# Patient Record
Sex: FEMALE | Race: WHITE | NOT HISPANIC OR LATINO | Employment: OTHER | URBAN - METROPOLITAN AREA
[De-identification: names, ages, dates, MRNs, and addresses within clinical notes are randomized per-mention and may not be internally consistent; named-entity substitution may affect disease eponyms.]

---

## 2017-07-12 ENCOUNTER — CONVERSION ENCOUNTER (OUTPATIENT)
Dept: GENERAL RADIOLOGY | Facility: HOSPITAL | Age: 72
End: 2017-07-12

## 2018-01-29 ENCOUNTER — OFFICE VISIT CONVERTED (OUTPATIENT)
Dept: FAMILY MEDICINE CLINIC | Facility: CLINIC | Age: 73
End: 2018-01-29
Attending: NURSE PRACTITIONER

## 2018-01-29 ENCOUNTER — CONVERSION ENCOUNTER (OUTPATIENT)
Dept: FAMILY MEDICINE CLINIC | Facility: CLINIC | Age: 73
End: 2018-01-29

## 2018-02-15 ENCOUNTER — OFFICE VISIT CONVERTED (OUTPATIENT)
Dept: NEUROSURGERY | Facility: CLINIC | Age: 73
End: 2018-02-15
Attending: PHYSICIAN ASSISTANT

## 2018-04-24 ENCOUNTER — OFFICE VISIT CONVERTED (OUTPATIENT)
Dept: FAMILY MEDICINE CLINIC | Facility: CLINIC | Age: 73
End: 2018-04-24
Attending: FAMILY MEDICINE

## 2018-04-24 ENCOUNTER — CONVERSION ENCOUNTER (OUTPATIENT)
Dept: FAMILY MEDICINE CLINIC | Facility: CLINIC | Age: 73
End: 2018-04-24

## 2018-06-13 ENCOUNTER — OFFICE VISIT CONVERTED (OUTPATIENT)
Dept: SURGERY | Facility: CLINIC | Age: 73
End: 2018-06-13
Attending: SURGERY

## 2018-07-13 ENCOUNTER — CONVERSION ENCOUNTER (OUTPATIENT)
Dept: GENERAL RADIOLOGY | Facility: HOSPITAL | Age: 73
End: 2018-07-13

## 2018-08-27 ENCOUNTER — OFFICE VISIT CONVERTED (OUTPATIENT)
Dept: SURGERY | Facility: CLINIC | Age: 73
End: 2018-08-27
Attending: SURGERY

## 2018-10-25 ENCOUNTER — CONVERSION ENCOUNTER (OUTPATIENT)
Dept: FAMILY MEDICINE CLINIC | Facility: CLINIC | Age: 73
End: 2018-10-25

## 2018-10-25 ENCOUNTER — OFFICE VISIT CONVERTED (OUTPATIENT)
Dept: FAMILY MEDICINE CLINIC | Facility: CLINIC | Age: 73
End: 2018-10-25
Attending: FAMILY MEDICINE

## 2018-11-01 ENCOUNTER — CONVERSION ENCOUNTER (OUTPATIENT)
Dept: SURGERY | Facility: CLINIC | Age: 73
End: 2018-11-01

## 2018-11-01 ENCOUNTER — OFFICE VISIT CONVERTED (OUTPATIENT)
Dept: SURGERY | Facility: CLINIC | Age: 73
End: 2018-11-01
Attending: PHYSICIAN ASSISTANT

## 2018-11-13 ENCOUNTER — OFFICE VISIT CONVERTED (OUTPATIENT)
Dept: UROLOGY | Facility: CLINIC | Age: 73
End: 2018-11-13
Attending: UROLOGY

## 2018-11-13 ENCOUNTER — CONVERSION ENCOUNTER (OUTPATIENT)
Dept: SURGERY | Facility: CLINIC | Age: 73
End: 2018-11-13

## 2018-12-11 ENCOUNTER — OFFICE VISIT CONVERTED (OUTPATIENT)
Dept: FAMILY MEDICINE CLINIC | Facility: CLINIC | Age: 73
End: 2018-12-11
Attending: NURSE PRACTITIONER

## 2019-01-10 ENCOUNTER — CONVERSION ENCOUNTER (OUTPATIENT)
Dept: SURGERY | Facility: CLINIC | Age: 74
End: 2019-01-10

## 2019-01-10 ENCOUNTER — PROCEDURE VISIT CONVERTED (OUTPATIENT)
Dept: UROLOGY | Facility: CLINIC | Age: 74
End: 2019-01-10
Attending: UROLOGY

## 2019-01-25 ENCOUNTER — CONVERSION ENCOUNTER (OUTPATIENT)
Dept: FAMILY MEDICINE CLINIC | Facility: CLINIC | Age: 74
End: 2019-01-25

## 2019-01-25 ENCOUNTER — OFFICE VISIT CONVERTED (OUTPATIENT)
Dept: FAMILY MEDICINE CLINIC | Facility: CLINIC | Age: 74
End: 2019-01-25
Attending: NURSE PRACTITIONER

## 2019-03-25 ENCOUNTER — OFFICE VISIT CONVERTED (OUTPATIENT)
Dept: FAMILY MEDICINE CLINIC | Facility: CLINIC | Age: 74
End: 2019-03-25
Attending: NURSE PRACTITIONER

## 2019-03-25 ENCOUNTER — CONVERSION ENCOUNTER (OUTPATIENT)
Dept: FAMILY MEDICINE CLINIC | Facility: CLINIC | Age: 74
End: 2019-03-25

## 2019-03-27 ENCOUNTER — HOSPITAL ENCOUNTER (OUTPATIENT)
Dept: GENERAL RADIOLOGY | Facility: HOSPITAL | Age: 74
Discharge: HOME | End: 2019-03-27
Attending: NURSE PRACTITIONER

## 2019-04-25 ENCOUNTER — HOSPITAL ENCOUNTER (OUTPATIENT)
Dept: LAB | Facility: HOSPITAL | Age: 74
Discharge: HOME | End: 2019-04-25
Attending: FAMILY MEDICINE

## 2019-04-25 LAB
25(OH)D3 SERPL-MCNC: 47.9 NG/ML (ref 30–100)
ALBUMIN SERPL-MCNC: 4.2 G/DL (ref 3.5–5)
ALBUMIN/GLOB SERPL: 1.2 {RATIO} (ref 1.4–2.6)
ALP SERPL-CCNC: 128 U/L (ref 43–160)
ALT SERPL-CCNC: 14 U/L (ref 10–40)
ANION GAP SERPL CALC-SCNC: 14 MMOL/L (ref 8–19)
APPEARANCE UR: CLEAR
AST SERPL-CCNC: 19 U/L (ref 15–50)
BASOPHILS # BLD AUTO: 0.07 10*3/UL (ref 0–0.2)
BASOPHILS NFR BLD AUTO: 0.8 % (ref 0–3)
BILIRUB SERPL-MCNC: 0.5 MG/DL (ref 0.2–1.3)
BILIRUB UR QL: ABNORMAL
BUN SERPL-MCNC: 16 MG/DL (ref 5–25)
BUN/CREAT SERPL: 17 {RATIO} (ref 6–20)
CALCIUM SERPL-MCNC: 9.2 MG/DL (ref 8.7–10.4)
CHLORIDE SERPL-SCNC: 102 MMOL/L (ref 99–111)
CHOLEST SERPL-MCNC: 156 MG/DL (ref 107–200)
CHOLEST/HDLC SERPL: 4 {RATIO} (ref 3–6)
COLOR UR: ABNORMAL
CONV ABS IMM GRAN: 0.02 10*3/UL (ref 0–0.2)
CONV BACTERIA: NEGATIVE
CONV CO2: 26 MMOL/L (ref 22–32)
CONV COLLECTION SOURCE (UA): ABNORMAL
CONV HYALINE CASTS IN URINE MICRO: ABNORMAL /[LPF]
CONV IMMATURE GRAN: 0.2 % (ref 0–1.8)
CONV TOTAL PROTEIN: 7.7 G/DL (ref 6.3–8.2)
CONV UROBILINOGEN IN URINE BY AUTOMATED TEST STRIP: 1 {EHRLICHU}/DL (ref 0.1–1)
CREAT UR-MCNC: 0.92 MG/DL (ref 0.5–0.9)
DEPRECATED RDW RBC AUTO: 49.8 FL (ref 36.4–46.3)
EOSINOPHIL # BLD AUTO: 0.01 10*3/UL (ref 0–0.7)
EOSINOPHIL # BLD AUTO: 0.1 % (ref 0–7)
ERYTHROCYTE [DISTWIDTH] IN BLOOD BY AUTOMATED COUNT: 14.5 % (ref 11.7–14.4)
FOLATE SERPL-MCNC: 10.3 NG/ML (ref 4.8–20)
GFR SERPLBLD BASED ON 1.73 SQ M-ARVRAT: >60 ML/MIN/{1.73_M2}
GLOBULIN UR ELPH-MCNC: 3.5 G/DL (ref 2–3.5)
GLUCOSE SERPL-MCNC: 96 MG/DL (ref 65–99)
GLUCOSE UR QL: NEGATIVE MG/DL
HBA1C MFR BLD: 15.6 G/DL (ref 12–16)
HCT VFR BLD AUTO: 48.8 % (ref 37–47)
HDLC SERPL-MCNC: 39 MG/DL (ref 40–60)
HGB UR QL STRIP: ABNORMAL
KETONES UR QL STRIP: NEGATIVE MG/DL
LDLC SERPL CALC-MCNC: 83 MG/DL (ref 70–100)
LEUKOCYTE ESTERASE UR QL STRIP: NEGATIVE
LYMPHOCYTES # BLD AUTO: 1.35 10*3/UL (ref 1–5)
MCH RBC QN AUTO: 29.5 PG (ref 27–31)
MCHC RBC AUTO-ENTMCNC: 32 G/DL (ref 33–37)
MCV RBC AUTO: 92.4 FL (ref 81–99)
MONOCYTES # BLD AUTO: 0.38 10*3/UL (ref 0.2–1.2)
MONOCYTES NFR BLD AUTO: 4.3 % (ref 3–10)
NEUTROPHILS # BLD AUTO: 7.01 10*3/UL (ref 2–8)
NEUTROPHILS NFR BLD AUTO: 79.3 % (ref 30–85)
NITRITE UR QL STRIP: NEGATIVE
NRBC CBCN: 0 % (ref 0–0.7)
OSMOLALITY SERPL CALC.SUM OF ELEC: 287 MOSM/KG (ref 273–304)
PH UR STRIP.AUTO: 5 [PH] (ref 5–8)
PLATELET # BLD AUTO: 164 10*3/UL (ref 130–400)
PMV BLD AUTO: 12.1 FL (ref 9.4–12.3)
POTASSIUM SERPL-SCNC: 4.4 MMOL/L (ref 3.5–5.3)
PROT UR QL: NEGATIVE MG/DL
RBC # BLD AUTO: 5.28 10*6/UL (ref 4.2–5.4)
RBC #/AREA URNS HPF: ABNORMAL /[HPF]
SODIUM SERPL-SCNC: 138 MMOL/L (ref 135–147)
SP GR UR: 1.02 (ref 1–1.03)
SQUAMOUS SPT QL MICRO: ABNORMAL /[HPF]
TRIGL SERPL-MCNC: 169 MG/DL (ref 40–150)
TSH SERPL-ACNC: 1.79 M[IU]/L (ref 0.27–4.2)
VARIANT LYMPHS NFR BLD MANUAL: 15.3 % (ref 20–45)
VIT B12 SERPL-MCNC: 631 PG/ML (ref 211–911)
VLDLC SERPL-MCNC: 34 MG/DL (ref 5–37)
WBC # BLD AUTO: 8.84 10*3/UL (ref 4.8–10.8)
WBC #/AREA URNS HPF: ABNORMAL /[HPF]

## 2019-04-30 ENCOUNTER — OFFICE VISIT CONVERTED (OUTPATIENT)
Dept: FAMILY MEDICINE CLINIC | Facility: CLINIC | Age: 74
End: 2019-04-30
Attending: FAMILY MEDICINE

## 2019-04-30 ENCOUNTER — HOSPITAL ENCOUNTER (OUTPATIENT)
Dept: LAB | Facility: HOSPITAL | Age: 74
Discharge: HOME | End: 2019-04-30
Attending: FAMILY MEDICINE

## 2019-05-01 LAB — HCV AB SER DONR QL: <0.1 S/CO RATIO (ref 0–0.9)

## 2019-05-03 ENCOUNTER — HOSPITAL ENCOUNTER (OUTPATIENT)
Dept: GENERAL RADIOLOGY | Facility: HOSPITAL | Age: 74
Discharge: HOME | End: 2019-05-03
Attending: FAMILY MEDICINE

## 2019-07-29 ENCOUNTER — OFFICE VISIT CONVERTED (OUTPATIENT)
Dept: FAMILY MEDICINE CLINIC | Facility: CLINIC | Age: 74
End: 2019-07-29
Attending: FAMILY MEDICINE

## 2019-09-16 ENCOUNTER — HOSPITAL ENCOUNTER (OUTPATIENT)
Dept: FAMILY MEDICINE CLINIC | Facility: CLINIC | Age: 74
Discharge: HOME | End: 2019-09-16
Attending: FAMILY MEDICINE

## 2019-09-16 ENCOUNTER — OFFICE VISIT CONVERTED (OUTPATIENT)
Dept: FAMILY MEDICINE CLINIC | Facility: CLINIC | Age: 74
End: 2019-09-16
Attending: FAMILY MEDICINE

## 2019-09-19 LAB
CONV LAST MENSTURAL PERIOD: NORMAL
SPECIMEN SOURCE: NORMAL
SPECIMEN SOURCE: NORMAL
THIN PREP CVX: NORMAL

## 2019-10-01 ENCOUNTER — OFFICE VISIT CONVERTED (OUTPATIENT)
Dept: FAMILY MEDICINE CLINIC | Facility: CLINIC | Age: 74
End: 2019-10-01
Attending: FAMILY MEDICINE

## 2019-10-02 ENCOUNTER — HOSPITAL ENCOUNTER (OUTPATIENT)
Dept: LAB | Facility: HOSPITAL | Age: 74
Discharge: HOME | End: 2019-10-02
Attending: FAMILY MEDICINE

## 2019-10-02 LAB
C DIFF TOX B STL QL CT TISS CULT: NEGATIVE
CONV 027 TOXIN: NEGATIVE

## 2019-10-04 LAB — BACTERIA SPEC AEROBE CULT: NORMAL

## 2019-10-08 ENCOUNTER — HOSPITAL ENCOUNTER (OUTPATIENT)
Dept: GENERAL RADIOLOGY | Facility: HOSPITAL | Age: 74
Discharge: HOME | End: 2019-10-08
Attending: FAMILY MEDICINE

## 2019-10-08 LAB
CREAT BLD-MCNC: 0.6 MG/DL (ref 0.6–1.4)
GFR SERPLBLD BASED ON 1.73 SQ M-ARVRAT: >60 ML/MIN/{1.73_M2}

## 2019-11-27 ENCOUNTER — OFFICE VISIT CONVERTED (OUTPATIENT)
Dept: GASTROENTEROLOGY | Facility: CLINIC | Age: 74
End: 2019-11-27
Attending: PHYSICIAN ASSISTANT

## 2019-12-18 ENCOUNTER — HOSPITAL ENCOUNTER (OUTPATIENT)
Dept: GASTROENTEROLOGY | Facility: HOSPITAL | Age: 74
Setting detail: HOSPITAL OUTPATIENT SURGERY
Discharge: HOME | End: 2019-12-18
Attending: INTERNAL MEDICINE

## 2020-03-02 ENCOUNTER — HOSPITAL ENCOUNTER (OUTPATIENT)
Dept: GENERAL RADIOLOGY | Facility: HOSPITAL | Age: 75
Discharge: HOME | End: 2020-03-02
Attending: FAMILY MEDICINE

## 2020-03-16 ENCOUNTER — OFFICE VISIT CONVERTED (OUTPATIENT)
Dept: FAMILY MEDICINE CLINIC | Facility: CLINIC | Age: 75
End: 2020-03-16
Attending: FAMILY MEDICINE

## 2020-03-16 ENCOUNTER — CONVERSION ENCOUNTER (OUTPATIENT)
Dept: FAMILY MEDICINE CLINIC | Facility: CLINIC | Age: 75
End: 2020-03-16

## 2020-06-11 ENCOUNTER — HOSPITAL ENCOUNTER (OUTPATIENT)
Dept: LAB | Facility: HOSPITAL | Age: 75
Discharge: HOME | End: 2020-06-11
Attending: FAMILY MEDICINE

## 2020-06-11 LAB
25(OH)D3 SERPL-MCNC: 34.9 NG/ML (ref 30–100)
ALBUMIN SERPL-MCNC: 3.9 G/DL (ref 3.5–5)
ALBUMIN/GLOB SERPL: 1.3 {RATIO} (ref 1.4–2.6)
ALP SERPL-CCNC: 93 U/L (ref 43–160)
ALT SERPL-CCNC: 8 U/L (ref 10–40)
ANION GAP SERPL CALC-SCNC: 21 MMOL/L (ref 8–19)
APPEARANCE UR: CLEAR
AST SERPL-CCNC: 14 U/L (ref 15–50)
BASOPHILS # BLD AUTO: 0.04 10*3/UL (ref 0–0.2)
BASOPHILS NFR BLD AUTO: 0.6 % (ref 0–3)
BILIRUB SERPL-MCNC: 0.34 MG/DL (ref 0.2–1.3)
BILIRUB UR QL: ABNORMAL
BUN SERPL-MCNC: 9 MG/DL (ref 5–25)
BUN/CREAT SERPL: 10 {RATIO} (ref 6–20)
CALCIUM SERPL-MCNC: 9 MG/DL (ref 8.7–10.4)
CHLORIDE SERPL-SCNC: 105 MMOL/L (ref 99–111)
CHOLEST SERPL-MCNC: 110 MG/DL (ref 107–200)
CHOLEST/HDLC SERPL: 2.9 {RATIO} (ref 3–6)
COLOR UR: ABNORMAL
CONV ABS IMM GRAN: 0.02 10*3/UL (ref 0–0.2)
CONV BACTERIA: NEGATIVE
CONV CO2: 19 MMOL/L (ref 22–32)
CONV COLLECTION SOURCE (UA): ABNORMAL
CONV CRYSTALS: ABNORMAL /[HPF]
CONV HYALINE CASTS IN URINE MICRO: ABNORMAL /[LPF]
CONV IMMATURE GRAN: 0.3 % (ref 0–1.8)
CONV TOTAL PROTEIN: 7 G/DL (ref 6.3–8.2)
CONV UROBILINOGEN IN URINE BY AUTOMATED TEST STRIP: 1 {EHRLICHU}/DL (ref 0.1–1)
CREAT UR-MCNC: 0.88 MG/DL (ref 0.5–0.9)
DEPRECATED RDW RBC AUTO: 50.1 FL (ref 36.4–46.3)
EOSINOPHIL # BLD AUTO: 0.01 10*3/UL (ref 0–0.7)
EOSINOPHIL # BLD AUTO: 0.2 % (ref 0–7)
ERYTHROCYTE [DISTWIDTH] IN BLOOD BY AUTOMATED COUNT: 14.6 % (ref 11.7–14.4)
FOLATE SERPL-MCNC: 8.7 NG/ML (ref 4.8–20)
GFR SERPLBLD BASED ON 1.73 SQ M-ARVRAT: >60 ML/MIN/{1.73_M2}
GLOBULIN UR ELPH-MCNC: 3.1 G/DL (ref 2–3.5)
GLUCOSE SERPL-MCNC: 102 MG/DL (ref 65–99)
GLUCOSE UR QL: NEGATIVE MG/DL
HCT VFR BLD AUTO: 45.3 % (ref 37–47)
HDLC SERPL-MCNC: 38 MG/DL (ref 40–60)
HGB BLD-MCNC: 14.6 G/DL (ref 12–16)
HGB UR QL STRIP: ABNORMAL
KETONES UR QL STRIP: NEGATIVE MG/DL
LDLC SERPL CALC-MCNC: 54 MG/DL (ref 70–100)
LEUKOCYTE ESTERASE UR QL STRIP: ABNORMAL
LYMPHOCYTES # BLD AUTO: 1.47 10*3/UL (ref 1–5)
LYMPHOCYTES NFR BLD AUTO: 23 % (ref 20–45)
MCH RBC QN AUTO: 30 PG (ref 27–31)
MCHC RBC AUTO-ENTMCNC: 32.2 G/DL (ref 33–37)
MCV RBC AUTO: 93.2 FL (ref 81–99)
MONOCYTES # BLD AUTO: 0.41 10*3/UL (ref 0.2–1.2)
MONOCYTES NFR BLD AUTO: 6.4 % (ref 3–10)
NEUTROPHILS # BLD AUTO: 4.45 10*3/UL (ref 2–8)
NEUTROPHILS NFR BLD AUTO: 69.5 % (ref 30–85)
NITRITE UR QL STRIP: POSITIVE
NRBC CBCN: 0 % (ref 0–0.7)
OSMOLALITY SERPL CALC.SUM OF ELEC: 291 MOSM/KG (ref 273–304)
PH UR STRIP.AUTO: 5.5 [PH] (ref 5–8)
PLATELET # BLD AUTO: 164 10*3/UL (ref 130–400)
PMV BLD AUTO: 11.9 FL (ref 9.4–12.3)
POTASSIUM SERPL-SCNC: 3.8 MMOL/L (ref 3.5–5.3)
PROT UR QL: ABNORMAL MG/DL
RBC # BLD AUTO: 4.86 10*6/UL (ref 4.2–5.4)
RBC #/AREA URNS HPF: ABNORMAL /[HPF]
SODIUM SERPL-SCNC: 141 MMOL/L (ref 135–147)
SP GR UR: 1.02 (ref 1–1.03)
SQUAMOUS SPT QL MICRO: ABNORMAL /[HPF]
TRIGL SERPL-MCNC: 91 MG/DL (ref 40–150)
TSH SERPL-ACNC: 2.31 M[IU]/L (ref 0.27–4.2)
VIT B12 SERPL-MCNC: 556 PG/ML (ref 211–911)
VLDLC SERPL-MCNC: 18 MG/DL (ref 5–37)
WBC # BLD AUTO: 6.4 10*3/UL (ref 4.8–10.8)
WBC #/AREA URNS HPF: ABNORMAL /[HPF]

## 2020-06-18 ENCOUNTER — OFFICE VISIT CONVERTED (OUTPATIENT)
Dept: FAMILY MEDICINE CLINIC | Facility: CLINIC | Age: 75
End: 2020-06-18
Attending: FAMILY MEDICINE

## 2020-06-18 ENCOUNTER — HOSPITAL ENCOUNTER (OUTPATIENT)
Dept: FAMILY MEDICINE CLINIC | Facility: CLINIC | Age: 75
Discharge: HOME | End: 2020-06-18
Attending: FAMILY MEDICINE

## 2020-06-18 LAB
APPEARANCE UR: ABNORMAL
BILIRUB UR QL: ABNORMAL
COLOR UR: ABNORMAL
CONV BACTERIA: ABNORMAL
CONV COLLECTION SOURCE (UA): ABNORMAL
CONV CRYSTALS: ABNORMAL /[HPF]
CONV UROBILINOGEN IN URINE BY AUTOMATED TEST STRIP: 1 {EHRLICHU}/DL (ref 0.1–1)
GLUCOSE UR QL: NEGATIVE MG/DL
HGB UR QL STRIP: NEGATIVE
KETONES UR QL STRIP: ABNORMAL MG/DL
LEUKOCYTE ESTERASE UR QL STRIP: ABNORMAL
MUCOUS THREADS URNS QL MICRO: ABNORMAL
NITRITE UR QL STRIP: NEGATIVE
PH UR STRIP.AUTO: 5 [PH] (ref 5–8)
PROT UR QL: ABNORMAL MG/DL
RBC #/AREA URNS HPF: ABNORMAL /[HPF]
SP GR UR: 1.03 (ref 1–1.03)
SQUAMOUS SPT QL MICRO: ABNORMAL /[HPF]
WBC #/AREA URNS HPF: ABNORMAL /[HPF]

## 2020-06-20 LAB — BACTERIA UR CULT: NORMAL

## 2020-07-15 ENCOUNTER — HOSPITAL ENCOUNTER (OUTPATIENT)
Dept: GENERAL RADIOLOGY | Facility: HOSPITAL | Age: 75
Discharge: HOME | End: 2020-07-15
Attending: FAMILY MEDICINE

## 2020-08-24 ENCOUNTER — OFFICE VISIT CONVERTED (OUTPATIENT)
Dept: FAMILY MEDICINE CLINIC | Facility: CLINIC | Age: 75
End: 2020-08-24
Attending: NURSE PRACTITIONER

## 2020-08-24 ENCOUNTER — CONVERSION ENCOUNTER (OUTPATIENT)
Dept: FAMILY MEDICINE CLINIC | Facility: CLINIC | Age: 75
End: 2020-08-24

## 2020-09-18 ENCOUNTER — HOSPITAL ENCOUNTER (OUTPATIENT)
Dept: LAB | Facility: HOSPITAL | Age: 75
Discharge: HOME | End: 2020-09-18
Attending: FAMILY MEDICINE

## 2020-09-19 LAB
25(OH)D3 SERPL-MCNC: 28.4 NG/ML (ref 30–100)
ALBUMIN SERPL-MCNC: 4.1 G/DL (ref 3.5–5)
ALBUMIN/GLOB SERPL: 1.2 {RATIO} (ref 1.4–2.6)
ALP SERPL-CCNC: 106 U/L (ref 43–160)
ALT SERPL-CCNC: 15 U/L (ref 10–40)
ANION GAP SERPL CALC-SCNC: 15 MMOL/L (ref 8–19)
AST SERPL-CCNC: 19 U/L (ref 15–50)
BASOPHILS # BLD AUTO: 0.04 10*3/UL (ref 0–0.2)
BASOPHILS NFR BLD AUTO: 0.6 % (ref 0–3)
BILIRUB SERPL-MCNC: 0.3 MG/DL (ref 0.2–1.3)
BUN SERPL-MCNC: 18 MG/DL (ref 5–25)
BUN/CREAT SERPL: 19 {RATIO} (ref 6–20)
CALCIUM SERPL-MCNC: 9.6 MG/DL (ref 8.7–10.4)
CHLORIDE SERPL-SCNC: 103 MMOL/L (ref 99–111)
CHOLEST SERPL-MCNC: 169 MG/DL (ref 107–200)
CHOLEST/HDLC SERPL: 5 {RATIO} (ref 3–6)
CONV ABS IMM GRAN: 0.02 10*3/UL (ref 0–0.2)
CONV CO2: 26 MMOL/L (ref 22–32)
CONV IMMATURE GRAN: 0.3 % (ref 0–1.8)
CONV TOTAL PROTEIN: 7.5 G/DL (ref 6.3–8.2)
CREAT UR-MCNC: 0.93 MG/DL (ref 0.5–0.9)
DEPRECATED RDW RBC AUTO: 49.4 FL (ref 36.4–46.3)
EOSINOPHIL # BLD AUTO: 0.01 10*3/UL (ref 0–0.7)
EOSINOPHIL # BLD AUTO: 0.2 % (ref 0–7)
ERYTHROCYTE [DISTWIDTH] IN BLOOD BY AUTOMATED COUNT: 13.9 % (ref 11.7–14.4)
FOLATE SERPL-MCNC: 6.3 NG/ML (ref 4.8–20)
GFR SERPLBLD BASED ON 1.73 SQ M-ARVRAT: >60 ML/MIN/{1.73_M2}
GLOBULIN UR ELPH-MCNC: 3.4 G/DL (ref 2–3.5)
GLUCOSE SERPL-MCNC: 108 MG/DL (ref 65–99)
HCT VFR BLD AUTO: 46.6 % (ref 37–47)
HDLC SERPL-MCNC: 34 MG/DL (ref 40–60)
HGB BLD-MCNC: 14.7 G/DL (ref 12–16)
LDLC SERPL CALC-MCNC: 107 MG/DL (ref 70–100)
LYMPHOCYTES # BLD AUTO: 1.18 10*3/UL (ref 1–5)
LYMPHOCYTES NFR BLD AUTO: 18.6 % (ref 20–45)
MCH RBC QN AUTO: 30.4 PG (ref 27–31)
MCHC RBC AUTO-ENTMCNC: 31.5 G/DL (ref 33–37)
MCV RBC AUTO: 96.5 FL (ref 81–99)
MONOCYTES # BLD AUTO: 0.26 10*3/UL (ref 0.2–1.2)
MONOCYTES NFR BLD AUTO: 4.1 % (ref 3–10)
NEUTROPHILS # BLD AUTO: 4.83 10*3/UL (ref 2–8)
NEUTROPHILS NFR BLD AUTO: 76.2 % (ref 30–85)
NRBC CBCN: 0 % (ref 0–0.7)
OSMOLALITY SERPL CALC.SUM OF ELEC: 292 MOSM/KG (ref 273–304)
PLATELET # BLD AUTO: 218 10*3/UL (ref 130–400)
PMV BLD AUTO: 11.9 FL (ref 9.4–12.3)
POTASSIUM SERPL-SCNC: 4.1 MMOL/L (ref 3.5–5.3)
RBC # BLD AUTO: 4.83 10*6/UL (ref 4.2–5.4)
SODIUM SERPL-SCNC: 140 MMOL/L (ref 135–147)
TRIGL SERPL-MCNC: 138 MG/DL (ref 40–150)
TSH SERPL-ACNC: 1.2 M[IU]/L (ref 0.27–4.2)
VIT B12 SERPL-MCNC: 494 PG/ML (ref 211–911)
VLDLC SERPL-MCNC: 28 MG/DL (ref 5–37)
WBC # BLD AUTO: 6.34 10*3/UL (ref 4.8–10.8)

## 2020-09-29 ENCOUNTER — OFFICE VISIT CONVERTED (OUTPATIENT)
Dept: FAMILY MEDICINE CLINIC | Facility: CLINIC | Age: 75
End: 2020-09-29
Attending: FAMILY MEDICINE

## 2020-11-02 ENCOUNTER — HOSPITAL ENCOUNTER (OUTPATIENT)
Dept: GENERAL RADIOLOGY | Facility: HOSPITAL | Age: 75
Discharge: HOME | End: 2020-11-02
Attending: FAMILY MEDICINE

## 2020-11-02 LAB
CREAT BLD-MCNC: 0.7 MG/DL (ref 0.6–1.4)
GFR SERPLBLD BASED ON 1.73 SQ M-ARVRAT: >60 ML/MIN/{1.73_M2}

## 2020-11-09 ENCOUNTER — HOSPITAL ENCOUNTER (OUTPATIENT)
Dept: CARDIOLOGY | Facility: HOSPITAL | Age: 75
Discharge: HOME | End: 2020-11-09
Attending: SURGERY

## 2020-11-13 ENCOUNTER — HOSPITAL ENCOUNTER (OUTPATIENT)
Dept: GENERAL RADIOLOGY | Facility: HOSPITAL | Age: 75
Discharge: HOME | End: 2020-11-13
Attending: SURGERY

## 2020-12-15 ENCOUNTER — HOSPITAL ENCOUNTER (OUTPATIENT)
Dept: GENERAL RADIOLOGY | Facility: HOSPITAL | Age: 75
Discharge: HOME | End: 2020-12-15
Attending: FAMILY MEDICINE

## 2020-12-15 ENCOUNTER — OFFICE VISIT CONVERTED (OUTPATIENT)
Dept: FAMILY MEDICINE CLINIC | Facility: CLINIC | Age: 75
End: 2020-12-15
Attending: FAMILY MEDICINE

## 2020-12-17 ENCOUNTER — HOSPITAL ENCOUNTER (OUTPATIENT)
Dept: FAMILY MEDICINE CLINIC | Facility: CLINIC | Age: 75
Discharge: HOME | End: 2020-12-17
Attending: FAMILY MEDICINE

## 2020-12-19 LAB
AMPICILLIN SUSC ISLT: >=32
AMPICILLIN+SULBAC SUSC ISLT: >=32
BACTERIA UR CULT: ABNORMAL
CEFAZOLIN SUSC ISLT: <=4
CEFEPIME SUSC ISLT: <=0.12
CEFTAZIDIME SUSC ISLT: <=1
CEFTRIAXONE SUSC ISLT: <=0.25
CIPROFLOXACIN SUSC ISLT: >=4
ERTAPENEM SUSC ISLT: <=0.12
GENTAMICIN SUSC ISLT: <=1
LEVOFLOXACIN SUSC ISLT: >=8
NITROFURANTOIN SUSC ISLT: <=16
PIP+TAZO SUSC ISLT: <=4
TMP SMX SUSC ISLT: <=20
TOBRAMYCIN SUSC ISLT: <=1

## 2020-12-30 ENCOUNTER — HOSPITAL ENCOUNTER (OUTPATIENT)
Dept: LAB | Facility: HOSPITAL | Age: 75
Discharge: HOME | End: 2020-12-30
Attending: FAMILY MEDICINE

## 2020-12-30 LAB
ALBUMIN SERPL-MCNC: 3.8 G/DL (ref 3.5–5)
ALBUMIN/GLOB SERPL: 1.2 {RATIO} (ref 1.4–2.6)
ALP SERPL-CCNC: 153 U/L (ref 43–160)
ALT SERPL-CCNC: 12 U/L (ref 10–40)
ANION GAP SERPL CALC-SCNC: 18 MMOL/L (ref 8–19)
APPEARANCE UR: ABNORMAL
AST SERPL-CCNC: 15 U/L (ref 15–50)
BASOPHILS # BLD AUTO: 0.03 10*3/UL (ref 0–0.2)
BASOPHILS NFR BLD AUTO: 0.3 % (ref 0–3)
BILIRUB SERPL-MCNC: 0.44 MG/DL (ref 0.2–1.3)
BILIRUB UR QL: ABNORMAL
BUN SERPL-MCNC: 13 MG/DL (ref 5–25)
BUN/CREAT SERPL: 15 {RATIO} (ref 6–20)
CALCIUM SERPL-MCNC: 9.3 MG/DL (ref 8.7–10.4)
CASTS URNS QL MICRO: ABNORMAL /[LPF]
CHLORIDE SERPL-SCNC: 101 MMOL/L (ref 99–111)
CHOLEST SERPL-MCNC: 163 MG/DL (ref 107–200)
CHOLEST/HDLC SERPL: 4.4 {RATIO} (ref 3–6)
COLOR UR: ABNORMAL
CONV ABS IMM GRAN: 0.02 10*3/UL (ref 0–0.2)
CONV BACTERIA: NEGATIVE
CONV CO2: 22 MMOL/L (ref 22–32)
CONV COLLECTION SOURCE (UA): ABNORMAL
CONV CRYSTALS: ABNORMAL /[HPF]
CONV HYALINE CASTS IN URINE MICRO: ABNORMAL /[LPF]
CONV IMMATURE GRAN: 0.2 % (ref 0–1.8)
CONV TOTAL PROTEIN: 7 G/DL (ref 6.3–8.2)
CONV UROBILINOGEN IN URINE BY AUTOMATED TEST STRIP: 1 {EHRLICHU}/DL (ref 0.1–1)
CREAT UR-MCNC: 0.88 MG/DL (ref 0.5–0.9)
DEPRECATED RDW RBC AUTO: 46.6 FL (ref 36.4–46.3)
EOSINOPHIL # BLD AUTO: 0.01 10*3/UL (ref 0–0.7)
EOSINOPHIL # BLD AUTO: 0.1 % (ref 0–7)
ERYTHROCYTE [DISTWIDTH] IN BLOOD BY AUTOMATED COUNT: 14 % (ref 11.7–14.4)
FOLATE SERPL-MCNC: 4.7 NG/ML (ref 4.8–20)
GFR SERPLBLD BASED ON 1.73 SQ M-ARVRAT: >60 ML/MIN/{1.73_M2}
GLOBULIN UR ELPH-MCNC: 3.2 G/DL (ref 2–3.5)
GLUCOSE SERPL-MCNC: 135 MG/DL (ref 65–99)
GLUCOSE UR QL: NEGATIVE MG/DL
HCT VFR BLD AUTO: 47.3 % (ref 37–47)
HDLC SERPL-MCNC: 37 MG/DL (ref 40–60)
HGB BLD-MCNC: 15.3 G/DL (ref 12–16)
HGB UR QL STRIP: ABNORMAL
KETONES UR QL STRIP: NEGATIVE MG/DL
LDLC SERPL CALC-MCNC: 93 MG/DL (ref 70–100)
LEUKOCYTE ESTERASE UR QL STRIP: ABNORMAL
LYMPHOCYTES # BLD AUTO: 1.15 10*3/UL (ref 1–5)
LYMPHOCYTES NFR BLD AUTO: 13.4 % (ref 20–45)
MCH RBC QN AUTO: 29.4 PG (ref 27–31)
MCHC RBC AUTO-ENTMCNC: 32.3 G/DL (ref 33–37)
MCV RBC AUTO: 90.8 FL (ref 81–99)
MONOCYTES # BLD AUTO: 0.34 10*3/UL (ref 0.2–1.2)
MONOCYTES NFR BLD AUTO: 4 % (ref 3–10)
NEUTROPHILS # BLD AUTO: 7.03 10*3/UL (ref 2–8)
NEUTROPHILS NFR BLD AUTO: 82 % (ref 30–85)
NITRITE UR QL STRIP: NEGATIVE
NRBC CBCN: 0 % (ref 0–0.7)
OSMOLALITY SERPL CALC.SUM OF ELEC: 286 MOSM/KG (ref 273–304)
PH UR STRIP.AUTO: 6 [PH] (ref 5–8)
PLATELET # BLD AUTO: 147 10*3/UL (ref 130–400)
PMV BLD AUTO: 11.9 FL (ref 9.4–12.3)
POTASSIUM SERPL-SCNC: 3.6 MMOL/L (ref 3.5–5.3)
PROT UR QL: 30 MG/DL
RBC # BLD AUTO: 5.21 10*6/UL (ref 4.2–5.4)
RBC #/AREA URNS HPF: ABNORMAL /[HPF]
RENAL EPI CELLS #/AREA URNS HPF: ABNORMAL /[HPF]
SODIUM SERPL-SCNC: 137 MMOL/L (ref 135–147)
SP GR UR: 1.02 (ref 1–1.03)
SQUAMOUS SPT QL MICRO: ABNORMAL /[HPF]
TRIGL SERPL-MCNC: 167 MG/DL (ref 40–150)
TSH SERPL-ACNC: 1.15 M[IU]/L (ref 0.27–4.2)
VIT B12 SERPL-MCNC: 566 PG/ML (ref 211–911)
VLDLC SERPL-MCNC: 33 MG/DL (ref 5–37)
WBC # BLD AUTO: 8.58 10*3/UL (ref 4.8–10.8)
WBC #/AREA URNS HPF: ABNORMAL /[HPF]

## 2020-12-31 LAB — 25(OH)D3 SERPL-MCNC: 29.5 NG/ML (ref 30–100)

## 2021-01-02 LAB
AMPICILLIN SUSC ISLT: >=32
AMPICILLIN+SULBAC SUSC ISLT: >=32
BACTERIA UR CULT: ABNORMAL
CEFAZOLIN SUSC ISLT: <=4
CEFEPIME SUSC ISLT: 2
CEFEPIME SUSC ISLT: <=0.12
CEFTAZIDIME SUSC ISLT: <=1
CEFTAZIDIME SUSC ISLT: <=1
CEFTRIAXONE SUSC ISLT: <=0.25
CIPROFLOXACIN SUSC ISLT: <=0.25
CIPROFLOXACIN SUSC ISLT: >=4
ERTAPENEM SUSC ISLT: <=0.12
GENTAMICIN SUSC ISLT: <=1
GENTAMICIN SUSC ISLT: <=1
LEVOFLOXACIN SUSC ISLT: 1
LEVOFLOXACIN SUSC ISLT: >=8
NITROFURANTOIN SUSC ISLT: <=16
PIP+TAZO SUSC ISLT: <=4
PIP+TAZO SUSC ISLT: <=4
TMP SMX SUSC ISLT: <=20
TOBRAMYCIN SUSC ISLT: <=1
TOBRAMYCIN SUSC ISLT: <=1

## 2021-01-04 ENCOUNTER — OFFICE VISIT CONVERTED (OUTPATIENT)
Dept: FAMILY MEDICINE CLINIC | Facility: CLINIC | Age: 76
End: 2021-01-04
Attending: FAMILY MEDICINE

## 2021-01-04 ENCOUNTER — CONVERSION ENCOUNTER (OUTPATIENT)
Dept: FAMILY MEDICINE CLINIC | Facility: CLINIC | Age: 76
End: 2021-01-04

## 2021-01-18 ENCOUNTER — CONVERSION ENCOUNTER (OUTPATIENT)
Dept: FAMILY MEDICINE CLINIC | Facility: CLINIC | Age: 76
End: 2021-01-18

## 2021-01-18 ENCOUNTER — OFFICE VISIT CONVERTED (OUTPATIENT)
Dept: FAMILY MEDICINE CLINIC | Facility: CLINIC | Age: 76
End: 2021-01-18
Attending: FAMILY MEDICINE

## 2021-01-18 ENCOUNTER — HOSPITAL ENCOUNTER (OUTPATIENT)
Dept: FAMILY MEDICINE CLINIC | Facility: CLINIC | Age: 76
Discharge: HOME | End: 2021-01-18
Attending: FAMILY MEDICINE

## 2021-01-20 LAB — SARS-COV-2 RNA SPEC QL NAA+PROBE: NOT DETECTED

## 2021-01-25 ENCOUNTER — HOSPITAL ENCOUNTER (OUTPATIENT)
Dept: GENERAL RADIOLOGY | Facility: HOSPITAL | Age: 76
Discharge: HOME | End: 2021-01-25
Attending: FAMILY MEDICINE

## 2021-02-09 ENCOUNTER — OFFICE VISIT CONVERTED (OUTPATIENT)
Dept: NEUROSURGERY | Facility: CLINIC | Age: 76
End: 2021-02-09
Attending: PHYSICIAN ASSISTANT

## 2021-02-09 ENCOUNTER — CONVERSION ENCOUNTER (OUTPATIENT)
Dept: NEUROLOGY | Facility: CLINIC | Age: 76
End: 2021-02-09

## 2021-03-09 ENCOUNTER — HOSPITAL ENCOUNTER (OUTPATIENT)
Dept: GENERAL RADIOLOGY | Facility: HOSPITAL | Age: 76
Discharge: HOME | End: 2021-03-09
Attending: PHYSICIAN ASSISTANT

## 2021-03-18 ENCOUNTER — OFFICE VISIT CONVERTED (OUTPATIENT)
Dept: NEUROSURGERY | Facility: CLINIC | Age: 76
End: 2021-03-18
Attending: PHYSICIAN ASSISTANT

## 2021-04-29 ENCOUNTER — OFFICE VISIT CONVERTED (OUTPATIENT)
Dept: FAMILY MEDICINE CLINIC | Facility: CLINIC | Age: 76
End: 2021-04-29
Attending: STUDENT IN AN ORGANIZED HEALTH CARE EDUCATION/TRAINING PROGRAM

## 2021-05-10 NOTE — H&P
History and Physical      Patient Name: Nilda Julien   Patient ID: [id number]   Sex: Female   YOB: 1945    Primary Care Provider: Jevon Gonzalez MD   Referring Provider: Jevon Gonzalez MD    Visit Date: February 9, 2021    Provider: Christi Escamilla PA-C   [facility]   Location Address: [address] [place] [address]  [id number]   Location Phone: [phone]          Chief Complaint  · Low back pain      History Of Present Illness  The patient is a 75 year old /White female who is seen in consultation at the request of Jevon Gonzalez MD for evaluation of T12 compression fracture mid-back region pain. She states the pain is moderate in severity, is constant and has a dull and aching quality. It began following an injury 6 weeks ago. The pain radiates into the right rib cage.   She reports no additional symptoms. The patient has not identified any aggravating factors. The patient Improves some with Hydrocodone   The patient's past medical history is notable for osteoporosis.   Recent Interventions  She has been previously treated with pain medication. The pain medications were effective.   Information Reviewed  The following information was reviewed radiology reports and images. A MRI of the lumbar spine and MRI of the thoracic spine revealed T12 acute/subacute compression with mild retropulsion with chronic T6 and L1 compression fractures.      DEXA scan March 2020 showed osteoporosis.  She was scheduled to have what sounds like prolia but cancelled due to Covid.       Past Medical History  Allergic rhinitis; Aortic stenosis; Arthritis; Back pain; Bone Density Screening; Coronary artery disease; Diarrhea; Diverticulitis; Essential hypertension; Fatigue; Heart Attack; Heart Disease; Heart Murmur; Hemorrhoids; Hip Pain; Hyperlipidemia; Hypertension; Irritable bowel syndrome; Irritable bowel syndrome with diarrhea; Memory loss/Forgetfulness;  Microhematuria; Nausea; Nephrolithiasis; Pap smear for cervical cancer screening; Screening Mammogram         Past Surgical History  cardiac stents; Colonoscopy; EGD; Hernia Repair; Open Heart Surgery         Medication List  Aspir-81 81 mg Oral tablet,delayed release (DR/EC); carvedilol 6.25 mg oral tablet; cilostazol 100 mg oral tablet; Flonase Allergy Relief 50 mcg/actuation nasal spray,suspension; hydrocodone-acetaminophen 7.5-325 mg oral tablet; Lipitor 80 mg oral tablet; Lomotil 2.5-0.025 mg oral tablet; omeprazole 20 mg oral capsule,delayed release(DR/EC); Probiotic oral; tramadol 50 mg oral tablet; Vitamin D3 10 mcg (400 unit) oral capsule; vitamin E 400 unit oral capsule; Zofran 4 mg oral tablet; Zoloft 50 mg oral tablet; Zyrtec 10 mg oral tablet         Allergy List  NO KNOWN DRUG ALLERGIES       Allergies Reconciled  Family Medical History  Stroke; Hypertension; Chronic Obstructive Pulmonary Disease; Heart Attack (MI); Family history of stroke; Family history of heart disease         Social History  Alcohol (Never); lives alone; Retired; Tobacco (Current every day);          Immunizations  Name Date Admin   Influenza Refused   Influenza Refused   Influenza Refused   Influenza 10/19/2015   Influenza 10/28/2013   Pneumococcal 04/17/2015   Pneumococcal 04/17/2015   Td 04/17/2015         Review of Systems  · Constitutional  o Denies  o : chills, excessive sweating, fatigue, fever, sycope/passing out, weight gain, weight loss  · Eyes  o Denies  o : changes in vision, blurry vision, double vision  · HENT  o Admits  o : ringing in the ears, nasal congestion  o Denies  o : loss of hearing, ear aches, sore throat, sinus pain, nose bleeds, seasonal allergies  · Cardiovascular  o Denies  o : blood clots, swollen legs, anemia, easy burising or bleeding, transfusions  · Respiratory  o Denies  o : shortness of breath, dry cough, productive cough, pneumonia, COPD  · Gastrointestinal  o Denies  o : difficulty  swallowing, reflux  · Genitourinary  o Denies  o : incontinence  · Neurologic  o Admits  o : difficulty with sleep  o Denies  o : headache, seizure, stroke, tremor, loss of balance, falls, dizziness/vertigo, numbness/tingling/paresthesia , difficulty with coordination, difficulty with dexterity, weakness  · Musculoskeletal  o Admits  o : low back pain, mid back pain  o Denies  o : neck stiffness/pain, swollen lymph nodes, muscle aches, joint pain, weakness, spasms, sciatica, pain radiating in arm, pain radiating in leg  · Endocrine  o Denies  o : diabetes, thyroid disorder  · Psychiatric  o Denies  o : anxiety, depression  · All Others Negative      Vitals  Date Time BP Position Site L\R Cuff Size HR RR TEMP (F) WT  HT  BMI kg/m2 BSA m2 O2 Sat FR L/min FiO2 HC       02/09/2021 03:10 PM        96.9 73lbs 3oz 5'   14.29 1.19             Physical Examination  · Constitutional  o Appearance  o : well nourished, well developed, alert, oriented, in no acute distress  · Respiratory  o Respiratory Effort  o : breathing unlabored, no accessory muscle use  o Inspection of Chest  o : normal appearance, no retractions  · Cardiovascular  o Peripheral Vascular System  o :   § Extremities  § : no cyanosis, clubbing or edema  · Musculoskeletal  o Spine  o :   § Stability  § : no subluxations present  § Range of Motion  § : range of motion normal  · Neurologic  o Mental Status Examination  o :   § Orientation  § : grossly oriented to person, place and time  o Motor Examination  o :   § RLE Strength  § : strength normal  § RLE Motor Function  § : tone normal, no atrophy, no abnormal movements noted  § LLE Strength  § : strength normal  § LLE Motor Function  § : tone normal, no atrophy, no abnormal movements noted  o Reflexes  o :   § RLE  § : 2/4 knee and ankle reflex, negative clonus, negative hyperreflexia  § LLE  § : 2/4 knee and ankle reflex, negative clonus, negative hyperreflexia  o Sensation  o :   § Light Touch  § : sensation  intact to light touch in extremities  o Gait and Station  o :   § Gait Screening  § : normal gait, able to stand without difficulty  · Psychiatric  o Mood and Affect  o : mood normal, affect appropriate     ttp in the midline at the thoracolumbar junction           Assessment  · Thoracic Compression Fracture     733.13  T12 compression fracture      Plan  · Orders  o Thoracic spine xray 3 views [facility] Preferred View (40221) - 733.13 - 03/09/2021   T12 compression fracture  · Medications  o Medications have been Reconciled  o Transition of Care or Provider Policy  · Instructions  o Encouraged to follow-up with Primary Care Provider for preventative care.  o The ROS and the PFSH were reviewed at today's visit.  o Call or return to office if symptoms persist or worsen.   o F/U in 4 weeks with thoracic spine xrays. I did not brace her since fracture is over 6 weeks old at this point and pain is improving. No lifting greater than 10 pounds. Not a good candidate for vertebroplasty due to retropulsion of the compression fracture at T12. Will discuss osteoporosis treatment with her PCP.             Electronically Signed by: Christi Escamilla PA-C -Author on February 9, 2021 03:48:20 PM

## 2021-05-13 NOTE — PROGRESS NOTES
Progress Note      Patient Name: Nilda Julien   Patient ID: [id number]   Sex: Female   YOB: 1945    Primary Care Provider: Jevon Gonzalez MD   Referring Provider: Jevon Gonzalez MD    Visit Date: September 29, 2020    Provider: Jevon Gonzalez MD   Location: [facility]   Location Address: [address] [place] [address], [address] [place] [address]  [id number]   Location Phone: [phone]          Chief Complaint  · Annual Exam  · (Health Maintainence Information Reviewed Under Results)      History Of Present Illness  Nilda Julien is a 75 year old /White female who presents for evaluation and treatment of:   Last PAP Smear: 09/16/2019.   Date of Last Mammogram: 03/11/2020.   Date of Last Colonoscopy: 12/18/2019       Past Medical History  Disease Name Date Onset Notes   Allergic rhinitis --  seasonal allergies   Aortic stenosis --  --    Arthritis --  --    Back pain --  --    Bone Density Screening 10/2/17 --    Coronary artery disease --  --    Diarrhea --  --    Diverticulitis --  --    Essential hypertension --  --    Fatigue --  --    Heart Attack 2001 --    Heart Disease --  --    Heart Murmur 2013 --    Hemorrhoids 2013 --    Hip Pain --  --    Hyperlipidemia --  --    Hypertension --  --    Irritable bowel syndrome --  --    Irritable bowel syndrome with diarrhea --  --    Memory loss/Forgetfulness --  --    Microhematuria 01/13/2019 --    Nausea --  --    Nephrolithiasis 01/13/2019 --    Pap smear for cervical cancer screening 4/17/15 --    Screening Mammogram 7/16/18 --          Past Surgical History  Procedure Name Date Notes   cardiac stents 2001 2 stents placed   Colonoscopy 5/23/16 2019 --    EGD 2019 --    Hernia Repair --  --    Open Heart Surgery 4/19/2013 4 way bypass         Medication List  Name Date Started Instructions   Aspir-81 81 mg Oral tablet,delayed release (DR/EC)  take 1 tablet (81 mg) by oral route once daily   carvedilol 6.25 mg oral tablet  2020 TAKE ONE TABLET BY MOUTH TWICE A DAY WITH FOOD   cilostazol 100 mg oral tablet  take 1 tablet by oral route 2 times a day   Lomotil 2.5-0.025 mg oral tablet 2020 take 2 tablets (5 mg) by oral route 2 times per day as needed for 30 days   omeprazole 20 mg oral capsule,delayed release(DR/EC) 2018 take 1 capsule (20 mg) by oral route once daily before a meal for 90 days   pravastatin 40 mg oral tablet 2020 TAKE ONE TABLET BY MOUTH EVERY NIGHT AT BEDTIME   Probiotic oral  --    tramadol 50 mg oral tablet 2020 take 1 tablet by oral route 2 times a for 30 days   vitamin E 400 unit oral capsule  take 2 capsules by oral route daily   Zofran 4 mg oral tablet 2020 take 1 tablet by oral route 3 times a day as needed   Zoloft 50 mg oral tablet 2020 TAKE ONE TABLET BY MOUTH DAILY         Allergy List  Allergen Name Date Reaction Notes   NO KNOWN DRUG ALLERGIES --  --  --        Allergies Reconciled  Family Medical History  Disease Name Relative/Age Notes   Stroke Father/   Mini   Hypertension Father/  Mother/   --    Chronic Obstructive Pulmonary Disease Father/   black lung  age 88   Heart Attack (MI) Mother/   --    Family history of stroke Father/   Father   Family history of heart disease Mother/   Mother         Social History  Finding Status Start/Stop Quantity Notes   Alcohol Never --/-- --  does not drink   lives alone --  --/-- --  --    Retired --  --/-- --  --    Tobacco Current every day --/-- 0.5 PPD current every day smoker, 0.5 pack per day, smoked 31 or more years    --  --/-- --  --          Immunizations  NameDate Admin Mfg Trade Name Lot Number Route Inj VIS Given VIS Publication   InfluenzaRefused 2020 NE Not Entered  NE NE     Comments:    InfluenzaRefused 2020 NE Not Entered  NE NE     Comments:    InfluenzaRefused 2019 NE Not Entered  NE NE     Comments:    Wvmwtilfb18/ [facility] Fluarix, quadrivalent, preservative free [id number]  IM RD 10/19/2015 07/02/2012   Comments: pt left office in stable condition   Hxqfjytzy64/28/2013 [facility] Fluarix-PF > 3 Years [id number] IM LD 10/28/2013 07/02/2012   Comments:    Mkgnvraosfbf66/17/2015 [facility] PREVNAR 13 [id number] IM RD 04/17/2015 02/27/2013   Comments:    Gtltvbieubxr72/17/2015 [facility] PREVNAR 13 [id number] IM RD 04/17/2015 02/27/2013   Comments:    Td04/17/2015 [facility] DECAVAC [id number] IM LD 04/17/2015 01/24/2012   Comments:          Review of Systems  · Constitutional  o Denies  o : night sweats  · Eyes  o Denies  o : double vision, blurred vision  · HENT  o Denies  o : vertigo, recent head injury  · Breasts  o Denies  o : abnormal changes in breast size, additional breast symptoms except as noted in the HPI  · Cardiovascular  o Denies  o : chest pain, irregular heart beats  · Respiratory  o Denies  o : shortness of breath, productive cough  · Gastrointestinal  o Denies  o : nausea, vomiting  · Genitourinary  o Denies  o : dysuria, urinary retention  · Integument  o Denies  o : hair growth change, new skin lesions  · Neurologic  o Denies  o : altered mental status, seizures  · Musculoskeletal  o Denies  o : joint swelling, limitation of motion  · Endocrine  o Denies  o : cold intolerance, heat intolerance  · Heme-Lymph  o Denies  o : petechiae, lymph node enlargement or tenderness  · Allergic-Immunologic  o Denies  o : frequent illnesses      Vitals  Date Time BP Position Site L\R Cuff Size HR RR TEMP (F) WT  HT  BMI kg/m2 BSA m2 O2 Sat        09/29/2020 03:38 /63 Sitting    88 - R  98.2 80lbs 6oz 5'   15.7 1.24 97 %          Physical Examination  · Constitutional  o Appearance  o : well-nourished, in no acute distress  · Neck  o Inspection/Palpation  o : normal appearance, no masses or tenderness, trachea midline  o Thyroid  o : gland size normal, nontender, no nodules or masses present on palpation  · Respiratory  o Respiratory Effort  o : breathing unlabored  o Inspection of Chest  o : normal  appearance  o Auscultation of Lungs  o : normal breath sounds throughout  · Cardiovascular  o Heart  o :   § Auscultation of Heart  § : regular rate and rhythm  · Breasts  o Inspection of Breasts  o : breasts symmetrical, no skin changes, no deformities present, no discharge present  o Palpation of Breasts, Axillae  o : no masses present on palpation, no breast tenderness  · Gastrointestinal  o Abdominal Examination  o : abdomen nontender to palpation, tone normal without rigidity or guarding, no masses present, normal bowel sounds  · Neurologic  o Mental Status Examination  o :   § Orientation  § : grossly oriented to person, place and time  o Gait and Station  o : normal gait, able to stand without difficulty  · Psychiatric  o Judgement and Insight  o : judgment and insight intact  o Mood and Affect  o : mood normal, affect appropriate          Assessment  · Annual physical exam     V70.0/Z00.00  · Vitamin D deficiency     268.9/E55.9  · Screening for depression     V79.0/Z13.89  · Routine gynecological examination     V72.31/Z01.419  · UTI (urinary tract infection)     599.0/N39.0  · Routine lab draw     V72.60/Z01.89  · Vitamin B12 deficiency     266.2/E53.8  · Abdominal aneurysm     441.4/I71.4  · Carotid stenosis     433.10/I65.29  · PVD (peripheral vascular disease)     443.9/I73.9       f/u as directed  order CT abdomen pelvis...with hx of aabdominal aneurysm.  order carotid ultrasound and bialteral ultrasound arteries..for PVD and carotid steonosis.    keep appt with pulm.  she needs to make another appt with [facility] for yearly f/u..she wants to make her own appt with Dr. Davila.      stay on calcium/vitamin D. she needs reclast shot..she wants to wait until early next year.   will schedule on next viist.        f/u as directed.  of note..cholesterol trended up...she doesnt want to change statin...will f/u in 3 months...low fat diet is key and increase activity.       Plan  · Orders  o Annual depression  screening, 15 minutes (82358, ) - V79.0/Z13.89 - 09/29/2020  o ACO-39: Current medications updated and reviewed (1159F, ) - - 09/29/2020  o ACO-18: Negative screen for clinical depression using a standardized tool () - - 09/29/2020  o ACO-14: Influenza immunization was not administered for reasons documented () - - 09/29/2020  o Urinalysis dipstick auto w micro (90365) - 599.0/N39.0 - 12/29/2020  o Urine Culture (Clean Catch) [facility] (54813) - 599.0/N39.0 - 12/29/2020  o Physical, Primary Care Panel (CBC, CMP, Lipid, TSH) [facility] (51395, 42501, 41424, 86536) - V72.60/Z01.89, 268.9/E55.9 - 12/29/2020  o Vitamin D (25-Hydroxy) Level (61836) - 268.9/E55.9 - 12/29/2020  o B12 Folate levels (B12FO) - 266.2/E53.8 - 12/29/2020  o CT Abdomen with IV Contrast [facility]; suggest Oral Prep (60420) - 441.4/I71.4 - 01/29/2021  o Carotid Doppler Bilateral [facility] (32087) - 433.10/I65.29 - 01/29/2021  o Arterial Doppler ultrasound of both lower extremities with measurement of segmental presssure (92575) - 443.9/I73.9 - 01/29/2021  · Medications  o Medications have been Reconciled  o Transition of Care or Provider Policy  · Instructions  o Reviewed health maintenance flowsheet and updated information. Orders were placed and/or patient's response was documented.  o Depression Screen completed and scanned into the EMR under the designated folder within the patient's documents.  o Today's PHQ-9 result is __7_  o Patient was educated/instructed on their diagnosis, treatment and medications prior to discharge from the clinic today.  o Counseled on monthly breast self exams.   o Counseled on STD prevention.  o Counseled on diet and exercise.   o Counseled on weight-bearing exercise.  o Recommended Calcium with Vitamin D twice daily.  o Electronically Identified Patient Education Materials Provided Electronically  · Disposition  o Call or Return if symptoms worsen or persist.  o Care Transition            Electronically Signed by:  Jevon Gonzalez MD -Author on September 29, 2020 04:59:00 PM

## 2021-05-13 NOTE — PROGRESS NOTES
Progress Note      Patient Name: Nilda Julien   Patient ID: [id number]   Sex: Female   YOB: 1945    Primary Care Provider: Jevon Gonzalez MD   Referring Provider: Jevon Gonzalez MD    Visit Date: August 24, 2020    Provider: JOSE Bueno   Location: [facility]   Location Address: [address] [place] [address], [address] [place] [address]  [id number]   Location Phone: [phone]          Chief Complaint  · ? allergic reaction  · noted her palms itching on Thursday and some itching on her legs and elbows. did complete augmentin on Thursday for her IBS.      History Of Present Illness  Nilda Julien is a 74 year old /White female who presents for evaluation and treatment of: pt had diarrhea and had started taking augmentin and then last 4 days of it developed itching. (10 days of augmentin)no rash but itching on her arms and legs. she didn't take anything for it.       Past Medical History  Disease Name Date Onset Notes   Allergic rhinitis --  seasonal allergies   Aortic stenosis --  --    Arthritis --  --    Back pain --  --    Bone Density Screening 10/2/17 --    Coronary artery disease --  --    Diarrhea --  --    Diverticulitis --  --    Essential hypertension --  --    Fatigue --  --    Heart Attack 2001 --    Heart Disease --  --    Heart Murmur 2013 --    Hemorrhoids 2013 --    Hip Pain --  --    Hyperlipidemia --  --    Hypertension --  --    Irritable bowel syndrome --  --    Irritable bowel syndrome with diarrhea --  --    Memory loss/Forgetfulness --  --    Microhematuria 01/13/2019 --    Nausea --  --    Nephrolithiasis 01/13/2019 --    Pap smear for cervical cancer screening 4/17/15 --    Screening Mammogram 7/16/18 --          Past Surgical History  Procedure Name Date Notes   cardiac stents 2001 2 stents placed   Colonoscopy 5/23/16 2019 --    EGD 2019 --    Hernia Repair --  --    Open Heart Surgery 4/19/2013 4 way bypass         Medication List  Name Date Started  Instructions   Aspir-81 81 mg Oral tablet,delayed release (DR/EC)  take 1 tablet (81 mg) by oral route once daily   carvedilol 6.25 mg oral tablet 2019 TAKE ONE TABLET BY MOUTH TWICE A DAY WITH FOOD   cilostazol 100 mg oral tablet  take 1 tablet by oral route 2 times a day   Lomotil 2.5-0.025 mg oral tablet 06/15/2020 take 2 tablets (5 mg) by oral route 2 times per day as needed for 30 days   omeprazole 20 mg oral capsule,delayed release(DR/EC) 2018 take 1 capsule (20 mg) by oral route once daily before a meal for 90 days   pravastatin 40 mg oral tablet 2020 TAKE ONE TABLET BY MOUTH EVERY NIGHT AT BEDTIME   Probiotic oral  --    tramadol 50 mg oral tablet 2020 take 1 tablet by oral route 2 times a for 30 days   vitamin E 400 unit oral capsule  take 2 capsules by oral route daily   Zofran 4 mg oral tablet 2020 take 1 tablet by oral route 3 times a day as needed   Zoloft 50 mg oral tablet 2020 TAKE ONE TABLET BY MOUTH DAILY         Allergy List  Allergen Name Date Reaction Notes   NO KNOWN DRUG ALLERGIES --  --  --          Family Medical History  Disease Name Relative/Age Notes   Stroke Father/   Mini   Hypertension Father/  Mother/   --    Chronic Obstructive Pulmonary Disease Father/   black lung  age 88   Heart Attack (MI) Mother/   --    Family history of stroke Father/   Father   Family history of heart disease Mother/   Mother         Social History  Finding Status Start/Stop Quantity Notes   Alcohol Never --/-- --  does not drink   lives alone --  --/-- --  --    Retired --  --/-- --  --    Tobacco Current every day --/-- 0.5 PPD current every day smoker, 0.5 pack per day, smoked 31 or more years    --  --/-- --  --          Immunizations  NameDate Admin Mfg Trade Name Lot Number Route Inj VIS Given VIS Publication   InfluenzaRefused 2020 NE Not Entered  NE NE     Comments:    InfluenzaRefused 2019 NE Not Entered  NE NE     Comments:     Jaujyqkko31/19/2015 [facility] Fluarix, quadrivalent, preservative free [id number] IM RD 10/19/2015 07/02/2012   Comments: pt left office in stable condition   Qslfbnvnv50/28/2013 [facility] Fluarix-PF > 3 Years [id number] IM LD 10/28/2013 07/02/2012   Comments:    Cafyakyiozdr38/17/2015 [facility] PREVNAR 13 [id number] IM RD 04/17/2015 02/27/2013   Comments:    Qmwcklbmyigp76/17/2015 [facility] PREVNAR 13 [id number] IM RD 04/17/2015 02/27/2013   Comments:    Td04/17/2015 [facility] DECAVAC [id number] IM LD 04/17/2015 01/24/2012   Comments:          Review of Systems  · Constitutional  o Denies  o : fatigue, night sweats  · Eyes  o Denies  o : double vision, blurred vision  · HENT  o Denies  o : vertigo, recent head injury  · Breasts  o Denies  o : abnormal changes in breast size, additional breast symptoms except as noted in the HPI  · Cardiovascular  o Admits  o : noted b/p was elevated but she had not taken her medication and did smoke before coming   o Denies  o : chest pain, irregular heart beats  · Respiratory  o Denies  o : shortness of breath, productive cough  · Gastrointestinal  o Denies  o : nausea, vomiting  · Genitourinary  o Denies  o : dysuria, urinary retention  · Integument  o Admits  o : no rash itching  o Denies  o : hair growth change, new skin lesions  · Neurologic  o Denies  o : altered mental status, seizures  · Musculoskeletal  o Denies  o : joint swelling, limitation of motion  · Endocrine  o Denies  o : cold intolerance, heat intolerance  · Heme-Lymph  o Denies  o : petechiae, lymph node enlargement or tenderness  · Allergic-Immunologic  o Denies  o : frequent illnesses      Vitals  Date Time BP Position Site L\R Cuff Size HR RR TEMP (F) WT  HT  BMI kg/m2 BSA m2 O2 Sat        08/24/2020 11:04 /85 Sitting    95 - R  97.6 77lbs 4oz 5'   15.09 1.22 96 %          Physical Examination  · Constitutional  o Appearance  o : well-nourished, well developed, alert, in no acute distress  · Head and Face  o Face  o :   § Inspection  § : no rash or  redness  · Eyes  o Conjunctivae  o : conjunctivae normal  o Sclerae  o : sclerae white  o Pupils and Irises  o : pupils equal, round, and reactive to light and accommodation bilaterally  o Corneas  o : tear film normal, no lesions present  o Eyelids/Ocular Adnexae  o : eyelid appearance normal, no exudates present, eye moisture level normal  · Ears, Nose, Mouth and Throat  o Ears  o : external ear auricle normal, otic canal normal, TM with no reddness, effusion, retraction  o Nose  o : external normal, nasal mucosa normal, turbinates normal  o Oral Cavity  o : tongue no lesion, oral mucosa normal  o Throat  o : no erythemia, exudate or lesions  · Neck  o Inspection/Palpation  o : normal appearance, no masses or tenderness, trachea midline, no enlarged cervical or supraclavicular lymphnodes palpated  o Thyroid  o : gland size normal, nontender, no nodules or masses present on palpation, thyroid motion normal during swallowing  · Respiratory  o Respiratory Effort  o : breathing unlabored  o Inspection of Chest  o : normal appearance, no retractions  o Auscultation of Lungs  o : normal breath sounds throughout  · Cardiovascular  o Heart  o :   § Auscultation of Heart  § : regular rate and rhythm without murmur  o Peripheral Vascular System  o :   § Extremities  § : no edema, no cyanosis, no distal hair loss, normal capillary refill  · Skin and Subcutaneous Tissue  o General Inspection  o : has thin, and does have small bruises but does take anticoagulant  · Neurologic  o Mental Status Examination  o : judgement, insight intact, modd and affect appropriate  o Motor Examination  o : strength grossly intact in all four extremities  o Gait and Station  o : normal gait, able to stand without difficulty          Assessment  · Itching     698.9/L29.9  · Medication reaction     E947.9/T50.905A      Plan  · Orders  o IM/SQ - Injection Fee [facility] (74614) - 698.9/L29.9 - 08/24/2020  o ACO-39: Current medications updated and reviewed  () - - 08/24/2020  o 4.00 - Kenalog Injection 40mg (-1) - 698.9/L29.9 - 08/24/2020   Injection - Kenalog 40 mg; Dose: 40 mg; Site: Left Gluteus; Route: intramuscular; Date: 08/24/2020 11:41:11; Exp: 10/01/2021; Lot: [id number]; Mfg: N/A; TradeName: triamcinolone; Location: [facility]; Administered By: Shira Chavez MA; Comment: pt tolerated injection  · Medications  o prednisone 10 mg oral tablet   SIG: take 1 tablet (10 mg) by oral route once daily for 6 days   DISP: (6) tablets with 0 refills  Prescribed on 08/24/2020     o hydroxyzine HCl 10 mg oral tablet   SIG: tab 1 po three times a day as needed itching   DISP: (20) tablets with 0 refills  Prescribed on 08/24/2020     o Benadryl 25 mg oral capsule   SIG: take 1 capsule (25 mg) by oral route 3 times per day as needed itching   DISP: (30) capsules with 0 refills  Discontinued on 08/24/2020     o Prolia 60 mg/mL subcutaneous syringe   SIG: inject 1 milliliter (60 mg) by subcutaneous route every 6 months in the upper arm, upper thigh or abdomen   DISP: (1) 1 ml syringe with 4 refills  Discontinued on 08/24/2020     o Medications have been Reconciled  o Transition of Care or Provider Policy  · Instructions  o Take all medications as prescribed/directed.  o Patient was educated/instructed on their diagnosis, treatment and medications prior to discharge from the clinic today.  o if she has diarrhea don't take an antibiotic. should do liquids and take probiotics  · Disposition  o Call or Return if symptoms worsen or persist.            Electronically Signed by: JOSE Bueno -Author on August 24, 2020 12:07:36 PM

## 2021-05-14 VITALS
HEART RATE: 80 BPM | OXYGEN SATURATION: 98 % | SYSTOLIC BLOOD PRESSURE: 110 MMHG | TEMPERATURE: 99.3 F | BODY MASS INDEX: 14.92 KG/M2 | RESPIRATION RATE: 16 BRPM | WEIGHT: 74 LBS | DIASTOLIC BLOOD PRESSURE: 70 MMHG | HEIGHT: 59 IN

## 2021-05-14 VITALS
TEMPERATURE: 97.9 F | HEIGHT: 60 IN | DIASTOLIC BLOOD PRESSURE: 64 MMHG | RESPIRATION RATE: 18 BRPM | BODY MASS INDEX: 14.53 KG/M2 | HEART RATE: 80 BPM | WEIGHT: 74 LBS | OXYGEN SATURATION: 97 % | SYSTOLIC BLOOD PRESSURE: 116 MMHG

## 2021-05-14 VITALS — TEMPERATURE: 96.9 F | BODY MASS INDEX: 14.37 KG/M2 | WEIGHT: 73.19 LBS | HEIGHT: 60 IN

## 2021-05-14 VITALS
DIASTOLIC BLOOD PRESSURE: 82 MMHG | HEART RATE: 94 BPM | SYSTOLIC BLOOD PRESSURE: 140 MMHG | BODY MASS INDEX: 14.76 KG/M2 | WEIGHT: 75.19 LBS | TEMPERATURE: 96.5 F | HEIGHT: 60 IN | OXYGEN SATURATION: 98 % | RESPIRATION RATE: 18 BRPM

## 2021-05-14 VITALS
TEMPERATURE: 97.6 F | WEIGHT: 77.25 LBS | SYSTOLIC BLOOD PRESSURE: 165 MMHG | OXYGEN SATURATION: 96 % | DIASTOLIC BLOOD PRESSURE: 85 MMHG | HEART RATE: 95 BPM | HEIGHT: 60 IN | BODY MASS INDEX: 15.17 KG/M2

## 2021-05-14 VITALS
HEART RATE: 88 BPM | BODY MASS INDEX: 15.78 KG/M2 | TEMPERATURE: 98.2 F | SYSTOLIC BLOOD PRESSURE: 121 MMHG | HEIGHT: 60 IN | DIASTOLIC BLOOD PRESSURE: 63 MMHG | OXYGEN SATURATION: 97 % | WEIGHT: 80.37 LBS

## 2021-05-14 VITALS — WEIGHT: 75.19 LBS | TEMPERATURE: 97 F | BODY MASS INDEX: 15.16 KG/M2 | HEIGHT: 59 IN

## 2021-05-14 NOTE — PROGRESS NOTES
Progress Note      Patient Name: Nilda Julien   Patient ID: [id number]   Sex: Female   YOB: 1945    Primary Care Provider: Jevon Gonzalez MD   Referring Provider: Jevon Gonzalez MD    Visit Date: April 29, 2021    Provider: Joselyn Neal MD   Location: [facility]   Location Address: [address] [place] [address], [address] [place] [address]  [id number]   Location Phone: [phone]          Chief Complaint     Hematoma on left foot       History Of Present Illness  Nilda Julien is a 75 year old /White female who presents for evaluation and treatment of:      74 y/o F with multiple comorbidities comes to the clinic today to follow-up on recent ER visit.    Patient visited ER for left ankle hematoma, hematoma was drained and patient was discharged home with instructions of continue with Neosporin.    Patient reports wound is healing okay, patient has no significant pain or any bleeding or any significant erythema.  Patient reports she has been using Neosporin.    Patient denies any recent fever, trauma or any recent injuries.       Past Medical History  Disease Name Date Onset Notes   Allergic rhinitis --  seasonal allergies   Aortic stenosis --  --    Arthritis --  --    Back pain --  --    Bone Density Screening 10/2/17 --    Coronary artery disease --  --    Diarrhea --  --    Diverticulitis --  --    Essential hypertension --  --    Fatigue --  --    Heart Attack 2001 --    Heart Disease --  --    Heart Murmur 2013 --    Hemorrhoids 2013 --    Hip Pain --  --    Hyperlipidemia --  --    Hypertension --  --    Irritable bowel syndrome --  --    Irritable bowel syndrome with diarrhea --  --    Memory loss/Forgetfulness --  --    Microhematuria 01/13/2019 --    Nausea --  --    Nephrolithiasis 01/13/2019 --    Pap smear for cervical cancer screening 4/17/15 --    Screening Mammogram 7/16/18 --          Past Surgical History  Procedure Name Date Notes   cardiac stents 2001 2  stents placed   Colonoscopy 16 --    EGD  --    Hernia Repair --  --    Open Heart Surgery 2013 4 way bypass         Medication List  Name Date Started Instructions   Aspir-81 81 mg Oral tablet,delayed release (DR/EC)  take 1 tablet (81 mg) by oral route once daily   carvedilol 6.25 mg oral tablet 2020 TAKE ONE TABLET BY MOUTH TWICE A DAY WITH FOOD   CETIRIZINE HCL 10 MG TABLET 2021 TAKE ONE TABLET BY MOUTH DAILY   cilostazol 100 mg oral tablet 10/15/2020 take 1 tablet by oral route 2 times a day   Flonase Allergy Relief 50 mcg/actuation nasal spray,suspension 2021 spray 2 sprays (100 mcg) in each nostril by intranasal route once daily   hydrocodone-acetaminophen 7.5-325 mg oral tablet 04/10/2021 take 1 tablet by oral route every 6 hours as needed for pain   Lipitor 80 mg oral tablet 2021 take 1 tablet (80 mg) by oral route once daily at bedtime for 90 days   Lomotil 2.5-0.025 mg oral tablet 2021 take 2 tablets (5 mg) by oral route 2 times per day as needed for 30 days   omeprazole 20 mg oral capsule,delayed release(DR/EC) 2018 take 1 capsule (20 mg) by oral route once daily before a meal for 90 days   Probiotic oral  --    tramadol 50 mg oral tablet 2020 take 1 tablet by oral route 2 times a for 30 days   Vitamin D3 10 mcg (400 unit) oral capsule  take 1 capsule by oral route daily   vitamin E 400 unit oral capsule  take 2 capsules by oral route daily   Zofran 4 mg oral tablet 2021 take 1 tablet by oral route 3 times a day as needed   Zoloft 50 mg oral tablet 2021 TAKE ONE TABLET BY MOUTH DAILY         Allergy List  Allergen Name Date Reaction Notes   NO KNOWN DRUG ALLERGIES --  --  --        Allergies Reconciled  Family Medical History  Disease Name Relative/Age Notes   Stroke Father/   Mini   Hypertension Father/  Mother/   --    Chronic Obstructive Pulmonary Disease Father/   black lung  age 88   Heart Attack (MI) Mother/   --   "  Family history of stroke Father/   Father   Family history of heart disease Mother/   Mother         Social History  Finding Status Start/Stop Quantity Notes   Alcohol Never --/-- --  does not drink   lives alone --  --/-- --  --    Retired --  --/-- --  --    Tobacco Current every day --/-- 0.5 PPD current every day smoker, 0.5 pack per day, smoked 31 or more years    --  --/-- --  --          Immunizations  NameDate Admin Mfg Trade Name Lot Number Route Inj VIS Given VIS Publication   InfluenzaRefused 09/29/2020 NE Not Entered  NE NE     Comments:    Ajoopyiqtstj13/17/2015 [facility] PREVNAR 13 [id number] IM RD 04/17/2015 02/27/2013   Comments:    Ygvhzpjfpkhg20/17/2015 [facility] PREVNAR 13 [id number]  RD 04/17/2015 02/27/2013   Comments:    Td04/17/2015 [facility] DECAVAC [id number] IM LD 04/17/2015 01/24/2012   Comments:          Review of Systems  · Constitutional  o Denies  o : fatigue, fever  · Eyes  o Denies  o : discharge from eye, redness  · HENT  o Denies  o : headaches, congestion  · Cardiovascular  o Denies  o : chest pain, palpitations  · Respiratory  o Denies  o : shortness of breath, wheezing, cough  · Gastrointestinal  o Denies  o : vomiting, diarrhea, constipation  · Genitourinary  o Denies  o : dysuria, hematuria  · Integument  o Denies  o : rash, new skin lesions  · Neurologic  o Denies  o : altered mental status, seizures  · Musculoskeletal  o Admits  o : Left ankle wound  o Denies  o : weakness, joint swelling  · Psychiatric  o Denies  o : anxiety, depression  · Heme-Lymph  o Denies  o : lymph node enlargement, tenderness      Vitals  Date Time BP Position Site L\R Cuff Size HR RR TEMP (F) WT  HT  BMI kg/m2 BSA m2 O2 Sat FR L/min FiO2 HC       04/29/2021 10:19 /70 Sitting    80 - R 16 99.3 73lbs 16oz 4'  11\" 14.95 1.18 98 %  21%          Physical Examination  · Constitutional  o Appearance  o : alert, in no acute distress, well developed, well-nourished  · Head and Face  o Head  o : normocephalic, " atraumatic, non tender, no palpable masses or nodules.  o Face  o : no facial lesions  · Eyes  o Vision  o : Acuity: grossly normal at distance, Conjuntivae: Normal, Sclerae white, Pupils: PERRL, Cornea: Clear, no lesions bilateral  · Skin and Subcutaneous Tissue  o General Inspection  o : Left ankle exam; small wound and open skin noted which is about 3 inch at the medial ankle, granulation tissue is noted on top, mild erythema, no tenderness or any pus appreciated  · Neurologic  o Mental Status Examination  o : alert and oriented to time, place, and person., Cranial Nerves: grossly intact,   · Psychiatric  o Mood and Affect  o : normal mood and affect          Assessment  · Skin wound from surgical incision     879.8/T14.8XXA  · Wound cellulitis     682.9/L03.90  --- I have advised patient to continue with Neosporin, continue with the dressing as instructed. Patient does have a history of poor blood circulation. We will give patient Keflex empirically for 5 days. If any worsening or no improvement noted; we will consider wound clinic and further management.    Patient to follow-up with Dr. Gonzalez in 4 weeks for reassessment and further planning    Patient understands and agrees with the plan      Plan  · Orders  o ACO-14: Influenza immunization was not administered for reasons documented [facility] () - - 04/29/2021  o ACO-39: Current medications updated and reviewed (1159F, ) - - 04/29/2021  · Medications  o Keflex 500 mg oral capsule   SIG: take 1 capsule (500 mg) by oral route every 12 hours for 5 days   DISP: (10) Capsule with 0 refills  Prescribed on 04/29/2021     o Medications have been Reconciled  o Transition of Care or Provider Policy  · Instructions  o Patient instructed/educated on their diet and exercise program.  o Patient was educated/instructed on their diagnosis, treatment and medications prior to discharge from the clinic today.  o Patient was instructed to exercise regularly.  o Patient  instructed to seek medical attention urgently for new or worsening symptoms.  o Call the office with any concerns or questions.  o Minutes spent with patient including greater than 50% in Education/Counseling/Care Coordination.  o Time spent with the patient was minutes, more than 50% face to face.  o Discussed Covid-19 precautions including, but not limited to, social distancing, avoid touching your face, and hand washing.   · Disposition  o Call or Return if symptoms worsen or persist.  o Follow Up PRN.  o Follow Up in 1 month.            Electronically Signed by: Joselyn Neal MD -Author on April 29, 2021 11:02:59 AM

## 2021-05-14 NOTE — PROGRESS NOTES
Progress Note      Patient Name: Nilda Julien   Patient ID: [id number]   Sex: Female   YOB: 1945    Primary Care Provider: Jevon Gonzalez MD   Referring Provider: Jevon Gonzalez MD    Visit Date: January 18, 2021    Provider: Jevon Gonzalez MD   Location: [facility]   Location Address: [address] [place] [address], [address] [place] [address]  [id number]   Location Phone: [phone]          Chief Complaint     Cough, sore throat, PND, bilateral ear ringing, and sinus pressure for 2 weeks.       History Of Present Illness  Nilda Julien is a 75 year old /White female who presents for evaluation and treatment of:      Pt presents for eval   She c/o sinus congestion/coough/ringing in the ears/PND for 2 weeks.   No fever or chills.       Past Medical History  Disease Name Date Onset Notes   Allergic rhinitis --  seasonal allergies   Aortic stenosis --  --    Arthritis --  --    Back pain --  --    Bone Density Screening 10/2/17 --    Coronary artery disease --  --    Diarrhea --  --    Diverticulitis --  --    Essential hypertension --  --    Fatigue --  --    Heart Attack 2001 --    Heart Disease --  --    Heart Murmur 2013 --    Hemorrhoids 2013 --    Hip Pain --  --    Hyperlipidemia --  --    Hypertension --  --    Irritable bowel syndrome --  --    Irritable bowel syndrome with diarrhea --  --    Memory loss/Forgetfulness --  --    Microhematuria 01/13/2019 --    Nausea --  --    Nephrolithiasis 01/13/2019 --    Pap smear for cervical cancer screening 4/17/15 --    Screening Mammogram 7/16/18 --          Past Surgical History  Procedure Name Date Notes   cardiac stents 2001 2 stents placed   Colonoscopy 5/23/16 2019 --    EGD 2019 --    Hernia Repair --  --    Open Heart Surgery 4/19/2013 4 way bypass         Medication List  Name Date Started Instructions   Aspir-81 81 mg Oral tablet,delayed release (DR/EC)  take 1 tablet (81 mg) by oral route once daily   carvedilol  6.25 mg oral tablet 2020 TAKE ONE TABLET BY MOUTH TWICE A DAY WITH FOOD   cilostazol 100 mg oral tablet 10/15/2020 take 1 tablet by oral route 2 times a day   Flonase Allergy Relief 50 mcg/actuation nasal spray,suspension 2021 spray 2 sprays (100 mcg) in each nostril by intranasal route once daily   hydrocodone-acetaminophen 7.5-325 mg oral tablet 2021 take 1 tablet by oral route every 6 hours as needed for pain   Lipitor 80 mg oral tablet 2021 take 1 tablet (80 mg) by oral route once daily at bedtime for 90 days   Lomotil 2.5-0.025 mg oral tablet 2020 take 2 tablets (5 mg) by oral route 2 times per day as needed for 30 days   omeprazole 20 mg oral capsule,delayed release(DR/EC) 2018 take 1 capsule (20 mg) by oral route once daily before a meal for 90 days   Probiotic oral  --    tramadol 50 mg oral tablet 2020 take 1 tablet by oral route 2 times a for 30 days   vitamin E 400 unit oral capsule  take 2 capsules by oral route daily   Zofran 4 mg oral tablet 2021 take 1 tablet by oral route 3 times a day as needed   Zoloft 50 mg oral tablet 2021 TAKE ONE TABLET BY MOUTH DAILY   Zyrtec 10 mg oral tablet 2021 take 1 tablet (10 mg) by oral route once daily for 90 days         Allergy List  Allergen Name Date Reaction Notes   NO KNOWN DRUG ALLERGIES --  --  --        Allergies Reconciled  Family Medical History  Disease Name Relative/Age Notes   Stroke Father/   Mini   Hypertension Father/  Mother/   --    Chronic Obstructive Pulmonary Disease Father/   black lung  age 88   Heart Attack (MI) Mother/   --    Family history of stroke Father/   Father   Family history of heart disease Mother/   Mother         Social History  Finding Status Start/Stop Quantity Notes   Alcohol Never --/-- --  does not drink   lives alone --  --/-- --  --    Retired --  --/-- --  --    Tobacco Current every day --/-- 0.5 PPD current every day smoker, 0.5 pack per day, smoked 31  or more years    --  --/-- --  --          Immunizations  NameDate Admin Mfg Trade Name Lot Number Route Inj VIS Given VIS Publication   InfluenzaRefused 09/29/2020 NE Not Entered  NE NE     Comments:    Uedvegbajbpj03/17/2015 [facility] 13 [id number] IM RD 04/17/2015 02/27/2013   Comments:    Daidijrquzkv54/17/2015 [facility] PREVNAR 13 [id number] IM RD 04/17/2015 02/27/2013   Comments:    Td04/17/2015 [facility] DECAVAC [id number] IM LD 04/17/2015 01/24/2012   Comments:          Review of Systems  · Constitutional  o Denies  o : night sweats  · Eyes  o Denies  o : double vision, blurred vision  · HENT  o Denies  o : vertigo, recent head injury  · Breasts  o Denies  o : abnormal changes in breast size, additional breast symptoms except as noted in the HPI  · Cardiovascular  o Denies  o : chest pain, irregular heart beats  · Respiratory  o Denies  o : shortness of breath, productive cough  · Gastrointestinal  o Denies  o : nausea, vomiting  · Genitourinary  o Denies  o : dysuria, urinary retention  · Integument  o Denies  o : hair growth change, new skin lesions  · Neurologic  o Denies  o : altered mental status, seizures  · Musculoskeletal  o Denies  o : joint swelling, limitation of motion  · Endocrine  o Denies  o : cold intolerance, heat intolerance  · Heme-Lymph  o Denies  o : petechiae, lymph node enlargement or tenderness  · Allergic-Immunologic  o Denies  o : frequent illnesses      Vitals  Date Time BP Position Site L\R Cuff Size HR RR TEMP (F) WT  HT  BMI kg/m2 BSA m2 O2 Sat FR L/min FiO2        01/18/2021 02:36 /82 Sitting    94 - R 18 96.5 75lbs 3oz 5'   14.68 1.2 98 %  21%          Physical Examination  · Constitutional  o Appearance  o : alert, in no acute distress, well developed, well-nourished  · Head and Face  o Head  o : normocephalic, atraumatic, non tender, no palpable masses or nodules.  o Face  o : no facial lesions  · Eyes  o Vision  o : Acuity: grossly normal at distance, Conjuntivae: Normal, Sclerae  white  · Ears, Nose, Mouth and Throat  o Ears  o : left ear wax..irrigated today  o Nose  o : Nose: Normal shape, No external deformity, No nasal discharge, Mucosa: normal, Septum: midline, Sinuses: Nontender   o Oral Cavity  o : Normal oral mucosa, Normal lips, Gums: normal pink, non swollen, Tongue: Normal appearance, Palate : Normal   o Throat  o : Oropharynx: no inflmation or lesions, Tonsils: within normal limits  · Respiratory  o Auscultation of Lungs  o : normal breath sounds throughout  · Cardiovascular  o Heart  o : Regular rate and rhythm, Normal S1,S2   · Psychiatric  o Mood and Affect  o : normal mood and affect          Results  · In-Office Procedures  o Medical procedure  § Cerumen Removal by MA or Nurse, Unilateral [facility] ([id number])       Assessment  · Sinus congestion     478.19/R09.81  · Sinus infection     473.9/J32.9       f/u as directed  zyrtec  flonase.    will also go ahead and do check for COVID.    left ear irrigated       Plan  · Orders  o ACO-14: Influenza immunization administered or previously received [facility] () - - 01/31/2021  o ACO-39: Current medications updated and reviewed (, 1159F) - - 01/18/2021  o [facility] NCOV2 (send-out) (93033) - - 01/18/2021  · Medications  o ciprofloxacin HCl 500 mg oral tablet   SIG: take 1 tablet (500 mg) by oral route every 12 hours for 7 days   DISP: (14) Tablet with 0 refills  Discontinued on 01/18/2021     o cyclobenzaprine 5 mg oral tablet   SIG: take 1 tablet (5 mg) by oral route QHS   DISP: (30) Tablet with 0 refills  Discontinued on 01/18/2021     o Macrobid 100 mg oral capsule   SIG: take 1 capsule (100 mg) by oral route 2 times per day with food for 7 days   DISP: (14) Capsule with 0 refills  Discontinued on 01/18/2021     o Medications have been Reconciled  o Transition of Care or Provider Policy  · Instructions  o Patient was educated/instructed on their diagnosis, treatment and medications prior to discharge from the clinic  today.  o Electronically Identified Patient Education Materials Provided Electronically  · Disposition  o Call or Return if symptoms worsen or persist.  o Care Transition            Electronically Signed by: Jevon Gonzalez MD -Author on January 31, 2021 05:24:54 PM

## 2021-05-14 NOTE — PROGRESS NOTES
Progress Note      Patient Name: Nilda Julien   Patient ID: [id number]   Sex: Female   YOB: 1945    Primary Care Provider: Jevon Gonzalez MD   Referring Provider: Jevon Gnozalez MD    Visit Date: December 15, 2020    Provider: Jevon Gonzalez MD   Location: [facility]   Location Address: [address] [place] [address], [address] [place] [address]  [id number]   Location Phone: [phone]          Chief Complaint     f/u  back pain       History Of Present Illness  Nilda Julien is a 75 year old /White female who presents for evaluation and treatment of:      Pt presents for eval.  She c/o back pain...mid back pain started after getting out of car.... and twisted her back while trying to lift a heavy doctor pepper bottle.   Pain is about a 7/10, daily, mid back.       Past Medical History  Allergic rhinitis; Aortic stenosis; Arthritis; Back pain; Bone Density Screening; Coronary artery disease; Diarrhea; Diverticulitis; Essential hypertension; Fatigue; Heart Attack; Heart Disease; Heart Murmur; Hemorrhoids; Hip Pain; Hyperlipidemia; Hypertension; Irritable bowel syndrome; Irritable bowel syndrome with diarrhea; Memory loss/Forgetfulness; Microhematuria; Nausea; Nephrolithiasis; Pap smear for cervical cancer screening; Screening Mammogram         Past Surgical History  cardiac stents; Colonoscopy; EGD; Hernia Repair; Open Heart Surgery         Medication List  Aspir-81 81 mg Oral tablet,delayed release (DR/EC); carvedilol 6.25 mg oral tablet; cilostazol 100 mg oral tablet; cyclobenzaprine 5 mg oral tablet; hydrocodone-acetaminophen 5-325 mg oral tablet; Lomotil 2.5-0.025 mg oral tablet; Macrobid 100 mg oral capsule; Medrol (Charlie) 4 mg oral tablets,dose pack; omeprazole 20 mg oral capsule,delayed release(DR/EC); pravastatin 40 mg oral tablet; Probiotic oral; tramadol 50 mg oral tablet; vitamin E 400 unit oral capsule; Zofran 4 mg oral tablet; Zoloft 50 mg oral tablet          Allergy List  NO KNOWN DRUG ALLERGIES       Allergies Reconciled  Family Medical History  Stroke; Hypertension; Chronic Obstructive Pulmonary Disease; Heart Attack (MI); Family history of stroke; Family history of heart disease         Social History  Alcohol (Never); lives alone; Retired; Tobacco (Current every day);          Immunizations  Name Date Admin   Influenza Refused   Influenza Refused   Influenza Refused   Influenza 10/19/2015   Influenza 10/28/2013   Pneumococcal 04/17/2015   Pneumococcal 04/17/2015   Td 04/17/2015         Review of Systems  · Constitutional  o Denies  o : fever, weight loss, weight gain  · Cardiovascular  o Denies  o : pedal edema, claudication, chest pressure, palpitations  · Respiratory  o Denies  o : shortness of breath, wheezing, cough, hemoptysis, dyspnea on exertion  · Gastrointestinal  o Denies  o : nausea, vomiting, diarrhea, constipation, abdominal pain      Vitals  Date Time BP Position Site L\R Cuff Size HR RR TEMP (F) WT  HT  BMI kg/m2 BSA m2 O2 Sat FR L/min FiO2        09/29/2020 03:38 /63 Sitting    88 - R  98.2 80lbs 6oz 5'   15.7 1.24 97 %            Physical Examination  · Constitutional  o Appearance  o : alert, in no acute distress, well developed, well-nourished  · Head and Face  o Head  o : normocephalic, atraumatic, non tender, no palpable masses or nodules.  o Face  o : no facial lesions  · Eyes  o Vision  o : Acuity: grossly normal at distance, Conjuntivae: Normal, Sclerae white  · Respiratory  o Auscultation of Lungs  o : normal breath sounds throughout  · Cardiovascular  o Heart  o : Regular rate and rhythm, Normal S1,S2   · Musculoskeletal  o Spine  o : TTP mid back  · Psychiatric  o Mood and Affect  o : normal mood and affect          Assessment  · Back pain     724.5/M54.9       fu a as directed  xrays.  steroid shot.  hydrocodone prn.   steroids.    urine.       Plan  · Orders  o ACO-39: Current medications updated and reviewed  (1159F, ) - - 12/15/2020  o ACO-14: Influenza immunization administered or previously received [facility] () - - 12/30/2020  o Lumbar Spine xray complete [facility] ([id number]) - 724.5/M54.9 - 12/15/2020  o Thoracic Spine (3 view) [facility] ([id number]) - 724.5/M54.9 - 12/15/2020  o Depo-Medrol 80 () - 724.5/M54.9 - 12/15/2020   Injection - Depo Medrol 80 mg; Dose: 80 mg; Site: Left Deltoid; Route: intramuscular; Date: 12/15/2020 15:32:49; Exp: 09/01/2021; Lot: [id number]; Mfg: [employer].; TradeName: methylprednisolone; Location: [facility]; Administered By: Clarissa Lancaster MA; Comment: Patient tolerated well  o Urine Culture (Clean Catch) [facility] ([id number]) - 724.5/M54.9 - 12/15/2020  · Medications  o Medications have been Reconciled  · Instructions  o Patient was educated/instructed on their diagnosis, treatment and medications prior to discharge from the clinic today.  · Disposition  o Call or Return if symptoms worsen or persist.  o Care Transition            Electronically Signed by: Jevon Gonzalez MD -Author on December 30, 2020 09:02:09 AM

## 2021-05-14 NOTE — PROGRESS NOTES
Progress Note      Patient Name: Nilda Julien   Patient ID: [id number]   Sex: Female   YOB: 1945    Primary Care Provider: Jevon Gonzalez MD   Referring Provider: Jevon Gnozalez MD    Visit Date: March 18, 2021    Provider: Christi Escamilla PA-C   Location: [facility]   Location Address: [address] [place] [address]  [id number]   Location Phone: [phone]          Chief Complaint     Follow up with t-spine x-ray done at [facility].       History Of Present Illness  The patient is a 75 year old /White female who is in the office for followup appointment.      Here for f/u for her T12 compression fracture.  Xrays from this month showed some progression of the compression fracture compared to imaging in January 2021.  Pain has progressed some over the past 2 weeks.  She has not been braced due to the fracture being subacute and at her last visit her pain had improved.       Past Medical History  Allergic rhinitis; Aortic stenosis; Arthritis; Back pain; Bone Density Screening; Coronary artery disease; Diarrhea; Diverticulitis; Essential hypertension; Fatigue; Heart Attack; Heart Disease; Heart Murmur; Hemorrhoids; Hip Pain; Hyperlipidemia; Hypertension; Irritable bowel syndrome; Irritable bowel syndrome with diarrhea; Memory loss/Forgetfulness; Microhematuria; Nausea; Nephrolithiasis; Pap smear for cervical cancer screening; Screening Mammogram         Past Surgical History  cardiac stents; Colonoscopy; EGD; Hernia Repair; Open Heart Surgery         Medication List  Aspir-81 81 mg Oral tablet,delayed release (DR/EC); carvedilol 6.25 mg oral tablet; cilostazol 100 mg oral tablet; Flonase Allergy Relief 50 mcg/actuation nasal spray,suspension; hydrocodone-acetaminophen 7.5-325 mg oral tablet; Lipitor 80 mg oral tablet; Lomotil 2.5-0.025 mg oral tablet; omeprazole 20 mg oral capsule,delayed release(DR/EC); Probiotic oral; tramadol 50 mg oral tablet; Vitamin D3 10  mcg (400 unit) oral capsule; vitamin E 400 unit oral capsule; Zofran 4 mg oral tablet; Zoloft 50 mg oral tablet; Zyrtec 10 mg oral tablet         Allergy List  NO KNOWN DRUG ALLERGIES       Allergies Reconciled  Family Medical History  Stroke; Hypertension; Chronic Obstructive Pulmonary Disease; Heart Attack (MI); Family history of stroke; Family history of heart disease         Social History  Alcohol (Never); lives alone; Retired; Tobacco (Current every day);          Immunizations  Name Date Admin   Influenza Refused   Influenza Refused   Influenza Refused   Influenza 10/19/2015   Influenza 10/28/2013   Pneumococcal 04/17/2015   Pneumococcal 04/17/2015   Td 04/17/2015         Review of Systems  · Constitutional  o Denies  o : chills, excessive sweating, fatigue, fever, sycope/passing out, weight gain, weight loss  · Eyes  o Denies  o : changes in vision, blurry vision, double vision  · HENT  o Admits  o : nasal congestion  o Denies  o : loss of hearing, ringing in the ears, ear aches, sore throat, sinus pain, nose bleeds, seasonal allergies  · Cardiovascular  o Admits  o : easy burising or bleeding  o Denies  o : blood clots, swollen legs, anemia, transfusions  · Respiratory  o Denies  o : shortness of breath, dry cough, productive cough, pneumonia, COPD  · Gastrointestinal  o Denies  o : difficulty swallowing, reflux  · Genitourinary  o Denies  o : incontinence  · Neurologic  o Denies  o : headache, seizure, stroke, tremor, loss of balance, falls, dizziness/vertigo, difficulty with sleep, numbness/tingling/paresthesia , difficulty with coordination, difficulty with dexterity, weakness  · Musculoskeletal  o Admits  o : low back pain, mid back pain  o Denies  o : neck stiffness/pain, swollen lymph nodes, muscle aches, joint pain, weakness, spasms, sciatica, pain radiating in arm, pain radiating in leg  · Endocrine  o Denies  o : diabetes, thyroid disorder  · Psychiatric  o Denies  o : anxiety,  "depression  · All Others Negative      Vitals  Date Time BP Position Site L\R Cuff Size HR RR TEMP (F) WT  HT  BMI kg/m2 BSA m2 O2 Sat FR L/min FiO2 HC       03/18/2021 01:15 PM        97 75lbs 3oz 4'  11\" 15.19 1.19             Physical Examination  · Constitutional  o Appearance  o : well-nourished, well developed, alert, in no acute distress  · Respiratory  o Respiratory Effort  o : breathing unlabored  · Cardiovascular  o Peripheral Vascular System  o :   § Extremities  § : no cyanosis, clubbing or edema  · Neurologic  o Mental Status Examination  o :   § Orientation  § : grossly oriented to person, place and time  o Sensation  o :   § Light Touch  § : sensation intact to light touch in extremities  o Gait and Station  o :   § Gait Screening  § : gait within normal limits  · Psychiatric  o Mood and Affect  o : mood normal, affect appropriate     TTP over the T12 region in the midline    no clonus           Assessment  · Pre-op examination     V72.84/Z01.818  · Thoracic Compression Fracture     733.13  T12 compression fracture  · Osteoporosis     733.00/M81.0      Plan  · Medications  o Medications have been Reconciled  o Transition of Care or Provider Policy  · Instructions  o ****Surgical Orders****  o ***************  o Outpatient  o ***************  o RISK AND BENEFITS:  o Possible risks/complications, benefits and alternatives to surgical or invasive procedure have been explained to the patient and/or legal guardian.  o Patient has been evaluated and can tolerate anesthesia and/or sedation. Risks, benefits, and alternatives to anesthesia and sedation have been explained to the patient and/or legal guardian.  o ***************  o PREP: Per protocol;  o IV: Per Anesthesia;  o *******************************************  o PRE- OP MEDICATION ORDER:  o *******************************************  o Kefzol 2 grams IV on call to OR.  o ***************  o Date of Surgical Procedure:   o Please sign permit for: " thoracic twelve vertebroplasty  o The above History and Physical must have been completed within 30 days of admission.  o The ROS and the PFSH were reviewed at today's visit.  o Call or return to office if symptoms worsen or persist.   o Dr. Cisneros discussed with her the risks and benefits of a T12 vertebroplasty. She will need cardiac clearance from Dr. Tang and permission to d/c cilostazol and ASA. She has a history of a four vessel bypass in 2013. She would like to proceed with surgery once cleared by cardiology. I will brace her with a TLSO brace in the meantime. She will wear it when upright past 30 degrees and when out of bed. No lifting greater than 10 pounds.   · Associate Tasks  o Task ID [id number] General Task: needs cardiac clearance for T12 vertebroplasty from Dr. Tang and d/c ASA and cilostazol            Electronically Signed by: Christi Escamilla PA-C -Author on March 18, 2021 01:55:29 PM  Electronically Co-signed by: Redd Cisneros MD -Reviewer on March 18, 2021 03:07:26 PM

## 2021-05-14 NOTE — PROGRESS NOTES
"   Progress Note      Patient Name: Nilda Julien   Patient ID: [id number]   Sex: Female   YOB: 1945    Primary Care Provider: Jevon Gonzalez MD   Referring Provider: Jevon Gonzalez MD    Visit Date: January 4, 2021    Provider: Jevon Gonzalez MD   Location: [facility]   Location Address: [address] [place] [address], [address]  [place], [place]  [id number]   Location Phone: [phone]          Chief Complaint     3 month follow up       History Of Present Illness  Nilda Julien is a 75 year old /White female who presents for evaluation and treatment of:      Pt presents for f/u.  She c/o back pain...mid back pain started after getting out of car.... and twisted her back while trying to lift a heavy doctor pepper bottle. She was given pain meds/muscle relaxer/steroids..but no improvement. Pain is manly of the mid to lower back. Per pt, she can feel her verterbra \"sticking out\" within her skin. Pain is at times a 9/10.     Hx of UTI..2 organisms. The urine culture results were given back to her today. She will require abx.    labs were done.. lipid panel uncontrolled.       Past Medical History  Disease Name Date Onset Notes   Allergic rhinitis --  seasonal allergies   Aortic stenosis --  --    Arthritis --  --    Back pain --  --    Bone Density Screening 10/2/17 --    Coronary artery disease --  --    Diarrhea --  --    Diverticulitis --  --    Essential hypertension --  --    Fatigue --  --    Heart Attack 2001 --    Heart Disease --  --    Heart Murmur 2013 --    Hemorrhoids 2013 --    Hip Pain --  --    Hyperlipidemia --  --    Hypertension --  --    Irritable bowel syndrome --  --    Irritable bowel syndrome with diarrhea --  --    Memory loss/Forgetfulness --  --    Microhematuria 01/13/2019 --    Nausea --  --    Nephrolithiasis 01/13/2019 --    Pap smear for cervical cancer screening 4/17/15 --    Screening Mammogram 7/16/18 --          Past Surgical " History  Procedure Name Date Notes   cardiac stents 2001 2 stents placed   Colonoscopy 5/23/16 2019 --    EGD 2019 --    Hernia Repair --  --    Open Heart Surgery 4/19/2013 4 way bypass         Medication List  Name Date Started Instructions   Aspir-81 81 mg Oral tablet,delayed release (DR/EC)  take 1 tablet (81 mg) by oral route once daily   carvedilol 6.25 mg oral tablet 09/29/2020 TAKE ONE TABLET BY MOUTH TWICE A DAY WITH FOOD   cilostazol 100 mg oral tablet 10/15/2020 take 1 tablet by oral route 2 times a day   ciprofloxacin HCl 500 mg oral tablet 01/04/2021 take 1 tablet (500 mg) by oral route every 12 hours for 7 days   cyclobenzaprine 5 mg oral tablet 01/04/2021 take 1 tablet (5 mg) by oral route QHS   hydrocodone-acetaminophen 7.5-325 mg oral tablet 01/04/2021 take 1 tablet by oral route every 6 hours as needed for pain   Lipitor 80 mg oral tablet 01/04/2021 take 1 tablet (80 mg) by oral route once daily at bedtime for 90 days   Lomotil 2.5-0.025 mg oral tablet 09/29/2020 take 2 tablets (5 mg) by oral route 2 times per day as needed for 30 days   Macrobid 100 mg oral capsule 01/04/2021 take 1 capsule (100 mg) by oral route 2 times per day with food for 7 days   omeprazole 20 mg oral capsule,delayed release(DR/EC) 04/24/2018 take 1 capsule (20 mg) by oral route once daily before a meal for 90 days   Probiotic oral  --    tramadol 50 mg oral tablet 09/29/2020 take 1 tablet by oral route 2 times a for 30 days   vitamin E 400 unit oral capsule  take 2 capsules by oral route daily   Zofran 4 mg oral tablet 01/04/2021 take 1 tablet by oral route 3 times a day as needed   Zoloft 50 mg oral tablet 06/19/2020 TAKE ONE TABLET BY MOUTH DAILY         Allergy List  Allergen Name Date Reaction Notes   NO KNOWN DRUG ALLERGIES --  --  --          Family Medical History  Disease Name Relative/Age Notes   Stroke Father/   Mini   Hypertension Father/  Mother/   --    Chronic Obstructive Pulmonary Disease Father/   black lung   age 88   Heart Attack (MI) Mother/   --    Family history of stroke Father/   Father   Family history of heart disease Mother/   Mother         Social History  Finding Status Start/Stop Quantity Notes   Alcohol Never --/-- --  does not drink   lives alone --  --/-- --  --    Retired --  --/-- --  --    Tobacco Current every day --/-- 0.5 PPD current every day smoker, 0.5 pack per day, smoked 31 or more years    --  --/-- --  --          Immunizations  NameDate Admin Mfg Trade Name Lot Number Route Inj VIS Given VIS Publication   InfluenzaRefused 2020 NE Not Entered  NE NE     Comments:    Spmpzdevsrbj95/17/2015 [facility] PREVNAR 13 [id number] IM RD 2015   Comments:    Vbbwhcmzzohq29/17/2015 [facility] PREVNAR 13 [id number] IM RD 2015   Comments:    Td2015 [facility] DECAVAC [id number] IM LD 2015   Comments:          Review of Systems  · Constitutional  o Denies  o : fever, weight loss, weight gain  · Cardiovascular  o Denies  o : pedal edema, claudication, chest pressure, palpitations  · Respiratory  o Denies  o : shortness of breath, wheezing, cough, hemoptysis, dyspnea on exertion  · Gastrointestinal  o Denies  o : nausea, vomiting, diarrhea, constipation, abdominal pain      Vitals  Date Time BP Position Site L\R Cuff Size HR RR TEMP (F) WT  HT  BMI kg/m2 BSA m2 O2 Sat FR L/min FiO2        2021 02:36 /64 Sitting    80 - R 18 97.9 73lbs 16oz 5'   14.45 1.19 97 %  21%          Physical Examination  · Constitutional  o Appearance  o : alert, in no acute distress, well developed, well-nourished  · Head and Face  o Head  o : normocephalic, atraumatic, non tender, no palpable masses or nodules.  o Face  o : no facial lesions  · Eyes  o Vision  o : Acuity: grossly normal at distance, Conjuntivae: Normal, Sclerae white  · Respiratory  o Auscultation of Lungs  o : normal breath sounds throughout  · Cardiovascular  o Heart  o : Regular rate and rhythm, Normal  S1,S2   · Musculoskeletal  o Spine  o : TTP along the mid to lower spine...scoliosis/deep curvature.   · Psychiatric  o Mood and Affect  o : normal mood and affect          Assessment  · UTI (urinary tract infection)     599.0/N39.0  · Back pain     724.5/M54.9  · Abnormal x-ray of thoracic spine     793.7/R93.7  · Abnormal x-ray of lumbar spine     793.7/R93.7       pain meds hydrocodone 7.5 given.  muscle relaxer.  will move her appt with neurosurg to a sooner appt.   MRI of the mid and lower back.    abx cipro and Macrobid and Rocephin.  she has to be off the cilostazol while taking cipro.      f/u as directed.         stop pravastatin and start Lipitor.       Plan  · Orders  o MRI of lumbar spine without contrast (97937) - 724.5/M54.9, 793.7/R93.7 - 01/04/2021  o MRI of thoracic spine without contrast (98221) - 724.5/M54.9, 793.7/R93.7 - 01/04/2021  o IM/SQ - Injection Fee [facility] (57649) - 599.0/N39.0 - 01/04/2021  o 4.00 - Rocephin 1 gram mixed with Lidocaine (-4) - 599.0/N39.0 - 01/04/2021   Injection - Rocephin 1 Gram; Dose: 1 Gram; Site: Left Deltoid; Route: intramuscular; Date: 01/04/2021 15:35:14; Exp: 06/01/2021; Lot: [id number]; g: [employer]; TradeName: ceftriaxone; Location: [facility]; Administered By: Lizzy Lemon MA; Comment: pt tolerated well  o ACO-14: Influenza immunization was not administered for reasons documented () - - 01/04/2021  o ACO-39: Current medications updated and reviewed (, 1159F) - - 01/04/2021  · Medications  o cyclobenzaprine 5 mg oral tablet   SIG: take 1 tablet (5 mg) by oral route QHS prn   DISP: (15) Tablet with 0 refills  Discontinued on 01/04/2021     o hydrocodone-acetaminophen 5-325 mg oral tablet   SIG: take 1 tablet by oral route 3 times a day as needed   DISP: (30) Tablet with 0 refills  Discontinued on 01/04/2021     o Macrobid 100 mg oral capsule   SIG: take 1 capsule (100 mg) by oral route every 12 hours with food for 7 days    DISP: (14) Capsule with 0 refills  Discontinued on 01/04/2021     o Medrol (Charlie) 4 mg oral tablets,dose pack   SIG: take by oral route as directed per package instructions   DISP: (1) Dose Pack with 0 refills  Discontinued on 01/04/2021     o Medications have been Reconciled  o Transition of Care or Provider Policy  · Instructions  o Electronically Identified Patient Education Materials Provided Electronically  · Disposition  o Call or Return if symptoms worsen or persist.  o Care Transition            Electronically Signed by: Jevon Gonzalez MD -Author on January 4, 2021 10:51:34 PM

## 2021-05-15 VITALS
SYSTOLIC BLOOD PRESSURE: 124 MMHG | WEIGHT: 88 LBS | HEART RATE: 82 BPM | HEIGHT: 60 IN | BODY MASS INDEX: 17.28 KG/M2 | DIASTOLIC BLOOD PRESSURE: 66 MMHG | OXYGEN SATURATION: 98 % | TEMPERATURE: 98.6 F

## 2021-05-15 VITALS
TEMPERATURE: 97.9 F | DIASTOLIC BLOOD PRESSURE: 64 MMHG | HEART RATE: 74 BPM | BODY MASS INDEX: 16.6 KG/M2 | HEIGHT: 60 IN | OXYGEN SATURATION: 97 % | SYSTOLIC BLOOD PRESSURE: 130 MMHG | WEIGHT: 84.56 LBS

## 2021-05-15 VITALS
OXYGEN SATURATION: 96 % | SYSTOLIC BLOOD PRESSURE: 115 MMHG | DIASTOLIC BLOOD PRESSURE: 58 MMHG | TEMPERATURE: 98.5 F | HEART RATE: 83 BPM | BODY MASS INDEX: 15.31 KG/M2 | HEIGHT: 60 IN | WEIGHT: 78 LBS

## 2021-05-15 VITALS
DIASTOLIC BLOOD PRESSURE: 73 MMHG | SYSTOLIC BLOOD PRESSURE: 138 MMHG | OXYGEN SATURATION: 98 % | BODY MASS INDEX: 14.79 KG/M2 | HEART RATE: 92 BPM | WEIGHT: 83.44 LBS | HEIGHT: 63 IN | TEMPERATURE: 98 F

## 2021-05-15 VITALS
WEIGHT: 90.5 LBS | SYSTOLIC BLOOD PRESSURE: 143 MMHG | HEIGHT: 60 IN | TEMPERATURE: 97.1 F | OXYGEN SATURATION: 98 % | BODY MASS INDEX: 17.77 KG/M2 | DIASTOLIC BLOOD PRESSURE: 68 MMHG | HEART RATE: 82 BPM

## 2021-05-15 VITALS
DIASTOLIC BLOOD PRESSURE: 61 MMHG | WEIGHT: 83.56 LBS | TEMPERATURE: 97.6 F | HEIGHT: 60 IN | BODY MASS INDEX: 16.4 KG/M2 | HEART RATE: 86 BPM | SYSTOLIC BLOOD PRESSURE: 111 MMHG | OXYGEN SATURATION: 98 %

## 2021-05-15 VITALS
HEART RATE: 86 BPM | BODY MASS INDEX: 16.77 KG/M2 | SYSTOLIC BLOOD PRESSURE: 108 MMHG | OXYGEN SATURATION: 96 % | TEMPERATURE: 97.5 F | DIASTOLIC BLOOD PRESSURE: 60 MMHG | WEIGHT: 85.44 LBS | HEIGHT: 60 IN

## 2021-05-15 VITALS
WEIGHT: 86.56 LBS | DIASTOLIC BLOOD PRESSURE: 51 MMHG | OXYGEN SATURATION: 98 % | HEIGHT: 60 IN | SYSTOLIC BLOOD PRESSURE: 111 MMHG | HEART RATE: 85 BPM | RESPIRATION RATE: 12 BRPM | BODY MASS INDEX: 16.99 KG/M2

## 2021-05-16 VITALS
BODY MASS INDEX: 17.93 KG/M2 | SYSTOLIC BLOOD PRESSURE: 142 MMHG | OXYGEN SATURATION: 98 % | HEART RATE: 78 BPM | HEIGHT: 60 IN | RESPIRATION RATE: 18 BRPM | DIASTOLIC BLOOD PRESSURE: 68 MMHG | WEIGHT: 91.31 LBS | TEMPERATURE: 97.8 F

## 2021-05-16 VITALS
HEART RATE: 85 BPM | RESPIRATION RATE: 9 BRPM | WEIGHT: 90.44 LBS | TEMPERATURE: 96.9 F | OXYGEN SATURATION: 98 % | DIASTOLIC BLOOD PRESSURE: 58 MMHG | SYSTOLIC BLOOD PRESSURE: 123 MMHG | BODY MASS INDEX: 17.75 KG/M2 | HEIGHT: 60 IN

## 2021-05-16 VITALS
HEIGHT: 63 IN | SYSTOLIC BLOOD PRESSURE: 110 MMHG | BODY MASS INDEX: 16.52 KG/M2 | RESPIRATION RATE: 20 BRPM | OXYGEN SATURATION: 96 % | WEIGHT: 93.25 LBS | HEART RATE: 78 BPM | DIASTOLIC BLOOD PRESSURE: 60 MMHG | TEMPERATURE: 97.1 F

## 2021-05-16 VITALS — WEIGHT: 90 LBS | RESPIRATION RATE: 16 BRPM | HEIGHT: 59 IN | BODY MASS INDEX: 18.14 KG/M2

## 2021-05-16 VITALS
HEIGHT: 59 IN | DIASTOLIC BLOOD PRESSURE: 72 MMHG | WEIGHT: 90.5 LBS | BODY MASS INDEX: 18.24 KG/M2 | SYSTOLIC BLOOD PRESSURE: 130 MMHG

## 2021-05-16 VITALS
HEIGHT: 60 IN | WEIGHT: 90.5 LBS | BODY MASS INDEX: 17.77 KG/M2 | SYSTOLIC BLOOD PRESSURE: 102 MMHG | DIASTOLIC BLOOD PRESSURE: 70 MMHG

## 2021-05-16 VITALS — BODY MASS INDEX: 17.94 KG/M2 | WEIGHT: 91.37 LBS | RESPIRATION RATE: 12 BRPM | HEIGHT: 60 IN

## 2021-05-16 VITALS
HEIGHT: 59 IN | WEIGHT: 90.06 LBS | OXYGEN SATURATION: 98 % | DIASTOLIC BLOOD PRESSURE: 60 MMHG | TEMPERATURE: 96 F | BODY MASS INDEX: 18.16 KG/M2 | SYSTOLIC BLOOD PRESSURE: 143 MMHG | HEART RATE: 89 BPM

## 2021-05-16 VITALS
HEIGHT: 59 IN | HEART RATE: 72 BPM | TEMPERATURE: 97.5 F | OXYGEN SATURATION: 98 % | DIASTOLIC BLOOD PRESSURE: 58 MMHG | WEIGHT: 92 LBS | SYSTOLIC BLOOD PRESSURE: 156 MMHG | BODY MASS INDEX: 18.55 KG/M2

## 2021-05-16 VITALS — RESPIRATION RATE: 14 BRPM | WEIGHT: 91 LBS | BODY MASS INDEX: 17.87 KG/M2 | HEIGHT: 60 IN

## 2021-05-16 VITALS — RESPIRATION RATE: 16 BRPM | BODY MASS INDEX: 18.14 KG/M2 | WEIGHT: 90 LBS | HEIGHT: 59 IN

## 2021-05-18 ENCOUNTER — OFFICE VISIT CONVERTED (OUTPATIENT)
Dept: CARDIOLOGY | Facility: CLINIC | Age: 76
End: 2021-05-18
Attending: SPECIALIST

## 2021-05-18 ENCOUNTER — CONVERSION ENCOUNTER (OUTPATIENT)
Dept: CARDIOLOGY | Facility: CLINIC | Age: 76
End: 2021-05-18

## 2021-05-22 ENCOUNTER — TRANSCRIBE ORDERS (OUTPATIENT)
Dept: ADMINISTRATIVE | Facility: HOSPITAL | Age: 76
End: 2021-05-22

## 2021-05-22 ENCOUNTER — TRANSCRIBE ORDERS (OUTPATIENT)
Dept: CARDIAC REHAB | Facility: HOSPITAL | Age: 76
End: 2021-05-22

## 2021-05-22 DIAGNOSIS — I24.1 POSTMYOCARDIAL INFARCTION SYNDROME FOLLOWING CORONARY ARTERY BYPASS GRAFT (CABG) SURGERY (HCC): Primary | ICD-10-CM

## 2021-05-22 DIAGNOSIS — Z95.1 HX OF CABG: ICD-10-CM

## 2021-05-22 DIAGNOSIS — Z95.1 POSTMYOCARDIAL INFARCTION SYNDROME FOLLOWING CORONARY ARTERY BYPASS GRAFT (CABG) SURGERY (HCC): Primary | ICD-10-CM

## 2021-05-22 DIAGNOSIS — Z12.31 VISIT FOR SCREENING MAMMOGRAM: Primary | ICD-10-CM

## 2021-05-28 ENCOUNTER — CONVERSION ENCOUNTER (OUTPATIENT)
Dept: CARDIOLOGY | Facility: CLINIC | Age: 76
End: 2021-05-28
Attending: SPECIALIST

## 2021-06-01 ENCOUNTER — TRANSCRIBE ORDERS (OUTPATIENT)
Dept: ADMINISTRATIVE | Facility: HOSPITAL | Age: 76
End: 2021-06-01

## 2021-06-01 DIAGNOSIS — R22.2 MASS IN CHEST: ICD-10-CM

## 2021-06-01 DIAGNOSIS — I87.1 IVC (INFERIOR VENA CAVA OBSTRUCTION): Primary | ICD-10-CM

## 2021-06-05 NOTE — PROCEDURES
"   Procedure Note      Patient Name: Nilda Julien   Patient ID: [id number]   Sex: Female   YOB: 1945    Primary Care Provider: Jevon Gonzalez MD   Referring Provider: Jevon Gonzalez MD    Visit Date: May 28, 2021    Provider: Dannie Obando MD   Location: [facility]   Location Address: [address] [place] [address], [address]   [place]  [id number]   Location Phone: [phone]          FINAL REPORT   TRANSTHORACIC ECHOCARDIOGRAM REPORT    Diagnosis: Shortness of breath   Height: 4'11\" Weight: 74 B/P: 132/62 BSA: 1.2   Tech: BNS   MEASUREMENTS:  RVID (Diastole) : RVID. (NORMAL: 0.7 to 2.4 cm max)   LVID (Systole): 3.7 cm (Diastole): 4.4 cm . (NORMAL: 3.7 - 5.4 cm)   Posterior Wall Thickness (Diastole): 1.1 cm. (NORMAL: 0.8 - 1.1 cm)   Septal Thickness (Diastole): 1.3 cm. (NORMAL: 0.7 - 1.2 cm)   LAID (Systole): 3.7 cm. (NORMAL: 1.9 - 3.8 cm)   Aortic Root Diameter (Diastole): 3.2 cm. (NORMAL: 2.0 - 3.7 cm)   DOPPLER:  E/A ratio N/A (NORMAL 0.8-2.0)   DT: 130 msec (NORMAL 140-240 msec.)   IVRT 106 m/sec (NORMAL  m/sec.)   E/E': 33 (NORMAL <8 avg.)   COMMENTS:  The patient underwent 2-D, M-Mode, and Doppler examination, including pulse-wave, continuous-wave, and color-flow analysis; the study is technically adequate.   FINDINGS:  AORTIC VALVE: Fibrocalcific.   MITRAL VALVE: Fibrocalcific.   TRICUSPID VALVE: Normal.   PULMONIC VALVE: Not well visualized.   LEFT ATRIUM: Normal. No intracavitary masses or clots seen. LA volume index is 40 mL/m2.   AORTIC ROOT: Normal in size with adequate motion.   LEFT VENTRICLE: Mild diffuse hypokinesis of the left ventricle with mildly reduced left ventricular systolic function with underlying left ventricular hypertrophy. Ejection fraction 41%.   RIGHT ATRIUM: Normal.   RIGHT VENTRICLE: Normal size and function.   PERICARDIUM: Unremarkable. No evidence of effusion.   INFERIOR VENA CAVA: Diameter is 2.3 cm.   DOPPLER: Doppler examination of the aortic, " mitral, tricuspid, and pulmonary valves was performed. Normal pulmonary artery systolic pressure by Doppler. There is slightly increased velocity across the aortic valve with peak gradient of 14 mm suggestive of mild aortic stenosis. There is mild aortic regurgitation. There is an echogenic structure in the IVC.   Faxed: 06/02/2021      CONCLUSION:  1.  Mildly reduced left ventricular systolic function with left ventricular hypertrophy and diffuse hypokinesis of        the left ventricle.  2.  Mild aortic regurgitation.  3.  Mild aortic stenosis.   4.  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    RECOMMENDATION:  CT of the chest with contrast to evaluate IVC.      Dannie Obando MD, FACC  JOSIAH/pap                 Electronically Signed by: Candace Mckeon-, Other -Author on June 2, 2021 10:33:46 AM  Electronically Co-signed by: Dannie Obando MD -Reviewer on June 2, 2021 12:39:44 PM

## 2021-06-05 NOTE — H&P
History and Physical      Patient Name: Nilda Julien   Patient ID: [id number]   Sex: Female   YOB: 1945    Primary Care Provider: Jevon Gonzalez MD   Referring Provider: Jevon Gonzalez MD    Visit Date: May 18, 2021    Provider: Dannie Obando MD   Location: [facility]   Location Address: [address] [place] [address], [address]   [place]  [id number]   Location Phone: [phone]          Chief Complaint     Shortness of breath and coronary artery disease.       History Of Present Illness  Consult requested by: ALL CARE PROVIDER NAME   Nilda Julien is a 75 year old /White female with a history of coronary artery disease status post coronary artery bypass graft surgery who has not seen me for several years. She has some shortness of breath on exertion. She denies any chest pain. She has severe peripheral vascular disease. She is supposed to undergo back surgery.   PAST MEDICAL HISTORY: Positive for coronary artery disease, history of coronary artery bypass graft surgery, hypertension, and hyperlipidemia.   FAMILY HISTORY: Positive for hypertension and heart disease.   PSYCHOSOCIAL HISTORY: Denies alcohol consumption. Currently smokes 1/2 pack per day.   CURRENT MEDICATIONS: Cilostazol 100 mg twice daily; Sertraline 50 mg daily; Cetirizine 10 mg daily; Atorvastatin 80 mg at bedtime; Tramadol 50 mg daily; Carvedilol 6.25 mg twice daily; Hydrocodone-Acetaminophen 7.5/325 mg every 6 hours; Vitamin D daily; Vitamin E daily; Aspirin 81 mg daily; Omeprazole 20 mg daily.   ALLERGIES: No known drug allergies.   CHOLESTEROL STATUS: Unknown.       Review of Systems  · Constitutional  o Admits  o : good general health lately, recent weight changes   o Denies  o : fatigue  · Eyes  o Admits  o : double vision  · HENT  o Admits  o : hearing loss or ringing, chronic sinus problem  o Denies  o : swollen glands in neck  · Cardiovascular  o Admits  o : swelling (feet, ankles, hands), shortness  "of breath while walking or lying flat  o Denies  o : chest pain, palpitations (fast, fluttering, or skipping beats)  · Respiratory  o Admits  o : chronic or frequent cough  o Denies  o : asthma or wheezing, COPD  · Gastrointestinal  o Admits  o : nausea or vomiting  o Denies  o : ulcers  · Neurologic  o Admits  o : lightheaded or dizzy  o Denies  o : stroke, headaches  · Musculoskeletal  o Denies  o : joint pain, back pain  · Endocrine  o Admits  o : heat or cold intolerance  o Denies  o : thyroid disease, diabetes, excessive thirst or urination  · Heme-Lymph  o Admits  o : bleeding or bruising tendency  o Denies  o : anemia      Vitals  Date Time BP Position Site L\R Cuff Size HR RR TEMP (F) WT  HT  BMI kg/m2 BSA m2 O2 Sat FR L/min FiO2 HC       05/18/2021 12:01 /62 Sitting    85 - R   73lbs 16oz 4'  11\" 14.95 1.18             Physical Examination  · Constitutional  o Appearance  o : Awake, alert, cooperative, pleasant.  · Eyes  o Pupils and Irises  o : Pupils equal and reacting to light and accommodation.  · Ears, Nose, Mouth and Throat  o Ears  o : Tympanic membranes are normal.  o Nose  o : Clear with no maxillary tenderness.  o Oral Cavity  o : Clear.  · Neck  o Inspection/Palpation  o : No JVD, bruits, thyromegaly, or adenopathy. Trachea midline. No axillary or cervical lymphadenopathy.  o Thyroid  o : No thyroid enlargement.   · Respiratory  o Inspection of Chest  o : No chest wall deformities, moving equal.  o Auscultation of Lungs  o : Good air entry with vesicular breath sounds.  o Percussion of Chest  o : Resonant bilaterally.   o Palpation of Chest  o : Equal movements bilaterally.  · Cardiovascular  o Heart  o :   § Auscultation of Heart  § : PMI is in the 5th intercostal space. S1 and S2 regular. No S3. No S4. No murmurs.  o Peripheral Vascular System  o :   § Extremities  § : No pedal edema. Peripheral pulses well felt. No cyanosis.  · Gastrointestinal  o Abdominal Examination  o : No " organomegaly, masses, tenderness, bruits, or abnormal pulsations. Bowel sounds are normal.  · Musculoskeletal  o General  o : Muscle strength is normal with normal tone.  · Skin and Subcutaneous Tissue  o General Inspection  o : No rash, petechiae, or suspicious skin lesions. Skin turgor is normal.          Assessment     1.  Coronary artery disease, status post coronary artery bypass graft surgery. Shortness of breath. Bilateral peripheral vascular disease. Here for multiple risk factors. We will do a Lexiscan stress test due to rule out any significant ischemia, will do echocardiogram to evaluate left ventricular systolic function. Continue carvedilol for now.  2.  Hyperlipidemia. Continue Lipitor, get lipid profile, managed by PMD.   3.  Positive for nicotine use. Smoking cessation discussed with the patient.         Dannie Obando MD  JOSIAH/               Electronically Signed by: Donna Banuelos-, OT -Author on May 20, 2021 05:21:16 AM  Electronically Co-signed by: Dannie Obando MD -Reviewer on May 20, 2021 11:38:21 AM

## 2021-06-07 ENCOUNTER — OFFICE VISIT (OUTPATIENT)
Dept: FAMILY MEDICINE CLINIC | Facility: CLINIC | Age: 76
End: 2021-06-07

## 2021-06-07 VITALS
BODY MASS INDEX: 15.86 KG/M2 | HEART RATE: 90 BPM | WEIGHT: 78.5 LBS | DIASTOLIC BLOOD PRESSURE: 73 MMHG | TEMPERATURE: 98 F | SYSTOLIC BLOOD PRESSURE: 118 MMHG | OXYGEN SATURATION: 96 % | RESPIRATION RATE: 18 BRPM

## 2021-06-07 DIAGNOSIS — E11.65 TYPE 2 DIABETES MELLITUS WITH HYPERGLYCEMIA, WITHOUT LONG-TERM CURRENT USE OF INSULIN (HCC): ICD-10-CM

## 2021-06-07 DIAGNOSIS — E03.9 ACQUIRED HYPOTHYROIDISM: Primary | ICD-10-CM

## 2021-06-07 DIAGNOSIS — K21.9 GASTROESOPHAGEAL REFLUX DISEASE WITHOUT ESOPHAGITIS: ICD-10-CM

## 2021-06-07 PROBLEM — E78.2 MIXED HYPERLIPIDEMIA: Chronic | Status: ACTIVE | Noted: 2021-06-07

## 2021-06-07 PROBLEM — I73.9 PVD (PERIPHERAL VASCULAR DISEASE): Chronic | Status: ACTIVE | Noted: 2021-06-07

## 2021-06-07 PROCEDURE — 99214 OFFICE O/P EST MOD 30 MIN: CPT | Performed by: FAMILY MEDICINE

## 2021-06-07 RX ORDER — ASPIRIN 81 MG/1
81 TABLET ORAL DAILY
COMMUNITY

## 2021-06-07 RX ORDER — CILOSTAZOL 100 MG/1
100 TABLET ORAL 2 TIMES DAILY
COMMUNITY
End: 2021-10-18 | Stop reason: SDUPTHER

## 2021-06-07 RX ORDER — FLUTICASONE PROPIONATE 50 MCG
2 SPRAY, SUSPENSION (ML) NASAL 2 TIMES DAILY
COMMUNITY
Start: 2021-01-18 | End: 2021-12-14

## 2021-06-07 RX ORDER — HYDROCODONE BITARTRATE AND ACETAMINOPHEN 7.5; 325 MG/1; MG/1
7.5 TABLET ORAL EVERY 6 HOURS
COMMUNITY
Start: 2021-04-10 | End: 2021-06-24

## 2021-06-07 RX ORDER — CARVEDILOL 6.25 MG/1
6.25 TABLET ORAL 2 TIMES DAILY WITH MEALS
COMMUNITY
End: 2021-10-18 | Stop reason: SDUPTHER

## 2021-06-07 RX ORDER — ATORVASTATIN CALCIUM 80 MG/1
80 TABLET, FILM COATED ORAL
COMMUNITY
Start: 2021-01-04 | End: 2021-06-24

## 2021-06-07 RX ORDER — CETIRIZINE HYDROCHLORIDE 10 MG/1
10 TABLET ORAL DAILY
COMMUNITY
Start: 2021-04-09 | End: 2021-10-18 | Stop reason: SDUPTHER

## 2021-06-07 NOTE — PROGRESS NOTES
Chief Complaint   Patient presents with   • Follow-up     Subjective   Nilda Julien is a 75 y.o. female who presents to the office for follow-up.  Patient presents for follow-up.  History of chronic back pain she has now established with neurosurgeon and currently on hydrocodone as needed.  She has a history of peripheral vascular disease bilateral lower extremities and she is established with vascular she is also on cilostazol.  Along with the statin and also aspirin.  She did not have her labs done today.  History allergic rhinitis controlled.  History of hyperlipidemia currently on 80 mg, history of anxiety disorder controlled history of vitamin D deficiency she is currently on vitamin D oral pill over-the-counter daily 400 units periods other than back pain she has no other medical concerns    The following portions of the patient's history were reviewed and updated as appropriate: allergies, current medications, past family history, past medical history, past social history, past surgical history and problem list.    Review of Systems   Constitutional: Negative for chills, fatigue, fever and unexpected weight loss.   Respiratory: Negative for cough, chest tightness and shortness of breath.    Cardiovascular: Negative for chest pain and palpitations.   Gastrointestinal: Negative for abdominal pain, constipation, diarrhea, nausea and vomiting.        Objective   Vitals:    06/07/21 1527   BP: 118/73   BP Location: Left leg   Patient Position: Sitting   Cuff Size: Adult   Pulse: 90   Resp: 18   Temp: 98 °F (36.7 °C)   SpO2: 96%   Weight: 35.6 kg (78 lb 8 oz)     Body mass index is 15.86 kg/m².  Physical Exam  Vitals reviewed.   Constitutional:       General: She is not in acute distress.     Appearance: Normal appearance. She is not ill-appearing.   HENT:      Head: Normocephalic.   Eyes:      Conjunctiva/sclera: Conjunctivae normal.   Cardiovascular:      Rate and Rhythm: Normal rate and regular rhythm.    Pulmonary:      Effort: Pulmonary effort is normal.      Breath sounds: Normal breath sounds.   Musculoskeletal:      Cervical back: No tenderness.   Lymphadenopathy:      Cervical: No cervical adenopathy.   Skin:     Findings: No lesion or rash.   Neurological:      Mental Status: She is alert and oriented to person, place, and time.   Psychiatric:         Mood and Affect: Mood normal.          Assessment/Plan   Diagnoses and all orders for this visit:    1. Acquired hypothyroidism (Primary)  -     Comprehensive Metabolic Panel; Future  -     Lipid Panel; Future  -     Hemoglobin A1c; Future  -     Microalbumin / Creatinine Urine Ratio - Urine, Clean Catch; Future    2. Type 2 diabetes mellitus with hyperglycemia, without long-term current use of insulin (CMS/[facility])  -     Comprehensive Metabolic Panel; Future  -     Lipid Panel; Future  -     Hemoglobin A1c; Future  -     Microalbumin / Creatinine Urine Ratio - Urine, Clean Catch; Future    3. Gastroesophageal reflux disease without esophagitis           She will return to clinic in 3 months for a annual Medicare wellness along with blood work.  Continue to see specialist including cardiologist vascular specialist and neurosurgeon    Return in about 3 months (around 9/7/2021) for with labs prior to appointment.   No flowsheet data found.

## 2021-06-09 ENCOUNTER — HOSPITAL ENCOUNTER (OUTPATIENT)
Dept: CT IMAGING | Facility: HOSPITAL | Age: 76
Discharge: HOME | End: 2021-06-09
Admitting: SPECIALIST

## 2021-06-09 DIAGNOSIS — I87.1 IVC (INFERIOR VENA CAVA OBSTRUCTION): ICD-10-CM

## 2021-06-09 DIAGNOSIS — R22.2 MASS IN CHEST: ICD-10-CM

## 2021-06-09 LAB — CREAT BLDA-MCNC: 0.7 MG/DL

## 2021-06-09 PROCEDURE — 82565 ASSAY OF CREATININE: CPT

## 2021-06-09 PROCEDURE — 0 IOPAMIDOL PER 1 ML: Performed by: SPECIALIST

## 2021-06-09 PROCEDURE — 71260 CT THORAX DX C+: CPT

## 2021-06-09 RX ADMIN — IOPAMIDOL 100 ML: 755 INJECTION, SOLUTION INTRAVENOUS at 18:08

## 2021-06-10 ENCOUNTER — TELEPHONE (OUTPATIENT)
Dept: CARDIOLOGY | Facility: CLINIC | Age: 76
End: 2021-06-10

## 2021-06-10 DIAGNOSIS — I87.1 VENA CAVA OBSTRUCTION, INFERIOR: Primary | ICD-10-CM

## 2021-06-10 NOTE — TELEPHONE ENCOUNTER
"Spoke with patient regarding her CTA Chest results:   1. Poor opacification of the inferior vena cava due to contrast bolus timing.  If there is high   clinical suspicion for an inferior vena cava mass, CT scan of the abdomen and pelvis with contrast   to include delayed phase imaging would be recommended.  2. Stable appearance of bilateral noncalcified pulmonary nodules.  Continued serial CT follow-up   would be recommended with repeat noncontrast CT scan of the chest in 6 months    Patient reports that she does not feel the contrast was administered in the correct manner because \"I didn't feel flushed or like I had to pee in my pants\" as she has had contrast studies before. With this result, what the patient has reported, and after consulting with Radiologist Dr Rainey, a second study will be ordered for better viewing CT Abdomen and Pelvis with contrast at [facility] one day next week.     Patient aware, verbalizes understanding, all questions answered at this time.   "

## 2021-06-23 ENCOUNTER — TELEPHONE (OUTPATIENT)
Dept: CARDIOLOGY | Facility: CLINIC | Age: 76
End: 2021-06-23

## 2021-06-23 ENCOUNTER — HOSPITAL ENCOUNTER (OUTPATIENT)
Dept: CT IMAGING | Facility: HOSPITAL | Age: 76
Discharge: HOME | End: 2021-06-23
Admitting: NURSE PRACTITIONER

## 2021-06-23 DIAGNOSIS — I87.1 VENA CAVA OBSTRUCTION, INFERIOR: ICD-10-CM

## 2021-06-23 PROCEDURE — 74177 CT ABD & PELVIS W/CONTRAST: CPT

## 2021-06-23 PROCEDURE — 0 IOPAMIDOL PER 1 ML: Performed by: NURSE PRACTITIONER

## 2021-06-23 RX ADMIN — IOPAMIDOL 100 ML: 755 INJECTION, SOLUTION INTRAVENOUS at 14:45

## 2021-06-23 NOTE — TELEPHONE ENCOUNTER
----- Message from JOSE Box sent at 6/23/2021  3:52 PM EDT -----  Notify patient no evidence of inferior vena cava mass.

## 2021-06-24 ENCOUNTER — OFFICE VISIT (OUTPATIENT)
Dept: WOUND CARE | Facility: HOSPITAL | Age: 76
End: 2021-06-24

## 2021-06-24 VITALS
DIASTOLIC BLOOD PRESSURE: 64 MMHG | TEMPERATURE: 98.3 F | HEART RATE: 86 BPM | HEIGHT: 59 IN | SYSTOLIC BLOOD PRESSURE: 102 MMHG | OXYGEN SATURATION: 96 % | BODY MASS INDEX: 15.72 KG/M2 | WEIGHT: 78 LBS | RESPIRATION RATE: 14 BRPM

## 2021-06-24 DIAGNOSIS — I83.028 VENOUS STASIS ULCER OF OTHER PART OF LEFT LOWER LEG LIMITED TO BREAKDOWN OF SKIN WITH VARICOSE VEINS (HCC): Primary | ICD-10-CM

## 2021-06-24 DIAGNOSIS — L97.821 VENOUS STASIS ULCER OF OTHER PART OF LEFT LOWER LEG LIMITED TO BREAKDOWN OF SKIN WITH VARICOSE VEINS (HCC): Primary | ICD-10-CM

## 2021-06-24 PROCEDURE — G0463 HOSPITAL OUTPT CLINIC VISIT: HCPCS | Performed by: SURGERY

## 2021-06-24 PROCEDURE — 99213 OFFICE O/P EST LOW 20 MIN: CPT | Performed by: SURGERY

## 2021-06-24 NOTE — PROGRESS NOTES
"Chief Complaint  Wound Check (NON HEALING LEFT ANKLE WOUND)    Subjective            Objective     Vitals:    06/24/21 1454   BP: 102/64   BP Location: Left arm   Patient Position: Sitting   Pulse: 86   Resp: 14   Temp: 98.3 °F (36.8 °C)   TempSrc: Temporal   SpO2: 96%   Weight: 35.4 kg (78 lb)   Height: 149.9 cm (59\")   PainSc:   5   PainLoc: Leg         I have reviewed the HPI and ROS as documented by MA/RN. Mir Mascorro MD    Physical Exam     Wound Description:  This ulcer of the medial aspect of the distal left leg appears to be healing well the wound is very superficial and has decreased in size.  The patient continues to apply the Silvadene to the wound on a daily basis.  I did silver nitrate the wound today.  I have asked the patient to return to see me again in 3 weeks.  Result Review :   The following data was reviewed by: Mir Mascorro MD on 06/24/2021:               Assessment and Plan    Diagnoses and all orders for this visit:    1. Venous stasis ulcer of other part of left lower leg limited to breakdown of skin with varicose veins (CMS/HCC) (Primary)            Follow Up   Return in about 3 weeks (around 7/15/2021).  Patient was given instructions and counseling regarding her condition or for health maintenance advice. Please see specific information pulled into the AVS if appropriate.       "

## 2021-06-24 NOTE — SIGNIFICANT NOTE
Epithelial %: 0%    Exposed Bone: no    Exposed Tendon: no    Impression: improved    Short Term Goals: INCREASE GRANULATION AND DECREASE SIZE

## 2021-06-28 RX ORDER — ATORVASTATIN CALCIUM 80 MG/1
80 TABLET, FILM COATED ORAL NIGHTLY
COMMUNITY
End: 2021-07-12

## 2021-06-28 RX ORDER — TRAMADOL HYDROCHLORIDE 50 MG/1
50 TABLET ORAL EVERY 6 HOURS PRN
COMMUNITY
End: 2022-01-24

## 2021-06-28 RX ORDER — OMEPRAZOLE 20 MG/1
20 CAPSULE, DELAYED RELEASE ORAL DAILY
COMMUNITY

## 2021-06-28 RX ORDER — HYDROCODONE BITARTRATE AND ACETAMINOPHEN 7.5; 325 MG/1; MG/1
1 TABLET ORAL EVERY 6 HOURS PRN
COMMUNITY
End: 2021-08-23 | Stop reason: SDUPTHER

## 2021-06-29 ENCOUNTER — HOSPITAL ENCOUNTER (OUTPATIENT)
Dept: NUCLEAR MEDICINE | Facility: HOSPITAL | Age: 76
Discharge: HOME | End: 2021-06-29

## 2021-06-29 DIAGNOSIS — Z95.1 HX OF CABG: ICD-10-CM

## 2021-06-29 PROCEDURE — 0 TECHNETIUM TETROFOSMIN KIT: Performed by: SPECIALIST

## 2021-06-29 PROCEDURE — 78452 HT MUSCLE IMAGE SPECT MULT: CPT | Performed by: SPECIALIST

## 2021-06-29 PROCEDURE — 93016 CV STRESS TEST SUPVJ ONLY: CPT | Performed by: NURSE PRACTITIONER

## 2021-06-29 PROCEDURE — 93018 CV STRESS TEST I&R ONLY: CPT | Performed by: SPECIALIST

## 2021-06-29 PROCEDURE — 93017 CV STRESS TEST TRACING ONLY: CPT

## 2021-06-29 PROCEDURE — A9502 TC99M TETROFOSMIN: HCPCS | Performed by: SPECIALIST

## 2021-06-29 PROCEDURE — 78452 HT MUSCLE IMAGE SPECT MULT: CPT

## 2021-06-29 PROCEDURE — 25010000002 REGADENOSON 0.4 MG/5ML SOLUTION

## 2021-06-29 RX ADMIN — TETROFOSMIN 1 DOSE: 1.38 INJECTION, POWDER, LYOPHILIZED, FOR SOLUTION INTRAVENOUS at 10:20

## 2021-06-29 RX ADMIN — TETROFOSMIN 1 DOSE: 1.38 INJECTION, POWDER, LYOPHILIZED, FOR SOLUTION INTRAVENOUS at 12:55

## 2021-06-29 RX ADMIN — REGADENOSON 0.4 MG: 0.08 INJECTION, SOLUTION INTRAVENOUS at 12:54

## 2021-06-30 ENCOUNTER — TELEPHONE (OUTPATIENT)
Dept: CARDIOLOGY | Facility: CLINIC | Age: 76
End: 2021-06-30

## 2021-06-30 LAB
BH CV IMMEDIATE POST TECH DATA BLOOD PRESSURE: NORMAL MMHG
BH CV IMMEDIATE POST TECH DATA HEART RATE: 115 BPM
BH CV IMMEDIATE POST TECH DATA OXYGEN SATS: 97 %
BH CV REST NUCLEAR ISOTOPE DOSE: 10.5 MCI
BH CV SIX MINUTE RECOVERY TECH DATA BLOOD PRESSURE: NORMAL
BH CV SIX MINUTE RECOVERY TECH DATA HEART RATE: 117 BPM
BH CV SIX MINUTE RECOVERY TECH DATA OXYGEN SATURATION: 98 %
BH CV STRESS BP STAGE 1: NORMAL
BH CV STRESS COMMENTS STAGE 1: NORMAL
BH CV STRESS DOSE REGADENOSON STAGE 1: 0.4
BH CV STRESS DURATION MIN STAGE 1: 0
BH CV STRESS DURATION SEC STAGE 1: 10
BH CV STRESS HR STAGE 1: 96
BH CV STRESS NUCLEAR ISOTOPE DOSE: 32.3 MCI
BH CV STRESS O2 STAGE 1: 96
BH CV STRESS PROTOCOL 1: NORMAL
BH CV STRESS RECOVERY BP: NORMAL MMHG
BH CV STRESS RECOVERY HR: 117 BPM
BH CV STRESS RECOVERY O2: 98 %
BH CV STRESS STAGE 1: 1
BH CV THREE MINUTE POST TECH DATA BLOOD PRESSURE: NORMAL MMHG
BH CV THREE MINUTE POST TECH DATA HEART RATE: 115 BPM
BH CV THREE MINUTE POST TECH DATA OXYGEN SATURATION: 98 %
LV EF NUC BP: 15 %
MAXIMAL PREDICTED HEART RATE: 145 BPM
PERCENT MAX PREDICTED HR: 79.31 %
STRESS BASELINE BP: NORMAL MMHG
STRESS BASELINE HR: 83 BPM
STRESS O2 SAT REST: 98 %
STRESS PERCENT HR: 93 %
STRESS POST O2 SAT PEAK: 97 %
STRESS POST PEAK BP: NORMAL MMHG
STRESS POST PEAK HR: 115 BPM
STRESS TARGET HR: 123 BPM

## 2021-06-30 NOTE — TELEPHONE ENCOUNTER
Called and informed patient of SPECT results.   Patient stated Dr. Redd Cisneros is her surgeon.  Procedure: T12- verteberoplasty.    Per neurosurgery note, they want to hold ASA and Cilostazol.  Our office does not manager Cilostazol.

## 2021-06-30 NOTE — TELEPHONE ENCOUNTER
Cindy Beltran APRN Justice, Allison, RN  Hold 5 days pre op       Surgery clearance faxed to Dr. Cisneros, [phone].

## 2021-06-30 NOTE — TELEPHONE ENCOUNTER
----- Message from JOSE Box sent at 6/30/2021 11:24 AM EDT -----  Myocardial perfusion imaging indicates a located in the inferior wall with no significant ischemia noted. Myocardial perfusion imaging indicates a medium-sized infarct . Impressions are consistent with an intermediate risk study.  No cardiac cath. Medical management only. Consider high risk for any surgery, including back surgery.

## 2021-07-12 RX ORDER — ATORVASTATIN CALCIUM 80 MG/1
TABLET, FILM COATED ORAL
Qty: 90 TABLET | Refills: 1 | Status: SHIPPED | OUTPATIENT
Start: 2021-07-12 | End: 2022-01-18

## 2021-07-15 VITALS
BODY MASS INDEX: 14.92 KG/M2 | WEIGHT: 74 LBS | HEART RATE: 85 BPM | DIASTOLIC BLOOD PRESSURE: 62 MMHG | HEIGHT: 59 IN | SYSTOLIC BLOOD PRESSURE: 132 MMHG

## 2021-07-16 ENCOUNTER — HOSPITAL ENCOUNTER (OUTPATIENT)
Dept: MAMMOGRAPHY | Facility: HOSPITAL | Age: 76
Discharge: HOME | End: 2021-07-16
Admitting: FAMILY MEDICINE

## 2021-07-16 DIAGNOSIS — Z12.31 VISIT FOR SCREENING MAMMOGRAM: ICD-10-CM

## 2021-07-16 PROCEDURE — 77067 SCR MAMMO BI INCL CAD: CPT

## 2021-07-16 PROCEDURE — 77063 BREAST TOMOSYNTHESIS BI: CPT

## 2021-07-19 ENCOUNTER — TELEPHONE (OUTPATIENT)
Dept: FAMILY MEDICINE CLINIC | Facility: CLINIC | Age: 76
End: 2021-07-19

## 2021-07-19 DIAGNOSIS — Z12.31 ENCOUNTER FOR SCREENING MAMMOGRAM FOR BREAST CANCER: Primary | ICD-10-CM

## 2021-07-19 NOTE — TELEPHONE ENCOUNTER
----- Message from Jevon Gonzalez MD sent at 7/18/2021 10:46 PM EDT -----  Send letter mammogram is benign.  Repeat in 1 year please place order and schedule.  Thanks

## 2021-08-09 DIAGNOSIS — R11.0 NAUSEA: Primary | ICD-10-CM

## 2021-08-09 DIAGNOSIS — R19.7 DIARRHEA, UNSPECIFIED TYPE: ICD-10-CM

## 2021-08-09 RX ORDER — DIPHENOXYLATE HYDROCHLORIDE AND ATROPINE SULFATE 2.5; .025 MG/1; MG/1
TABLET ORAL
Qty: 120 TABLET | Refills: 1 | Status: SHIPPED | OUTPATIENT
Start: 2021-08-09 | End: 2021-10-23

## 2021-08-09 RX ORDER — ONDANSETRON 4 MG/1
TABLET, FILM COATED ORAL
Qty: 30 TABLET | Refills: 0 | Status: SHIPPED | OUTPATIENT
Start: 2021-08-09 | End: 2021-09-09

## 2021-08-23 DIAGNOSIS — G89.4 CHRONIC PAIN SYNDROME: Primary | ICD-10-CM

## 2021-08-23 NOTE — TELEPHONE ENCOUNTER
Caller: Nilda Julien    Relationship: Self    Best call back number: [phone]    Medication needed:   Requested Prescriptions     Pending Prescriptions Disp Refills   • HYDROcodone-acetaminophen (NORCO) 7.5-325 MG per tablet       Sig: Take 1 tablet by mouth Every 6 (Six) Hours As Needed for Moderate Pain .       When do you need the refill by: ASAP 8/25/21    What additional details did the patient provide when requesting the medication:PATIENT STATED SHE HAS 2 DOSES REMAINING    Does the patient have less than a 3 day supply:  [x] Yes  [] No    What is the patient's preferred pharmacy: [facility], [place] - [address] [place] [address] AT [facility] [address] - [phone] [facility] [phone] FX

## 2021-08-24 RX ORDER — HYDROCODONE BITARTRATE AND ACETAMINOPHEN 7.5; 325 MG/1; MG/1
1 TABLET ORAL EVERY 6 HOURS PRN
Qty: 90 TABLET | Refills: 0 | Status: SHIPPED | OUTPATIENT
Start: 2021-08-24 | End: 2022-01-18 | Stop reason: SDUPTHER

## 2021-09-07 DIAGNOSIS — R11.0 NAUSEA: ICD-10-CM

## 2021-09-09 RX ORDER — ONDANSETRON 4 MG/1
TABLET, FILM COATED ORAL
Qty: 30 TABLET | Refills: 0 | Status: SHIPPED | OUTPATIENT
Start: 2021-09-09 | End: 2021-10-18 | Stop reason: SDUPTHER

## 2021-09-30 ENCOUNTER — TELEPHONE (OUTPATIENT)
Dept: FAMILY MEDICINE CLINIC | Facility: CLINIC | Age: 76
End: 2021-09-30

## 2021-09-30 NOTE — TELEPHONE ENCOUNTER
Caller: Nilda Julien    Relationship: Self    Best call back number: [phone]    What orders are you requesting (i.e. lab or imaging): LABS PRIOR TO 10/18/21 APPOINTMENT AND TO INCLUDE THYROID AT PATIENTS REQUEST    In what timeframe would the patient need to come in:   10/11/21    Where will you receive your lab/imaging services:   [address]

## 2021-10-12 ENCOUNTER — LAB (OUTPATIENT)
Dept: LAB | Facility: HOSPITAL | Age: 76
End: 2021-10-12

## 2021-10-12 DIAGNOSIS — E11.65 TYPE 2 DIABETES MELLITUS WITH HYPERGLYCEMIA, WITHOUT LONG-TERM CURRENT USE OF INSULIN (HCC): ICD-10-CM

## 2021-10-12 DIAGNOSIS — E03.9 ACQUIRED HYPOTHYROIDISM: ICD-10-CM

## 2021-10-12 LAB
ALBUMIN SERPL-MCNC: 4.2 G/DL (ref 3.5–5.2)
ALBUMIN UR-MCNC: 1.9 MG/DL
ALBUMIN/GLOB SERPL: 1.4 G/DL
ALP SERPL-CCNC: 141 U/L (ref 39–117)
ALT SERPL W P-5'-P-CCNC: 14 U/L (ref 1–33)
ANION GAP SERPL CALCULATED.3IONS-SCNC: 10.1 MMOL/L (ref 5–15)
AST SERPL-CCNC: 15 U/L (ref 1–32)
BILIRUB SERPL-MCNC: 0.4 MG/DL (ref 0–1.2)
BUN SERPL-MCNC: 11 MG/DL (ref 8–23)
BUN/CREAT SERPL: 16.2 (ref 7–25)
CALCIUM SPEC-SCNC: 9.4 MG/DL (ref 8.6–10.5)
CHLORIDE SERPL-SCNC: 102 MMOL/L (ref 98–107)
CHOLEST SERPL-MCNC: 108 MG/DL (ref 0–200)
CO2 SERPL-SCNC: 25.9 MMOL/L (ref 22–29)
CREAT SERPL-MCNC: 0.68 MG/DL (ref 0.57–1)
CREAT UR-MCNC: 99.5 MG/DL
GFR SERPL CREATININE-BSD FRML MDRD: 84 ML/MIN/1.73
GLOBULIN UR ELPH-MCNC: 3.1 GM/DL
GLUCOSE SERPL-MCNC: 113 MG/DL (ref 65–99)
HBA1C MFR BLD: 5.4 % (ref 4.8–5.6)
HDLC SERPL-MCNC: 38 MG/DL (ref 40–60)
LDLC SERPL CALC-MCNC: 53 MG/DL (ref 0–100)
LDLC/HDLC SERPL: 1.38 {RATIO}
MICROALBUMIN/CREAT UR: 19.1 MG/G
POTASSIUM SERPL-SCNC: 4.5 MMOL/L (ref 3.5–5.2)
PROT SERPL-MCNC: 7.3 G/DL (ref 6–8.5)
SODIUM SERPL-SCNC: 138 MMOL/L (ref 136–145)
TRIGL SERPL-MCNC: 88 MG/DL (ref 0–150)
VLDLC SERPL-MCNC: 17 MG/DL (ref 5–40)

## 2021-10-12 PROCEDURE — 80061 LIPID PANEL: CPT

## 2021-10-12 PROCEDURE — 36415 COLL VENOUS BLD VENIPUNCTURE: CPT

## 2021-10-12 PROCEDURE — 82043 UR ALBUMIN QUANTITATIVE: CPT

## 2021-10-12 PROCEDURE — 82570 ASSAY OF URINE CREATININE: CPT

## 2021-10-12 PROCEDURE — 80053 COMPREHEN METABOLIC PANEL: CPT

## 2021-10-12 PROCEDURE — 83036 HEMOGLOBIN GLYCOSYLATED A1C: CPT

## 2021-10-18 ENCOUNTER — OFFICE VISIT (OUTPATIENT)
Dept: FAMILY MEDICINE CLINIC | Facility: CLINIC | Age: 76
End: 2021-10-18

## 2021-10-18 VITALS
TEMPERATURE: 97.6 F | HEART RATE: 86 BPM | DIASTOLIC BLOOD PRESSURE: 72 MMHG | OXYGEN SATURATION: 97 % | BODY MASS INDEX: 15.94 KG/M2 | RESPIRATION RATE: 18 BRPM | SYSTOLIC BLOOD PRESSURE: 121 MMHG | WEIGHT: 78.9 LBS

## 2021-10-18 DIAGNOSIS — R73.09 ELEVATED GLUCOSE: ICD-10-CM

## 2021-10-18 DIAGNOSIS — E55.9 VITAMIN D DEFICIENCY: ICD-10-CM

## 2021-10-18 DIAGNOSIS — R53.82 CHRONIC FATIGUE: ICD-10-CM

## 2021-10-18 DIAGNOSIS — K58.0 IRRITABLE BOWEL SYNDROME WITH DIARRHEA: ICD-10-CM

## 2021-10-18 DIAGNOSIS — E78.2 MIXED HYPERLIPIDEMIA: ICD-10-CM

## 2021-10-18 DIAGNOSIS — R53.83 FATIGUE, UNSPECIFIED TYPE: ICD-10-CM

## 2021-10-18 DIAGNOSIS — J30.9 ALLERGIC RHINITIS, UNSPECIFIED SEASONALITY, UNSPECIFIED TRIGGER: ICD-10-CM

## 2021-10-18 DIAGNOSIS — R91.1 LUNG NODULE: ICD-10-CM

## 2021-10-18 DIAGNOSIS — R11.0 NAUSEA: ICD-10-CM

## 2021-10-18 DIAGNOSIS — Z23 NEED FOR PNEUMOCOCCAL VACCINATION: ICD-10-CM

## 2021-10-18 DIAGNOSIS — Z23 NEED FOR INFLUENZA VACCINATION: ICD-10-CM

## 2021-10-18 DIAGNOSIS — I10 ESSENTIAL (PRIMARY) HYPERTENSION: ICD-10-CM

## 2021-10-18 DIAGNOSIS — Z00.00 MEDICARE ANNUAL WELLNESS VISIT, SUBSEQUENT: ICD-10-CM

## 2021-10-18 DIAGNOSIS — K21.9 GASTROESOPHAGEAL REFLUX DISEASE WITHOUT ESOPHAGITIS: ICD-10-CM

## 2021-10-18 PROBLEM — E78.5 HYPERLIPIDEMIA: Status: ACTIVE | Noted: 2021-06-07

## 2021-10-18 PROBLEM — M19.90 ARTHRITIS: Status: ACTIVE | Noted: 2021-10-18

## 2021-10-18 PROBLEM — I25.10 CORONARY ARTERY DISEASE: Status: ACTIVE | Noted: 2021-10-18

## 2021-10-18 PROBLEM — R01.1 HEART MURMUR: Status: ACTIVE | Noted: 2021-10-18

## 2021-10-18 PROBLEM — R19.7 DIARRHEA: Status: ACTIVE | Noted: 2021-10-18

## 2021-10-18 PROBLEM — I35.0 AORTIC STENOSIS: Status: ACTIVE | Noted: 2021-10-18

## 2021-10-18 PROBLEM — M25.559 HIP PAIN: Status: ACTIVE | Noted: 2021-10-18

## 2021-10-18 PROBLEM — K64.9 HEMORRHOIDS: Status: ACTIVE | Noted: 2021-10-18

## 2021-10-18 PROBLEM — K57.92 DIVERTICULITIS: Status: ACTIVE | Noted: 2021-10-18

## 2021-10-18 PROBLEM — R31.29 MICROHEMATURIA: Status: ACTIVE | Noted: 2019-01-13

## 2021-10-18 PROBLEM — I21.9 HEART ATTACK: Status: ACTIVE | Noted: 2021-10-18

## 2021-10-18 PROBLEM — N20.0 NEPHROLITHIASIS: Status: ACTIVE | Noted: 2019-01-13

## 2021-10-18 PROCEDURE — G0439 PPPS, SUBSEQ VISIT: HCPCS | Performed by: FAMILY MEDICINE

## 2021-10-18 PROCEDURE — 1160F RVW MEDS BY RX/DR IN RCRD: CPT | Performed by: FAMILY MEDICINE

## 2021-10-18 PROCEDURE — 1170F FXNL STATUS ASSESSED: CPT | Performed by: FAMILY MEDICINE

## 2021-10-18 PROCEDURE — 1125F AMNT PAIN NOTED PAIN PRSNT: CPT | Performed by: FAMILY MEDICINE

## 2021-10-18 PROCEDURE — G0008 ADMIN INFLUENZA VIRUS VAC: HCPCS | Performed by: FAMILY MEDICINE

## 2021-10-18 PROCEDURE — 90662 IIV NO PRSV INCREASED AG IM: CPT | Performed by: FAMILY MEDICINE

## 2021-10-18 RX ORDER — CILOSTAZOL 100 MG/1
100 TABLET ORAL 2 TIMES DAILY
Qty: 180 TABLET | Refills: 1 | Status: SHIPPED | OUTPATIENT
Start: 2021-10-18 | End: 2022-01-16

## 2021-10-18 RX ORDER — ONDANSETRON 4 MG/1
4 TABLET, FILM COATED ORAL EVERY 8 HOURS PRN
Qty: 90 TABLET | Refills: 1 | Status: SHIPPED | OUTPATIENT
Start: 2021-10-18 | End: 2022-03-14

## 2021-10-18 RX ORDER — CETIRIZINE HYDROCHLORIDE 10 MG/1
10 TABLET ORAL DAILY
Qty: 90 TABLET | Refills: 1 | Status: SHIPPED | OUTPATIENT
Start: 2021-10-18 | End: 2022-04-28 | Stop reason: SDUPTHER

## 2021-10-18 RX ORDER — CARVEDILOL 6.25 MG/1
6.25 TABLET ORAL 2 TIMES DAILY WITH MEALS
Qty: 180 TABLET | Refills: 1 | Status: SHIPPED | OUTPATIENT
Start: 2021-10-18 | End: 2023-02-09

## 2021-10-18 NOTE — PROGRESS NOTES
The ABCs of the Annual Wellness Visit  Subsequent Medicare Wellness Visit    Chief Complaint   Patient presents with   • Medicare Wellness-subsequent      Subjective    History of Present Illness:  Nilda Julien is a 76 y.o. female who presents for a Subsequent Medicare Wellness Visit.    The following portions of the patient's history were reviewed and   updated as appropriate: allergies, current medications, past surgical history and problem list.    Compared to one year ago, the patient feels her physical   health is the same.    Compared to one year ago, the patient feels her mental   health is the same.    Recent Hospitalizations:  This patient has had a [facility] admission record on file within the last 365 days.    Current Medical Providers:  Patient Care Team:  Jevon Gonzalez MD as PCP - General (Family Medicine)    Outpatient Medications Prior to Visit   Medication Sig Dispense Refill   • aspirin (aspirin) 81 MG EC tablet Take 81 mg by mouth Daily.     • atorvastatin (LIPITOR) 80 MG tablet TAKE ONE TABLET BY MOUTH EVERY NIGHT AT BEDTIME 90 tablet 1   • Cholecalciferol 10 MCG (400 UNIT) capsule Take 400 Units by mouth Daily.     • diphenoxylate-atropine (LOMOTIL) 2.5-0.025 MG per tablet TAKE TWO TABLETS BY MOUTH TWICE A DAY AS NEEDED 120 tablet 1   • fluticasone (FLONASE) 50 MCG/ACT nasal spray 2 sprays into the nostril(s) as directed by provider 2 (two) times a day.     • HYDROcodone-acetaminophen (NORCO) 7.5-325 MG per tablet Take 1 tablet by mouth Every 6 (Six) Hours As Needed for Moderate Pain . 90 tablet 0   • omeprazole (priLOSEC) 20 MG capsule Take 20 mg by mouth Daily.     • traMADol (ULTRAM) 50 MG tablet Take 50 mg by mouth Every 6 (Six) Hours As Needed for Moderate Pain .     • Vitamin Mixture (VITAMIN E COMPLETE PO) Take 1 tablet by mouth Daily.     • carvedilol (COREG) 6.25 MG tablet Take 6.25 mg by mouth 2 (Two) Times a Day With Meals.     • cetirizine (zyrTEC) 10 MG tablet Take 10 mg  by mouth Daily.     • cilostazol (PLETAL) 100 MG tablet Take 100 mg by mouth 2 (Two) Times a Day.     • ondansetron (ZOFRAN) 4 MG tablet TAKE ONE TABLET BY MOUTH THREE TIMES A DAY AS NEEDED 30 tablet 0   • sertraline (Zoloft) 50 MG tablet Take 50 mg by mouth Daily.       No facility-administered medications prior to visit.       Opioid medication/s are on active medication list.  and I have evaluated her active treatment plan and pain score trends (see table).  There were no vitals filed for this visit.  I have reviewed the chart for potential of high risk medication and harmful drug interactions in the elderly.            Aspirin is on active medication list. Aspirin use is indicated based on review of current medical condition/s. Pros and cons of this therapy have been discussed today. Benefits of this medication outweigh potential harm.  Patient has been encouraged to continue taking this medication.  .      Patient Active Problem List   Diagnosis   • Hyperlipidemia   • Hypertension   • Type 2 diabetes mellitus with hyperglycemia, without long-term current use of insulin (HCC)   • Gastroesophageal reflux disease without esophagitis   • Acquired hypothyroidism   • Allergic rhinitis   • Aortic stenosis   • Arthritis   • Hip pain   • Coronary artery disease   • Diarrhea   • Diverticulitis   • Fatigue   • Heart attack (HCC)   • Heart murmur   • Hemorrhoids   • Irritable bowel syndrome with diarrhea   • Microhematuria   • Nausea   • Nephrolithiasis     Advance Care Planning  Advance Directive is on file.  ACP discussion was held with the patient during this visit. Patient has an advance directive in EMR which is still valid.           Objective    Vitals:    10/18/21 1403   BP: 121/72   BP Location: Left arm   Patient Position: Sitting   Cuff Size: Adult   Pulse: 86   Resp: 18   Temp: 97.6 °F (36.4 °C)   SpO2: 97%   Weight: 35.8 kg (78 lb 14.4 oz)     BMI Readings from Last 1 Encounters:   10/18/21 15.94 kg/m²   BMI  is below normal parameters. Recommendations include: none (medical contraindication)    Does the patient have evidence of cognitive impairment? No    Physical Exam  Lab Results   Component Value Date    TRIG 88 10/12/2021    HDL 38 (L) 10/12/2021    LDL 53 10/12/2021    VLDL 17 10/12/2021    HGBA1C 5.40 10/12/2021            HEALTH RISK ASSESSMENT    Smoking Status:  Social History     Tobacco Use   Smoking Status Current Every Day Smoker   • Packs/day: 0.50   • Types: Cigarettes   Smokeless Tobacco Never Used   Tobacco Comment    Current every day smoker, 0.5 PPD, smoked for 31 or more years     Alcohol Consumption:  Social History     Substance and Sexual Activity   Alcohol Use Never     Fall Risk Screen:    STEADI Fall Risk Assessment was completed, and patient is at LOW risk for falls.Assessment completed on:10/18/2021    Depression Screening:  PHQ-2/PHQ-9 Depression Screening 10/18/2021   Little interest or pleasure in doing things 0   Feeling down, depressed, or hopeless 0   Total Score 0       Health Habits and Functional and Cognitive Screening:  Functional & Cognitive Status 10/18/2021   Do you have difficulty preparing food and eating? No   Do you have difficulty bathing yourself, getting dressed or grooming yourself? No   Do you have difficulty using the toilet? No   Do you have difficulty moving around from place to place? No   Do you have trouble with steps or getting out of a bed or a chair? Yes   Current Diet Well Balanced Diet   Dental Exam Other        Dental Exam Comment Dentures   Eye Exam Up to date   Exercise (times per week) 2 times per week   Current Exercises Include No Regular Exercise        Exercise Comment Cleaning and walking   Do you need help using the phone?  No   Are you deaf or do you have serious difficulty hearing?  Yes   Do you need help with transportation? No   Do you need help shopping? No   Do you need help preparing meals?  No   Do you need help with housework?  No   Do you  need help with laundry? No   Do you need help taking your medications? No   Do you need help managing money? No   Do you ever drive or ride in a car without wearing a seat belt? No   Have you felt unusual stress, anger or loneliness in the last month? No   Who do you live with? Alone   If you need help, do you have trouble finding someone available to you? No   Have you been bothered in the last four weeks by sexual problems? No   Do you have difficulty concentrating, remembering or making decisions? No       Age-appropriate Screening Schedule:  Refer to the list below for future screening recommendations based on patient's age, sex and/or medical conditions. Orders for these recommended tests are listed in the plan section. The patient has been provided with a written plan.    Health Maintenance   Topic Date Due   • ZOSTER VACCINE (1 of 2) Never done   • DIABETIC EYE EXAM  05/22/2021   • DXA SCAN  03/02/2022   • HEMOGLOBIN A1C  04/12/2022   • LIPID PANEL  10/12/2022   • URINE MICROALBUMIN  10/12/2022   • TDAP/TD VACCINES (2 - Tdap) 04/17/2025   • INFLUENZA VACCINE  Completed              Assessment/Plan   CMS Preventative Services Quick Reference  Risk Factors Identified During Encounter  Cardiovascular Disease  Chronic Pain   Dementia/Memory   Depression/Dysphoria  The above risks/problems have been discussed with the patient.  Follow up actions/plans if indicated are seen below in the Assessment/Plan Section.  Pertinent information has been shared with the patient in the After Visit Summary.    Diagnoses and all orders for this visit:    1. Medicare annual wellness visit, subsequent    2. Nausea  -     ondansetron (ZOFRAN) 4 MG tablet; Take 1 tablet by mouth Every 8 (Eight) Hours As Needed for Nausea or Vomiting.  Dispense: 90 tablet; Refill: 1    3. Chronic fatigue  -     TSH+Free T4; Future  -     T3, free; Future  -     Thyroid Peroxidase Antibody; Future    4. Lung nodule  -     CT Chest With Contrast;  Future    5. Mixed hyperlipidemia    6. Fatigue, unspecified type  -     Vitamin B12 & Folate; Future    7. Vitamin D deficiency  -     Vitamin D 25 Hydroxy; Future    8. Gastroesophageal reflux disease without esophagitis    9. Allergic rhinitis, unspecified seasonality, unspecified trigger    10. Essential (primary) hypertension  -     CBC Auto Differential; Future  -     Comprehensive Metabolic Panel; Future  -     Lipid Panel; Future  -     TSH; Future  -     T4, Free; Future  -     Microalbumin / Creatinine Urine Ratio - Urine, Clean Catch; Future    11. Irritable bowel syndrome with diarrhea    12. Elevated glucose  -     Hemoglobin A1c; Future    13. Need for influenza vaccination  -     Fluzone High-Dose 65+yrs ([id number])    14. Need for pneumococcal vaccination  -     pneumococcal polysaccharide 23-valent (PNEUMOVAX-23) vaccine 0.5 mL    Other orders  -     cetirizine (zyrTEC) 10 MG tablet; Take 1 tablet by mouth Daily.  Dispense: 90 tablet; Refill: 1  -     sertraline (Zoloft) 50 MG tablet; Take 1 tablet by mouth Daily.  Dispense: 90 tablet; Refill: 1  -     carvedilol (COREG) 6.25 MG tablet; Take 1 tablet by mouth 2 (Two) Times a Day With Meals for 90 days.  Dispense: 180 tablet; Refill: 1  -     cilostazol (PLETAL) 100 MG tablet; Take 1 tablet by mouth 2 (Two) Times a Day for 90 days.  Dispense: 180 tablet; Refill: 1  -     riFAXIMin (XIFAXAN) 550 MG tablet; Take 1 tablet by mouth Every 12 (Twelve) Hours.  Dispense: 28 tablet; Refill: 0        Follow Up:   Return in about 6 months (around 4/18/2022).     An After Visit Summary and PPPS were made available to the patient.      Trial of xifaxin.

## 2021-10-20 DIAGNOSIS — R19.7 DIARRHEA, UNSPECIFIED TYPE: ICD-10-CM

## 2021-10-23 RX ORDER — DIPHENOXYLATE HYDROCHLORIDE AND ATROPINE SULFATE 2.5; .025 MG/1; MG/1
TABLET ORAL
Qty: 120 TABLET | Refills: 1 | Status: SHIPPED | OUTPATIENT
Start: 2021-10-23 | End: 2021-12-30

## 2021-11-04 ENCOUNTER — TRANSCRIBE ORDERS (OUTPATIENT)
Dept: VASCULAR SURGERY | Facility: HOSPITAL | Age: 76
End: 2021-11-04

## 2021-11-04 DIAGNOSIS — I73.9 CLAUDICATION (HCC): Primary | ICD-10-CM

## 2021-12-01 ENCOUNTER — HOSPITAL ENCOUNTER (OUTPATIENT)
Dept: CARDIOLOGY | Facility: HOSPITAL | Age: 76
Discharge: HOME | End: 2021-12-01

## 2021-12-01 ENCOUNTER — OFFICE VISIT (OUTPATIENT)
Dept: VASCULAR SURGERY | Facility: HOSPITAL | Age: 76
End: 2021-12-01

## 2021-12-01 VITALS
TEMPERATURE: 98 F | HEART RATE: 71 BPM | DIASTOLIC BLOOD PRESSURE: 70 MMHG | SYSTOLIC BLOOD PRESSURE: 121 MMHG | RESPIRATION RATE: 16 BRPM | OXYGEN SATURATION: 95 %

## 2021-12-01 DIAGNOSIS — I70.219 ATHEROSCLEROSIS OF LOWER EXTREMITY WITH CLAUDICATION (HCC): Primary | ICD-10-CM

## 2021-12-01 DIAGNOSIS — I73.9 CLAUDICATION (HCC): ICD-10-CM

## 2021-12-01 LAB
BH CV LOWER ARTERIAL LEFT ABI RATIO: 0.44
BH CV LOWER ARTERIAL LEFT DORSALIS PEDIS SYS MAX: 52 MMHG
BH CV LOWER ARTERIAL LEFT GREAT TOE SYS MAX: 37 MMHG
BH CV LOWER ARTERIAL LEFT LOW THIGH SYS MAX: 56 MMHG
BH CV LOWER ARTERIAL LEFT POPLITEAL SYS MAX: 55 MMHG
BH CV LOWER ARTERIAL LEFT POST TIBIAL SYS MAX: 57 MMHG
BH CV LOWER ARTERIAL LEFT TBI RATIO: 0.28
BH CV LOWER ARTERIAL RIGHT ABI RATIO: 0.69
BH CV LOWER ARTERIAL RIGHT DORSALIS PEDIS SYS MAX: 88 MMHG
BH CV LOWER ARTERIAL RIGHT GREAT TOE SYS MAX: 76 MMHG
BH CV LOWER ARTERIAL RIGHT LOW THIGH SYS MAX: 58 MMHG
BH CV LOWER ARTERIAL RIGHT POPLITEAL SYS MAX: 88 MMHG
BH CV LOWER ARTERIAL RIGHT POST TIBIAL SYS MAX: 90 MMHG
BH CV LOWER ARTERIAL RIGHT TBI RATIO: 0.58
MAXIMAL PREDICTED HEART RATE: 144 BPM
STRESS TARGET HR: 122 BPM
UPPER ARTERIAL LEFT ARM BRACHIAL SYS MAX: 121 MMHG
UPPER ARTERIAL RIGHT ARM BRACHIAL SYS MAX: 130 MMHG

## 2021-12-01 PROCEDURE — 93923 UPR/LXTR ART STDY 3+ LVLS: CPT | Performed by: SURGERY

## 2021-12-01 PROCEDURE — 99212 OFFICE O/P EST SF 10 MIN: CPT | Performed by: SURGERY

## 2021-12-01 PROCEDURE — 93923 UPR/LXTR ART STDY 3+ LVLS: CPT

## 2021-12-01 NOTE — PROGRESS NOTES
[facility]   Follow up Office    Patient Name: Nilda Julien  : 1945  MRN: [id number]  Primary Care Physician:  Jevon Gonzalez MD      Subjective   Subjective     HPI:    Nilda Julien is a 76 y.o. female here for follow-up for her peripheral vascular disease.  She continues to have significant limitations with walking.  Her left leg is the most symptomatic.  The right leg does not bother her.  No new complaints.      Objective     Vitals:   Temp:  [98 °F (36.7 °C)] 98 °F (36.7 °C)  Heart Rate:  [71] 71  Resp:  [16] 16  BP: (121-130)/(70) 121/70    Physical Exam      General: Alert, no acute distress.  Extremities: Symmetric.  Skin intact.    Diagnostic studies: An arterial Doppler in the office today demonstrates ABIs of 0.69 on the right, 0.44 on the left.    Assessment/Plan   Assessment / Plan     Diagnoses and all orders for this visit:    1. Atherosclerosis of lower extremity with claudication (HCC) (Primary)       Assessment/Plan:   Mrs. Julien has significant occlusive disease.  A previous CTA demonstrates occlusion of her left common iliac, occlusion of the bilateral superficial femoral arteries.  I discussed with her again today management options to include no further evaluation or intervention versus surgical intervention.  If she desired to proceed with surgical intervention I would recommend that we do an arteriogram first with attempt any possible endovascular intervention that may be an option.  Definitive surgery will depend on the results of angiography.  I have discussed with the patient in detail the mechanics of the procedure, the indications, benefits, risks, alternatives as well as potential complications to include but not limited to infection, bleeding, inability to cross the occlusion, vascular injury requiring surgical repair.  She appears to understand.  At this time she will think about it and she will let us know whether she wants to proceed.  I have advised her that if  we do not hear from her the plan will be for a repeat arterial Doppler in 1 year.  She agrees.        Electronically signed by Selvin Vásquez MD, 12/01/21, 3:43 PM EST.

## 2021-12-03 ENCOUNTER — LAB (OUTPATIENT)
Dept: LAB | Facility: HOSPITAL | Age: 76
End: 2021-12-03

## 2021-12-03 DIAGNOSIS — R53.82 CHRONIC FATIGUE: ICD-10-CM

## 2021-12-03 LAB
T3FREE SERPL-MCNC: 2.48 PG/ML (ref 2–4.4)
T4 FREE SERPL-MCNC: 1.32 NG/DL (ref 0.93–1.7)
TSH SERPL DL<=0.05 MIU/L-ACNC: 1.31 UIU/ML (ref 0.27–4.2)

## 2021-12-03 PROCEDURE — 84443 ASSAY THYROID STIM HORMONE: CPT

## 2021-12-03 PROCEDURE — 84481 FREE ASSAY (FT-3): CPT

## 2021-12-03 PROCEDURE — 84439 ASSAY OF FREE THYROXINE: CPT

## 2021-12-03 PROCEDURE — 36415 COLL VENOUS BLD VENIPUNCTURE: CPT

## 2021-12-03 PROCEDURE — 86376 MICROSOMAL ANTIBODY EACH: CPT

## 2021-12-06 LAB — THYROPEROXIDASE AB SERPL-ACNC: 14 IU/ML (ref 0–34)

## 2021-12-14 RX ORDER — FLUTICASONE PROPIONATE 50 MCG
SPRAY, SUSPENSION (ML) NASAL
Qty: 16 ML | Refills: 1 | Status: SHIPPED | OUTPATIENT
Start: 2021-12-14 | End: 2023-02-24 | Stop reason: SDUPTHER

## 2021-12-30 ENCOUNTER — OFFICE VISIT (OUTPATIENT)
Dept: CARDIOLOGY | Facility: CLINIC | Age: 76
End: 2021-12-30

## 2021-12-30 VITALS
DIASTOLIC BLOOD PRESSURE: 54 MMHG | HEIGHT: 59 IN | BODY MASS INDEX: 15.92 KG/M2 | WEIGHT: 79 LBS | HEART RATE: 88 BPM | SYSTOLIC BLOOD PRESSURE: 107 MMHG

## 2021-12-30 DIAGNOSIS — E78.5 HYPERLIPIDEMIA LDL GOAL <70: ICD-10-CM

## 2021-12-30 DIAGNOSIS — I25.10 CORONARY ARTERY DISEASE INVOLVING NATIVE CORONARY ARTERY OF NATIVE HEART WITHOUT ANGINA PECTORIS: ICD-10-CM

## 2021-12-30 DIAGNOSIS — F17.200 SMOKER: ICD-10-CM

## 2021-12-30 DIAGNOSIS — I35.0 AORTIC STENOSIS, MILD: ICD-10-CM

## 2021-12-30 DIAGNOSIS — I10 ESSENTIAL HYPERTENSION: ICD-10-CM

## 2021-12-30 DIAGNOSIS — I50.20 HFREF (HEART FAILURE WITH REDUCED EJECTION FRACTION) (HCC): Primary | ICD-10-CM

## 2021-12-30 PROBLEM — R11.0 NAUSEA: Status: RESOLVED | Noted: 2021-10-18 | Resolved: 2021-12-30

## 2021-12-30 PROBLEM — M25.559 HIP PAIN: Status: RESOLVED | Noted: 2021-10-18 | Resolved: 2021-12-30

## 2021-12-30 PROBLEM — R01.1 HEART MURMUR: Status: RESOLVED | Noted: 2021-10-18 | Resolved: 2021-12-30

## 2021-12-30 PROBLEM — I21.9 HEART ATTACK: Status: RESOLVED | Noted: 2021-10-18 | Resolved: 2021-12-30

## 2021-12-30 PROCEDURE — 99214 OFFICE O/P EST MOD 30 MIN: CPT | Performed by: NURSE PRACTITIONER

## 2021-12-30 NOTE — PATIENT INSTRUCTIONS
"Low-Sodium Eating Plan  Sodium, which is an element that makes up salt, helps you maintain a healthy balance of fluids in your body. Too much sodium can increase your blood pressure and cause fluid and waste to be held in your body.  Your health care provider or dietitian may recommend following this plan if you have high blood pressure (hypertension), kidney disease, liver disease, or heart failure. Eating less sodium can help lower your blood pressure, reduce swelling, and protect your heart, liver, and kidneys.  What are tips for following this plan?  Reading food labels  · The Nutrition Facts label lists the amount of sodium in one serving of the food. If you eat more than one serving, you must multiply the listed amount of sodium by the number of servings.  · Choose foods with less than 140 mg of sodium per serving.  · Avoid foods with 300 mg of sodium or more per serving.  Shopping    · Look for lower-sodium products, often labeled as \"low-sodium\" or \"no salt added.\"  · Always check the sodium content, even if foods are labeled as \"unsalted\" or \"no salt added.\"  · Buy fresh foods.  ? Avoid canned foods and pre-made or frozen meals.  ? Avoid canned, cured, or processed meats.  · Buy breads that have less than 80 mg of sodium per slice.    Cooking    · Eat more home-cooked food and less restaurant, buffet, and fast food.  · Avoid adding salt when cooking. Use salt-free seasonings or herbs instead of table salt or sea salt. Check with your health care provider or pharmacist before using salt substitutes.  · Cook with plant-based oils, such as canola, sunflower, or olive oil.    Meal planning  · When eating at a restaurant, ask that your food be prepared with less salt or no salt, if possible. Avoid dishes labeled as brined, pickled, cured, smoked, or made with soy sauce, miso, or teriyaki sauce.  · Avoid foods that contain MSG (monosodium glutamate). MSG is sometimes added to Chinese food, bouillon, and some " "canned foods.  · Make meals that can be grilled, baked, poached, roasted, or steamed. These are generally made with less sodium.  General information  Most people on this plan should limit their sodium intake to 1,500-2,000 mg (milligrams) of sodium each day.  What foods should I eat?  Fruits  Fresh, frozen, or canned fruit. Fruit juice.  Vegetables  Fresh or frozen vegetables. \"No salt added\" canned vegetables. \"No salt added\" tomato sauce and paste. Low-sodium or reduced-sodium tomato and vegetable juice.  Grains  Low-sodium cereals, including oats, puffed wheat and rice, and shredded wheat. Low-sodium crackers. Unsalted rice. Unsalted pasta. Low-sodium bread. Whole-grain breads and whole-grain pasta.  Meats and other proteins  Fresh or frozen (no salt added) meat, poultry, seafood, and fish. Low-sodium canned tuna and salmon. Unsalted nuts. Dried peas, beans, and lentils without added salt. Unsalted canned beans. Eggs. Unsalted nut butters.  Dairy  Milk. Soy milk. Cheese that is naturally low in sodium, such as ricotta cheese, fresh mozzarella, or Swiss cheese. Low-sodium or reduced-sodium cheese. Cream cheese. Yogurt.  Seasonings and condiments  Fresh and dried herbs and spices. Salt-free seasonings. Low-sodium mustard and ketchup. Sodium-free salad dressing. Sodium-free light mayonnaise. Fresh or refrigerated horseradish. Lemon juice. Vinegar.  Other foods  Homemade, reduced-sodium, or low-sodium soups. Unsalted popcorn and pretzels. Low-salt or salt-free chips.  The items listed above may not be a complete list of foods and beverages you can eat. Contact a dietitian for more information.  What foods should I avoid?  Vegetables  Sauerkraut, pickled vegetables, and relishes. Olives. French fries. Onion rings. Regular canned vegetables (not low-sodium or reduced-sodium). Regular canned tomato sauce and paste (not low-sodium or reduced-sodium). Regular tomato and vegetable juice (not low-sodium or reduced-sodium). " Frozen vegetables in sauces.  Grains  Instant hot cereals. Bread stuffing, pancake, and biscuit mixes. Croutons. Seasoned rice or pasta mixes. Noodle soup cups. Boxed or frozen macaroni and cheese. Regular salted crackers. Self-rising flour.  Meats and other proteins  Meat or fish that is salted, canned, smoked, spiced, or pickled. Precooked or cured meat, such as sausages or meat loaves. Mandel. Ham. Pepperoni. Hot dogs. Corned beef. Chipped beef. Salt pork. Jerky. Pickled herring. Anchovies and sardines. Regular canned tuna. Salted nuts.  Dairy  Processed cheese and cheese spreads. Hard cheeses. Cheese curds. Blue cheese. Feta cheese. String cheese. Regular cottage cheese. Buttermilk. Canned milk.  Fats and oils  Salted butter. Regular margarine. Ghee. Mandel fat.  Seasonings and condiments  Onion salt, garlic salt, seasoned salt, table salt, and sea salt. Canned and packaged gravies. Worcestershire sauce. Tartar sauce. Barbecue sauce. Teriyaki sauce. Soy sauce, including reduced-sodium. Steak sauce. Fish sauce. Oyster sauce. Cocktail sauce. Horseradish that you find on the shelf. Regular ketchup and mustard. Meat flavorings and tenderizers. Bouillon cubes. Hot sauce. Pre-made or packaged marinades. Pre-made or packaged taco seasonings. Relishes. Regular salad dressings. Salsa.  Other foods  Salted popcorn and pretzels. Corn chips and puffs. Potato and tortilla chips. Canned or dried soups. Pizza. Frozen entrees and pot pies.  The items listed above may not be a complete list of foods and beverages you should avoid. Contact a dietitian for more information.  Summary  · Eating less sodium can help lower your blood pressure, reduce swelling, and protect your heart, liver, and kidneys.  · Most people on this plan should limit their sodium intake to 1,500-2,000 mg (milligrams) of sodium each day.  · Canned, boxed, and frozen foods are high in sodium. Restaurant foods, fast foods, and pizza are also very high in sodium.  You also get sodium by adding salt to food.  · Try to cook at home, eat more fresh fruits and vegetables, and eat less fast food and canned, processed, or prepared foods.  This information is not intended to replace advice given to you by your health care provider. Make sure you discuss any questions you have with your health care provider.  Document Revised: 01/22/2021 Document Reviewed: 11/18/2020  [employer] Patient Education © 2021 [employer]      Cholesterol Content in Foods  Cholesterol is a waxy, fat-like substance that helps to carry fat in the blood. The body needs cholesterol in small amounts, but too much cholesterol can cause damage to the arteries and heart.  Most people should eat less than 200 milligrams (mg) of cholesterol a day.  Foods with cholesterol    Cholesterol is found in animal-based foods, such as meat, seafood, and dairy. Generally, low-fat dairy and lean meats have less cholesterol than full-fat dairy and fatty meats. The milligrams of cholesterol per serving (mg per serving) of common cholesterol-containing foods are listed below.  Meat and other proteins  · Egg -- one large whole egg has 186 mg.  · Veal shank -- 4 oz has 141 mg.  · Lean ground turkey (93% lean) -- 4 oz has 118 mg.  · Fat-trimmed lamb loin -- 4 oz has 106 mg.  · Lean ground beef (90% lean) -- 4 oz has 100 mg.  · Lobster -- 3.5 oz has 90 mg.  · Pork loin chops -- 4 oz has 86 mg.  · Canned salmon -- 3.5 oz has 83 mg.  · Fat-trimmed beef top loin -- 4 oz has 78 mg.  · Frankfurter -- 1 cat (3.5 oz) has 77 mg.  · Crab -- 3.5 oz has 71 mg.  · Roasted chicken without skin, white meat -- 4 oz has 66 mg.  · Light bologna -- 2 oz has 45 mg.  · Deli-cut turkey -- 2 oz has 31 mg.  · Canned tuna -- 3.5 oz has 31 mg.  · Mandel -- 1 oz has 29 mg.  · Oysters and mussels (raw) -- 3.5 oz has 25 mg.  · Mackerel -- 1 oz has 22 mg.  · Trout -- 1 oz has 20 mg.  · Pork sausage -- 1 link (1 oz) has 17 mg.  · [place] -- 1 oz has 16  mg.  · Tilapia -- 1 oz has 14 mg.  Dairy  · Soft-serve ice cream -- ½ cup (4 oz) has 103 mg.  · Whole-milk yogurt -- 1 cup (8 oz) has 29 mg.  · Cheddar cheese -- 1 oz has 28 mg.  · American cheese -- 1 oz has 28 mg.  · Whole milk -- 1 cup (8 oz) has 23 mg.  · 2% milk -- 1 cup (8 oz) has 18 mg.  · Cream cheese -- 1 tablespoon (Tbsp) has 15 mg.  · Cottage cheese -- ½ cup (4 oz) has 14 mg.  · Low-fat (1%) milk -- 1 cup (8 oz) has 10 mg.  · Sour cream -- 1 Tbsp has 8.5 mg.  · Low-fat yogurt -- 1 cup (8 oz) has 8 mg.  · Nonfat Greek yogurt -- 1 cup (8 oz) has 7 mg.  · Half-and-half cream -- 1 Tbsp has 5 mg.  Fats and oils  · Cod liver oil -- 1 tablespoon (Tbsp) has 82 mg.  · Butter -- 1 Tbsp has 15 mg.  · Lard -- 1 Tbsp has 14 mg.  · Mandle grease -- 1 Tbsp has 14 mg.  · Mayonnaise -- 1 Tbsp has 5-10 mg.  · Margarine -- 1 Tbsp has 3-10 mg.  Exact amounts of cholesterol in these foods may vary depending on specific ingredients and brands.  Foods without cholesterol  Most plant-based foods do not have cholesterol unless you combine them with a food that has cholesterol. Foods without cholesterol include:  · Grains and cereals.  · Vegetables.  · Fruits.  · Vegetable oils, such as olive, canola, and sunflower oil.  · Legumes, such as peas, beans, and lentils.  · Nuts and seeds.  · Egg whites.  Summary  · The body needs cholesterol in small amounts, but too much cholesterol can cause damage to the arteries and heart.  · Most people should eat less than 200 milligrams (mg) of cholesterol a day.  This information is not intended to replace advice given to you by your health care provider. Make sure you discuss any questions you have with your health care provider.  Document Revised: 05/10/2021 Document Reviewed: 05/10/2021  [employer] Patient Education © 2021 [employer]

## 2021-12-30 NOTE — PROGRESS NOTES
Chief Complaint  Coronary Artery Disease, Hypertension, and Hyperlipidemia    Subjective            History of Present Illness  Nilda Julien is a 76-year-old white/ female patient who presents to the office today for follow-up.  She has CAD status post CABG, mild aortic stenosis, hyperlipidemia, hypertension, and is a smoker.  She reports compliance with all of her medications.  She denies any chest pain, shortness of breath, lightheadedness/dizziness, palpitations, or edema.    PMH  Past Medical History:   Diagnosis Date   • Allergic rhinitis     Seasonal allergies   • Aortic stenosis, mild 10/18/2021   • Arthritis    • Back pain    • CAD s/p CABG 10/18/2021   • Diverticulitis    • Essential hypertension 6/7/2021   • Fatigue    • Heart attack (HCC) 10/18/2021   • Hemorrhoids 2013   • Hyperlipidemia LDL goal <70 6/7/2021   • Irritable bowel syndrome with diarrhea    • Memory loss     forgetfulness   • Microhematuria 01/13/2019   • Nephrolithiasis 01/13/2019   • Smoker 12/30/2021         ALLERGY  No Known Allergies       SURGICALHX  Past Surgical History:   Procedure Laterality Date   • CARDIAC SURGERY  04/19/2013    4 way bypass   • COLONOSCOPY  05/23/2016, 2019   • CORONARY ARTERY BYPASS GRAFT  2013   • ENDOSCOPY  2019   • HERNIA REPAIR     • OTHER SURGICAL HISTORY  2001    cardiac stents, 2 stents placed          SOC  Social History     Socioeconomic History   • Marital status:    Tobacco Use   • Smoking status: Current Every Day Smoker     Packs/day: 0.50     Types: Cigarettes   • Smokeless tobacco: Never Used   • Tobacco comment: Current every day smoker, 0.5 PPD, smoked for 31 or more years   Vaping Use   • Vaping Use: Never used   Substance and Sexual Activity   • Alcohol use: Never   • Drug use: Never   • Sexual activity: Defer         FAMHX  Family History   Problem Relation Age of Onset   • Hypertension Mother    • Heart attack Mother         MI   • Other Mother         Family History of  "Heart Disease   • Stroke Father         MINI   • Hypertension Father    • COPD Father         Black lung  age 88   • Other Father         Family history of Stroke          MEDSIGONLY  Current Outpatient Medications on File Prior to Visit   Medication Sig   • aspirin (aspirin) 81 MG EC tablet Take 81 mg by mouth Daily.   • atorvastatin (LIPITOR) 80 MG tablet TAKE ONE TABLET BY MOUTH EVERY NIGHT AT BEDTIME   • carvedilol (COREG) 6.25 MG tablet Take 1 tablet by mouth 2 (Two) Times a Day With Meals for 90 days.   • cetirizine (zyrTEC) 10 MG tablet Take 1 tablet by mouth Daily.   • Cholecalciferol 10 MCG (400 UNIT) capsule Take 400 Units by mouth Daily.   • cilostazol (PLETAL) 100 MG tablet Take 1 tablet by mouth 2 (Two) Times a Day for 90 days.   • fluticasone (FLONASE) 50 MCG/ACT nasal spray SPRAY TWO SPRAYS IN EACH NOSTRIL ONCE DAILY   • HYDROcodone-acetaminophen (NORCO) 7.5-325 MG per tablet Take 1 tablet by mouth Every 6 (Six) Hours As Needed for Moderate Pain .   • omeprazole (priLOSEC) 20 MG capsule Take 20 mg by mouth Daily.   • ondansetron (ZOFRAN) 4 MG tablet Take 1 tablet by mouth Every 8 (Eight) Hours As Needed for Nausea or Vomiting.   • sertraline (Zoloft) 50 MG tablet Take 1 tablet by mouth Daily.   • traMADol (ULTRAM) 50 MG tablet Take 50 mg by mouth Every 6 (Six) Hours As Needed for Moderate Pain .   • [DISCONTINUED] diphenoxylate-atropine (LOMOTIL) 2.5-0.025 MG per tablet TAKE TWO TABLETS BY MOUTH TWICE A DAY AS NEEDED   • [DISCONTINUED] riFAXIMin (XIFAXAN) 550 MG tablet Take 1 tablet by mouth Every 12 (Twelve) Hours.   • [DISCONTINUED] Vitamin Mixture (VITAMIN E COMPLETE PO) Take 1 tablet by mouth Daily.     No current facility-administered medications on file prior to visit.         Objective   /54   Pulse 88   Ht 149.9 cm (59\")   Wt 35.8 kg (79 lb)   BMI 15.96 kg/m²       Physical Exam  HENT:      Head: Normocephalic.   Neck:      Vascular: No carotid bruit.   Cardiovascular:      " Rate and Rhythm: Normal rate and regular rhythm.      Pulses: Normal pulses.      Heart sounds: Murmur heard.       Pulmonary:      Effort: Pulmonary effort is normal.      Breath sounds: Normal breath sounds.   Musculoskeletal:      Cervical back: Neck supple.      Right lower leg: No edema.      Left lower leg: No edema.   Skin:     General: Skin is dry.      Capillary Refill: Capillary refill takes less than 2 seconds.   Neurological:      Mental Status: She is alert and oriented to person, place, and time.   Psychiatric:         Behavior: Behavior normal.       Result Review :   The following data was reviewed by: JOSE Mar on 12/30/2021:  No results found for: PROBNP  CMP    CMP 10/12/21   Glucose 113 (A)   BUN 11   Creatinine 0.68   eGFR Non African Am 84   Sodium 138   Potassium 4.5   Chloride 102   Calcium 9.4   Albumin 4.20   Total Bilirubin 0.4   Alkaline Phosphatase 141 (A)   AST (SGOT) 15   ALT (SGPT) 14           Lab Results   Component Value Date    TSH 1.310 12/03/2021      Lab Results   Component Value Date    FREET4 1.32 12/03/2021      No results found for: DDIMERQUANT  No results found for: MG   No results found for: DIGOXIN   No results found for: TROPONINT        Lipid Panel    Lipid Panel 10/12/21   Total Cholesterol 108   Triglycerides 88   HDL Cholesterol 38 (A)   VLDL Cholesterol 17   LDL Cholesterol  53   LDL/HDL Ratio 1.38   (A) Abnormal value       05/28/21 echo:  1.  Mildly reduced left ventricular systolic function with left ventricular hypertrophy and diffuse hypokinesis of the left ventricle.  2.  Mild aortic regurgitation.  3.  Mild aortic stenosis.      Assessment and Plan    Diagnoses and all orders for this visit:    1. HFrEF (heart failure with reduced ejection fraction) (Primary)  Symptomatically stable and euvolemic on exam, continue carvedilol 6.25 mg twice daily.    2. CAD s/p CABG  Currently denies any anginal symptoms, continue aspirin 81 mg daily.    3. Aortic  stenosis, mild  Symptomatically stable at this time, continue to monitor, repeat echocardiogram in 6 months.  -     Adult Transthoracic Echo Complete W/ Cont if Necessary Per Protocol; Future    4. Hyperlipidemia LDL goal <70  Last lipid panel was 10/12/2021 with LDL of 53 which is within her goal range, continue atorvastatin 80 mg daily.    5. Essential hypertension  Currently controlled and without adverse effects from medication, continue carvedilol 6.25 mg twice daily.    6. Smoker  Tobacco abuse cessation counseling provided to patient today but was unable to get patient to commit to cessation plan.    Follow Up   Return in about 6 months (around 6/30/2022) for Follow up with Dr Obando.    Patient was given instructions and counseling regarding her condition or for health maintenance advice. Please see specific information pulled into the AVS if appropriate.     Nilda Julien  reports that she has been smoking cigarettes. She has been smoking about 0.50 packs per day. She has never used smokeless tobacco.. I have educated her on the risk of diseases from using tobacco products such as cancer, COPD and heart disease.     I advised her to quit and she is not willing to quit.    I spent 4 minutes counseling the patient.           Cindy Beltran, JOSE  12/30/21  13:03 EST    Dictated Utilizing Dragon Dictation

## 2022-01-04 ENCOUNTER — HOSPITAL ENCOUNTER (OUTPATIENT)
Dept: CT IMAGING | Facility: HOSPITAL | Age: 77
Discharge: HOME | End: 2022-01-04
Admitting: FAMILY MEDICINE

## 2022-01-04 DIAGNOSIS — R91.1 LUNG NODULE: ICD-10-CM

## 2022-01-04 LAB — CREAT BLDA-MCNC: 0.8 MG/DL

## 2022-01-04 PROCEDURE — 71260 CT THORAX DX C+: CPT

## 2022-01-04 PROCEDURE — 82565 ASSAY OF CREATININE: CPT

## 2022-01-04 PROCEDURE — 0 IOPAMIDOL PER 1 ML: Performed by: FAMILY MEDICINE

## 2022-01-04 RX ADMIN — IOPAMIDOL 100 ML: 755 INJECTION, SOLUTION INTRAVENOUS at 13:30

## 2022-01-11 ENCOUNTER — TELEPHONE (OUTPATIENT)
Dept: FAMILY MEDICINE CLINIC | Facility: CLINIC | Age: 77
End: 2022-01-11

## 2022-01-11 DIAGNOSIS — S32.010K COMPRESSION FRACTURE OF L1 VERTEBRA WITH NONUNION, SUBSEQUENT ENCOUNTER: ICD-10-CM

## 2022-01-11 DIAGNOSIS — S22.080K COMPRESSION FRACTURE OF T12 VERTEBRA WITH NONUNION, SUBSEQUENT ENCOUNTER: Primary | ICD-10-CM

## 2022-01-11 DIAGNOSIS — S22.050K: ICD-10-CM

## 2022-01-11 NOTE — TELEPHONE ENCOUNTER
----- Message from Jevon Gonzalez MD sent at 1/10/2022  5:15 AM EST -----  Please call and let pt know that the nodule in the lung is stable. No new ndoule. Recs are to repeat CT in 2 yrs.  She still has the compression fractures in back, very similar to previous imaging. I know she was sent to neurosurg for that last year. Get her back in to neurosurg. For compression fracture T12.T6, and L1 Thanks. I want her to see them again to make sure the plan doesn't change.

## 2022-01-18 DIAGNOSIS — G89.4 CHRONIC PAIN SYNDROME: ICD-10-CM

## 2022-01-18 RX ORDER — ATORVASTATIN CALCIUM 80 MG/1
TABLET, FILM COATED ORAL
Qty: 90 TABLET | Refills: 1 | Status: SHIPPED | OUTPATIENT
Start: 2022-01-18 | End: 2022-04-28 | Stop reason: SDUPTHER

## 2022-01-18 RX ORDER — HYDROCODONE BITARTRATE AND ACETAMINOPHEN 7.5; 325 MG/1; MG/1
1 TABLET ORAL EVERY 6 HOURS PRN
Qty: 90 TABLET | Refills: 0 | Status: SHIPPED | OUTPATIENT
Start: 2022-01-18 | End: 2022-04-01 | Stop reason: HOSPADM

## 2022-01-24 ENCOUNTER — OFFICE VISIT (OUTPATIENT)
Dept: NEUROSURGERY | Facility: CLINIC | Age: 77
End: 2022-01-24

## 2022-01-24 VITALS — BODY MASS INDEX: 15.32 KG/M2 | WEIGHT: 76 LBS | HEIGHT: 59 IN

## 2022-01-24 DIAGNOSIS — S22.050D CLOSED WEDGE COMPRESSION FRACTURE OF T6 VERTEBRA WITH ROUTINE HEALING, SUBSEQUENT ENCOUNTER: ICD-10-CM

## 2022-01-24 DIAGNOSIS — S22.080G COMPRESSION FRACTURE OF T12 VERTEBRA WITH DELAYED HEALING, SUBSEQUENT ENCOUNTER: Primary | ICD-10-CM

## 2022-01-24 PROCEDURE — 99214 OFFICE O/P EST MOD 30 MIN: CPT | Performed by: NURSE PRACTITIONER

## 2022-01-24 NOTE — PATIENT INSTRUCTIONS
-MRI Thoracic Spine  -No lifting greater than 10 pounds, limit bending and twisting at the waist  -Wear LSO brace for support when out of bed  -Follow up in 4 weeks

## 2022-01-24 NOTE — PROGRESS NOTES
Chief Complaint  Back Pain    Subjective          Nilda Julien who is a 76 y.o. year old female who presents to [facility] for concerns of back pain.     Pt was last seen in our office in March 2021 for an acute T12 compression fracture. At that time her XR showed progression of the fracture and she was having worsening pain symptoms. She discussed vertebroplasty with Dr. Cisneros, with plans to proceed pending cardiac clearance. Unfortunately this was delayed due to other health issues. Her pain improved with time.     She presents today with concerns of new, severe pain in the thoracic spine. This started around two weeks ago. She denies falling. She denies any heavy lifting or movement that triggered pain. Pain is in the lower thoracic spine, around T11/T12, with radiating pain into the ribs worse on the right side. She denies in numbness or weakness into the legs. Her PCP has prescribed Norco 7.5 mg which is providing modest benefit. She has not been wearing her TLSO brace as it is too big and bulky for her. She is rather petite in stature, weighing 76 lb. Pt has concerns of unintentional weight loss as well.    She had a recent CT Chest revealing stable chronic compression fractures at T6, T12, and L1. Most significant at T12.       Interval History per MIGUELANGEL Roque 3/18/21  The patient is a 75 year old /White female who is in the office for followup appointment.       Here for f/u for her T12 compression fracture.  Xrays from this month showed some progression of the compression fracture compared to imaging in January 2021.  Pain has progressed some over the past 2 weeks.  She has not been braced due to the fracture being subacute and at her last visit her pain had improved.       Recent Interventions: Norco      Review of Systems   Musculoskeletal: Positive for back pain.   Neurological: Positive for weakness.   All other systems reviewed and are  "negative.       Objective   Vital Signs:   Ht 149.9 cm (59\")   Wt 34.5 kg (76 lb)   BMI 15.35 kg/m²       Physical Exam  Vitals reviewed.   Constitutional:       Appearance: Normal appearance.   Musculoskeletal:      Thoracic back: Tenderness present.   Neurological:      Mental Status: She is alert and oriented to person, place, and time.      Deep Tendon Reflexes: Strength normal.      Reflex Scores:       Patellar reflexes are 2+ on the right side and 2+ on the left side.       Achilles reflexes are 2+ on the right side and 2+ on the left side.       Neurologic Exam     Mental Status   Oriented to person, place, and time.   Level of consciousness: alert    Motor Exam   Muscle bulk: normal  Overall muscle tone: normal    Strength   Strength 5/5 throughout.     Sensory Exam   Light touch normal.     Gait, Coordination, and Reflexes     Reflexes   Right patellar: 2+  Left patellar: 2+  Right achilles: 2+  Left achilles: 2+  Right ankle clonus: absent  Left ankle clonus: absentAntalgic gait        Result Review :       Data reviewed: Radiologic studies  CT Chest revealing stable chronic compression fractures at T6, T12, and L1. Most significant at T12.           Assessment and Plan    Diagnoses and all orders for this visit:    1. Compression fracture of T12 vertebra with delayed healing, subsequent encounter (Primary)  -     MRI Thoracic Spine Without Contrast; Future    2. Closed wedge compression fracture of T6 vertebra with routine healing, subsequent encounter  -     MRI Thoracic Spine Without Contrast; Future    Pt presenting with prior thoracic spine compression fractures, presenting with worsening mid back pain. Will order MRI Thoracic spine to stage these fractures for consideration of surgical intervention. She has been instructed to wear her brace for support, she is going to order one that is not as bulky as her current brace. Mobility restrictions given. Continue Norco as needed for pain. She will " follow up in 4 weeks.       Follow Up   Return in about 4 weeks (around 2/21/2022).  Patient was given instructions and counseling regarding her condition or for health maintenance advice.     -MRI Thoracic Spine  -No lifting greater than 10 pounds, limit bending and twisting at the waist  -Wear LSO brace for support when out of bed  -Follow up in 4 weeks

## 2022-02-11 ENCOUNTER — HOSPITAL ENCOUNTER (OUTPATIENT)
Dept: MRI IMAGING | Facility: HOSPITAL | Age: 77
Discharge: HOME | End: 2022-02-11
Admitting: NURSE PRACTITIONER

## 2022-02-11 DIAGNOSIS — S22.050D CLOSED WEDGE COMPRESSION FRACTURE OF T6 VERTEBRA WITH ROUTINE HEALING, SUBSEQUENT ENCOUNTER: ICD-10-CM

## 2022-02-11 DIAGNOSIS — S22.080G COMPRESSION FRACTURE OF T12 VERTEBRA WITH DELAYED HEALING, SUBSEQUENT ENCOUNTER: ICD-10-CM

## 2022-02-11 PROCEDURE — 72146 MRI CHEST SPINE W/O DYE: CPT

## 2022-02-21 ENCOUNTER — LAB (OUTPATIENT)
Dept: LAB | Facility: HOSPITAL | Age: 77
End: 2022-02-21

## 2022-02-21 ENCOUNTER — OFFICE VISIT (OUTPATIENT)
Dept: NEUROSURGERY | Facility: CLINIC | Age: 77
End: 2022-02-21

## 2022-02-21 VITALS — BODY MASS INDEX: 15.12 KG/M2 | HEIGHT: 59 IN | WEIGHT: 75 LBS

## 2022-02-21 DIAGNOSIS — M80.08XG FRACTURE OF VERTEBRA DUE TO OSTEOPOROSIS WITH DELAYED HEALING, SUBSEQUENT ENCOUNTER: ICD-10-CM

## 2022-02-21 DIAGNOSIS — R63.4 WEIGHT LOSS: ICD-10-CM

## 2022-02-21 DIAGNOSIS — S22.080A COMPRESSION FRACTURE OF T11 VERTEBRA, INITIAL ENCOUNTER: Primary | ICD-10-CM

## 2022-02-21 DIAGNOSIS — S22.080A COMPRESSION FRACTURE OF T11 VERTEBRA, INITIAL ENCOUNTER: ICD-10-CM

## 2022-02-21 PROCEDURE — 99214 OFFICE O/P EST MOD 30 MIN: CPT | Performed by: NURSE PRACTITIONER

## 2022-02-21 PROCEDURE — 86334 IMMUNOFIX E-PHORESIS SERUM: CPT

## 2022-02-21 PROCEDURE — 36415 COLL VENOUS BLD VENIPUNCTURE: CPT

## 2022-02-21 PROCEDURE — 84165 PROTEIN E-PHORESIS SERUM: CPT

## 2022-02-21 PROCEDURE — 84155 ASSAY OF PROTEIN SERUM: CPT

## 2022-02-21 PROCEDURE — 82784 ASSAY IGA/IGD/IGG/IGM EACH: CPT

## 2022-02-21 NOTE — PROGRESS NOTES
Chief Complaint  Back Pain    Subjective          Nilda Julien who is a 76 y.o. year old female who presents to [facility] NEUROSURGERY for follow up of worsening mid back pain. Recent MRI Thoracic Spine.     MRI Thoracic Spine on 2/11/22 reviewed. Acute T11 compression fracture with 30% loss of height and minimal retropulsion. Severe T12 compression fracture with almost complete loss of anterior height, appearing acute to subacute in nature. Chronic compression fractures at T6  and L1. No cord signal abnormality.     Pt's back pain continues to be quite severe. Norco 7.5 mg providing modest benefit. She cannot tolerate a TLSO brace due to her petite stature and kyphosis. She has been wearing a lumbar brace bought online, though this rides up on her as well. She wishes to discuss surgical options with Dr. Cisneros.     She has lost another pound since her last visit. She has had minimal appetite.       Interval History   Nilda Julien who is a 76 y.o. year old female who presents to [facility] NEUR[facility]UR[facility]ERY for concerns of back pain.      Pt was last seen in our office in March 2021 for an acute T12 compression fracture. At that time her XR showed progression of the fracture and she was having worsening pain symptoms. She discussed vertebroplasty with Dr. Cisneros, with plans to proceed pending cardiac clearance. Unfortunately this was delayed due to other health issues. Her pain improved with time.      She presents today with concerns of new, severe pain in the thoracic spine. This started around two weeks ago. She denies falling. She denies any heavy lifting or movement that triggered pain. Pain is in the lower thoracic spine, around T11/T12, with radiating pain into the ribs worse on the right side. She denies in numbness or weakness into the legs. Her PCP has prescribed Norco 7.5 mg which is providing modest benefit. She has not been wearing her TLSO  "brace as it is too big and bulky for her. She is rather petite in stature, weighing 76 lb. Pt has concerns of unintentional weight loss as well.     She had a recent CT Chest revealing stable chronic compression fractures at T6, T12, and L1. Most significant at T12.      Recent Interventions: Norco      Review of Systems   Constitutional: Positive for unexpected weight loss.   Musculoskeletal: Positive for back pain.   Neurological: Positive for weakness.   All other systems reviewed and are negative.       Objective   Vital Signs:   Ht 149.9 cm (59\")   Wt 34 kg (75 lb)   BMI 15.15 kg/m²       Physical Exam  Vitals reviewed.   Constitutional:       Appearance: Normal appearance.   Musculoskeletal:      Thoracic back: Tenderness present.   Neurological:      Mental Status: She is alert and oriented to person, place, and time.      Deep Tendon Reflexes:      Reflex Scores:       Patellar reflexes are 2+ on the right side and 2+ on the left side.       Achilles reflexes are 2+ on the right side and 2+ on the left side.       Neurologic Exam     Mental Status   Oriented to person, place, and time.     Gait, Coordination, and Reflexes     Reflexes   Right patellar: 2+  Left patellar: 2+  Right achilles: 2+  Left achilles: 2+       Result Review :       Data reviewed: Radiologic studies MRI Thoracic Spine on 2/11/22 reviewed. Acute T11 compression fracture with 30% loss of height and minimal retropulsion. Severe T12 compression fracture with almost complete loss of anterior height, appearing acute to subacute in nature. Chronic compression fractures at T6  and L1. No cord signal abnormality.           Assessment and Plan    Diagnoses and all orders for this visit:    1. Compression fracture of T11 vertebra, initial encounter ([facility]) (Primary)  -     Protein Elec + Interp, Serum; Future  -     Immunofixation electrophoresis; Future    2. Fracture of vertebra due to osteoporosis with delayed healing, subsequent encounter  - "     Protein Elec + Interp, Serum; Future  -     Immunofixation electrophoresis; Future    3. Weight loss  -     Protein Elec + Interp, Serum; Future  -     Immunofixation electrophoresis; Future    Pt presenting with worsening mid back pain, with new compression fracture at T11 and non healing fracture at T12. Will order SPEP/YENNI to evaluate for possible cancerous process, though suspicion is low. Her fractures are likely osteoporotic in nature. She wishes to discuss surgical options. I have reviewed her MRI Thoracic Spine with Dr. Cisneros. He believes surgery would be challenging though could be possibility for vertebroplasty. She will need cardiac clearance. Will schedule pt for follow up to discuss surgical options.       Follow Up   Return in about 1 week (around 2/28/2022).  Patient was given instructions and counseling regarding her condition or for health maintenance advice.     Addendum  SPEP/YENNI normal with no M Liang.

## 2022-02-22 LAB
ALBUMIN SERPL ELPH-MCNC: 2.9 G/DL (ref 2.9–4.4)
ALBUMIN/GLOB SERPL: 0.9 {RATIO} (ref 0.7–1.7)
ALPHA1 GLOB SERPL ELPH-MCNC: 0.3 G/DL (ref 0–0.4)
ALPHA2 GLOB SERPL ELPH-MCNC: 0.8 G/DL (ref 0.4–1)
B-GLOBULIN SERPL ELPH-MCNC: 1.1 G/DL (ref 0.7–1.3)
GAMMA GLOB SERPL ELPH-MCNC: 1.3 G/DL (ref 0.4–1.8)
GLOBULIN SER-MCNC: 3.5 G/DL (ref 2.2–3.9)
IGA SERPL-MCNC: 527 MG/DL (ref 64–422)
IGG SERPL-MCNC: 1079 MG/DL (ref 586–1602)
IGM SERPL-MCNC: 115 MG/DL (ref 26–217)
INTERPRETATION SERPL IEP-IMP: ABNORMAL
LABORATORY COMMENT REPORT: ABNORMAL
M PROTEIN SERPL ELPH-MCNC: ABNORMAL G/DL
PROT SERPL-MCNC: 6.4 G/DL (ref 6–8.5)

## 2022-02-24 ENCOUNTER — PREP FOR SURGERY (OUTPATIENT)
Dept: OTHER | Facility: HOSPITAL | Age: 77
End: 2022-02-24

## 2022-02-24 ENCOUNTER — OFFICE VISIT (OUTPATIENT)
Dept: NEUROSURGERY | Facility: CLINIC | Age: 77
End: 2022-02-24

## 2022-02-24 VITALS — HEIGHT: 59 IN | WEIGHT: 75 LBS | BODY MASS INDEX: 15.12 KG/M2

## 2022-02-24 DIAGNOSIS — S22.080G COMPRESSION FRACTURE OF T12 VERTEBRA WITH DELAYED HEALING, SUBSEQUENT ENCOUNTER: ICD-10-CM

## 2022-02-24 DIAGNOSIS — S22.080A COMPRESSION FRACTURE OF T11 VERTEBRA, INITIAL ENCOUNTER: Primary | ICD-10-CM

## 2022-02-24 PROCEDURE — 99213 OFFICE O/P EST LOW 20 MIN: CPT | Performed by: NURSE PRACTITIONER

## 2022-02-24 RX ORDER — CEFAZOLIN SODIUM 2 G/100ML
2 INJECTION, SOLUTION INTRAVENOUS ONCE
Status: CANCELLED | OUTPATIENT
Start: 2022-02-24 | End: 2022-02-24

## 2022-02-24 NOTE — PROGRESS NOTES
Chief Complaint  Back Pain    Subjective          Nilda Julien who is a 76 y.o. year old female who presents to [facility]UR[facility] NEUROSURGERY for follow up of acute T11 and T12 compression fractures.     Pt here to discuss surgical options with Dr. Cisneros for severe thoracic spine. At her last visit we reviewed her MRI Thoracic Spine, revealing a new compression fracture at T11 with 30% loss of height, as well as a non healing fracture at T12. There had been discussion previously regarding vertebroplasty at T12 in March of last year. This was delayed as she required cardiac clearance and her symptoms had improved. Unfortunately her pain has worsened over the past several months and medication management is not helping. She is unable to wear TLSO brace.     We checked SPEP/YENNI due to her multiple fracture history. This was normal.     Pt has a history of CAD and prior CABG, as well as CHF. She is followed by Dr. Obando and will need cardiac clearance prior to surgery.      Interval History 2/21/22     Nilda Julien who is a 76 y.o. year old female who presents to [facility]UR[facility] NEUROSURGERY for follow up of worsening mid back pain. Recent MRI Thoracic Spine.      MRI Thoracic Spine on 2/11/22 reviewed. Acute T11 compression fracture with 30% loss of height and minimal retropulsion. Severe T12 compression fracture with almost complete loss of anterior height, appearing acute to subacute in nature. Chronic compression fractures at T6  and L1. No cord signal abnormality.      Pt's back pain continues to be quite severe. Norco 7.5 mg providing modest benefit. She cannot tolerate a TLSO brace due to her petite stature and kyphosis. She has been wearing a lumbar brace bought online, though this rides up on her as well. She wishes to discuss surgical options with Dr. Cisneros.      She has lost another pound since her last visit. She has had minimal appetite.  "    Recent Interventions: Norco      Review of Systems   Constitutional: Positive for unexpected weight loss.   Musculoskeletal: Positive for back pain.   Neurological: Positive for weakness.   All other systems reviewed and are negative.       Objective   Vital Signs:   Ht 149.9 cm (59\")   Wt 34 kg (75 lb)   BMI 15.15 kg/m²       Physical Exam  Vitals reviewed.   Constitutional:       Appearance: Normal appearance.   Musculoskeletal:      Thoracic back: Tenderness present.   Neurological:      Mental Status: She is alert and oriented to person, place, and time.      Deep Tendon Reflexes:      Reflex Scores:       Patellar reflexes are 2+ on the right side and 2+ on the left side.       Achilles reflexes are 2+ on the right side and 2+ on the left side.       Neurologic Exam     Mental Status   Oriented to person, place, and time.     Gait, Coordination, and Reflexes     Reflexes   Right patellar: 2+  Left patellar: 2+  Right achilles: 2+  Left achilles: 2+       Result Review :       Data reviewed: Radiologic studies MRI Thoracic Spine on 2/11/22 reviewed. Acute T11 compression fracture with 30% loss of height and minimal retropulsion. Severe T12 compression fracture with almost complete loss of anterior height, appearing acute to subacute in nature. Chronic compression fractures at T6  and L1. No cord signal abnormality.            Assessment and Plan    Diagnoses and all orders for this visit:    1. Compression fracture of T11 vertebra, initial encounter ([facility]) (Primary)    2. Compression fracture of T12 vertebra with delayed healing, subsequent encounter    Pt discussed T11 vertebroplasty with Dr. Cisneros. Risks and benefits discussed. She would be considered high risk due to her heart history and history of multiple vertebral fractures. She expressed understanding of this. Will obtain cardiac clearance with Dr. Obando prior to surgery. She will follow up at post op.       Follow Up   Return for Post-op " Follow up.  Patient was given instructions and counseling regarding her condition or for health maintenance advice.

## 2022-02-25 ENCOUNTER — TELEPHONE (OUTPATIENT)
Dept: CARDIOLOGY | Facility: CLINIC | Age: 77
End: 2022-02-25

## 2022-02-25 NOTE — TELEPHONE ENCOUNTER
Procedure:  vertebroplasty     Med Directive: Aspirin    PMH: heart failure, CAD sp CABG, mild aortic stenosis, hyperlipidemia, HTN    Last Seen: 12/30/21    Stress Test 5/22/21       Myocardial perfusion imaging with SPECT     analysis shows a fixed defect of the inferior     wall and lateral wall.  There is mild gil-infarct ischemia in the lateral wall.  Gated SPECT shows a reduced ejection fraction of around 15%.  The ejection fraction has to be correlated with that of the echocardiogram.    Echo 5/28/21  1.  Mildly reduced left ventricular systolic function 41% with left ventricular hypertrophy and diffuse hypokinesis of        the left ventricle.  2.  Mild aortic regurgitation.  3.  Mild aortic stenosis.   4.  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

## 2022-03-02 PROBLEM — S22.080A COMPRESSION FRACTURE OF T11 VERTEBRA: Status: ACTIVE | Noted: 2022-03-02

## 2022-03-14 DIAGNOSIS — R11.0 NAUSEA: ICD-10-CM

## 2022-03-14 RX ORDER — ONDANSETRON 4 MG/1
TABLET, FILM COATED ORAL
Qty: 90 TABLET | Refills: 1 | Status: SHIPPED | OUTPATIENT
Start: 2022-03-14 | End: 2022-07-15 | Stop reason: SDUPTHER

## 2022-03-18 ENCOUNTER — ANESTHESIA EVENT (OUTPATIENT)
Dept: PERIOP | Facility: HOSPITAL | Age: 77
End: 2022-03-18

## 2022-03-22 ENCOUNTER — TELEPHONE (OUTPATIENT)
Dept: NEUROSURGERY | Facility: CLINIC | Age: 77
End: 2022-03-22

## 2022-03-22 NOTE — TELEPHONE ENCOUNTER
Patient was scheduled for surgery with Dr. Cisneros 3-23-22, but called today to advise that she would like to reschedule due to IBS. Surgery moved to 4-1-22.

## 2022-03-23 ENCOUNTER — ANESTHESIA (OUTPATIENT)
Dept: PERIOP | Facility: HOSPITAL | Age: 77
End: 2022-03-23

## 2022-04-01 ENCOUNTER — HOSPITAL ENCOUNTER (OUTPATIENT)
Facility: HOSPITAL | Age: 77
Setting detail: HOSPITAL OUTPATIENT SURGERY
Discharge: HOME | End: 2022-04-01
Attending: NEUROLOGICAL SURGERY | Admitting: NEUROLOGICAL SURGERY

## 2022-04-01 ENCOUNTER — TELEPHONE (OUTPATIENT)
Dept: NEUROSURGERY | Facility: CLINIC | Age: 77
End: 2022-04-01

## 2022-04-01 ENCOUNTER — APPOINTMENT (OUTPATIENT)
Dept: GENERAL RADIOLOGY | Facility: HOSPITAL | Age: 77
End: 2022-04-01

## 2022-04-01 VITALS
DIASTOLIC BLOOD PRESSURE: 79 MMHG | OXYGEN SATURATION: 96 % | BODY MASS INDEX: 17.31 KG/M2 | HEIGHT: 56 IN | SYSTOLIC BLOOD PRESSURE: 178 MMHG | WEIGHT: 76.94 LBS | RESPIRATION RATE: 14 BRPM | TEMPERATURE: 97.3 F | HEART RATE: 79 BPM

## 2022-04-01 DIAGNOSIS — S22.080G COMPRESSION FRACTURE OF T11 VERTEBRA WITH DELAYED HEALING, SUBSEQUENT ENCOUNTER: Primary | ICD-10-CM

## 2022-04-01 DIAGNOSIS — S22.080A COMPRESSION FRACTURE OF T11 VERTEBRA, INITIAL ENCOUNTER: ICD-10-CM

## 2022-04-01 PROCEDURE — 25010000002 PROPOFOL 10 MG/ML EMULSION: Performed by: NURSE ANESTHETIST, CERTIFIED REGISTERED

## 2022-04-01 PROCEDURE — 22510 PERQ CERVICOTHORACIC INJECT: CPT | Performed by: NEUROLOGICAL SURGERY

## 2022-04-01 PROCEDURE — 76000 FLUOROSCOPY <1 HR PHYS/QHP: CPT

## 2022-04-01 PROCEDURE — C1713 ANCHOR/SCREW BN/BN,TIS/BN: HCPCS | Performed by: NEUROLOGICAL SURGERY

## 2022-04-01 PROCEDURE — 25010000002 CEFAZOLIN IN DEXTROSE 2-4 GM/100ML-% SOLUTION: Performed by: NEUROLOGICAL SURGERY

## 2022-04-01 PROCEDURE — 25010000002 DEXAMETHASONE PER 1 MG: Performed by: NURSE ANESTHETIST, CERTIFIED REGISTERED

## 2022-04-01 PROCEDURE — 25010000002 ONDANSETRON PER 1 MG: Performed by: NURSE ANESTHETIST, CERTIFIED REGISTERED

## 2022-04-01 PROCEDURE — 25010000002 MIDAZOLAM PER 1 MG: Performed by: ANESTHESIOLOGY

## 2022-04-01 PROCEDURE — S0260 H&P FOR SURGERY: HCPCS | Performed by: NEUROLOGICAL SURGERY

## 2022-04-01 DEVICE — CMT BONE CONFIDENCE/NDLSS DS KT 11CC: Type: IMPLANTABLE DEVICE | Site: SPINE THORACIC | Status: FUNCTIONAL

## 2022-04-01 RX ORDER — PROPOFOL 10 MG/ML
VIAL (ML) INTRAVENOUS AS NEEDED
Status: DISCONTINUED | OUTPATIENT
Start: 2022-04-01 | End: 2022-04-01 | Stop reason: SURG

## 2022-04-01 RX ORDER — ONDANSETRON 2 MG/ML
4 INJECTION INTRAMUSCULAR; INTRAVENOUS ONCE AS NEEDED
Status: DISCONTINUED | OUTPATIENT
Start: 2022-04-01 | End: 2022-04-01 | Stop reason: HOSPADM

## 2022-04-01 RX ORDER — DEXAMETHASONE SODIUM PHOSPHATE 4 MG/ML
INJECTION, SOLUTION INTRA-ARTICULAR; INTRALESIONAL; INTRAMUSCULAR; INTRAVENOUS; SOFT TISSUE AS NEEDED
Status: DISCONTINUED | OUTPATIENT
Start: 2022-04-01 | End: 2022-04-01 | Stop reason: SURG

## 2022-04-01 RX ORDER — ACETAMINOPHEN 500 MG
500 TABLET ORAL ONCE
Status: COMPLETED | OUTPATIENT
Start: 2022-04-01 | End: 2022-04-01

## 2022-04-01 RX ORDER — BUPIVACAINE HYDROCHLORIDE AND EPINEPHRINE 5; 5 MG/ML; UG/ML
INJECTION, SOLUTION EPIDURAL; INTRACAUDAL; PERINEURAL AS NEEDED
Status: DISCONTINUED | OUTPATIENT
Start: 2022-04-01 | End: 2022-04-01 | Stop reason: HOSPADM

## 2022-04-01 RX ORDER — MEPERIDINE HYDROCHLORIDE 25 MG/ML
12.5 INJECTION INTRAMUSCULAR; INTRAVENOUS; SUBCUTANEOUS
Status: DISCONTINUED | OUTPATIENT
Start: 2022-04-01 | End: 2022-04-01 | Stop reason: HOSPADM

## 2022-04-01 RX ORDER — MIDAZOLAM HYDROCHLORIDE 1 MG/ML
1 INJECTION INTRAMUSCULAR; INTRAVENOUS ONCE
Status: COMPLETED | OUTPATIENT
Start: 2022-04-01 | End: 2022-04-01

## 2022-04-01 RX ORDER — ROCURONIUM BROMIDE 10 MG/ML
INJECTION, SOLUTION INTRAVENOUS AS NEEDED
Status: DISCONTINUED | OUTPATIENT
Start: 2022-04-01 | End: 2022-04-01 | Stop reason: SURG

## 2022-04-01 RX ORDER — ONDANSETRON 2 MG/ML
INJECTION INTRAMUSCULAR; INTRAVENOUS AS NEEDED
Status: DISCONTINUED | OUTPATIENT
Start: 2022-04-01 | End: 2022-04-01 | Stop reason: SURG

## 2022-04-01 RX ORDER — OXYCODONE HYDROCHLORIDE AND ACETAMINOPHEN 5; 325 MG/1; MG/1
1 TABLET ORAL EVERY 6 HOURS PRN
Qty: 20 TABLET | Refills: 0 | Status: SHIPPED | OUTPATIENT
Start: 2022-04-01 | End: 2022-04-21 | Stop reason: SDUPTHER

## 2022-04-01 RX ORDER — SODIUM CHLORIDE, SODIUM LACTATE, POTASSIUM CHLORIDE, CALCIUM CHLORIDE 600; 310; 30; 20 MG/100ML; MG/100ML; MG/100ML; MG/100ML
9 INJECTION, SOLUTION INTRAVENOUS CONTINUOUS PRN
Status: DISCONTINUED | OUTPATIENT
Start: 2022-04-01 | End: 2022-04-01 | Stop reason: HOSPADM

## 2022-04-01 RX ORDER — LIDOCAINE HYDROCHLORIDE 20 MG/ML
INJECTION, SOLUTION INFILTRATION; PERINEURAL AS NEEDED
Status: DISCONTINUED | OUTPATIENT
Start: 2022-04-01 | End: 2022-04-01 | Stop reason: SURG

## 2022-04-01 RX ORDER — OXYCODONE HYDROCHLORIDE 5 MG/1
5 TABLET ORAL
Status: DISCONTINUED | OUTPATIENT
Start: 2022-04-01 | End: 2022-04-01 | Stop reason: HOSPADM

## 2022-04-01 RX ORDER — PROMETHAZINE HYDROCHLORIDE 12.5 MG/1
25 TABLET ORAL ONCE AS NEEDED
Status: DISCONTINUED | OUTPATIENT
Start: 2022-04-01 | End: 2022-04-01 | Stop reason: HOSPADM

## 2022-04-01 RX ORDER — PROMETHAZINE HYDROCHLORIDE 25 MG/1
25 SUPPOSITORY RECTAL ONCE AS NEEDED
Status: DISCONTINUED | OUTPATIENT
Start: 2022-04-01 | End: 2022-04-01 | Stop reason: HOSPADM

## 2022-04-01 RX ORDER — PHENYLEPHRINE HCL IN 0.9% NACL 1 MG/10 ML
SYRINGE (ML) INTRAVENOUS AS NEEDED
Status: DISCONTINUED | OUTPATIENT
Start: 2022-04-01 | End: 2022-04-01 | Stop reason: SURG

## 2022-04-01 RX ORDER — CEFAZOLIN SODIUM 2 G/100ML
2 INJECTION, SOLUTION INTRAVENOUS ONCE
Status: COMPLETED | OUTPATIENT
Start: 2022-04-01 | End: 2022-04-01

## 2022-04-01 RX ADMIN — LIDOCAINE HYDROCHLORIDE 100 MG: 20 INJECTION, SOLUTION INFILTRATION; PERINEURAL at 09:18

## 2022-04-01 RX ADMIN — ACETAMINOPHEN 500 MG: 500 TABLET ORAL at 08:35

## 2022-04-01 RX ADMIN — SODIUM CHLORIDE, POTASSIUM CHLORIDE, SODIUM LACTATE AND CALCIUM CHLORIDE 9 ML/HR: 600; 310; 30; 20 INJECTION, SOLUTION INTRAVENOUS at 08:04

## 2022-04-01 RX ADMIN — CEFAZOLIN SODIUM 2 G: 2 INJECTION, SOLUTION INTRAVENOUS at 09:09

## 2022-04-01 RX ADMIN — Medication 200 MCG: at 09:23

## 2022-04-01 RX ADMIN — SUGAMMADEX 50 MG: 100 INJECTION, SOLUTION INTRAVENOUS at 09:44

## 2022-04-01 RX ADMIN — PROPOFOL 150 MG: 10 INJECTION, EMULSION INTRAVENOUS at 09:18

## 2022-04-01 RX ADMIN — Medication 100 MCG: at 09:30

## 2022-04-01 RX ADMIN — SODIUM CHLORIDE, POTASSIUM CHLORIDE, SODIUM LACTATE AND CALCIUM CHLORIDE: 600; 310; 30; 20 INJECTION, SOLUTION INTRAVENOUS at 09:53

## 2022-04-01 RX ADMIN — ONDANSETRON 4 MG: 2 INJECTION INTRAMUSCULAR; INTRAVENOUS at 09:24

## 2022-04-01 RX ADMIN — ROCURONIUM BROMIDE 20 MG: 10 INJECTION INTRAVENOUS at 09:18

## 2022-04-01 RX ADMIN — MIDAZOLAM HYDROCHLORIDE 1 MG: 1 INJECTION, SOLUTION INTRAMUSCULAR; INTRAVENOUS at 08:41

## 2022-04-01 RX ADMIN — DEXAMETHASONE SODIUM PHOSPHATE 8 MG: 4 INJECTION INTRA-ARTICULAR; INTRALESIONAL; INTRAMUSCULAR; INTRAVENOUS; SOFT TISSUE at 09:24

## 2022-04-01 RX ADMIN — Medication 100 MCG: at 09:28

## 2022-04-01 RX ADMIN — SUGAMMADEX 200 MG: 100 INJECTION, SOLUTION INTRAVENOUS at 09:42

## 2022-04-01 NOTE — TELEPHONE ENCOUNTER
Caller: JENNIFER SMITH    Relationship to patient: Emergency Contact    Best call back number: [phone]    Chief complaint: PATIENT CANNOT MAKE IT TO THE SCHEDULED 1ST POST-OP APPOINTMENT.     Type of visit: POST-OP    Requested date: 4/21/2022; 1:00PM OR LATER.    If rescheduling, when is the original appointment: Appointment with Ann Scott APRN (04/21/2022)    Additional notes: PATIENT'S DAUGHTER (ON  VERBAL AND EMERGENCY CONTACT) CALLED TO RESCHEDULE HER MOTHER'S FIRST POST-OP APPOINTMENT. THEY CANNOT MAKE THE CURRENTLY SCHEDULED 9:30AM APPOINTMENT AND ARE REQUESTING SOMETHING 1:00 OR LATER THAT SAME DAY.     IF NOTHING IS AVAILABLE FOR THE SAME DAY IN THAT TIME FRAME, PLEASE CONTACT DAUGHTER TO RESCHEDULE POST-OP APPT.     ATTEMPTED WARM TRANSFER TO PRACTICE BUT BOTH EXTENSIONS WERE EITHER ON THE OTHER LINE OR UNAVAILABLE.     THANK YOU VERY MUCH.

## 2022-04-01 NOTE — ANESTHESIA PREPROCEDURE EVALUATION
" Anesthesia Evaluation     Patient summary reviewed and Nursing notes reviewed   no history of anesthetic complications:  NPO Solid Status: > 8 hours  NPO Liquid Status: > 2 hours           Airway   Mallampati: II  TM distance: >3 FB  Neck ROM: full  No difficulty expected  Dental    (+) edentulous, upper dentures and lower dentures    Pulmonary - negative pulmonary ROS and normal exam    breath sounds clear to auscultation  Cardiovascular - normal exam  Exercise tolerance: good (4-7 METS)    Rhythm: regular  Rate: normal    (+) hypertension, valvular problems/murmurs AS, past MI , CAD, CABG, CHF Systolic <55%, hyperlipidemia,     ROS comment:  <4METS; DECREASED MOBILITY. HX MI, CABG. SEE CARDS NOTE/CLEARANCE BOOKMARKED. echo EF 41%, mild AS/AR    Neuro/Psych- negative ROS  GI/Hepatic/Renal/Endo    (+)   diabetes mellitus, thyroid problem hypothyroidism    Musculoskeletal     (+) back pain,   Abdominal    Substance History      OB/GYN          Other   arthritis,             Last Seen: 12/30/21 Cleared, \"mod risk\" Dr Tang     Stress Test 5/22/21       Myocardial perfusion imaging with SPECT     analysis shows a fixed defect of the inferior     wall and lateral wall.  There is mild gil-infarct ischemia in the lateral wall.  Gated SPECT shows a reduced ejection fraction of around 15%.  The ejection fraction has to be correlated with that of the echocardiogram.     Echo 5/28/21  1.  Mildly reduced left ventricular systolic function 41% with left ventricular hypertrophy and diffuse hypokinesis of        the left ventricle.  2.  Mild aortic regurgitation.  3.  Mild aortic stenosis.   4.  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       Anesthesia Plan    ASA 3     general       Anesthetic plan, all risks, benefits, and alternatives have been provided, discussed and informed consent has been obtained with: patient.        CODE STATUS:       "

## 2022-04-01 NOTE — ANESTHESIA POSTPROCEDURE EVALUATION
Patient: Nilda Julien    Procedure Summary     Date: 04/01/22 Room / Location: [facility] OR 05 / [facility] MAIN OR    Anesthesia Start: 0906 Anesthesia Stop: 1012    Procedure: VERTEBROPLASTY, THORACIC 11 (N/A ) Diagnosis:       Compression fracture of T11 vertebra, initial encounter ([facility])      (Compression fracture of T11 vertebra, initial encounter ([facility]) [S22.080A])    Surgeons: Redd Cisneros MD Provider: Abdullahi Adame MD    Anesthesia Type: general ASA Status: 3          Anesthesia Type: general    Vitals  Vitals Value Taken Time   /97 04/01/22 1031   Temp 36.2 °C (97.2 °F) 04/01/22 1003   Pulse 85 04/01/22 1032   Resp 14 04/01/22 1013   SpO2 93 % 04/01/22 1032   Vitals shown include unvalidated device data.        Post Anesthesia Care and Evaluation    Patient location during evaluation: bedside  Patient participation: complete - patient participated  Level of consciousness: awake  Pain management: adequate  Airway patency: patent  Anesthetic complications: No anesthetic complications  PONV Status: none  Cardiovascular status: acceptable and stable  Respiratory status: acceptable  Hydration status: acceptable    Comments: An Anesthesiologist personally participated in the most demanding procedures (including induction and emergence if applicable) in the anesthesia plan, monitored the course of anesthesia administration at frequent intervals and remained physically present and available for immediate diagnosis and treatment of emergencies.

## 2022-04-01 NOTE — DISCHARGE INSTRUCTIONS
DISCHARGE INSTRUCTIONS  VERTEBROPLASTY  [x] MINIMALLY INVASIVE      For your surgery you had:  General anesthesia (you may have a sore throat for the first 24 hours)  IV sedation.  Local anesthesia  Monitored anesthesia care  You received a medicated patch for nausea prevention today (behind your ear). It is recommended that you remove it 24-48 hours post-operatively. It must be removed within 72 hours.   You have received an anesthesia medication today that can cause hormonal forms of birth control to be ineffective. You should use a different form of birth control (to prevent pregnancy) for 7 days.   You may experience dizziness, drowsiness, or light-headedness for several hours following surgery  Do not stay alone today or tonight.  Limit your activity for 24 hours.  You should not drive, operate machinery, drink alcohol, or sign legally binding documents for 24 hours or while you are taking pain medication.  Activity  For the first two weeks following surgery, remain close to home and do not do any lifting, bending or strenuous activity.  Walking is the best exercise and you can increase the amount you walk as you feel like it.  Riding in a car for short distances (15-20 minutes) is okay but do not drive until you are off all narcotic medications.  If you have a sedentary job, you may resume work as soon as you feel like it (this is rarely less than one week).  Pain Control  Take your pain medicines as needed and as prescribed.  As you are feeling better, you can decrease the prescribed pain medication and take over-the-counter medications such as Tylenol or Ibuprofen.  The prescribed pain medicines tend to be constipating so it is important to eat a well-balanced diet and take a stool softener if recommended by the doctor.  Activity such as walking also helps keep your bowels regular.   Incision Care  Your sutures are under the skin and will dissolve in time.  You may shower as soon as you like.    [x] You  have a clear dressing, which you should remove in 3-4 days.  Beneath this dressing are steri-strips that will peel off over time.  If these steri-strips have not peeled off in 10-14 days, you may remove them.  Gently washing your incision with mild soap and rinsing with water during your shower is all you need to do to care for the incision.  Do not scrub the incision or sit in the bathtub.  Check your incision site each day to see how it looks.  Some redness and bruising is normal for the first few days.  NOTIFY YOUR DOCTOR IF YOU EXPERIENCE ANY OF THE FOLLOWING:   You have a fever over 100.8o Fahrenheit orally  Shaking chills  Your incision is red, hot to touch, or has excessive drainage  Your incision starts to separate  Increase in bleeding or bleeding that is excessive  Nausea, vomiting and/or pain not controlled by prescribed medications  Unable to urinate in 6 hours after surgery  You may contact 's clinic at [phone] with any questions or concerns.  If unable to reach your doctor, please go to the closest emergency room.    SPECIAL INSTRUCTIONS:          Last dose of pain medication was given at:

## 2022-04-01 NOTE — OP NOTE
VERTEBROPLASTY  Procedure Report    Patient Name:  Nilda Julien  YOB: 1945    Date of Surgery:  4/1/2022     Indications: Thoracic 11 compression fracture, osteoporotic    Pre-op Diagnosis:   Compression fracture of T11 vertebra, initial encounter ([facility]) [S22.080A]       Post-Op Diagnosis Codes:     * Compression fracture of T11 vertebra, initial encounter ([facility]) [S22.080A]    Procedure/CPT® Codes:      Procedure(s):  VERTEBROPLASTY, THORACIC 11    Staff:  Surgeon(s):  Redd Cisneros MD    Assistant: Ann Kramer RN CSA    Anesthesia: General    Estimated Blood Loss: none      Specimen:          None        Findings: Multiple compression fractures, T12 to narrow to proceed with surgery, T11 with good filling pattern after injection of polymethylmethacrylate.    Complications: No apparent intraoperative complications    Description of Procedure: After informed consent was obtained, the patient was brought to the operating room.  After the induction of adequate general endotracheal seizure she was gently placed into the prone position on the Blayne table with massaging pads.  Her back was prepped and draped in typical fashion.  The thoracic levels were localized using the C arm.  She had a significant fracture T12 which was known the operatively.  The vertebral body immediately above was also shown to have a compression fracture of approximately 30%.  This was the target of the surgery.  A line about 3-1/2 cm off the midline was drawn.  On this line the T11 vertebral body was localized.  The spinal needle was used.  The lateral border of the pedicle was identified using AP and the Jamshidi needle was then advanced into the vertebral body.  Once it passed the posterior cortex the AP was again used to confirm that the needle had not breached the medial border of the pedicle.  It was then advanced to the anterior one third the vertebral body.  The confidence of bone cement was then mixed.  It was  injected into the Jamshidi needle under fluoroscopic guidance.  Live fluoroscopic images were obtained once the contrast reached the vertebral body.  There was a good filling pattern with the contrast involving both sides of vertebral body.  Approximately 3 mL of cement was injected.  After this the Jamshidi needle was removed.  A Steri-Strip was placed over the stab incision.    Assistant: Ann Kramer RN CSA Thad Jackson, MD     Date: 4/1/2022  Time: 09:58 EDT

## 2022-04-01 NOTE — H&P
[facility]   HISTORY AND PHYSICAL    Patient Name: Nilda Julien  : 1945  MRN: [id number]  Primary Care Physician:  Jevno Gonzalez MD  Date of admission: 2022    Subjective   Subjective     Chief Complaint: Acute thoracic 11 compression fracture with pain    76-year-old female with multiple fractures of the spine.  She has an acute T11 fracture.  She also has a T12 fracture with near vertebra plana.  She presents for thoracic 11 vertebroplasty.      Review of Systems   Musculoskeletal: Positive for back pain ( Multiple compression fractures).        Personal History     Past Medical History:   Diagnosis Date   • Allergic rhinitis     Seasonal allergies   • Aortic stenosis, mild 10/18/2021   • Arthritis    • Back pain    • CAD s/p CABG 10/18/2021   • Diverticulitis    • Essential hypertension 2021   • Fatigue    • Heart attack (HCC) 10/18/2021   • Hemorrhoids    • HFrEF (heart failure with reduced ejection fraction) 2021   • Hyperlipidemia LDL goal <70 2021   • Irritable bowel syndrome with diarrhea    • Memory loss     forgetfulness   • Microhematuria 2019   • Nephrolithiasis 2019   • Smoker 2021       Past Surgical History:   Procedure Laterality Date   • CARDIAC SURGERY  2013    4 way bypass   • COLONOSCOPY  2016, 2019   • CORONARY ARTERY BYPASS GRAFT     • ENDOSCOPY  2019   • HERNIA REPAIR     • OTHER SURGICAL HISTORY  2001    cardiac stents, 2 stents placed       Family History: family history includes COPD in her father; Heart attack in her mother; Hypertension in her father and mother; Other in her father and mother; Stroke in her father. Otherwise pertinent FHx was reviewed and not pertinent to current issue.    Social History:  reports that she has been smoking cigarettes. She has been smoking about 0.50 packs per day. She has never used smokeless tobacco. She reports that she does not drink alcohol and does not use drugs.    Home  Medications:  Cholecalciferol, HYDROcodone-acetaminophen, aspirin, atorvastatin, carvedilol, cetirizine, fluticasone, omeprazole, ondansetron, and sertraline    Allergies:  No Known Allergies    Objective    Objective     Vitals:   Temp:  [97.5 °F (36.4 °C)] 97.5 °F (36.4 °C)  Heart Rate:  [76] 76  Resp:  [20] 20  BP: (148)/(77) 148/77    Physical Exam  Constitutional:       Comments: Very thin in appearance   Cardiovascular:      Comments: No visible edema  Pulmonary:      Effort: Pulmonary effort is normal.   Musculoskeletal:      Comments: Obvious kyphosis of the thoracic spine   Neurological:      Mental Status: She is alert.   Psychiatric:         Mood and Affect: Mood normal.         Result Review    Result Review:  I have personally reviewed the results from the time of this admission to 4/1/2022 08:26 EDT and agree with these findings:  []  Laboratory  []  Microbiology  [x]  Radiology  []  EKG/Telemetry   []  Cardiology/Vascular   []  Pathology  []  Old records  []  Other:  Most notable findings include: MRI shows near vertebral plana at T12 with acute T11 compression fracture with no retropulsion.    Assessment/Plan   Assessment / Plan     Brief Patient Summary:  Nilda Julien is a 76 y.o. female who has an acute T11 compression fracture.  Presents today for vertebroplasty.    Active Hospital Problems:  Active Hospital Problems    Diagnosis    • **Compression fracture of T11 vertebra (HCC)      Added automatically from request for surgery [id number]       Plan:   OR today for thoracic 11 vertebroplasty.  Risk and benefits discussed with patient.  She desires to proceed.    DVT prophylaxis:  Mechanical DVT prophylaxis orders are present.    CODE STATUS:       Admission Status:  I believe this patient meets outpatient status.    Rded Cisneros MD

## 2022-04-01 NOTE — TELEPHONE ENCOUNTER
Left detailed voicemail to notify that I have changed appointment time to 1:30 on the same day (4-21-22).

## 2022-04-21 ENCOUNTER — OFFICE VISIT (OUTPATIENT)
Dept: NEUROSURGERY | Facility: CLINIC | Age: 77
End: 2022-04-21

## 2022-04-21 VITALS
BODY MASS INDEX: 16.87 KG/M2 | SYSTOLIC BLOOD PRESSURE: 119 MMHG | WEIGHT: 75 LBS | HEIGHT: 56 IN | HEART RATE: 83 BPM | DIASTOLIC BLOOD PRESSURE: 61 MMHG

## 2022-04-21 DIAGNOSIS — Z98.890 S/P VERTEBROPLASTY: Primary | ICD-10-CM

## 2022-04-21 DIAGNOSIS — S22.080G COMPRESSION FRACTURE OF T11 VERTEBRA WITH DELAYED HEALING, SUBSEQUENT ENCOUNTER: ICD-10-CM

## 2022-04-21 PROCEDURE — 99024 POSTOP FOLLOW-UP VISIT: CPT | Performed by: NURSE PRACTITIONER

## 2022-04-21 RX ORDER — OXYCODONE HYDROCHLORIDE AND ACETAMINOPHEN 5; 325 MG/1; MG/1
1 TABLET ORAL EVERY 6 HOURS PRN
Qty: 12 TABLET | Refills: 0 | Status: SHIPPED | OUTPATIENT
Start: 2022-04-21 | End: 2022-04-28 | Stop reason: SDUPTHER

## 2022-04-21 NOTE — PROGRESS NOTES
"Chief Complaint  Post-op    Subjective          Nilda Julien who is a 76 y.o. year old female who presents to [facility] 3 weeks s/p vertebroplasty T11.    Pt continues to have significant pain in her back. Rates her pain 7/10 today. Increases with prolonged standing. She has not been wearing the brace. She has not been taking any medication for pain. She initially was taking Percocet once a day. She did feel like this helped, though has been hesitant to take the medication.    Her mobility is improving. She reports having an easier time getting out of bed and in and out of the car.    Incision has healed. No fever or chills.             Review of Systems   Musculoskeletal: Positive for arthralgias, back pain and gait problem.   Neurological: Positive for weakness.   All other systems reviewed and are negative.       Objective   Vital Signs:   /61   Pulse 83   Ht 142.2 cm (56\")   Wt 34 kg (75 lb)   BMI 16.81 kg/m²       Physical Exam  Vitals reviewed.   Constitutional:       Appearance: Normal appearance.   Skin:         Neurological:      Mental Status: She is alert and oriented to person, place, and time.      Deep Tendon Reflexes: Strength normal.        Neurologic Exam     Mental Status   Oriented to person, place, and time.   Level of consciousness: alert    Motor Exam   Muscle bulk: normal  Overall muscle tone: normal    Strength   Strength 5/5 throughout.     Gait, Coordination, and Reflexes     Gait  Gait: shuffling       Result Review :                 Assessment and Plan    Diagnoses and all orders for this visit:    1. S/P vertebroplasty (Primary)    2. Compression fracture of T11 vertebra with delayed healing, subsequent encounter  -     oxyCODONE-acetaminophen (PERCOCET) 5-325 MG per tablet; Take 1 tablet by mouth Every 6 (Six) Hours As Needed (Pain).  Dispense: 12 tablet; Refill: 0    Pt has continued pain. She is not taking medications for pain. " Will refill her Percocet, instructed to take the medication scheduled twice daily and as needed. She will follow up in 4 weeks.      Follow Up   Return in about 4 weeks (around 5/19/2022).  Patient was given instructions and counseling regarding her condition or for health maintenance advice.

## 2022-04-28 ENCOUNTER — TELEPHONE (OUTPATIENT)
Dept: NEUROSURGERY | Facility: CLINIC | Age: 77
End: 2022-04-28

## 2022-04-28 DIAGNOSIS — S22.080G COMPRESSION FRACTURE OF T11 VERTEBRA WITH DELAYED HEALING, SUBSEQUENT ENCOUNTER: ICD-10-CM

## 2022-04-28 RX ORDER — ATORVASTATIN CALCIUM 80 MG/1
80 TABLET, FILM COATED ORAL
Qty: 90 TABLET | Refills: 1 | Status: SHIPPED | OUTPATIENT
Start: 2022-04-28 | End: 2023-02-24 | Stop reason: SDUPTHER

## 2022-04-28 RX ORDER — OXYCODONE HYDROCHLORIDE AND ACETAMINOPHEN 5; 325 MG/1; MG/1
1 TABLET ORAL EVERY 6 HOURS PRN
Qty: 12 TABLET | Refills: 0 | Status: SHIPPED | OUTPATIENT
Start: 2022-04-28 | End: 2022-05-16 | Stop reason: SDUPTHER

## 2022-04-28 RX ORDER — CETIRIZINE HYDROCHLORIDE 10 MG/1
10 TABLET ORAL DAILY
Qty: 90 TABLET | Refills: 1 | Status: SHIPPED | OUTPATIENT
Start: 2022-04-28 | End: 2022-11-09

## 2022-04-28 NOTE — TELEPHONE ENCOUNTER
Caller: Nilda Julien    Relationship: Self    Best call back number: [phone]    Requested Prescriptions:   Requested Prescriptions     Pending Prescriptions Disp Refills   • sertraline (Zoloft) 50 MG tablet 90 tablet 1     Sig: Take 1 tablet by mouth Daily.   • cetirizine (zyrTEC) 10 MG tablet 90 tablet 1     Sig: Take 1 tablet by mouth Daily.   • atorvastatin (LIPITOR) 80 MG tablet 90 tablet 1     Sig: Take 1 tablet by mouth every night at bedtime.    cilostazol 100 mg TWICE DAILY PER PATIENT(NOT IN LIST)    Diphenoxylate/atropine 25./.025 2 A DAY AS NEEDED PER PATIENT (NOT IN LIST)    HYDROCODONE-ACETAMINOPHEN 7.5-325 EVERY 6 HOURS PER PATIENT (NOT IN LIST)    Pharmacy where request should be sent: [facility] [address] [place] [address], [place] - [address] AT [facility] [address] - [phone] [facility] [phone] FX     Additional details provided by patient: PATIENT HAS LESS THAN A 3 DAY SUPPLY     Does the patient have less than a 3 day supply:  [x] Yes  [] No    Aryan Marr Rep   04/28/22 14:59 EDT

## 2022-04-28 NOTE — TELEPHONE ENCOUNTER
Caller: PATIENT    Relationship: SELF     Best call back number: [phone]    Requested Prescriptions:  OXYCODONE 5-325       Pharmacy where request should be sent:  [facility] (IN CH[facility])      Does the patient have less than a 3 day supply:  [x] Yes  [] No        PATIENT IS COMPLETELY OUT

## 2022-05-16 DIAGNOSIS — I70.219 ATHEROSCLEROSIS OF LOWER EXTREMITY WITH CLAUDICATION: Primary | ICD-10-CM

## 2022-05-16 DIAGNOSIS — R19.7 DIARRHEA, UNSPECIFIED TYPE: Primary | ICD-10-CM

## 2022-05-16 DIAGNOSIS — S22.080G COMPRESSION FRACTURE OF T11 VERTEBRA WITH DELAYED HEALING, SUBSEQUENT ENCOUNTER: ICD-10-CM

## 2022-05-16 RX ORDER — DIPHENOXYLATE HYDROCHLORIDE AND ATROPINE SULFATE 2.5; .025 MG/1; MG/1
2 TABLET ORAL DAILY PRN
Qty: 60 TABLET | Refills: 2 | Status: SHIPPED | OUTPATIENT
Start: 2022-05-16 | End: 2022-09-12

## 2022-05-16 RX ORDER — CILOSTAZOL 100 MG/1
100 TABLET ORAL 2 TIMES DAILY
Qty: 180 TABLET | Refills: 3 | Status: SHIPPED | OUTPATIENT
Start: 2022-05-16

## 2022-05-16 RX ORDER — OXYCODONE HYDROCHLORIDE AND ACETAMINOPHEN 5; 325 MG/1; MG/1
1 TABLET ORAL EVERY 6 HOURS PRN
Qty: 12 TABLET | Refills: 0 | Status: SHIPPED | OUTPATIENT
Start: 2022-05-16 | End: 2022-07-19

## 2022-05-16 RX ORDER — DIPHENOXYLATE HYDROCHLORIDE AND ATROPINE SULFATE 2.5; .025 MG/1; MG/1
2 TABLET ORAL DAILY PRN
COMMUNITY
End: 2022-05-16 | Stop reason: SDUPTHER

## 2022-05-16 NOTE — TELEPHONE ENCOUNTER
Caller: Nilda Julien    Relationship: Self    Best call back number: [phone]    Requested Prescriptions:   DIPHENOXYLATE-ATROP 2.5- 2.5MGS  2 TABLETS  A DAY BY MOUTH AS NEEDED    Pharmacy where request should be sent: [facility] [address] [place] [address], [place] - [address] AT [facility] [address] - [phone] [facility] [phone] FX     Additional details provided by patient:     Does the patient have less than a 3 day supply:  [x] Yes  [] No    Aryan Vann Rep   05/16/22 13:31 EDT

## 2022-05-19 ENCOUNTER — OFFICE VISIT (OUTPATIENT)
Dept: NEUROSURGERY | Facility: CLINIC | Age: 77
End: 2022-05-19

## 2022-05-19 VITALS — WEIGHT: 74 LBS | BODY MASS INDEX: 16.65 KG/M2 | HEIGHT: 56 IN

## 2022-05-19 DIAGNOSIS — Z98.890 S/P VERTEBROPLASTY: Primary | ICD-10-CM

## 2022-05-19 DIAGNOSIS — S22.080G COMPRESSION FRACTURE OF T11 VERTEBRA WITH DELAYED HEALING, SUBSEQUENT ENCOUNTER: ICD-10-CM

## 2022-05-19 PROCEDURE — 99213 OFFICE O/P EST LOW 20 MIN: CPT | Performed by: NURSE PRACTITIONER

## 2022-05-19 RX ORDER — HYDROCODONE BITARTRATE AND ACETAMINOPHEN 5; 325 MG/1; MG/1
1 TABLET ORAL EVERY 8 HOURS PRN
Qty: 30 TABLET | Refills: 0 | Status: SHIPPED | OUTPATIENT
Start: 2022-05-19 | End: 2022-06-06 | Stop reason: SDUPTHER

## 2022-05-19 NOTE — PROGRESS NOTES
"Chief Complaint  Back Pain    Subjective          Nilda Julien who is a 76 y.o. year old female who presents to [facility] 6 weeks s/p T11 vertebroplasty.    Pt's back pain has improved some. She has been taking the Percocet 5/325 mg twice daily scheduled which provides her benefit. Will sometimes wear off during the day. She rates her pain a 5-6/10 today.             Review of Systems   Musculoskeletal: Positive for back pain.   All other systems reviewed and are negative.       Objective   Vital Signs:   Ht 142.2 cm (56\")   Wt 33.6 kg (74 lb)   BMI 16.59 kg/m²       Physical Exam  Vitals reviewed.   Constitutional:       Appearance: Normal appearance.   Neurological:      Mental Status: She is alert and oriented to person, place, and time.      Gait: Gait is intact.        Neurologic Exam     Mental Status   Oriented to person, place, and time.   Level of consciousness: alert    Gait, Coordination, and Reflexes     Gait  Gait: normal       Result Review :                 Assessment and Plan    Diagnoses and all orders for this visit:    1. S/P vertebroplasty (Primary)  -     HYDROcodone-acetaminophen (NORCO) 5-325 MG per tablet; Take 1 tablet by mouth Every 8 (Eight) Hours As Needed for Severe Pain .  Dispense: 30 tablet; Refill: 0    2. Compression fracture of T11 vertebra with delayed healing, subsequent encounter  -     HYDROcodone-acetaminophen (NORCO) 5-325 MG per tablet; Take 1 tablet by mouth Every 8 (Eight) Hours As Needed for Severe Pain .  Dispense: 30 tablet; Refill: 0    Will change Percocet to Norco 5/325 mg TID as needed for pain. Neftaly reviewed. She will follow up in 4 weeks.       Follow Up   Return in about 4 weeks (around 6/16/2022).  Patient was given instructions and counseling regarding her condition or for health maintenance advice.     -Norco 5/325 mg three times daily as needed for pain  -Follow up in 4 weeks  "

## 2022-06-06 DIAGNOSIS — Z98.890 S/P VERTEBROPLASTY: ICD-10-CM

## 2022-06-06 DIAGNOSIS — S22.080G COMPRESSION FRACTURE OF T11 VERTEBRA WITH DELAYED HEALING, SUBSEQUENT ENCOUNTER: ICD-10-CM

## 2022-06-07 RX ORDER — HYDROCODONE BITARTRATE AND ACETAMINOPHEN 5; 325 MG/1; MG/1
1 TABLET ORAL EVERY 8 HOURS PRN
Qty: 30 TABLET | Refills: 0 | Status: SHIPPED | OUTPATIENT
Start: 2022-06-07 | End: 2022-07-19

## 2022-06-24 ENCOUNTER — TELEPHONE (OUTPATIENT)
Dept: CARDIOLOGY | Facility: CLINIC | Age: 77
End: 2022-06-24

## 2022-06-24 NOTE — TELEPHONE ENCOUNTER
----- Message from JOSE Box sent at 6/21/2022  3:20 PM EDT -----  Notify pt echo result: EF 51% (improved from previous echo 05/2021) and mild aortic regurgitation present. Continue to monitor.

## 2022-07-15 DIAGNOSIS — R11.0 NAUSEA: ICD-10-CM

## 2022-07-15 RX ORDER — ONDANSETRON 4 MG/1
4 TABLET, FILM COATED ORAL EVERY 8 HOURS PRN
Qty: 90 TABLET | Refills: 1 | Status: SHIPPED | OUTPATIENT
Start: 2022-07-15 | End: 2022-12-02

## 2022-07-15 NOTE — TELEPHONE ENCOUNTER
Caller: Nilda Julien    Relationship: Self    Best call back number: [phone]    Requested Prescriptions:   Requested Prescriptions     Pending Prescriptions Disp Refills   • ondansetron (ZOFRAN) 4 MG tablet 90 tablet 1     Sig: Take 1 tablet by mouth Every 8 (Eight) Hours As Needed for Nausea or Vomiting.        Pharmacy where request should be sent: [facility] [address] [place] [address], [place] - [address] AT [facility] [address] - [phone] [facility] [phone] FX     Does the patient have less than a 3 day supply:  [x] Yes  [] No    Aryan Mayorga Rep   07/15/22 15:00 EDT

## 2022-07-19 ENCOUNTER — OFFICE VISIT (OUTPATIENT)
Dept: NEUROSURGERY | Facility: CLINIC | Age: 77
End: 2022-07-19

## 2022-07-19 VITALS — BODY MASS INDEX: 15.72 KG/M2 | HEIGHT: 59 IN | WEIGHT: 78 LBS

## 2022-07-19 DIAGNOSIS — Z98.890 S/P VERTEBROPLASTY: ICD-10-CM

## 2022-07-19 DIAGNOSIS — S22.080G COMPRESSION FRACTURE OF T11 VERTEBRA WITH DELAYED HEALING, SUBSEQUENT ENCOUNTER: Primary | ICD-10-CM

## 2022-07-19 DIAGNOSIS — S22.050D CLOSED WEDGE COMPRESSION FRACTURE OF T6 VERTEBRA WITH ROUTINE HEALING, SUBSEQUENT ENCOUNTER: ICD-10-CM

## 2022-07-19 DIAGNOSIS — S22.080G COMPRESSION FRACTURE OF T12 VERTEBRA WITH DELAYED HEALING, SUBSEQUENT ENCOUNTER: ICD-10-CM

## 2022-07-19 PROCEDURE — 99213 OFFICE O/P EST LOW 20 MIN: CPT | Performed by: NURSE PRACTITIONER

## 2022-07-19 RX ORDER — TRAMADOL HYDROCHLORIDE 50 MG/1
50 TABLET ORAL 2 TIMES DAILY PRN
Qty: 60 TABLET | Refills: 0 | Status: SHIPPED | OUTPATIENT
Start: 2022-07-19 | End: 2022-10-19

## 2022-07-19 NOTE — PROGRESS NOTES
"Chief Complaint  Back Pain    Subjective          Nilda Julien who is a 76 y.o. year old female who presents to [facility] NEUR[facility]URGERY s/p T11 vertebroplasty. Chronic back pain.     Her pain is improving. She is no longer taking Norco. She is over three months since her surgery. She will have increased pain with prolonged activity, standing and walking. Tylenol is not helping. She would like to try something else for pain.               Review of Systems   Musculoskeletal: Positive for arthralgias and back pain.   All other systems reviewed and are negative.       Objective   Vital Signs:   Ht 149.9 cm (59\")   Wt 35.4 kg (78 lb)   BMI 15.75 kg/m²       Physical Exam  Vitals reviewed.   Constitutional:       Appearance: Normal appearance.   Neurological:      Mental Status: She is alert and oriented to person, place, and time.      Gait: Gait is intact.      Deep Tendon Reflexes: Strength normal.        Neurologic Exam     Mental Status   Oriented to person, place, and time.   Level of consciousness: alert    Motor Exam   Muscle bulk: normal  Overall muscle tone: normal    Strength   Strength 5/5 throughout.     Gait, Coordination, and Reflexes     Gait  Gait: normal       Result Review :                 Assessment and Plan    Diagnoses and all orders for this visit:    1. Compression fracture of T11 vertebra with delayed healing, subsequent encounter (Primary)  -     traMADol (ULTRAM) 50 MG tablet; Take 1 tablet by mouth 2 (Two) Times a Day As Needed for Moderate Pain .  Dispense: 60 tablet; Refill: 0    2. Closed wedge compression fracture of T6 vertebra with routine healing, subsequent encounter  -     traMADol (ULTRAM) 50 MG tablet; Take 1 tablet by mouth 2 (Two) Times a Day As Needed for Moderate Pain .  Dispense: 60 tablet; Refill: 0    3. Compression fracture of T12 vertebra with delayed healing, subsequent encounter    4. S/P vertebroplasty    Pt is doing well. She is having " chronic back pain symptoms. Will start Tramadol twice daily as needed. Neftaly reviewed. She will follow up in three months.       Follow Up   Return in about 3 months (around 10/19/2022).  Patient was given instructions and counseling regarding her condition or for health maintenance advice.     -Tramadol 50 mg twice daily as needed for pain  -Follow up in 3 months

## 2022-08-07 NOTE — PROGRESS NOTES
[facility]  Cardiology progress Note    Patient Name: Nilda Julien  : 1945    CHIEF COMPLAINT  Shortness of breath, coronary artery disease        Subjective   Subjective     HISTORY OF PRESENT ILLNESS    Nilda Julien is a 76 y.o. female with coronary artery s/p CABG.  No chest pain.  Shortness of breath stable.    REVIEW OF SYSTEMS    Constitutional:    No fever, no weight loss  Skin:     No rash  Otolaryngeal:    No difficulty swallowing  Cardiovascular:  No chest pain or shortness of breath  Pulmonary:    No cough, no sputum production    Personal History     Social History:    reports that she has been smoking cigarettes. She has been smoking about 0.50 packs per day. She has never used smokeless tobacco. She reports that she does not drink alcohol and does not use drugs.    Home Medications:  Current Outpatient Medications on File Prior to Visit   Medication Sig   • aspirin 81 MG EC tablet Take 81 mg by mouth Daily. Last dose 22 per Dr. Obando instructed   • atorvastatin (LIPITOR) 80 MG tablet Take 1 tablet by mouth every night at bedtime.   • cetirizine (zyrTEC) 10 MG tablet Take 1 tablet by mouth Daily.   • Cholecalciferol 10 MCG (400 UNIT) capsule Take 400 Units by mouth Daily.   • cilostazol (PLETAL) 100 MG tablet Take 1 tablet by mouth 2 (Two) Times a Day.   • diphenoxylate-atropine (LOMOTIL) 2.5-0.025 MG per tablet Take 2 tablets by mouth Daily As Needed for Diarrhea.   • fluticasone (FLONASE) 50 MCG/ACT nasal spray SPRAY TWO SPRAYS IN EACH NOSTRIL ONCE DAILY (Patient taking differently: 1 spray into the nostril(s) as directed by provider Daily As Needed.)   • omeprazole (priLOSEC) 20 MG capsule Take 20 mg by mouth Daily.   • ondansetron (ZOFRAN) 4 MG tablet Take 1 tablet by mouth Every 8 (Eight) Hours As Needed for Nausea or Vomiting.   • sertraline (Zoloft) 50 MG tablet Take 1 tablet by mouth Daily.   • traMADol (ULTRAM) 50 MG tablet Take 1 tablet by mouth 2 (Two) Times  a Day As Needed for Moderate Pain .   • carvedilol (COREG) 6.25 MG tablet Take 1 tablet by mouth 2 (Two) Times a Day With Meals for 90 days. (Patient taking differently: Take 6.25 mg by mouth 2 (Two) Times a Day With Meals. today)     No current facility-administered medications on file prior to visit.       Past Medical History:   Diagnosis Date   • Allergic rhinitis     Seasonal allergies   • Aortic stenosis, mild 10/18/2021   • Arthritis    • Back pain    • CAD s/p CABG 10/18/2021   • Diverticulitis    • Essential hypertension 06/07/2021   • Fatigue    • Heart attack (HCC) 10/18/2021   • Hemorrhoids 2013   • HFrEF (heart failure with reduced ejection fraction) 12/30/2021   • Hyperlipidemia LDL goal <70 06/07/2021   • Irritable bowel syndrome with diarrhea    • Memory loss     forgetfulness   • Microhematuria 01/13/2019   • Nephrolithiasis 01/13/2019   • Smoker 12/30/2021       Allergies:  No Known Allergies    Objective    Objective       Vitals:   Heart Rate:  [86] 86  BP: (124)/(60) 124/60  Body mass index is 14.74 kg/m².     PHYSICAL EXAM:    General Appearance:   · well developed  · well nourished  HENT:   · oropharynx moist  · lips not cyanotic  Neck:  · thyroid not enlarged  · supple  Respiratory:  · no respiratory distress  · normal breath sounds  · no rales  Cardiovascular:  · no jugular venous distention  · regular rhythm  · apical impulse normal  · S1 normal, S2 normal  · no S3, no S4   · no murmur  · no rub, no thrill  · carotid pulses normal; no bruit  · pedal pulses normal  · lower extremity edema: none    Skin:   · warm, dry  Psychiatric:  · judgement and insight appropriate  · normal mood and affect        Result Review:  I have personally reviewed the available results from  [x]  Laboratory  [x]  EKG  [x]  Cardiology  [x]  Medications  [x]  Old records  []  Other:     Procedures  Lab Results   Component Value Date    CHOL 108 10/12/2021     Lab Results   Component Value Date    TRIG 88 10/12/2021     TRIG 167 (H) 12/30/2020    TRIG 138 09/18/2020     Lab Results   Component Value Date    HDL 38 (L) 10/12/2021    HDL 37 (L) 12/30/2020    HDL 34 (L) 09/18/2020     Lab Results   Component Value Date    LDL 53 10/12/2021    LDL 93 12/30/2020     (H) 09/18/2020     Lab Results   Component Value Date    VLDL 17 10/12/2021    VLDL 33 12/30/2020    VLDL 28 09/18/2020     Results for orders placed in visit on 06/20/22    Adult Transthoracic Echo Complete W/ Cont if Necessary Per Protocol    Interpretation Summary  Mild diffuse hypokinesis with adequate left ventricular systolic function .  Fibrocalcific mitral and aortic valves.  Mild MR and mild TR.  Mild AI.     Impression/Plan:  1.  Coronary artery s/p CABG stable: Continue aspirin 81 mg a day.  No chest pain.  2.  Essential hypertension controlled: Continue carvedilol 6.25 mg twice daily.  Blood pressure controlled at home.  3.  Hyperlipidemia: Continue Lipitor 80 mg a day.  Lipid profile showed LDL of 53.  4.  Mild valvular heart disease/mild aortic regurgitation: Asymptomatic.           Dannie Obando MD   08/09/22   13:53 EDT

## 2022-08-09 ENCOUNTER — OFFICE VISIT (OUTPATIENT)
Dept: CARDIOLOGY | Facility: CLINIC | Age: 77
End: 2022-08-09

## 2022-08-09 VITALS
HEART RATE: 86 BPM | WEIGHT: 73 LBS | HEIGHT: 59 IN | BODY MASS INDEX: 14.72 KG/M2 | SYSTOLIC BLOOD PRESSURE: 124 MMHG | DIASTOLIC BLOOD PRESSURE: 60 MMHG

## 2022-08-09 DIAGNOSIS — Z95.1 HX OF CABG: ICD-10-CM

## 2022-08-09 DIAGNOSIS — I10 HYPERTENSION, ESSENTIAL: ICD-10-CM

## 2022-08-09 DIAGNOSIS — I25.10 CORONARY ARTERY DISEASE INVOLVING NATIVE CORONARY ARTERY OF NATIVE HEART WITHOUT ANGINA PECTORIS: Primary | ICD-10-CM

## 2022-08-09 DIAGNOSIS — E78.2 HYPERLIPEMIA, MIXED: ICD-10-CM

## 2022-08-09 PROCEDURE — 99214 OFFICE O/P EST MOD 30 MIN: CPT | Performed by: SPECIALIST

## 2022-08-16 ENCOUNTER — HOSPITAL ENCOUNTER (EMERGENCY)
Facility: HOSPITAL | Age: 77
Discharge: HOME | End: 2022-08-16
Attending: EMERGENCY MEDICINE | Admitting: EMERGENCY MEDICINE

## 2022-08-16 ENCOUNTER — APPOINTMENT (OUTPATIENT)
Dept: GENERAL RADIOLOGY | Facility: HOSPITAL | Age: 77
End: 2022-08-16

## 2022-08-16 ENCOUNTER — TELEPHONE (OUTPATIENT)
Dept: FAMILY MEDICINE CLINIC | Facility: CLINIC | Age: 77
End: 2022-08-16

## 2022-08-16 VITALS
TEMPERATURE: 97.9 F | RESPIRATION RATE: 18 BRPM | OXYGEN SATURATION: 99 % | SYSTOLIC BLOOD PRESSURE: 141 MMHG | HEIGHT: 59 IN | DIASTOLIC BLOOD PRESSURE: 76 MMHG | HEART RATE: 94 BPM | WEIGHT: 71.65 LBS | BODY MASS INDEX: 14.44 KG/M2

## 2022-08-16 DIAGNOSIS — M25.532 LEFT WRIST PAIN: Primary | ICD-10-CM

## 2022-08-16 DIAGNOSIS — M19.90 ARTHRITIS: ICD-10-CM

## 2022-08-16 LAB
ALBUMIN SERPL-MCNC: 4.1 G/DL (ref 3.5–5.2)
ALBUMIN/GLOB SERPL: 1.1 G/DL
ALP SERPL-CCNC: 164 U/L (ref 39–117)
ALT SERPL W P-5'-P-CCNC: 12 U/L (ref 1–33)
ANION GAP SERPL CALCULATED.3IONS-SCNC: 12.3 MMOL/L (ref 5–15)
AST SERPL-CCNC: 14 U/L (ref 1–32)
BASOPHILS # BLD AUTO: 0.05 10*3/MM3 (ref 0–0.2)
BASOPHILS NFR BLD AUTO: 0.5 % (ref 0–1.5)
BILIRUB SERPL-MCNC: 0.6 MG/DL (ref 0–1.2)
BUN SERPL-MCNC: 11 MG/DL (ref 8–23)
BUN/CREAT SERPL: 15.1 (ref 7–25)
CALCIUM SPEC-SCNC: 9.5 MG/DL (ref 8.6–10.5)
CHLORIDE SERPL-SCNC: 104 MMOL/L (ref 98–107)
CO2 SERPL-SCNC: 19.7 MMOL/L (ref 22–29)
CREAT SERPL-MCNC: 0.73 MG/DL (ref 0.57–1)
CRP SERPL-MCNC: 0.38 MG/DL (ref 0–0.5)
DEPRECATED RDW RBC AUTO: 46.7 FL (ref 37–54)
EGFRCR SERPLBLD CKD-EPI 2021: 85.4 ML/MIN/1.73
EOSINOPHIL # BLD AUTO: 0 10*3/MM3 (ref 0–0.4)
EOSINOPHIL NFR BLD AUTO: 0 % (ref 0.3–6.2)
ERYTHROCYTE [DISTWIDTH] IN BLOOD BY AUTOMATED COUNT: 14.3 % (ref 12.3–15.4)
ERYTHROCYTE [SEDIMENTATION RATE] IN BLOOD: 23 MM/HR (ref 0–30)
GLOBULIN UR ELPH-MCNC: 3.6 GM/DL
GLUCOSE SERPL-MCNC: 132 MG/DL (ref 65–99)
HCT VFR BLD AUTO: 43.5 % (ref 34–46.6)
HGB BLD-MCNC: 14.3 G/DL (ref 12–15.9)
IMM GRANULOCYTES # BLD AUTO: 0.04 10*3/MM3 (ref 0–0.05)
IMM GRANULOCYTES NFR BLD AUTO: 0.4 % (ref 0–0.5)
LYMPHOCYTES # BLD AUTO: 1.25 10*3/MM3 (ref 0.7–3.1)
LYMPHOCYTES NFR BLD AUTO: 11.8 % (ref 19.6–45.3)
MCH RBC QN AUTO: 29.4 PG (ref 26.6–33)
MCHC RBC AUTO-ENTMCNC: 32.9 G/DL (ref 31.5–35.7)
MCV RBC AUTO: 89.5 FL (ref 79–97)
MONOCYTES # BLD AUTO: 0.47 10*3/MM3 (ref 0.1–0.9)
MONOCYTES NFR BLD AUTO: 4.5 % (ref 5–12)
NEUTROPHILS NFR BLD AUTO: 8.75 10*3/MM3 (ref 1.7–7)
NEUTROPHILS NFR BLD AUTO: 82.8 % (ref 42.7–76)
NRBC BLD AUTO-RTO: 0 /100 WBC (ref 0–0.2)
PLATELET # BLD AUTO: 164 10*3/MM3 (ref 140–450)
PMV BLD AUTO: 10.7 FL (ref 6–12)
POTASSIUM SERPL-SCNC: 3.9 MMOL/L (ref 3.5–5.2)
PROT SERPL-MCNC: 7.7 G/DL (ref 6–8.5)
RBC # BLD AUTO: 4.86 10*6/MM3 (ref 3.77–5.28)
SODIUM SERPL-SCNC: 136 MMOL/L (ref 136–145)
WBC NRBC COR # BLD: 10.56 10*3/MM3 (ref 3.4–10.8)

## 2022-08-16 PROCEDURE — 86140 C-REACTIVE PROTEIN: CPT | Performed by: NURSE PRACTITIONER

## 2022-08-16 PROCEDURE — 80053 COMPREHEN METABOLIC PANEL: CPT | Performed by: NURSE PRACTITIONER

## 2022-08-16 PROCEDURE — 25010000002 DEXAMETHASONE PER 1 MG: Performed by: NURSE PRACTITIONER

## 2022-08-16 PROCEDURE — 73110 X-RAY EXAM OF WRIST: CPT

## 2022-08-16 PROCEDURE — 85652 RBC SED RATE AUTOMATED: CPT | Performed by: NURSE PRACTITIONER

## 2022-08-16 PROCEDURE — 99282 EMERGENCY DEPT VISIT SF MDM: CPT

## 2022-08-16 PROCEDURE — 36415 COLL VENOUS BLD VENIPUNCTURE: CPT | Performed by: NURSE PRACTITIONER

## 2022-08-16 PROCEDURE — 96372 THER/PROPH/DIAG INJ SC/IM: CPT

## 2022-08-16 PROCEDURE — 85025 COMPLETE CBC W/AUTO DIFF WBC: CPT | Performed by: NURSE PRACTITIONER

## 2022-08-16 RX ORDER — DEXAMETHASONE SODIUM PHOSPHATE 10 MG/ML
10 INJECTION INTRAMUSCULAR; INTRAVENOUS ONCE
Status: COMPLETED | OUTPATIENT
Start: 2022-08-16 | End: 2022-08-16

## 2022-08-16 RX ADMIN — DEXAMETHASONE SODIUM PHOSPHATE 10 MG: 10 INJECTION INTRAMUSCULAR; INTRAVENOUS at 18:28

## 2022-08-16 NOTE — TELEPHONE ENCOUNTER
Caller: Nilda Julien    Relationship to patient: Self    Best call back number: [phone]    Chief complaint: ARTHRITIS IN HAND WITH SWELLING    Type of visit: OFFICE VISIT    Requested date: 08/16/2022    Additional notes:  PATIENT IS REQUESTING AN APPOINTMENT AS SOON AS POSSIBLE FOR ARTHRITIS IN HAND WITH SWELLING. SHE IS REQUESTING CALL BACK WITH SOONER APPOINTMENT.

## 2022-08-16 NOTE — ED PROVIDER NOTES
--- PROVIDER IN TRIAGE NOTE ---    Time: 5:04 PM EDT    History of Present Illness:  Patient is a 76 y.o. year old female who presents to the emergency department with complaints of left lateral wrist pain, ulnar side.  Patient reports she has a history of arthritis and has pretty frequent flareups with her wrist pain.  She states that she comes here to get steroid injection that usually takes care of the pain.  She denies any known injury.  Denies fever.  Denies history of gout.  Chief Complaint   Patient presents with   • Wrist Pain          History provided by:  Patient   used: No    Wrist Pain  Location:  Left wrist, ulnar side  Severity:  Moderate  Onset quality:  Gradual  Timing:  Intermittent  Progression:  Waxing and waning  Chronicity:  Chronic  Context:  Arthritis flare  Relieved by:  Nothing  Worsened by:  Touch and movement  Associated symptoms: no abdominal pain, no chest pain, no cough, no diarrhea, no fatigue, no fever, no headaches, no myalgias, no nausea, no rash, no rhinorrhea, no shortness of breath, no sore throat and no vomiting          Patient Care Team  Primary Care Provider: Ralph Marcus APRN    Past Medical History:     No Known Allergies  Past Medical History:   Diagnosis Date   • Allergic rhinitis     Seasonal allergies   • Aortic stenosis, mild 10/18/2021   • Arthritis    • Back pain    • CAD s/p CABG 10/18/2021   • Diverticulitis    • Essential hypertension 06/07/2021   • Fatigue    • Heart attack (HCC) 10/18/2021   • Hemorrhoids 2013   • HFrEF (heart failure with reduced ejection fraction) 12/30/2021   • Hyperlipidemia LDL goal <70 06/07/2021   • Irritable bowel syndrome with diarrhea    • Memory loss     forgetfulness   • Microhematuria 01/13/2019   • Nephrolithiasis 01/13/2019   • Smoker 12/30/2021     Past Surgical History:   Procedure Laterality Date   • CARDIAC SURGERY  04/19/2013    4 way bypass   • COLONOSCOPY  05/23/2016, 2019   • CORONARY ARTERY BYPASS  GRAFT     • ENDOSCOPY     • HERNIA REPAIR     • OTHER SURGICAL HISTORY      cardiac stents, 2 stents placed   • VERTEBROPLASTY N/A 2022    Procedure: VERTEBROPLASTY, THORACIC 11;  Surgeon: Redd Cisneros MD;  Location: [facility] MAIN OR;  Service: Neurosurgery;  Laterality: N/A;     Family History   Problem Relation Age of Onset   • Hypertension Mother    • Heart attack Mother         MI   • Other Mother         Family History of Heart Disease   • Stroke Father         MINI   • Hypertension Father    • COPD Father         Black lung  age 88   • Other Father         Family history of Stroke       Home Medications:  Prior to Admission medications    Medication Sig Start Date End Date Taking? Authorizing Provider   aspirin 81 MG EC tablet Take 81 mg by mouth Daily. Last dose 22 per Dr. Obando instructed    ProviderAnny MD   atorvastatin (LIPITOR) 80 MG tablet Take 1 tablet by mouth every night at bedtime. 22   Ralph Marcus APRN   carvedilol (COREG) 6.25 MG tablet Take 1 tablet by mouth 2 (Two) Times a Day With Meals for 90 days.  Patient taking differently: Take 6.25 mg by mouth 2 (Two) Times a Day With Meals. today 10/18/21 1/16/22  Jevon Gonzlaez MD   cetirizine (zyrTEC) 10 MG tablet Take 1 tablet by mouth Daily. 22   Ralph Marcus APRN   Cholecalciferol 10 MCG (400 UNIT) capsule Take 400 Units by mouth Daily.    Provider, MD Anny   cilostazol (PLETAL) 100 MG tablet Take 1 tablet by mouth 2 (Two) Times a Day. 22   Selvin Vásquez MD   diphenoxylate-atropine (LOMOTIL) 2.5-0.025 MG per tablet Take 2 tablets by mouth Daily As Needed for Diarrhea. 22   Ralph Marcus APRN   fluticasone (FLONASE) 50 MCG/ACT nasal spray SPRAY TWO SPRAYS IN EACH NOSTRIL ONCE DAILY  Patient taking differently: 1 spray into the nostril(s) as directed by provider Daily As Needed. 21   Jevon Gonzalez MD   omeprazole (priLOSEC) 20 MG capsule Take 20 mg by mouth  "Daily.    Provider, MD Anny   ondansetron (ZOFRAN) 4 MG tablet Take 1 tablet by mouth Every 8 (Eight) Hours As Needed for Nausea or Vomiting. 7/15/22   Ralph Marcus APRN   sertraline (Zoloft) 50 MG tablet Take 1 tablet by mouth Daily. 4/28/22   Ralph Marcus APRN   traMADol (ULTRAM) 50 MG tablet Take 1 tablet by mouth 2 (Two) Times a Day As Needed for Moderate Pain . 7/19/22   Ann Scott APRN        Social History:   Social History     Tobacco Use   • Smoking status: Current Every Day Smoker     Packs/day: 0.50     Types: Cigarettes   • Smokeless tobacco: Never Used   • Tobacco comment: Current every day smoker, 0.5 PPD, smoked for 31 or more years   Vaping Use   • Vaping Use: Never used   Substance Use Topics   • Alcohol use: Never   • Drug use: Never       Review of Systems:  Review of Systems   Constitutional: Negative for chills, fatigue and fever.   HENT: Negative for rhinorrhea and sore throat.    Eyes: Negative.    Respiratory: Negative for cough, chest tightness and shortness of breath.    Cardiovascular: Negative for chest pain and palpitations.   Gastrointestinal: Negative for abdominal pain, diarrhea, nausea and vomiting.   Endocrine: Negative.    Genitourinary: Negative for decreased urine volume, difficulty urinating, flank pain, frequency, hematuria and urgency.   Musculoskeletal: Positive for arthralgias (Left wrist pain). Negative for myalgias.   Skin: Negative for color change, rash and wound.   Allergic/Immunologic: Negative.    Neurological: Negative for dizziness, syncope, weakness, light-headedness and headaches.   Hematological: Bruises/bleeds easily.   Psychiatric/Behavioral: Negative for agitation and confusion. The patient is not nervous/anxious.         Physical Exam:  /76   Pulse 94   Temp 97.9 °F (36.6 °C)   Resp 18   Ht 149.9 cm (59\")   Wt 32.5 kg (71 lb 10.4 oz)   SpO2 99%   BMI 14.47 kg/m²     Physical Exam  Vitals and nursing note " reviewed.   Constitutional:       General: She is not in acute distress.     Appearance: Normal appearance. She is not ill-appearing.   HENT:      Head: Normocephalic and atraumatic.      Nose: Nose normal.   Eyes:      Extraocular Movements: Extraocular movements intact.      Pupils: Pupils are equal, round, and reactive to light.   Cardiovascular:      Rate and Rhythm: Normal rate and regular rhythm.      Pulses: Normal pulses.      Heart sounds: Normal heart sounds. No murmur heard.    No gallop.   Pulmonary:      Effort: Pulmonary effort is normal. No respiratory distress.      Breath sounds: Normal breath sounds. No wheezing, rhonchi or rales.   Chest:      Chest wall: No tenderness.   Abdominal:      General: Bowel sounds are normal. There is no distension.      Palpations: Abdomen is soft.      Tenderness: There is no abdominal tenderness.   Musculoskeletal:         General: Tenderness (Left wrist pain ulnar side.  Joint tenderness.  There is erythema that is present at site of the joint.) present. No swelling, deformity or signs of injury. Normal range of motion.      Cervical back: Normal range of motion and neck supple.   Skin:     General: Skin is warm and dry.      Capillary Refill: Capillary refill takes less than 2 seconds.      Findings: Bruising (There is some mild erythema and bruising noted to the left lateral wrist) present. No erythema or rash.   Neurological:      Mental Status: She is alert and oriented to person, place, and time.      Motor: No weakness.   Psychiatric:         Mood and Affect: Mood normal.         Behavior: Behavior normal.                Medications in the Emergency Department:  Medications   dexamethasone (DECADRON) injection 10 mg (10 mg Intramuscular Given 8/16/22 1828)        Labs  Lab Results (last 24 hours)     Procedure Component Value Units Date/Time    CBC & Differential [[id number]]  (Abnormal) Collected: 08/16/22 1711    Specimen: Blood Updated: 08/16/22 1217     Narrative:      The following orders were created for panel order CBC & Differential.  Procedure                               Abnormality         Status                     ---------                               -----------         ------                     CBC Auto Differential[[id number]]        Abnormal            Final result                 Please view results for these tests on the individual orders.    Comprehensive Metabolic Panel [[id number]]  (Abnormal) Collected: 08/16/22 1711    Specimen: Blood Updated: 08/16/22 1800     Glucose 132 mg/dL      BUN 11 mg/dL      Creatinine 0.73 mg/dL      Sodium 136 mmol/L      Potassium 3.9 mmol/L      Chloride 104 mmol/L      CO2 19.7 mmol/L      Calcium 9.5 mg/dL      Total Protein 7.7 g/dL      Albumin 4.10 g/dL      ALT (SGPT) 12 U/L      AST (SGOT) 14 U/L      Alkaline Phosphatase 164 U/L      Total Bilirubin 0.6 mg/dL      Globulin 3.6 gm/dL      A/G Ratio 1.1 g/dL      BUN/Creatinine Ratio 15.1     Anion Gap 12.3 mmol/L      eGFR 85.4 mL/min/1.73      Comment: National Kidney Foundation and American Society of Nephrology (ASN) Task Force recommended calculation based on the Chronic Kidney Disease Epidemiology Collaboration (CKD-EPI) equation refit without adjustment for race.       Narrative:      GFR Normal >60  Chronic Kidney Disease <60  Kidney Failure <15      C-reactive Protein [[id number]]  (Normal) Collected: 08/16/22 1711    Specimen: Blood Updated: 08/16/22 1800     C-Reactive Protein 0.38 mg/dL     Sedimentation Rate [[id number]]  (Normal) Collected: 08/16/22 1711    Specimen: Blood Updated: 08/16/22 1815     Sed Rate 23 mm/hr     CBC Auto Differential [[id number]]  (Abnormal) Collected: 08/16/22 1711    Specimen: Blood Updated: 08/16/22 1743     WBC 10.56 10*3/mm3      RBC 4.86 10*6/mm3      Hemoglobin 14.3 g/dL      Hematocrit 43.5 %      MCV 89.5 fL      MCH 29.4 pg      MCHC 32.9 g/dL      RDW 14.3 %      RDW-SD 46.7 fl      MPV 10.7 fL      Platelets  164 10*3/mm3      Neutrophil % 82.8 %      Lymphocyte % 11.8 %      Monocyte % 4.5 %      Eosinophil % 0.0 %      Basophil % 0.5 %      Immature Grans % 0.4 %      Neutrophils, Absolute 8.75 10*3/mm3      Lymphocytes, Absolute 1.25 10*3/mm3      Monocytes, Absolute 0.47 10*3/mm3      Eosinophils, Absolute 0.00 10*3/mm3      Basophils, Absolute 0.05 10*3/mm3      Immature Grans, Absolute 0.04 10*3/mm3      nRBC 0.0 /100 WBC            Imaging:  XR Wrist 3+ View Left    Result Date: 8/16/2022  PROCEDURE: XR WRIST 3+ VW LEFT  COMPARISON: [facility], CR, WRIST >OR= 3V LT, 6/08/2019, 17:41.  INDICATIONS: pain  FINDINGS:  BONES: Normal.  No significant arthropathy or acute abnormality.  SOFT TISSUES: Negative.  No visible soft tissue swelling.  EFFUSION: None visible.  OTHER: Negative.        No acute disease.       FREDRICK MICHELLE MD       Electronically Signed and Approved By: FREDRICK MICHELLE MD on 8/16/2022 at 19:33               Procedures:  Procedures      Medical Decision Making:  MDM  Number of Diagnoses or Management Options  Arthritis  Left wrist pain  Diagnosis management comments: XR shows NAF.  I feel the patient is having an arthritic flareup no concern for septic arthritis.  She was given a steroid shot here.  I feel she is safe to discharge home at this time.       Amount and/or Complexity of Data Reviewed  Clinical lab tests: reviewed and ordered  Tests in the radiology section of CPT®: reviewed and ordered    Risk of Complications, Morbidity, and/or Mortality  Presenting problems: moderate  Diagnostic procedures: moderate  Management options: moderate    Patient Progress  Patient progress: stable       Progress  ED Course as of 08/16/22 1944   Tue Aug 16, 2022   1704 --- PROVIDER IN TRIAGE NOTE ---    The patient was seen and evaluated by essence Lopez in triage. Orders were placed and the patient is currently awaiting disposition. [KS]   1838 CBC normal.  CMP is overall  unremarkable.  Sed rate and C-reactive protein are normal.    X-ray of the left wrist results are pending. [AR]      ED Course User Index  [AR] Ashely Cruz APRN  [KS] Sonia Lopez APRN       This patient was evaluated in triage. Orders were placed. Patient is currently awaiting final disposition.       Final diagnoses:   Left wrist pain   Arthritis       ED Disposition     ED Disposition   Discharge    Condition   Stable    Comment   --               This medical record created using voice recognition software.           Ashely Cruz APRN  08/16/22 1934       Ashely Cruz APRN  08/16/22 1935       Sonia Lopez APRN  08/16/22 1944

## 2022-08-16 NOTE — DISCHARGE INSTRUCTIONS
Take your current home medications.  Follow-up with your primary care physician with recurrent and ongoing issues.  Return to the ER with any new or worsening symptoms such as fever greater than 100.4, severe redness that spreads up your arm, uncontrolled pain or any new injuries.  Your labs today were normal.  Your x-ray of your wrist was normal.  There is no evidence of fracture.

## 2022-08-17 NOTE — TELEPHONE ENCOUNTER
Patient was seen in there ER where she was given a steroid injection. Patient states she is feeling much better.

## 2022-08-24 ENCOUNTER — OFFICE VISIT (OUTPATIENT)
Dept: FAMILY MEDICINE CLINIC | Facility: CLINIC | Age: 77
End: 2022-08-24

## 2022-08-24 VITALS
WEIGHT: 72.6 LBS | SYSTOLIC BLOOD PRESSURE: 110 MMHG | HEART RATE: 81 BPM | DIASTOLIC BLOOD PRESSURE: 74 MMHG | TEMPERATURE: 98.6 F | RESPIRATION RATE: 16 BRPM | BODY MASS INDEX: 14.64 KG/M2 | HEIGHT: 59 IN | OXYGEN SATURATION: 98 %

## 2022-08-24 DIAGNOSIS — M54.41 CHRONIC BILATERAL LOW BACK PAIN WITH BILATERAL SCIATICA: ICD-10-CM

## 2022-08-24 DIAGNOSIS — R91.1 PULMONARY NODULE LESS THAN 1 CM IN DIAMETER WITH MODERATE TO HIGH RISK FOR MALIGNANT NEOPLASM: ICD-10-CM

## 2022-08-24 DIAGNOSIS — Z51.81 MEDICATION MONITORING ENCOUNTER: ICD-10-CM

## 2022-08-24 DIAGNOSIS — Z91.89 PULMONARY NODULE LESS THAN 1 CM IN DIAMETER WITH MODERATE TO HIGH RISK FOR MALIGNANT NEOPLASM: ICD-10-CM

## 2022-08-24 DIAGNOSIS — K52.9 CHRONIC DIARRHEA: ICD-10-CM

## 2022-08-24 DIAGNOSIS — M81.0 OSTEOPOROSIS WITHOUT CURRENT PATHOLOGICAL FRACTURE, UNSPECIFIED OSTEOPOROSIS TYPE: ICD-10-CM

## 2022-08-24 DIAGNOSIS — Z78.0 MENOPAUSE: Primary | ICD-10-CM

## 2022-08-24 DIAGNOSIS — G89.29 CHRONIC BILATERAL LOW BACK PAIN WITH BILATERAL SCIATICA: ICD-10-CM

## 2022-08-24 DIAGNOSIS — M54.42 CHRONIC BILATERAL LOW BACK PAIN WITH BILATERAL SCIATICA: ICD-10-CM

## 2022-08-24 DIAGNOSIS — I10 ESSENTIAL HYPERTENSION: ICD-10-CM

## 2022-08-24 DIAGNOSIS — Z23 NEED FOR SHINGLES VACCINE: ICD-10-CM

## 2022-08-24 PROCEDURE — 80305 DRUG TEST PRSMV DIR OPT OBS: CPT | Performed by: NURSE PRACTITIONER

## 2022-08-24 PROCEDURE — 99214 OFFICE O/P EST MOD 30 MIN: CPT | Performed by: NURSE PRACTITIONER

## 2022-08-24 RX ORDER — LIDOCAINE 50 MG/G
1 PATCH TOPICAL EVERY 24 HOURS
Qty: 30 PATCH | Refills: 1 | Status: SHIPPED | OUTPATIENT
Start: 2022-08-24

## 2022-08-24 NOTE — PROGRESS NOTES
Chief Complaint  Hypertension, Depression, Back Pain (On tramadol, not helping), Diarrhea (On Lomotil doesn't need a refill at this time.), and Hand Pain (Had steroid shot, not helping as well as it did the last time.)    Hermelinda Julien presents to [facility]  Transfer of care from Dr. Gonzalez.    Diarrhea:  Has been on lomotil states hasn't had any workup that she remembers for it but has had even before her back surgery.   Back pain:Thought Hydrocodone was messing with mind but no change when stopped.   Tramadol no longer helping.  Hand and wrist on the left wrist is pain.      Having some discharge and mucus so  Would like tested for UTI.  Back always hurts so not sure of cause.    Back pain:  States tramadol no longer helping.  States that     Hypertension  Pertinent negatives include no chest pain, palpitations or shortness of breath.   DepressionPatient is not experiencing: palpitations and shortness of breath.    Back Pain  Pertinent negatives include no chest pain, fever, numbness or weakness.   Diarrhea   Pertinent negatives include no coughing or fever.   Hand Pain   Pertinent negatives include no chest pain or numbness.         Past History:    Medical History: has a past medical history of Allergic rhinitis, Aortic stenosis, mild (10/18/2021), Arthritis, Back pain, CAD s/p CABG (10/18/2021), Diverticulitis, Essential hypertension (06/07/2021), Fatigue, Heart attack (HCC) (10/18/2021), Hemorrhoids (2013), HFrEF (heart failure with reduced ejection fraction) (12/30/2021), Hyperlipidemia LDL goal <70 (06/07/2021), Irritable bowel syndrome with diarrhea, Memory loss, Microhematuria (01/13/2019), Nephrolithiasis (01/13/2019), and Smoker (12/30/2021).     Surgical History: has a past surgical history that includes Other surgical history (2001); Colonoscopy (05/23/2016, 2019); Esophagogastroduodenoscopy (2019); Hernia repair; Cardiac surgery (04/19/2013);  Coronary artery bypass graft (2013); and Vertebroplasty (N/A, 4/1/2022).     Family History: family history includes COPD in her father; Heart attack in her mother; Hypertension in her father and mother; Other in her father and mother; Stroke in her father.     Social History: reports that she has been smoking cigarettes. She has been smoking about 0.50 packs per day. She has never used smokeless tobacco. She reports that she does not drink alcohol and does not use drugs.    Allergies: Patient has no known allergies.          Current Outpatient Medications:   •  aspirin 81 MG EC tablet, Take 81 mg by mouth Daily. Last dose 03/16/22 per Dr. Obando instructed, Disp: , Rfl:   •  atorvastatin (LIPITOR) 80 MG tablet, Take 1 tablet by mouth every night at bedtime., Disp: 90 tablet, Rfl: 1  •  cetirizine (zyrTEC) 10 MG tablet, Take 1 tablet by mouth Daily., Disp: 90 tablet, Rfl: 1  •  Cholecalciferol 10 MCG (400 UNIT) capsule, Take 400 Units by mouth Daily., Disp: , Rfl:   •  cilostazol (PLETAL) 100 MG tablet, Take 1 tablet by mouth 2 (Two) Times a Day., Disp: 180 tablet, Rfl: 3  •  fluticasone (FLONASE) 50 MCG/ACT nasal spray, SPRAY TWO SPRAYS IN EACH NOSTRIL ONCE DAILY (Patient taking differently: 1 spray into the nostril(s) as directed by provider Daily As Needed.), Disp: 16 mL, Rfl: 1  •  omeprazole (priLOSEC) 20 MG capsule, Take 20 mg by mouth Daily., Disp: , Rfl:   •  ondansetron (ZOFRAN) 4 MG tablet, Take 1 tablet by mouth Every 8 (Eight) Hours As Needed for Nausea or Vomiting., Disp: 90 tablet, Rfl: 1  •  sertraline (Zoloft) 50 MG tablet, Take 1 tablet by mouth Daily., Disp: 90 tablet, Rfl: 1  •  traMADol (ULTRAM) 50 MG tablet, Take 1 tablet by mouth 2 (Two) Times a Day As Needed for Moderate Pain ., Disp: 60 tablet, Rfl: 0  •  alendronate (Fosamax) 70 MG tablet, Take 1 tablet by mouth Every 7 (Seven) Days., Disp: 13 tablet, Rfl: 1  •  carvedilol (COREG) 6.25 MG tablet, Take 1 tablet by mouth 2 (Two) Times a Day  "With Meals for 90 days. (Patient taking differently: Take 6.25 mg by mouth 2 (Two) Times a Day With Meals. today), Disp: 180 tablet, Rfl: 1  •  diphenoxylate-atropine (LOMOTIL) 2.5-0.025 MG per tablet, TAKE TWO TABLETS BY MOUTH DAILY AS NEEDED FOR DARRHEA, Disp: 60 tablet, Rfl: 0  •  lidocaine (Lidoderm) 5 %, Place 1 patch on the skin as directed by provider Daily. Remove & Discard patch within 12 hours or as directed by MD, Disp: 30 patch, Rfl: 1    There are no discontinued medications.      Review of Systems   Constitutional: Negative for fever.   Respiratory: Negative for cough and shortness of breath.    Cardiovascular: Negative for chest pain, palpitations and leg swelling.   Gastrointestinal: Positive for diarrhea.   Musculoskeletal: Positive for back pain.   Neurological: Negative for dizziness, weakness, numbness and headache.        Objective         Vitals:    08/24/22 1257   BP: 110/74   BP Location: Right arm   Patient Position: Sitting   Cuff Size: Adult   Pulse: 81   Resp: 16   Temp: 98.6 °F (37 °C)   TempSrc: Infrared   SpO2: 98%   Weight: 32.9 kg (72 lb 9.6 oz)   Height: 149.9 cm (59.02\")     Body mass index is 14.66 kg/m².         Physical Exam  Vitals reviewed.   Constitutional:       Appearance: Normal appearance. She is well-developed.   HENT:      Head: Normocephalic and atraumatic.      Mouth/Throat:      Pharynx: No oropharyngeal exudate.   Eyes:      Conjunctiva/sclera: Conjunctivae normal.      Pupils: Pupils are equal, round, and reactive to light.   Cardiovascular:      Rate and Rhythm: Normal rate and regular rhythm.      Heart sounds: Normal heart sounds. No murmur heard.    No friction rub. No gallop.   Pulmonary:      Effort: Pulmonary effort is normal.      Breath sounds: Normal breath sounds. No wheezing or rhonchi.   Skin:     General: Skin is warm and dry.   Neurological:      Mental Status: She is alert and oriented to person, place, and time.   Psychiatric:         Mood and " Affect: Mood and affect normal.         Behavior: Behavior normal.         Thought Content: Thought content normal.         Judgment: Judgment normal.             Result Review :               Assessment and Plan     Diagnoses and all orders for this visit:    1. Menopause (Primary)  -     DEXA Bone Density Axial; Future    2. Pulmonary nodule less than 1 cm in diameter with moderate to high risk for malignant neoplasm  -     CT Chest Without Contrast; Future    3. Essential hypertension  -     Lipid Panel With / Chol / HDL Ratio; Future  -     Comprehensive Metabolic Panel; Future  -     Urinalysis With Culture If Indicated -; Future  -     CBC (No Diff); Future    4. Need for shingles vaccine  -     Zoster Vac Recomb Adjuvanted 50 MCG/0.5ML reconstituted suspension; Inject 0.5 mL into the appropriate muscle as directed by prescriber 1 (One) Time for 1 dose.  Dispense: 1 each; Refill: 1    5. Chronic bilateral low back pain with bilateral sciatica  -     Ambulatory Referral to Pain Management  -     lidocaine (Lidoderm) 5 %; Place 1 patch on the skin as directed by provider Daily. Remove & Discard patch within 12 hours or as directed by MD  Dispense: 30 patch; Refill: 1    6. Chronic diarrhea  Comments:  will wait but if change of medication doesn't stop will send to gastro.  Orders:  -     POC Urine Drug Screen [facility]    7. Medication monitoring encounter  -     POC Urine Drug Screen [facility]    8. Osteoporosis without current pathological fracture, unspecified osteoporosis type  -     alendronate (Fosamax) 70 MG tablet; Take 1 tablet by mouth Every 7 (Seven) Days.  Dispense: 13 tablet; Refill: 1              Follow Up     Return in about 6 months (around 2/24/2023).    Patient was given instructions and counseling regarding her condition or for health maintenance advice. Please see specific information pulled into the AVS if appropriate.

## 2022-09-12 DIAGNOSIS — R19.7 DIARRHEA, UNSPECIFIED TYPE: ICD-10-CM

## 2022-09-12 RX ORDER — DIPHENOXYLATE HYDROCHLORIDE AND ATROPINE SULFATE 2.5; .025 MG/1; MG/1
TABLET ORAL
Qty: 60 TABLET | Refills: 0 | Status: SHIPPED | OUTPATIENT
Start: 2022-09-12 | End: 2022-10-13

## 2022-09-13 ENCOUNTER — HOSPITAL ENCOUNTER (OUTPATIENT)
Dept: BONE DENSITY | Facility: HOSPITAL | Age: 77
Discharge: HOME | End: 2022-09-13
Admitting: NURSE PRACTITIONER

## 2022-09-13 DIAGNOSIS — Z78.0 MENOPAUSE: ICD-10-CM

## 2022-09-13 PROCEDURE — 77080 DXA BONE DENSITY AXIAL: CPT

## 2022-09-13 RX ORDER — ALENDRONATE SODIUM 70 MG/1
70 TABLET ORAL
Qty: 13 TABLET | Refills: 1 | Status: SHIPPED | OUTPATIENT
Start: 2022-09-13

## 2022-09-23 ENCOUNTER — APPOINTMENT (OUTPATIENT)
Dept: CT IMAGING | Facility: HOSPITAL | Age: 77
End: 2022-09-23

## 2022-10-13 DIAGNOSIS — R19.7 DIARRHEA, UNSPECIFIED TYPE: ICD-10-CM

## 2022-10-13 RX ORDER — DIPHENOXYLATE HYDROCHLORIDE AND ATROPINE SULFATE 2.5; .025 MG/1; MG/1
TABLET ORAL
Qty: 60 TABLET | Refills: 1 | Status: SHIPPED | OUTPATIENT
Start: 2022-10-13 | End: 2022-12-02

## 2022-10-17 ENCOUNTER — HOSPITAL ENCOUNTER (OUTPATIENT)
Dept: CT IMAGING | Facility: HOSPITAL | Age: 77
Discharge: HOME | End: 2022-10-17
Admitting: NURSE PRACTITIONER

## 2022-10-17 DIAGNOSIS — Z91.89 PULMONARY NODULE LESS THAN 1 CM IN DIAMETER WITH MODERATE TO HIGH RISK FOR MALIGNANT NEOPLASM: ICD-10-CM

## 2022-10-17 DIAGNOSIS — R91.1 PULMONARY NODULE LESS THAN 1 CM IN DIAMETER WITH MODERATE TO HIGH RISK FOR MALIGNANT NEOPLASM: ICD-10-CM

## 2022-10-17 DIAGNOSIS — R91.8 PULMONARY NODULES: Primary | ICD-10-CM

## 2022-10-17 PROCEDURE — 71250 CT THORAX DX C-: CPT

## 2022-10-19 ENCOUNTER — OFFICE VISIT (OUTPATIENT)
Dept: NEUROSURGERY | Facility: CLINIC | Age: 77
End: 2022-10-19

## 2022-10-19 VITALS
WEIGHT: 77 LBS | HEART RATE: 88 BPM | HEIGHT: 59 IN | DIASTOLIC BLOOD PRESSURE: 59 MMHG | SYSTOLIC BLOOD PRESSURE: 124 MMHG | BODY MASS INDEX: 15.52 KG/M2

## 2022-10-19 DIAGNOSIS — S22.080G COMPRESSION FRACTURE OF T12 VERTEBRA WITH DELAYED HEALING, SUBSEQUENT ENCOUNTER: ICD-10-CM

## 2022-10-19 DIAGNOSIS — S22.080G COMPRESSION FRACTURE OF T11 VERTEBRA WITH DELAYED HEALING, SUBSEQUENT ENCOUNTER: Primary | ICD-10-CM

## 2022-10-19 DIAGNOSIS — M80.08XG FRACTURE OF VERTEBRA DUE TO OSTEOPOROSIS WITH DELAYED HEALING, SUBSEQUENT ENCOUNTER: ICD-10-CM

## 2022-10-19 PROCEDURE — 99213 OFFICE O/P EST LOW 20 MIN: CPT | Performed by: NURSE PRACTITIONER

## 2022-10-19 RX ORDER — CARVEDILOL 6.25 MG/1
TABLET ORAL EVERY 12 HOURS SCHEDULED
COMMUNITY
End: 2023-02-09 | Stop reason: SDUPTHER

## 2022-10-19 NOTE — PROGRESS NOTES
"Chief Complaint  Follow-up    Subjective          Nilda Julien who is a 77 y.o. year old female who presents to [facility] for follow up of back pain.     History of Present Illness  Pt with history of multiple vertebral fractures, with prior T11 vertebroplasty 6 months ago. She has had persistent back pain. Since her last visit she has established care with pain management. She has started Norco 5/325 mg twice daily as needed. She is typically taking her pain medicine once daily. The Norco helps her though does not last long. She does feel that this helps more than the Tramadol did and she is no longer taking Tramadol.    She rates her pain 7/10 today. She has been doing more around the house, cleaning the house. This increases her pain. She has an elastic brace that she purchased though does not wear as it is too big for her.     She recently had a bone density study demonstrating osteoporosis in the lumbar spine and hips. She was started on alendronate 70 mg weekly. This has been upsetting her stomach some.           Review of Systems   Musculoskeletal: Positive for arthralgias, back pain and gait problem.   Neurological: Positive for weakness.   All other systems reviewed and are negative.       Objective   Vital Signs:   /59 (BP Location: Left arm, Patient Position: Sitting, Cuff Size: Adult)   Pulse 88   Ht 149.9 cm (59.02\")   Wt 34.9 kg (77 lb)   BMI 15.54 kg/m²       Physical Exam  Vitals reviewed.   Constitutional:       Appearance: Normal appearance.   Musculoskeletal:      Thoracic back: Tenderness present. Scoliosis (kyphosis) present.      Lumbar back: Tenderness present.   Neurological:      Mental Status: She is alert and oriented to person, place, and time.      Motor: Motor strength is normal.      Gait: Gait is intact.      Deep Tendon Reflexes:      Reflex Scores:       Patellar reflexes are 2+ on the right side and 2+ on the left side.       " Achilles reflexes are 2+ on the right side and 2+ on the left side.       Neurologic Exam     Mental Status   Oriented to person, place, and time.   Level of consciousness: alert    Motor Exam   Muscle bulk: normal  Overall muscle tone: normal    Strength   Strength 5/5 throughout.     Sensory Exam   Light touch normal.     Gait, Coordination, and Reflexes     Gait  Gait: normal    Reflexes   Right patellar: 2+  Left patellar: 2+  Right achilles: 2+  Left achilles: 2+  Right ankle clonus: absent  Left ankle clonus: absent       Result Review :            DEXA Bone Density 9/13/22  IMPRESSION:  Osteoporosis in the lumbar spine and hips.  Bone density in the femoral necks has   decreased by 17.7 percent compared to 3/2/2020.  Fracture risk is high.          Assessment and Plan    Diagnoses and all orders for this visit:    1. Compression fracture of T11 vertebra with delayed healing, subsequent encounter (Primary)    2. Compression fracture of T12 vertebra with delayed healing, subsequent encounter    3. Fracture of vertebra due to osteoporosis with delayed healing, subsequent encounter    Pt has established care with pain management for medication management. We discussed taking her pain medication as prescribed for improved pain control. She sees pain management tomorrow and will discuss with them.     She can follow up in our office as needed. She is high risk for fracture due to osteoporosis. She will call if she has any new symptoms are worsening pain.       Follow Up   Return if symptoms worsen or fail to improve.  Patient was given instructions and counseling regarding her condition or for health maintenance advice.

## 2022-11-09 RX ORDER — CETIRIZINE HYDROCHLORIDE 10 MG/1
TABLET ORAL
Qty: 90 TABLET | Refills: 1 | Status: SHIPPED | OUTPATIENT
Start: 2022-11-09

## 2022-11-14 ENCOUNTER — TELEPHONE (OUTPATIENT)
Dept: FAMILY MEDICINE CLINIC | Facility: CLINIC | Age: 77
End: 2022-11-14

## 2022-11-14 NOTE — TELEPHONE ENCOUNTER
Caller: Nilda Julien    Relationship: Self    Best call back number: [phone]    What form or medical record are you requesting: PAPER FOR WALK-UP FOR GARBAGE     Who is requesting this form or medical record from you: [employer]ILLING [employer]ICE    How would you like to receive the form or medical records (pick-up, mail, fax): FAX  If fax, what is the fax number: PATIENT IS UNSURE OF FAX NUMBER, BUT THEIR PHONE NUMBER [phone]    Timeframe paperwork needed: AS SOON AS POSSIBLE    Additional notes:   PATIENT HAS FRACTURES IN BACK AND WOULD LIKE A PAPER FOR [employer] BILLING SERVICE TO BE ABLE TO WALK-UP TO COLLECT PATIENT'S GARBAGE.

## 2022-12-02 DIAGNOSIS — R11.0 NAUSEA: ICD-10-CM

## 2022-12-02 DIAGNOSIS — R19.7 DIARRHEA, UNSPECIFIED TYPE: ICD-10-CM

## 2022-12-02 RX ORDER — DIPHENOXYLATE HYDROCHLORIDE AND ATROPINE SULFATE 2.5; .025 MG/1; MG/1
TABLET ORAL
Qty: 60 TABLET | Refills: 0 | Status: SHIPPED | OUTPATIENT
Start: 2022-12-02 | End: 2023-01-04

## 2022-12-02 RX ORDER — ONDANSETRON 4 MG/1
TABLET, FILM COATED ORAL
Qty: 90 TABLET | Refills: 1 | Status: SHIPPED | OUTPATIENT
Start: 2022-12-02 | End: 2023-04-03

## 2023-01-04 DIAGNOSIS — R19.7 DIARRHEA, UNSPECIFIED TYPE: ICD-10-CM

## 2023-01-04 RX ORDER — DIPHENOXYLATE HYDROCHLORIDE AND ATROPINE SULFATE 2.5; .025 MG/1; MG/1
TABLET ORAL
Qty: 60 TABLET | Refills: 0 | Status: SHIPPED | OUTPATIENT
Start: 2023-01-04 | End: 2023-02-06

## 2023-02-06 DIAGNOSIS — R19.7 DIARRHEA, UNSPECIFIED TYPE: ICD-10-CM

## 2023-02-06 RX ORDER — DIPHENOXYLATE HYDROCHLORIDE AND ATROPINE SULFATE 2.5; .025 MG/1; MG/1
TABLET ORAL
Qty: 60 TABLET | Refills: 0 | Status: SHIPPED | OUTPATIENT
Start: 2023-02-06 | End: 2023-04-03

## 2023-02-09 ENCOUNTER — OFFICE VISIT (OUTPATIENT)
Dept: CARDIOLOGY | Facility: CLINIC | Age: 78
End: 2023-02-09
Payer: MEDICARE

## 2023-02-09 VITALS
WEIGHT: 77 LBS | SYSTOLIC BLOOD PRESSURE: 123 MMHG | HEIGHT: 59 IN | DIASTOLIC BLOOD PRESSURE: 91 MMHG | BODY MASS INDEX: 15.52 KG/M2 | HEART RATE: 53 BPM

## 2023-02-09 DIAGNOSIS — I35.0 AORTIC STENOSIS, MILD: ICD-10-CM

## 2023-02-09 DIAGNOSIS — F17.200 SMOKER: ICD-10-CM

## 2023-02-09 DIAGNOSIS — E78.5 HYPERLIPIDEMIA LDL GOAL <70: ICD-10-CM

## 2023-02-09 DIAGNOSIS — I50.32 CHRONIC HEART FAILURE WITH PRESERVED EJECTION FRACTION (HFPEF): ICD-10-CM

## 2023-02-09 DIAGNOSIS — I25.10 CORONARY ARTERY DISEASE INVOLVING NATIVE CORONARY ARTERY OF NATIVE HEART WITHOUT ANGINA PECTORIS: Primary | ICD-10-CM

## 2023-02-09 DIAGNOSIS — I10 ESSENTIAL HYPERTENSION: ICD-10-CM

## 2023-02-09 PROCEDURE — 99214 OFFICE O/P EST MOD 30 MIN: CPT | Performed by: NURSE PRACTITIONER

## 2023-02-09 RX ORDER — CARVEDILOL 6.25 MG/1
6.25 TABLET ORAL 2 TIMES DAILY WITH MEALS
Qty: 180 TABLET | Refills: 1 | Status: SHIPPED | OUTPATIENT
Start: 2023-02-09

## 2023-02-09 NOTE — PROGRESS NOTES
Chief Complaint  Follow-up, Hyperlipidemia, Hypertension, Aortic Stenosis, Coronary Artery Disease, and Congestive Heart Failure    Subjective            History of Present Illness  Nilda Julien is a 77-year-old female patient who presents to the office today for follow up. She has CAD with prior CABG, chronic HFpEF, mild aortic stenosis, hypertension, hyperlipidemia, and is a smoker. She reports compliance with medications. She denies any chest pain, shortness of breath, lightheadedness/dizziness, palpitations, or edema.    PMH  Past Medical History:   Diagnosis Date   • Allergic rhinitis     Seasonal allergies   • Aortic stenosis, mild 10/18/2021   • Arthritis    • Back pain    • CAD s/p CABG 10/18/2021   • Diverticulitis    • Essential hypertension 06/07/2021   • Fatigue    • Heart attack (HCC) 10/18/2021   • Hemorrhoids 2013   • HFrEF (heart failure with reduced ejection fraction) 12/30/2021   • Hyperlipidemia LDL goal <70 06/07/2021   • Irritable bowel syndrome with diarrhea    • Memory loss     forgetfulness   • Microhematuria 01/13/2019   • Nephrolithiasis 01/13/2019   • Smoker 12/30/2021         ALLERGY  No Known Allergies       SURGICALHX  Past Surgical History:   Procedure Laterality Date   • CARDIAC SURGERY  04/19/2013    4 way bypass   • COLONOSCOPY  05/23/2016, 2019   • CORONARY ARTERY BYPASS GRAFT  2013   • ENDOSCOPY  2019   • HERNIA REPAIR     • OTHER SURGICAL HISTORY  2001    cardiac stents, 2 stents placed   • VERTEBROPLASTY N/A 4/1/2022    Procedure: VERTEBROPLASTY, THORACIC 11;  Surgeon: Redd Cisneros MD;  [facility]: [facility];  Service: Neurosurgery;  Laterality: N/A;          SOC  Social History     Socioeconomic History   • Marital status:    Tobacco Use   • Smoking status: Every Day     Packs/day: 0.50     Types: Cigarettes   • Smokeless tobacco: Never   • Tobacco comments:     Current every day smoker, 0.5 PPD, smoked for 31 or more years   Vaping Use   • Vaping Use: Never used    Substance and Sexual Activity   • Alcohol use: Never   • Drug use: Never   • Sexual activity: Defer         FAMHX  Family History   Problem Relation Age of Onset   • Hypertension Mother    • Heart attack Mother         MI   • Other Mother         Family History of Heart Disease   • Stroke Father         MINI   • Hypertension Father    • COPD Father         Black lung  age 88   • Other Father         Family history of Stroke          MEDSIGONLY  Current Outpatient Medications on File Prior to Visit   Medication Sig   • alendronate (Fosamax) 70 MG tablet Take 1 tablet by mouth Every 7 (Seven) Days.   • aspirin 81 MG EC tablet Take 81 mg by mouth Daily. Last dose 22 per Dr. Obando instructed   • atorvastatin (LIPITOR) 80 MG tablet Take 1 tablet by mouth every night at bedtime.   • carvedilol (COREG) 6.25 MG tablet Every 12 (Twelve) Hours.   • cetirizine (zyrTEC) 10 MG tablet TAKE ONE TABLET BY MOUTH DAILY   • Cholecalciferol 10 MCG (400 UNIT) capsule Take 400 Units by mouth Daily.   • cilostazol (PLETAL) 100 MG tablet Take 1 tablet by mouth 2 (Two) Times a Day.   • diphenoxylate-atropine (LOMOTIL) 2.5-0.025 MG per tablet TAKE 2 TABLETS BY MOUTH AS NEEDED FOR DIARRHEA   • fluticasone (FLONASE) 50 MCG/ACT nasal spray SPRAY TWO SPRAYS IN EACH NOSTRIL ONCE DAILY (Patient taking differently: 1 spray into the nostril(s) as directed by provider Daily As Needed.)   • lidocaine (Lidoderm) 5 % Place 1 patch on the skin as directed by provider Daily. Remove & Discard patch within 12 hours or as directed by MD   • omeprazole (priLOSEC) 20 MG capsule Take 20 mg by mouth Daily.   • ondansetron (ZOFRAN) 4 MG tablet TAKE ONE TABLET BY MOUTH EVERY 8 HOURS AS NEEDED FOR NAUSEA OR VOMITING.   • sertraline (ZOLOFT) 50 MG tablet TAKE ONE TABLET BY MOUTH DAILY   • carvedilol (COREG) 6.25 MG tablet Take 1 tablet by mouth 2 (Two) Times a Day With Meals for 90 days. (Patient taking differently: Take 6.25 mg by mouth 2 (Two)  "Times a Day With Meals. today)     No current facility-administered medications on file prior to visit.         Objective   /91 (BP Location: Left arm, Patient Position: Sitting, Cuff Size: Adult)   Pulse 53   Ht 149.9 cm (59\")   Wt 34.9 kg (77 lb)   BMI 15.55 kg/m²       Physical Exam  HENT:      Head: Normocephalic.   Neck:      Vascular: No carotid bruit.   Cardiovascular:      Rate and Rhythm: Regular rhythm. Bradycardia present.      Pulses: Normal pulses.      Heart sounds: Murmur heard.   Pulmonary:      Effort: Pulmonary effort is normal.      Breath sounds: Normal breath sounds.   Musculoskeletal:      Cervical back: Neck supple.      Right lower leg: No edema.      Left lower leg: No edema.   Skin:     General: Skin is dry.      Capillary Refill: Capillary refill takes less than 2 seconds.   Neurological:      Mental Status: She is alert and oriented to person, place, and time.   Psychiatric:         Behavior: Behavior normal.       Result Review :   The following data was reviewed by: JOSE Mar on 02/09/2023:  No results found for: PROBNP  CMP    CMP 8/16/22   Glucose 132 (A)   BUN 11   Creatinine 0.73   eGFR 85.4   Sodium 136   Potassium 3.9   Chloride 104   Calcium 9.5   Total Protein    Total Protein 7.7   Albumin 4.10   Globulin    Globulin 3.6   Total Bilirubin 0.6   Alkaline Phosphatase 164 (A)   AST (SGOT) 14   ALT (SGPT) 12   Albumin/Globulin Ratio 1.1   BUN/Creatinine Ratio 15.1   Anion Gap 12.3   (A) Abnormal value       Comments are available for some flowsheets but are not being displayed.           CBC w/diff    CBC w/Diff 8/16/22   WBC 10.56   RBC 4.86   Hemoglobin 14.3   Hematocrit 43.5   MCV 89.5   MCH 29.4   MCHC 32.9   RDW 14.3   Platelets 164   Neutrophil Rel % 82.8 (A)   Immature Granulocyte Rel % 0.4   Lymphocyte Rel % 11.8 (A)   Monocyte Rel % 4.5 (A)   Eosinophil Rel % 0.0 (A)   Basophil Rel % 0.5   (A) Abnormal value             Lab Results   Component Value " Date    TSH 1.310 12/03/2021      Lab Results   Component Value Date    FREET4 1.32 12/03/2021      No results found for: DDIMERQUANT  No results found for: MG   No results found for: DIGOXIN   No results found for: TROPONINT        Results for orders placed in visit on 06/20/22    Adult Transthoracic Echo Complete W/ Cont if Necessary Per Protocol    Interpretation Summary  Mild diffuse hypokinesis with adequate left ventricular systolic function .  Fibrocalcific mitral and aortic valves.  Mild MR and mild TR.  Mild AI.         Assessment and Plan    Diagnoses and all orders for this visit:    1. CAD s/p CABG (Primary)  She denies anginal symptoms, continue aspirin 81 mg daily.    2. Chronic heart failure with preserved ejection fraction (HFpEF)   Symptomatically stable at this time, continue carvedilol 6.25 mg twice daily.  Continue to monitor weight daily to check for fluid retention.     3. Aortic stenosis, mild  Currently asymptomatic, continue to monitor with repeat echocardiogram.    4. Essential hypertension  Currently controlled, continue carvedilol 6.25 mg twice daily. Low sodium diet discussed.    5. Hyperlipidemia LDL goal <70  Last lipid panel was 10/12/21 with LDL of 53 which is within her goal range, continue atorvastatin 80 mg daily.    6. Smoker  Smoking cessation counseling provided to patient but was unable to get patient to commit to cessation plan.       Other orders  -     carvedilol (COREG) 6.25 MG tablet; Take 1 tablet by mouth 2 (Two) Times a Day With Meals.  Dispense: 180 tablet; Refill: 1        Follow Up   Return in about 6 months (around 8/9/2023) for Follow up with Dr Obando.    Patient was given instructions and counseling regarding her condition or for health maintenance advice. Please see specific information pulled into the AVS if appropriate.     Nilda Julien  reports that she has been smoking cigarettes. She has been smoking an average of .5 packs per day. She has never used  smokeless tobacco.. I have educated her on the risk of diseases from using tobacco products such as cancer, COPD and heart disease.     I advised her to quit and she is not willing to quit.    I spent 4 minutes counseling the patient.           Cindy Beltran, APRN  02/09/23  15:38 EST    Dictated Utilizing Dragon Dictation

## 2023-02-24 ENCOUNTER — OFFICE VISIT (OUTPATIENT)
Dept: FAMILY MEDICINE CLINIC | Facility: CLINIC | Age: 78
End: 2023-02-24
Payer: MEDICARE

## 2023-02-24 VITALS
HEART RATE: 76 BPM | WEIGHT: 75.4 LBS | SYSTOLIC BLOOD PRESSURE: 96 MMHG | RESPIRATION RATE: 16 BRPM | DIASTOLIC BLOOD PRESSURE: 58 MMHG | TEMPERATURE: 97.9 F | BODY MASS INDEX: 16.96 KG/M2 | HEIGHT: 56 IN

## 2023-02-24 DIAGNOSIS — I10 ESSENTIAL HYPERTENSION: ICD-10-CM

## 2023-02-24 DIAGNOSIS — G89.29 CHRONIC BILATERAL LOW BACK PAIN WITH BILATERAL SCIATICA: ICD-10-CM

## 2023-02-24 DIAGNOSIS — N89.8 VAGINAL DISCHARGE: ICD-10-CM

## 2023-02-24 DIAGNOSIS — M81.0 AGE-RELATED OSTEOPOROSIS WITHOUT CURRENT PATHOLOGICAL FRACTURE: ICD-10-CM

## 2023-02-24 DIAGNOSIS — E78.5 HYPERLIPIDEMIA LDL GOAL <70: ICD-10-CM

## 2023-02-24 DIAGNOSIS — M54.41 CHRONIC BILATERAL LOW BACK PAIN WITH BILATERAL SCIATICA: ICD-10-CM

## 2023-02-24 DIAGNOSIS — M54.42 CHRONIC BILATERAL LOW BACK PAIN WITH BILATERAL SCIATICA: ICD-10-CM

## 2023-02-24 DIAGNOSIS — J30.9 ALLERGIC RHINITIS, UNSPECIFIED SEASONALITY, UNSPECIFIED TRIGGER: ICD-10-CM

## 2023-02-24 DIAGNOSIS — Z12.11 SCREEN FOR COLON CANCER: Primary | ICD-10-CM

## 2023-02-24 LAB
CANDIDA SPECIES: NEGATIVE
GARDNERELLA VAGINALIS: NEGATIVE
T VAGINALIS DNA VAG QL PROBE+SIG AMP: NEGATIVE

## 2023-02-24 PROCEDURE — G0439 PPPS, SUBSEQ VISIT: HCPCS | Performed by: NURSE PRACTITIONER

## 2023-02-24 PROCEDURE — 1170F FXNL STATUS ASSESSED: CPT | Performed by: NURSE PRACTITIONER

## 2023-02-24 PROCEDURE — 87660 TRICHOMONAS VAGIN DIR PROBE: CPT | Performed by: NURSE PRACTITIONER

## 2023-02-24 PROCEDURE — 87480 CANDIDA DNA DIR PROBE: CPT | Performed by: NURSE PRACTITIONER

## 2023-02-24 PROCEDURE — 1160F RVW MEDS BY RX/DR IN RCRD: CPT | Performed by: NURSE PRACTITIONER

## 2023-02-24 PROCEDURE — 87510 GARDNER VAG DNA DIR PROBE: CPT | Performed by: NURSE PRACTITIONER

## 2023-02-24 RX ORDER — ATORVASTATIN CALCIUM 80 MG/1
80 TABLET, FILM COATED ORAL
Qty: 90 TABLET | Refills: 1 | Status: SHIPPED | OUTPATIENT
Start: 2023-02-24

## 2023-02-24 RX ORDER — FLUTICASONE PROPIONATE 50 MCG
2 SPRAY, SUSPENSION (ML) NASAL DAILY
Qty: 16 ML | Refills: 1 | Status: SHIPPED | OUTPATIENT
Start: 2023-02-24

## 2023-02-24 NOTE — PROGRESS NOTES
The ABCs of the Annual Wellness Visit  Subsequent Medicare Wellness Visit    Subjective    Nilda Julien is a 77 y.o. female who presents for a Subsequent Medicare Wellness Visit.    The following portions of the patient's history were reviewed and   updated as appropriate: allergies, current medications, past family history, past medical history, past social history, past surgical history and problem list.    Compared to one year ago, the patient feels her physical   health is worse.    Compared to one year ago, the patient feels her mental   health is worse.    Recent Hospitalizations:  She was not admitted to the hospital during the last year.       Current Medical Providers:  Patient Care Team:  Ralph Marcus APRN as PCP - General (Nurse Practitioner)  Dannie Obando MD as Consulting Physician (Cardiology)  Ann Scott APRN as Nurse Practitioner (Nurse Practitioner)    Outpatient Medications Prior to Visit   Medication Sig Dispense Refill   • aspirin 81 MG EC tablet Take 81 mg by mouth Daily. Last dose 03/16/22 per Dr. Obando instructed     • carvedilol (COREG) 6.25 MG tablet Take 1 tablet by mouth 2 (Two) Times a Day With Meals. 180 tablet 1   • cetirizine (zyrTEC) 10 MG tablet TAKE ONE TABLET BY MOUTH DAILY 90 tablet 1   • Cholecalciferol 10 MCG (400 UNIT) capsule Take 400 Units by mouth Daily.     • cilostazol (PLETAL) 100 MG tablet Take 1 tablet by mouth 2 (Two) Times a Day. 180 tablet 3   • diphenoxylate-atropine (LOMOTIL) 2.5-0.025 MG per tablet TAKE 2 TABLETS BY MOUTH AS NEEDED FOR DIARRHEA 60 tablet 0   • lidocaine (Lidoderm) 5 % Place 1 patch on the skin as directed by provider Daily. Remove & Discard patch within 12 hours or as directed by MD 30 patch 1   • omeprazole (priLOSEC) 20 MG capsule Take 20 mg by mouth Daily.     • ondansetron (ZOFRAN) 4 MG tablet TAKE ONE TABLET BY MOUTH EVERY 8 HOURS AS NEEDED FOR NAUSEA OR VOMITING. 90 tablet 1   • sertraline (ZOLOFT) 50 MG  tablet TAKE ONE TABLET BY MOUTH DAILY 90 tablet 1   • atorvastatin (LIPITOR) 80 MG tablet Take 1 tablet by mouth every night at bedtime. 90 tablet 1   • fluticasone (FLONASE) 50 MCG/ACT nasal spray SPRAY TWO SPRAYS IN EACH NOSTRIL ONCE DAILY (Patient taking differently: 1 spray into the nostril(s) as directed by provider Daily As Needed.) 16 mL 1   • alendronate (Fosamax) 70 MG tablet Take 1 tablet by mouth Every 7 (Seven) Days. 13 tablet 1     No facility-administered medications prior to visit.       No opioid medication identified on active medication list. I have reviewed chart for other potential  high risk medication/s and harmful drug interactions in the elderly.          Aspirin is on active medication list. Aspirin use is indicated based on review of current medical condition/s. Pros and cons of this therapy have been discussed today. Benefits of this medication outweigh potential harm.  Patient has been encouraged to continue taking this medication.  .      Patient Active Problem List   Diagnosis   • Hyperlipidemia LDL goal <70   • Essential hypertension   • Type 2 diabetes mellitus with hyperglycemia, without long-term current use of insulin (HCC)   • Gastroesophageal reflux disease without esophagitis   • Acquired hypothyroidism   • Allergic rhinitis   • Aortic stenosis, mild   • Arthritis   • CAD s/p CABG   • Diarrhea   • Diverticulitis   • Fatigue   • Hemorrhoids   • Irritable bowel syndrome with diarrhea   • Microhematuria   • Nephrolithiasis   • Smoker   • Chronic heart failure with preserved ejection fraction (HFpEF)   • Age-related osteoporosis without current pathological fracture     Advance Care Planning  Advance Directive is on file.  ACP discussion was held with the patient during this visit. Patient has an advance directive in EMR which is still valid.      Objective    Vitals:    02/24/23 1257   BP: 96/58   BP Location: Right arm   Patient Position: Sitting   Cuff Size: Adult   Pulse: 76  "  Resp: 16   Temp: 97.9 °F (36.6 °C)   TempSrc: Temporal   Weight: 34.2 kg (75 lb 6.4 oz)   Height: 142.2 cm (56\")     Estimated body mass index is 16.9 kg/m² as calculated from the following:    Height as of this encounter: 142.2 cm (56\").    Weight as of this encounter: 34.2 kg (75 lb 6.4 oz).    BMI is below normal parameters (malnutrition). Recommendations: treating the underlying disease process      Does the patient have evidence of cognitive impairment? No          HEALTH RISK ASSESSMENT    Smoking Status:  Social History     Tobacco Use   Smoking Status Every Day   • Packs/day: 0.50   • Types: Cigarettes   Smokeless Tobacco Never   Tobacco Comments    Current every day smoker, 0.5 PPD, smoked for 31 or more years     Alcohol Consumption:  Social History     Substance and Sexual Activity   Alcohol Use Never     Fall Risk Screen:    RICKEY Fall Risk Assessment was completed, and patient is at LOW risk for falls.Assessment completed on:2/24/2023    Depression Screening:  PHQ-2/PHQ-9 Depression Screening 2/24/2023   Little Interest or Pleasure in Doing Things 1-->several days   Feeling Down, Depressed or Hopeless 0-->not at all   PHQ-9: Brief Depression Severity Measure Score 1       Health Habits and Functional and Cognitive Screening:  Functional & Cognitive Status 2/24/2023   Do you have difficulty preparing food and eating? No   Do you have difficulty bathing yourself, getting dressed or grooming yourself? No   Do you have difficulty using the toilet? Yes   Do you have difficulty moving around from place to place? Yes   Do you have trouble with steps or getting out of a bed or a chair? Yes   Current Diet Well Balanced Diet   Dental Exam Up to date        Dental Exam Comment -   Eye Exam Not up to date   Exercise (times per week) 0 times per week   Current Exercises Include No Regular Exercise        Exercise Comment -   Do you need help using the phone?  No   Are you deaf or do you have serious difficulty " hearing?  Yes   Do you need help with transportation? No   Do you need help shopping? No   Do you need help preparing meals?  No   Do you need help with housework?  No   Do you need help with laundry? No   Do you need help taking your medications? No   Do you need help managing money? No   Do you ever drive or ride in a car without wearing a seat belt? No   Have you felt unusual stress, anger or loneliness in the last month? No   Who do you live with? Alone   If you need help, do you have trouble finding someone available to you? No   Have you been bothered in the last four weeks by sexual problems? No   Do you have difficulty concentrating, remembering or making decisions? Yes       Age-appropriate Screening Schedule:  Refer to the list below for future screening recommendations based on patient's age, sex and/or medical conditions. Orders for these recommended tests are listed in the plan section. The patient has been provided with a written plan.    Health Maintenance   Topic Date Due   • DIABETIC EYE EXAM  05/22/2021   • HEMOGLOBIN A1C  04/12/2022   • LIPID PANEL  10/12/2022   • URINE MICROALBUMIN  10/12/2022   • COLORECTAL CANCER SCREENING  12/18/2022   • ZOSTER VACCINE (1 of 2) 02/24/2023 (Originally [birth year]/1995)   • INFLUENZA VACCINE  03/31/2023 (Originally 8/1/2022)   • ANNUAL WELLNESS VISIT  02/24/2024   • DXA SCAN  09/13/2024   • TDAP/TD VACCINES (3 - Tdap) 04/17/2025   • HEPATITIS C SCREENING  Completed   • COVID-19 Vaccine  Completed   • Pneumococcal Vaccine 65+  Completed                CMS Preventative Services Quick Reference  Risk Factors Identified During Encounter  None Identified  The above risks/problems have been discussed with the patient.  Pertinent information has been shared with the patient in the After Visit Summary.  An After Visit Summary and PPPS were made available to the patient.    Follow Up:   Next Medicare Wellness visit to be scheduled in 1 year.       Additional E&M Note during same  "encounter follows:  Patient has multiple medical problems which are significant and separately identifiable that require additional work above and beyond the Medicare Wellness Visit.      Chief Complaint  Hypertension, Depression, GI Problem, Diarrhea, Medicare Wellness-subsequent, Osteoporosis (Trouble with the fosamax), Fatigue, Vaginal Discharge (Green/yellow), and muscle cramps (Left calf - has vein problems, Dr. Vásquez sees her)    Subjective        Osteoporosis:  Fosamax causes GI issues.    Hypertension  Pertinent negatives include no chest pain, headaches or shortness of breath.   DepressionPatient is not experiencing: shortness of breath.    GI Problem  The primary symptoms include fatigue and diarrhea. Primary symptoms do not include fever.   Diarrhea   Pertinent negatives include no coughing, fever or headaches.   Osteoporosis  Associated symptoms include fatigue. Pertinent negatives include no chest pain, coughing, fever or headaches.   Fatigue  Associated symptoms include fatigue. Pertinent negatives include no chest pain, coughing, fever or headaches.   Vaginal Discharge  The patient's primary symptoms include vaginal discharge. Associated symptoms include diarrhea. Pertinent negatives include no fever or headaches.     Nilda Julien is also being seen today for vaginal discharge.    Review of Systems   Constitutional: Positive for fatigue. Negative for fever.   Respiratory: Negative for cough, chest tightness and shortness of breath.    Cardiovascular: Negative for chest pain.   Gastrointestinal: Positive for diarrhea.   Genitourinary: Positive for vaginal discharge.       Objective   Vital Signs:  BP 96/58 (BP Location: Right arm, Patient Position: Sitting, Cuff Size: Adult)   Pulse 76   Temp 97.9 °F (36.6 °C) (Temporal)   Resp 16   Ht 142.2 cm (56\")   Wt 34.2 kg (75 lb 6.4 oz)   BMI 16.90 kg/m²     Physical Exam  Vitals reviewed.   Constitutional:       Appearance: Normal appearance. She is " well-developed.   HENT:      Head: Normocephalic and atraumatic.      Mouth/Throat:      Pharynx: No oropharyngeal exudate.   Eyes:      Conjunctiva/sclera: Conjunctivae normal.      Pupils: Pupils are equal, round, and reactive to light.   Cardiovascular:      Rate and Rhythm: Normal rate and regular rhythm.      Heart sounds: Normal heart sounds. No murmur heard.    No friction rub. No gallop.   Pulmonary:      Effort: Pulmonary effort is normal.      Breath sounds: Normal breath sounds. No wheezing or rhonchi.   Genitourinary:     Vagina: Vaginal discharge present.   Skin:     General: Skin is warm and dry.   Neurological:      Mental Status: She is alert and oriented to person, place, and time.   Psychiatric:         Mood and Affect: Mood and affect normal.         Behavior: Behavior normal.         Thought Content: Thought content normal.         Judgment: Judgment normal.                         Assessment and Plan   Diagnoses and all orders for this visit:    1. Screen for colon cancer (Primary)  -     Ambulatory Referral For Screening Colonoscopy    2. Allergic rhinitis, unspecified seasonality, unspecified trigger  -     fluticasone (FLONASE) 50 MCG/ACT nasal spray; 2 sprays by Each Nare route Daily.  Dispense: 16 mL; Refill: 1    3. Age-related osteoporosis without current pathological fracture    4. Essential hypertension  -     Lipid Panel With / Chol / HDL Ratio; Future  -     Comprehensive Metabolic Panel; Future  -     CBC (No Diff); Future  -     Urinalysis With Culture If Indicated -; Future    5. Vaginal discharge  -     Gardnerella vaginalis, Trichomonas vaginalis, Candida albicans, DNA - Swab, Vagina    6. Chronic bilateral low back pain with bilateral sciatica    7. Hyperlipidemia LDL goal <70  -     atorvastatin (LIPITOR) 80 MG tablet; Take 1 tablet by mouth every night at bedtime.  Dispense: 90 tablet; Refill: 1    Other orders  -     denosumab (PROLIA) syringe 60 mg             Follow Up   No  follow-ups on file.  Patient was given instructions and counseling regarding her condition or for health maintenance advice. Please see specific information pulled into the AVS if appropriate.

## 2023-02-27 ENCOUNTER — LAB (OUTPATIENT)
Dept: LAB | Facility: HOSPITAL | Age: 78
End: 2023-02-27
Payer: MEDICARE

## 2023-02-27 DIAGNOSIS — I10 ESSENTIAL HYPERTENSION: ICD-10-CM

## 2023-02-27 DIAGNOSIS — M81.0 AGE-RELATED OSTEOPOROSIS WITHOUT CURRENT PATHOLOGICAL FRACTURE: ICD-10-CM

## 2023-02-27 LAB
25(OH)D3 SERPL-MCNC: 15.1 NG/ML (ref 30–100)
ALBUMIN SERPL-MCNC: 3.6 G/DL (ref 3.5–5.2)
ALBUMIN/GLOB SERPL: 1.1 G/DL
ALP SERPL-CCNC: 151 U/L (ref 39–117)
ALT SERPL W P-5'-P-CCNC: 12 U/L (ref 1–33)
ANION GAP SERPL CALCULATED.3IONS-SCNC: 7.4 MMOL/L (ref 5–15)
AST SERPL-CCNC: 16 U/L (ref 1–32)
BILIRUB SERPL-MCNC: 0.4 MG/DL (ref 0–1.2)
BILIRUB UR QL STRIP: NEGATIVE
BUN SERPL-MCNC: 15 MG/DL (ref 8–23)
BUN/CREAT SERPL: 18.3 (ref 7–25)
CALCIUM SPEC-SCNC: 8.6 MG/DL (ref 8.6–10.5)
CHLORIDE SERPL-SCNC: 103 MMOL/L (ref 98–107)
CHOLEST SERPL-MCNC: 113 MG/DL (ref 0–200)
CLARITY UR: ABNORMAL
CO2 SERPL-SCNC: 28.6 MMOL/L (ref 22–29)
COLOR UR: YELLOW
CREAT SERPL-MCNC: 0.82 MG/DL (ref 0.57–1)
DEPRECATED RDW RBC AUTO: 42.9 FL (ref 37–54)
EGFRCR SERPLBLD CKD-EPI 2021: 73.8 ML/MIN/1.73
ERYTHROCYTE [DISTWIDTH] IN BLOOD BY AUTOMATED COUNT: 13.3 % (ref 12.3–15.4)
GLOBULIN UR ELPH-MCNC: 3.4 GM/DL
GLUCOSE SERPL-MCNC: 109 MG/DL (ref 65–99)
GLUCOSE UR STRIP-MCNC: NEGATIVE MG/DL
HCT VFR BLD AUTO: 42.3 % (ref 34–46.6)
HDLC SERPL QL: 2.9
HDLC SERPL-MCNC: 39 MG/DL (ref 40–60)
HGB BLD-MCNC: 13.9 G/DL (ref 12–15.9)
HGB UR QL STRIP.AUTO: ABNORMAL
KETONES UR QL STRIP: NEGATIVE
LDLC SERPL CALC-MCNC: 57 MG/DL (ref 0–100)
LEUKOCYTE ESTERASE UR QL STRIP.AUTO: ABNORMAL
MAGNESIUM SERPL-MCNC: 1.7 MG/DL (ref 1.6–2.4)
MCH RBC QN AUTO: 29 PG (ref 26.6–33)
MCHC RBC AUTO-ENTMCNC: 32.9 G/DL (ref 31.5–35.7)
MCV RBC AUTO: 88.3 FL (ref 79–97)
NITRITE UR QL STRIP: NEGATIVE
PH UR STRIP.AUTO: 6 [PH] (ref 5–8)
PHOSPHATE SERPL-MCNC: 3.5 MG/DL (ref 2.5–4.5)
PLATELET # BLD AUTO: 143 10*3/MM3 (ref 140–450)
PMV BLD AUTO: 11.8 FL (ref 6–12)
POTASSIUM SERPL-SCNC: 4 MMOL/L (ref 3.5–5.2)
PROT SERPL-MCNC: 7 G/DL (ref 6–8.5)
PROT UR QL STRIP: ABNORMAL
RBC # BLD AUTO: 4.79 10*6/MM3 (ref 3.77–5.28)
SODIUM SERPL-SCNC: 139 MMOL/L (ref 136–145)
SP GR UR STRIP: 1.02 (ref 1–1.03)
TRIGL SERPL-MCNC: 85 MG/DL (ref 0–150)
UROBILINOGEN UR QL STRIP: ABNORMAL
VLDLC SERPL-MCNC: 17 MG/DL (ref 5–40)
WBC NRBC COR # BLD: 5.13 10*3/MM3 (ref 3.4–10.8)

## 2023-02-27 PROCEDURE — 36415 COLL VENOUS BLD VENIPUNCTURE: CPT

## 2023-02-27 PROCEDURE — 80053 COMPREHEN METABOLIC PANEL: CPT

## 2023-02-27 PROCEDURE — 80061 LIPID PANEL: CPT

## 2023-02-27 PROCEDURE — 81001 URINALYSIS AUTO W/SCOPE: CPT

## 2023-02-27 PROCEDURE — 82306 VITAMIN D 25 HYDROXY: CPT

## 2023-02-27 PROCEDURE — 83735 ASSAY OF MAGNESIUM: CPT

## 2023-02-27 PROCEDURE — 87186 SC STD MICRODIL/AGAR DIL: CPT

## 2023-02-27 PROCEDURE — 84100 ASSAY OF PHOSPHORUS: CPT

## 2023-02-27 PROCEDURE — 87077 CULTURE AEROBIC IDENTIFY: CPT

## 2023-02-27 PROCEDURE — 85027 COMPLETE CBC AUTOMATED: CPT

## 2023-02-27 PROCEDURE — 87086 URINE CULTURE/COLONY COUNT: CPT

## 2023-02-28 LAB
BACTERIA UR QL AUTO: ABNORMAL /HPF
COD CRY URNS QL: ABNORMAL /HPF
HYALINE CASTS UR QL AUTO: ABNORMAL /LPF
RBC # UR STRIP: ABNORMAL /HPF
REF LAB TEST METHOD: ABNORMAL
SQUAMOUS #/AREA URNS HPF: ABNORMAL /HPF
WBC # UR STRIP: ABNORMAL /HPF

## 2023-03-01 DIAGNOSIS — N39.0 URINARY TRACT INFECTION WITHOUT HEMATURIA, SITE UNSPECIFIED: ICD-10-CM

## 2023-03-01 DIAGNOSIS — R79.89 LOW SERUM VITAMIN D: Primary | ICD-10-CM

## 2023-03-01 DIAGNOSIS — E55.9 VITAMIN D DEFICIENCY: ICD-10-CM

## 2023-03-01 LAB — BACTERIA SPEC AEROBE CULT: ABNORMAL

## 2023-03-01 RX ORDER — CEFDINIR 300 MG/1
300 CAPSULE ORAL 2 TIMES DAILY
Qty: 14 CAPSULE | Refills: 0 | Status: SHIPPED | OUTPATIENT
Start: 2023-03-01

## 2023-03-01 RX ORDER — ERGOCALCIFEROL 1.25 MG/1
50000 CAPSULE ORAL
Qty: 13 CAPSULE | Refills: 3 | Status: SHIPPED | OUTPATIENT
Start: 2023-03-01

## 2023-03-09 ENCOUNTER — HOSPITAL ENCOUNTER (EMERGENCY)
Facility: HOSPITAL | Age: 78
Discharge: HOME | End: 2023-03-09
Attending: EMERGENCY MEDICINE | Admitting: EMERGENCY MEDICINE
Payer: MEDICARE

## 2023-03-09 ENCOUNTER — APPOINTMENT (OUTPATIENT)
Dept: CT IMAGING | Facility: HOSPITAL | Age: 78
End: 2023-03-09
Payer: MEDICARE

## 2023-03-09 VITALS
TEMPERATURE: 98.3 F | RESPIRATION RATE: 18 BRPM | HEART RATE: 79 BPM | SYSTOLIC BLOOD PRESSURE: 163 MMHG | OXYGEN SATURATION: 97 % | BODY MASS INDEX: 15.24 KG/M2 | HEIGHT: 59 IN | WEIGHT: 75.62 LBS | DIASTOLIC BLOOD PRESSURE: 69 MMHG

## 2023-03-09 DIAGNOSIS — R10.84 GENERALIZED ABDOMINAL PAIN: Primary | ICD-10-CM

## 2023-03-09 LAB
ALBUMIN SERPL-MCNC: 4.4 G/DL (ref 3.5–5.2)
ALBUMIN/GLOB SERPL: 1.3 G/DL
ALP SERPL-CCNC: 149 U/L (ref 39–117)
ALT SERPL W P-5'-P-CCNC: 17 U/L (ref 1–33)
ANION GAP SERPL CALCULATED.3IONS-SCNC: 10.5 MMOL/L (ref 5–15)
AST SERPL-CCNC: 20 U/L (ref 1–32)
BACTERIA UR QL AUTO: ABNORMAL /HPF
BASOPHILS # BLD AUTO: 0.04 10*3/MM3 (ref 0–0.2)
BASOPHILS NFR BLD AUTO: 0.7 % (ref 0–1.5)
BILIRUB SERPL-MCNC: 0.4 MG/DL (ref 0–1.2)
BILIRUB UR QL STRIP: NEGATIVE
BUN SERPL-MCNC: 24 MG/DL (ref 8–23)
BUN/CREAT SERPL: 29.6 (ref 7–25)
CALCIUM SPEC-SCNC: 9 MG/DL (ref 8.6–10.5)
CHLORIDE SERPL-SCNC: 102 MMOL/L (ref 98–107)
CLARITY UR: CLEAR
CO2 SERPL-SCNC: 23.5 MMOL/L (ref 22–29)
COLOR UR: YELLOW
CREAT SERPL-MCNC: 0.81 MG/DL (ref 0.57–1)
D-LACTATE SERPL-SCNC: 1.4 MMOL/L (ref 0.5–2)
DEPRECATED RDW RBC AUTO: 47.4 FL (ref 37–54)
EGFRCR SERPLBLD CKD-EPI 2021: 74.9 ML/MIN/1.73
EOSINOPHIL # BLD AUTO: 0.01 10*3/MM3 (ref 0–0.4)
EOSINOPHIL NFR BLD AUTO: 0.2 % (ref 0.3–6.2)
ERYTHROCYTE [DISTWIDTH] IN BLOOD BY AUTOMATED COUNT: 14.8 % (ref 12.3–15.4)
GLOBULIN UR ELPH-MCNC: 3.5 GM/DL
GLUCOSE SERPL-MCNC: 79 MG/DL (ref 65–99)
GLUCOSE UR STRIP-MCNC: NEGATIVE MG/DL
HCT VFR BLD AUTO: 44.8 % (ref 34–46.6)
HGB BLD-MCNC: 14.9 G/DL (ref 12–15.9)
HGB UR QL STRIP.AUTO: NEGATIVE
HOLD SPECIMEN: NORMAL
HOLD SPECIMEN: NORMAL
HYALINE CASTS UR QL AUTO: ABNORMAL /LPF
IMM GRANULOCYTES # BLD AUTO: 0.02 10*3/MM3 (ref 0–0.05)
IMM GRANULOCYTES NFR BLD AUTO: 0.3 % (ref 0–0.5)
KETONES UR QL STRIP: NEGATIVE
LEUKOCYTE ESTERASE UR QL STRIP.AUTO: ABNORMAL
LIPASE SERPL-CCNC: 70 U/L (ref 13–60)
LYMPHOCYTES # BLD AUTO: 1.28 10*3/MM3 (ref 0.7–3.1)
LYMPHOCYTES NFR BLD AUTO: 21.1 % (ref 19.6–45.3)
MCH RBC QN AUTO: 29.2 PG (ref 26.6–33)
MCHC RBC AUTO-ENTMCNC: 33.3 G/DL (ref 31.5–35.7)
MCV RBC AUTO: 87.7 FL (ref 79–97)
MONOCYTES # BLD AUTO: 0.32 10*3/MM3 (ref 0.1–0.9)
MONOCYTES NFR BLD AUTO: 5.3 % (ref 5–12)
NEUTROPHILS NFR BLD AUTO: 4.41 10*3/MM3 (ref 1.7–7)
NEUTROPHILS NFR BLD AUTO: 72.4 % (ref 42.7–76)
NITRITE UR QL STRIP: NEGATIVE
NRBC BLD AUTO-RTO: 0 /100 WBC (ref 0–0.2)
PH UR STRIP.AUTO: 6 [PH] (ref 5–8)
PLATELET # BLD AUTO: 168 10*3/MM3 (ref 140–450)
PMV BLD AUTO: 11.6 FL (ref 6–12)
POTASSIUM SERPL-SCNC: 4.2 MMOL/L (ref 3.5–5.2)
PROT SERPL-MCNC: 7.9 G/DL (ref 6–8.5)
PROT UR QL STRIP: NEGATIVE
RBC # BLD AUTO: 5.11 10*6/MM3 (ref 3.77–5.28)
RBC # UR STRIP: ABNORMAL /HPF
REF LAB TEST METHOD: ABNORMAL
SODIUM SERPL-SCNC: 136 MMOL/L (ref 136–145)
SP GR UR STRIP: >1.03 (ref 1–1.03)
SQUAMOUS #/AREA URNS HPF: ABNORMAL /HPF
UROBILINOGEN UR QL STRIP: ABNORMAL
WBC # UR STRIP: ABNORMAL /HPF
WBC NRBC COR # BLD: 6.08 10*3/MM3 (ref 3.4–10.8)
WHOLE BLOOD HOLD COAG: NORMAL
WHOLE BLOOD HOLD SPECIMEN: NORMAL

## 2023-03-09 PROCEDURE — 99283 EMERGENCY DEPT VISIT LOW MDM: CPT

## 2023-03-09 PROCEDURE — 81001 URINALYSIS AUTO W/SCOPE: CPT | Performed by: EMERGENCY MEDICINE

## 2023-03-09 PROCEDURE — 85025 COMPLETE CBC W/AUTO DIFF WBC: CPT | Performed by: EMERGENCY MEDICINE

## 2023-03-09 PROCEDURE — 83605 ASSAY OF LACTIC ACID: CPT | Performed by: EMERGENCY MEDICINE

## 2023-03-09 PROCEDURE — 0 IOHEXOL 300 MG/ML SOLUTION: Performed by: EMERGENCY MEDICINE

## 2023-03-09 PROCEDURE — 36415 COLL VENOUS BLD VENIPUNCTURE: CPT

## 2023-03-09 PROCEDURE — 80053 COMPREHEN METABOLIC PANEL: CPT | Performed by: EMERGENCY MEDICINE

## 2023-03-09 PROCEDURE — 83690 ASSAY OF LIPASE: CPT | Performed by: EMERGENCY MEDICINE

## 2023-03-09 PROCEDURE — 74177 CT ABD & PELVIS W/CONTRAST: CPT

## 2023-03-09 RX ORDER — DICYCLOMINE HCL 20 MG
20 TABLET ORAL EVERY 6 HOURS PRN
Qty: 20 TABLET | Refills: 0 | Status: SHIPPED | OUTPATIENT
Start: 2023-03-09 | End: 2023-03-21 | Stop reason: SDUPTHER

## 2023-03-09 RX ORDER — SODIUM CHLORIDE 0.9 % (FLUSH) 0.9 %
10 SYRINGE (ML) INJECTION AS NEEDED
Status: DISCONTINUED | OUTPATIENT
Start: 2023-03-09 | End: 2023-03-09 | Stop reason: HOSPADM

## 2023-03-09 RX ADMIN — IOHEXOL 50 ML: 300 INJECTION, SOLUTION INTRAVENOUS at 18:18

## 2023-03-09 NOTE — ED PROVIDER NOTES
Time: 5:10 PM EST  Date of encounter:  3/9/2023  Independent Historian/Clinical History and Information was obtained by:   Patient  Chief Complaint: Abdominal Pain    History is limited by: N/A    History of Present Illness:  Patient is a 77 y.o. year old female who presents to the emergency department for evaluation of abdominal pain that began about a week ago. The patient reportedly has a UTI and is in the process of completing an antibiotic course for treatment. The patient reports a history of back fracture which occurred over a year ago. The patient reports that this pain it not similar to the pain she experienced due to her fracture and that her current pain is more intense. The patient has not taken any pain medication. The patient denies dysuria or odorous urine.    HPI    Patient Care Team  Primary Care Provider: Ralph Marcus APRN    Past Medical History:     No Known Allergies  Past Medical History:   Diagnosis Date   • Allergic rhinitis     Seasonal allergies   • Aortic stenosis, mild 10/18/2021   • Arthritis    • Back pain    • CAD s/p CABG 10/18/2021   • Chronic heart failure with preserved ejection fraction (HFpEF) 12/30/2021   • Diverticulitis    • Essential hypertension 06/07/2021   • Fatigue    • Heart attack (HCC) 10/18/2021   • Hemorrhoids 2013   • HFrEF (heart failure with reduced ejection fraction) 12/30/2021   • Hyperlipidemia LDL goal <70 06/07/2021   • Irritable bowel syndrome with diarrhea    • Memory loss     forgetfulness   • Microhematuria 01/13/2019   • Nephrolithiasis 01/13/2019   • Smoker 12/30/2021     Past Surgical History:   Procedure Laterality Date   • CARDIAC SURGERY  04/19/2013    4 way bypass   • COLONOSCOPY  05/23/2016, 2019   • CORONARY ARTERY BYPASS GRAFT  2013   • ENDOSCOPY  2019   • HERNIA REPAIR     • OTHER SURGICAL HISTORY  2001    cardiac stents, 2 stents placed   • VERTEBROPLASTY N/A 4/1/2022    Procedure: VERTEBROPLASTY, THORACIC 11;  Surgeon: Redd Cisneros MD;   Location: [facility] MAIN OR;  Service: Neurosurgery;  Laterality: N/A;     Family History   Problem Relation Age of Onset   • Hypertension Mother    • Heart attack Mother         MI   • Other Mother         Family History of Heart Disease   • Stroke Father         MINI   • Hypertension Father    • COPD Father         Black lung  age 88   • Other Father         Family history of Stroke       Home Medications:  Prior to Admission medications    Medication Sig Start Date End Date Taking? Authorizing Provider   alendronate (Fosamax) 70 MG tablet Take 1 tablet by mouth Every 7 (Seven) Days. 22   Ralph Marcus APRN   aspirin 81 MG EC tablet Take 81 mg by mouth Daily. Last dose 22 per Dr. Obando instructed    ProviderAnny MD   atorvastatin (LIPITOR) 80 MG tablet Take 1 tablet by mouth every night at bedtime. 23   Ralph Marcus APRN   carvedilol (COREG) 6.25 MG tablet Take 1 tablet by mouth 2 (Two) Times a Day With Meals. 23   Cindy Beltran APRN   cefdinir (OMNICEF) 300 MG capsule Take 1 capsule by mouth 2 (Two) Times a Day. 3/1/23   Ralph Marcus APRN   cetirizine (zyrTEC) 10 MG tablet TAKE ONE TABLET BY MOUTH DAILY 22   Ralph Marcus APRN   Cholecalciferol 10 MCG (400 UNIT) capsule Take 400 Units by mouth Daily.    Provider, MD Anny   cilostazol (PLETAL) 100 MG tablet Take 1 tablet by mouth 2 (Two) Times a Day. 22   Selvin Vásquez MD   diphenoxylate-atropine (LOMOTIL) 2.5-0.025 MG per tablet TAKE 2 TABLETS BY MOUTH AS NEEDED FOR DIARRHEA 23   Ralph Marcus APRN   fluticasone (FLONASE) 50 MCG/ACT nasal spray 2 sprays by Each Nare route Daily. 23   Ralph Marcus APRN   lidocaine (Lidoderm) 5 % Place 1 patch on the skin as directed by provider Daily. Remove & Discard patch within 12 hours or as directed by MD 22   Ralph Marcus APRN   omeprazole (priLOSEC) 20 MG capsule Take 20 mg by mouth Daily.    ProviderAnny MD  "  ondansetron (ZOFRAN) 4 MG tablet TAKE ONE TABLET BY MOUTH EVERY 8 HOURS AS NEEDED FOR NAUSEA OR VOMITING. 12/2/22   Ralph Marcus APRN   sertraline (ZOLOFT) 50 MG tablet TAKE ONE TABLET BY MOUTH DAILY 11/9/22   Ralph Marcus APRN   vitamin D (ERGOCALCIFEROL) 1.25 MG (08536 UT) capsule capsule Take 1 capsule by mouth Every 7 (Seven) Days. 3/1/23   Ralph Marcus APRN        Social History:   Social History     Tobacco Use   • Smoking status: Every Day     Packs/day: 0.50     Types: Cigarettes   • Smokeless tobacco: Never   • Tobacco comments:     Current every day smoker, 0.5 PPD, smoked for 31 or more years   Vaping Use   • Vaping Use: Never used   Substance Use Topics   • Alcohol use: Never   • Drug use: Never         Review of Systems:  Review of Systems   Constitutional: Negative for chills and fever.   HENT: Negative for congestion, rhinorrhea and sore throat.    Eyes: Negative for pain and visual disturbance.   Respiratory: Negative for apnea, cough, chest tightness and shortness of breath.    Cardiovascular: Negative for chest pain and palpitations.   Gastrointestinal: Positive for abdominal pain. Negative for diarrhea, nausea and vomiting.   Genitourinary: Negative for difficulty urinating and dysuria.   Musculoskeletal: Negative for joint swelling and myalgias.   Skin: Negative for color change.   Neurological: Negative for seizures and headaches.   Psychiatric/Behavioral: Negative.    All other systems reviewed and are negative.       Physical Exam:  /69   Pulse 79   Temp 98.3 °F (36.8 °C)   Resp 18   Ht 149.9 cm (59\")   Wt 34.3 kg (75 lb 9.9 oz)   SpO2 97%   BMI 15.27 kg/m²     Physical Exam  Vitals and nursing note reviewed.   Constitutional:       General: She is not in acute distress.     Appearance: Normal appearance. She is not toxic-appearing.   HENT:      Head: Normocephalic and atraumatic.      Jaw: There is normal jaw occlusion.   Eyes:      General: Lids are normal.      " Extraocular Movements: Extraocular movements intact.      Conjunctiva/sclera: Conjunctivae normal.      Pupils: Pupils are equal, round, and reactive to light.   Cardiovascular:      Rate and Rhythm: Normal rate and regular rhythm.      Pulses: Normal pulses.      Heart sounds: Normal heart sounds.   Pulmonary:      Effort: Pulmonary effort is normal. No respiratory distress.      Breath sounds: Normal breath sounds. No wheezing or rhonchi.   Abdominal:      General: Abdomen is flat.      Palpations: Abdomen is soft.      Tenderness: There is abdominal tenderness (Lower abdominal tenderness). There is no guarding or rebound.   Musculoskeletal:         General: Normal range of motion.      Cervical back: Normal range of motion and neck supple.      Right lower leg: No edema.      Left lower leg: No edema.   Skin:     General: Skin is warm and dry.   Neurological:      Mental Status: She is alert and oriented to person, place, and time. Mental status is at baseline.   Psychiatric:         Mood and Affect: Mood normal.                  Procedures:  Procedures      Medical Decision Making:      Comorbidities that affect care:    Smoking    External Notes reviewed:    Previous Clinic Note: Family medicine office visit for general medical management.      The following orders were placed and all results were independently analyzed by me:  Orders Placed This Encounter   Procedures   • CT Abdomen Pelvis With Contrast   • Kinney Draw   • Comprehensive Metabolic Panel   • Lipase   • Urinalysis With Microscopic If Indicated (No Culture) - Urine, Clean Catch   • Lactic Acid, Plasma   • CBC Auto Differential   • Urinalysis, Microscopic Only - Urine, Clean Catch   • NPO Diet NPO Type: Strict NPO   • Undress & Gown   • Insert Peripheral IV   • CBC & Differential   • Green Top (Gel)   • Lavender Top   • Gold Top - SST   • Light Blue Top       Medications Given in the Emergency Department:  Medications   sodium chloride 0.9 % flush  10 mL (has no administration in time range)   iohexol (OMNIPAQUE) 300 MG/ML injection 100 mL (50 mL Intravenous Given 3/9/23 1818)        ED Course:         Labs:    Lab Results (last 24 hours)     Procedure Component Value Units Date/Time    CBC & Differential [[id number]]  (Abnormal) Collected: 03/09/23 1621    Specimen: Blood Updated: 03/09/23 1712    Narrative:      The following orders were created for panel order CBC & Differential.  Procedure                               Abnormality         Status                     ---------                               -----------         ------                     CBC Auto Differential[[id number]]        Abnormal            Final result                 Please view results for these tests on the individual orders.    Comprehensive Metabolic Panel [[id number]]  (Abnormal) Collected: 03/09/23 1621    Specimen: Blood Updated: 03/09/23 1730     Glucose 79 mg/dL      BUN 24 mg/dL      Creatinine 0.81 mg/dL      Sodium 136 mmol/L      Potassium 4.2 mmol/L      Chloride 102 mmol/L      CO2 23.5 mmol/L      Calcium 9.0 mg/dL      Total Protein 7.9 g/dL      Albumin 4.4 g/dL      ALT (SGPT) 17 U/L      AST (SGOT) 20 U/L      Alkaline Phosphatase 149 U/L      Total Bilirubin 0.4 mg/dL      Globulin 3.5 gm/dL      A/G Ratio 1.3 g/dL      BUN/Creatinine Ratio 29.6     Anion Gap 10.5 mmol/L      eGFR 74.9 mL/min/1.73     Narrative:      GFR Normal >60  Chronic Kidney Disease <60  Kidney Failure <15    The GFR formula is only valid for adults with stable renal function between ages 18 and 70.    Lipase [[id number]]  (Abnormal) Collected: 03/09/23 1621    Specimen: Blood Updated: 03/09/23 1730     Lipase 70 U/L     Lactic Acid, Plasma [[id number]]  (Normal) Collected: 03/09/23 1621    Specimen: Blood Updated: 03/09/23 1726     Lactate 1.4 mmol/L     CBC Auto Differential [[id number]]  (Abnormal) Collected: 03/09/23 1621    Specimen: Blood Updated: 03/09/23 1712     WBC 6.08 10*3/mm3      RBC  5.11 10*6/mm3      Hemoglobin 14.9 g/dL      Hematocrit 44.8 %      MCV 87.7 fL      MCH 29.2 pg      MCHC 33.3 g/dL      RDW 14.8 %      RDW-SD 47.4 fl      MPV 11.6 fL      Platelets 168 10*3/mm3      Neutrophil % 72.4 %      Lymphocyte % 21.1 %      Monocyte % 5.3 %      Eosinophil % 0.2 %      Basophil % 0.7 %      Immature Grans % 0.3 %      Neutrophils, Absolute 4.41 10*3/mm3      Lymphocytes, Absolute 1.28 10*3/mm3      Monocytes, Absolute 0.32 10*3/mm3      Eosinophils, Absolute 0.01 10*3/mm3      Basophils, Absolute 0.04 10*3/mm3      Immature Grans, Absolute 0.02 10*3/mm3      nRBC 0.0 /100 WBC     Urinalysis With Microscopic If Indicated (No Culture) - Urine, Clean Catch [[id number]]  (Abnormal) Collected: 03/09/23 1920    Specimen: Urine, Clean Catch Updated: 03/09/23 1932     Color, UA Yellow     Appearance, UA Clear     pH, UA 6.0     Specific Gravity, UA >1.030     Glucose, UA Negative     Ketones, UA Negative     Bilirubin, UA Negative     Blood, UA Negative     Protein, UA Negative     Leuk Esterase, UA Small (1+)     Nitrite, UA Negative     Urobilinogen, UA 0.2 E.U./dL    Urinalysis, Microscopic Only - Urine, Clean Catch [[id number]]  (Abnormal) Collected: 03/09/23 1920    Specimen: Urine, Clean Catch Updated: 03/09/23 1932     RBC, UA 0-2 /HPF      WBC, UA 0-2 /HPF      Bacteria, UA None Seen /HPF      Squamous Epithelial Cells, UA 0-2 /HPF      Hyaline Casts, UA 0-2 /LPF      Methodology Automated Microscopy           Imaging:    CT Abdomen Pelvis With Contrast    Result Date: 3/9/2023  PROCEDURE: CT ABDOMEN PELVIS W CONTRAST  COMPARISON: [facility], CT, CT ABDOMEN PELVIS W CONTRAST, 6/23/2021, 14:47.  INDICATIONS: abdominal pain  TECHNIQUE: After obtaining the patient's consent, CT images were created with non-ionic intravenous contrast material.   PROTOCOL:   Standard imaging protocol performed    RADIATION:   DLP: 199.4 mGy*cm   Automated exposure control was utilized to  minimize radiation dose. CONTRAST: 50 cc Omnipaque 300 I.V.  FINDINGS:  Upper slices through the lower chest reveal atelectatic changes in the lower lungs.  There are some small nodular areas in the left basilar area which have been noted and could relate to more chronic atelectasis in this area.  There is no definite abnormality of the liver, spleen, pancreas or left kidney.  There is an appearance to the right renal collecting system which could reflect parapelvic cyst based on prior exam.  There is calcification in the right renal hilar area which could be vascular in nature.  The bladder is not distended.  There is extensive colonic diverticulosis.  The lack of oral contrast makes assessment of the bowel limited.  No definite diverticulitis is confirmed on this exam although could be easily missed.  There are atherosclerotic vascular calcifications noted.  There is aneurysmal dilatation of the infrarenal abdominal aorta which measures 3.1 x 2.3 cm previously measuring 2.85 by 2.6 cm.    There is abnormal fluid within the endometrial canal.  Pathology within the uterus could not be excluded  There is a levoscoliosis of the lumbar spine with multilevel degenerative change.  There are compression deformities L1, T12 and T11 with fracture deformity of T11 having developed since prior study.        1. Bibasilar atelectasis. 2. Abnormal fluid in the endometrial canal pathology in the uterus not excluded. 3. Extensive colonic diverticulosis 4. Atherosclerotic vascular disease with infrarenal abdominal Justice aneurysm. 5. Compression fractures of T11, T12 and L1 with fracture of T11 new since prior CT 6. Right parapelvic renal cyst.     ADALID CHRISTIANSEN MD       Electronically Signed and Approved By: ADALID CHRISTIANSEN MD on 3/09/2023 at 19:11                 Differential Diagnosis and Discussion:    Abdominal Pain: Based on the patient's signs and symptoms, I considered abdominal aortic aneurysm, small bowel obstruction,  pancreatitis, acute cholecystitis, acute appendecitis, peptic ulcer disease, gastritis, colitis, endocrine disorders, irritable bowel syndrome and other differential diagnosis an etiology of the patient's abdominal pain.    All labs were reviewed and interpreted by me.  CT scan radiology interpretation was reviewed by me.    MDM  Number of Diagnoses or Management Options  Generalized abdominal pain  Diagnosis management comments: In summary this is a 77-year-old female who presents to the emergency department with complaints of abdominal pain.  She states she is currently being treated for UTI as well.  On examination her abdomen is only minimally tender.  CT scan of the abdomen pelvis is unremarkable for acute pathology.  CBC independently reviewed by me and shows no critical abnormalities.  CMP independently reviewed by me and shows no critical abnormalities.  Patient given prescription for Bentyl and instructed to follow-up outpatient.  Very strict return to ER and follow-up instructions have been provided to the patient.             Patient Care Considerations:    ANTIBIOTICS: I considered prescribing antibiotics as an outpatient however Patient is currently on antibiotics for UTI.      Consultants/Shared Management Plan:    None    Social Determinants of Health:    Patient is independent, reliable, and has access to care.       Disposition and Care Coordination:    Discharged: The patient is suitable and stable for discharge with no need for consideration of observation or admission.    I have explained the patient´s condition, diagnoses and treatment plan based on the information available to me at this time. I have answered questions and addressed any concerns. The patient has a good  understanding of the patient´s diagnosis, condition, and treatment plan as can be expected at this point. The vital signs have been stable. The patient´s condition is stable and appropriate for discharge from the emergency  department.      The patient will pursue further outpatient evaluation with the primary care physician or other designated or consulting physician as outlined in the discharge instructions. They are agreeable to this plan of care and follow-up instructions have been explained in detail. The patient has received these instructions in written format and have expressed an understanding of the discharge instructions. The patient is aware that any significant change in condition or worsening of symptoms should prompt an immediate return to this or the closest emergency department or call to 911.  I have explained discharge medications and the need for follow up with the patient/caretakers. This was also printed in the discharge instructions. Patient was discharged with the following medications and follow up:      Medication List      New Prescriptions    dicyclomine 20 MG tablet  Commonly known as: BENTYL  Take 1 tablet by mouth Every 6 (Six) Hours As Needed (abdominal pain).           Where to Get Your Medications      These medications were sent to [facility] [id number] - [place] - [address] AT [facility] [address] - [phone] [facility] [phone]   [address] [place] [address]    Phone: [phone]   · dicyclomine 20 MG tablet      Ralph Marcus, APRN  [address]  [address]  [place] [address]  [phone]    In 1 week         Final diagnoses:   Generalized abdominal pain        ED Disposition     ED Disposition   Discharge    Condition   Stable    Comment   --             This medical record created using voice recognition software.      Kurt LOWRY MD, am scribing for and in the presence of Kurt Eason MD. 3/9/2023 19:55 Tyson Braden  03/09/23 1726       Kurt Eason MD  03/09/23 [id number]

## 2023-03-16 ENCOUNTER — OFFICE VISIT (OUTPATIENT)
Dept: NEUROSURGERY | Facility: CLINIC | Age: 78
End: 2023-03-16
Payer: MEDICARE

## 2023-03-16 VITALS
HEIGHT: 59 IN | BODY MASS INDEX: 15.24 KG/M2 | WEIGHT: 75.6 LBS | SYSTOLIC BLOOD PRESSURE: 145 MMHG | DIASTOLIC BLOOD PRESSURE: 72 MMHG | HEART RATE: 70 BPM

## 2023-03-16 DIAGNOSIS — S22.080G COMPRESSION FRACTURE OF T12 VERTEBRA WITH DELAYED HEALING, SUBSEQUENT ENCOUNTER: ICD-10-CM

## 2023-03-16 DIAGNOSIS — M54.6 THORACIC SPINE PAIN: ICD-10-CM

## 2023-03-16 DIAGNOSIS — S22.080G COMPRESSION FRACTURE OF T11 VERTEBRA WITH DELAYED HEALING, SUBSEQUENT ENCOUNTER: Primary | ICD-10-CM

## 2023-03-16 DIAGNOSIS — S22.050D CLOSED WEDGE COMPRESSION FRACTURE OF T6 VERTEBRA WITH ROUTINE HEALING, SUBSEQUENT ENCOUNTER: ICD-10-CM

## 2023-03-16 DIAGNOSIS — M80.08XG FRACTURE OF VERTEBRA DUE TO OSTEOPOROSIS WITH DELAYED HEALING, SUBSEQUENT ENCOUNTER: ICD-10-CM

## 2023-03-16 PROCEDURE — 3077F SYST BP >= 140 MM HG: CPT | Performed by: NURSE PRACTITIONER

## 2023-03-16 PROCEDURE — 1159F MED LIST DOCD IN RCRD: CPT | Performed by: NURSE PRACTITIONER

## 2023-03-16 PROCEDURE — 3078F DIAST BP <80 MM HG: CPT | Performed by: NURSE PRACTITIONER

## 2023-03-16 PROCEDURE — 1160F RVW MEDS BY RX/DR IN RCRD: CPT | Performed by: NURSE PRACTITIONER

## 2023-03-16 PROCEDURE — 99214 OFFICE O/P EST MOD 30 MIN: CPT | Performed by: NURSE PRACTITIONER

## 2023-03-16 RX ORDER — HYDROCODONE BITARTRATE AND ACETAMINOPHEN 5; 325 MG/1; MG/1
1 TABLET ORAL 2 TIMES DAILY PRN
Qty: 20 TABLET | Refills: 0 | Status: SHIPPED | OUTPATIENT
Start: 2023-03-16

## 2023-03-16 RX ORDER — SACCHAROMYCES BOULARDII 250 MG
250 CAPSULE ORAL 2 TIMES DAILY
COMMUNITY

## 2023-03-16 RX ORDER — TRAMADOL HYDROCHLORIDE 50 MG/1
50 TABLET ORAL EVERY 6 HOURS PRN
COMMUNITY

## 2023-03-16 RX ORDER — HYDROCODONE BITARTRATE AND ACETAMINOPHEN 7.5; 325 MG/1; MG/1
1 TABLET ORAL EVERY 6 HOURS PRN
COMMUNITY
End: 2023-03-16

## 2023-03-16 RX ORDER — VITAMIN E 268 MG
400 CAPSULE ORAL 2 TIMES DAILY
COMMUNITY

## 2023-03-16 NOTE — PROGRESS NOTES
Chief Complaint  Back Pain (Mid back pain that radiates to right ribs.)    Subjective          Nilda Julien who is a 77 y.o. year old female who presents to [facility] for follow up of worsening mid back pain. Recent CT Abdomen and Pelvis.     History of Present Illness  Pt has been followed by pain management for her chronic back pain since her last visit.  She has recently had some concerns of worsening back pain. She has had GI issues and recently had CT Abdomen and Pelvis, which mentioned T11, T12, and L1 vertebral fractures. The radiologist noted the T11 compression fracture to be new since her last CT Abdomen and Pelvis. This is where she had prior vertebroplasty with Dr Cisneros in April of last year.     Her worsening back pain is in the upper thoracic spine. She continues norco for her pain. She is having some pain into the ribs.       Interval History 10/19/22    Nilda Julien who is a 77 y.o. year old female who presents to [facility] for follow up of back pain.      History of Present Illness  Pt with history of multiple vertebral fractures, with prior T11 vertebroplasty 6 months ago. She has had persistent back pain. Since her last visit she has established care with pain management. She has started Norco 5/325 mg twice daily as needed. She is typically taking her pain medicine once daily. The Norco helps her though does not last long. She does feel that this helps more than the Tramadol did and she is no longer taking Tramadol.     She rates her pain 7/10 today. She has been doing more around the house, cleaning the house. This increases her pain. She has an elastic brace that she purchased though does not wear as it is too big for her.      She recently had a bone density study demonstrating osteoporosis in the lumbar spine and hips. She was started on alendronate 70 mg weekly. This has been upsetting her stomach  "some.         Recent Interventions: T11 vertebroplasty 4/1/22      Review of Systems   Musculoskeletal: Positive for back pain and gait problem.   Neurological: Positive for weakness.   All other systems reviewed and are negative.       Objective   Vital Signs:   /72 (BP Location: Left arm)   Pulse 70   Ht 149.9 cm (59\")   Wt 34.3 kg (75 lb 9.6 oz)   BMI 15.27 kg/m²       Physical Exam  Vitals reviewed.   Constitutional:       Appearance: Normal appearance.   Musculoskeletal:      Thoracic back: Deformity (kyphosis) and tenderness present. Decreased range of motion.      Lumbar back: No tenderness. Negative right straight leg raise test and negative left straight leg raise test.      Right hip: No tenderness. Normal range of motion.      Left hip: No tenderness. Normal range of motion.   Neurological:      Mental Status: She is alert and oriented to person, place, and time.      Motor: Motor strength is normal.      Gait: Gait is intact.      Deep Tendon Reflexes:      Reflex Scores:       Patellar reflexes are 2+ on the right side and 2+ on the left side.       Achilles reflexes are 2+ on the right side and 2+ on the left side.       Neurologic Exam     Mental Status   Oriented to person, place, and time.   Level of consciousness: alert    Motor Exam   Muscle bulk: normal  Overall muscle tone: normal    Strength   Strength 5/5 throughout.     Sensory Exam   Light touch normal.     Gait, Coordination, and Reflexes     Gait  Gait: normal    Reflexes   Right patellar: 2+  Left patellar: 2+  Right achilles: 2+  Left achilles: 2+  Right ankle clonus: absent  Left ankle clonus: absent       Result Review :       Data reviewed: Radiologic studies CT Abdomen and Pelvis on 3/29/23 demonstrates vertebral fractures at T11, T12, and L1.           Assessment and Plan    Diagnoses and all orders for this visit:    1. Compression fracture of T11 vertebra with delayed healing, subsequent encounter (Primary)  -     MRI " Thoracic Spine Without Contrast; Future    2. Compression fracture of T12 vertebra with delayed healing, subsequent encounter  -     MRI Thoracic Spine Without Contrast; Future    3. Fracture of vertebra due to osteoporosis with delayed healing, subsequent encounter  -     MRI Thoracic Spine Without Contrast; Future    4. Closed wedge compression fracture of T6 vertebra with routine healing, subsequent encounter  -     MRI Thoracic Spine Without Contrast; Future    5. Thoracic spine pain  -     MRI Thoracic Spine Without Contrast; Future    Pt with history of multiple vertebral fracture due to osteoporosis, presenting with worsening mid back pain. We reviewed her CT Abdomen and Pelvis, which only visualizes her lower thoracic spine. Her pain is in the upper thoracic spine. Will proceed with MRI Thoracic Spine to evaluate for new fracture. She will follow up in 4 weeks.       Follow Up   Return in about 4 weeks (around 4/13/2023).  Patient was given instructions and counseling regarding her condition or for health maintenance advice.     -MRI Thoracic Spine  -Follow up 4 weeks

## 2023-03-21 NOTE — TELEPHONE ENCOUNTER
Caller: Nilda Julien    Relationship: Self    Best call back number: [phone]    Requested Prescriptions:   Requested Prescriptions     Pending Prescriptions Disp Refills   • dicyclomine (BENTYL) 20 MG tablet 20 tablet 0     Sig: Take 1 tablet by mouth Every 6 (Six) Hours As Needed (abdominal pain).      Pharmacy where request should be sent: [id number] [place] [facility] [address] AT [facility] [address] - [phone] [facility] [phone] FX     Last office visit with prescribing clinician: 2/24/2023   Last telemedicine visit with prescribing clinician: Visit date not found   Next office visit with prescribing clinician: 10/20/2023     Does the patient have less than a 3 day supply:  [x] Yes  [] No    Would you like a call back once the refill request has been completed: [x] Yes [] No    If the office needs to give you a call back, can they leave a voicemail: [x] Yes [] No    Aryan Squires Rep   03/21/23 14:41 EDT

## 2023-03-22 RX ORDER — DICYCLOMINE HCL 20 MG
20 TABLET ORAL EVERY 6 HOURS PRN
Qty: 20 TABLET | Refills: 0 | Status: SHIPPED | OUTPATIENT
Start: 2023-03-22

## 2023-04-03 DIAGNOSIS — R11.0 NAUSEA: ICD-10-CM

## 2023-04-03 DIAGNOSIS — R19.7 DIARRHEA, UNSPECIFIED TYPE: ICD-10-CM

## 2023-04-03 RX ORDER — DIPHENOXYLATE HYDROCHLORIDE AND ATROPINE SULFATE 2.5; .025 MG/1; MG/1
TABLET ORAL
Qty: 60 TABLET | Refills: 1 | Status: SHIPPED | OUTPATIENT
Start: 2023-04-03

## 2023-04-03 RX ORDER — ONDANSETRON 4 MG/1
TABLET, FILM COATED ORAL
Qty: 90 TABLET | Refills: 1 | Status: SHIPPED | OUTPATIENT
Start: 2023-04-03

## 2023-04-25 ENCOUNTER — TELEPHONE (OUTPATIENT)
Dept: NEUROSURGERY | Facility: CLINIC | Age: 78
End: 2023-04-25

## 2023-04-25 NOTE — TELEPHONE ENCOUNTER
CALLED [facility] BACK TO RESCHEDULE APPOINTMENT. LVM TO CALL [phone] OR TO SEND A [employer] MESSAGE SO APPOINTMENT CAN BE RESCHEDULED.    OK FOR HUB TO RESCHEDULE AFTER MRI HAS BEEN COMPLETED. MRI IS SCHEDULED FOR 4/27/2023

## 2023-04-27 ENCOUNTER — HOSPITAL ENCOUNTER (OUTPATIENT)
Dept: MRI IMAGING | Facility: HOSPITAL | Age: 78
Discharge: HOME | End: 2023-04-27
Payer: MEDICARE

## 2023-04-27 DIAGNOSIS — S22.080G COMPRESSION FRACTURE OF T12 VERTEBRA WITH DELAYED HEALING, SUBSEQUENT ENCOUNTER: ICD-10-CM

## 2023-04-27 DIAGNOSIS — M80.08XG FRACTURE OF VERTEBRA DUE TO OSTEOPOROSIS WITH DELAYED HEALING, SUBSEQUENT ENCOUNTER: ICD-10-CM

## 2023-04-27 DIAGNOSIS — M54.6 THORACIC SPINE PAIN: ICD-10-CM

## 2023-04-27 DIAGNOSIS — S22.050D CLOSED WEDGE COMPRESSION FRACTURE OF T6 VERTEBRA WITH ROUTINE HEALING, SUBSEQUENT ENCOUNTER: ICD-10-CM

## 2023-04-27 DIAGNOSIS — S22.080G COMPRESSION FRACTURE OF T11 VERTEBRA WITH DELAYED HEALING, SUBSEQUENT ENCOUNTER: ICD-10-CM

## 2023-04-27 PROCEDURE — 72146 MRI CHEST SPINE W/O DYE: CPT

## 2023-04-28 ENCOUNTER — TELEPHONE (OUTPATIENT)
Dept: NEUROSURGERY | Facility: CLINIC | Age: 78
End: 2023-04-28
Payer: MEDICARE

## 2023-04-28 NOTE — TELEPHONE ENCOUNTER
Stable prior fractures with an acute to subacute fracture now at T10. Will review at follow up.    ANY QUESTIONS FEEL FREE TO CALL [phone] OR SEND A [employer] MESSAGE.

## 2023-05-03 ENCOUNTER — OFFICE VISIT (OUTPATIENT)
Dept: NEUROSURGERY | Facility: CLINIC | Age: 78
End: 2023-05-03
Payer: MEDICARE

## 2023-05-03 VITALS
HEIGHT: 59 IN | DIASTOLIC BLOOD PRESSURE: 62 MMHG | SYSTOLIC BLOOD PRESSURE: 131 MMHG | WEIGHT: 78.2 LBS | HEART RATE: 80 BPM | BODY MASS INDEX: 15.76 KG/M2

## 2023-05-03 DIAGNOSIS — M80.08XG FRACTURE OF VERTEBRA DUE TO OSTEOPOROSIS WITH DELAYED HEALING, SUBSEQUENT ENCOUNTER: ICD-10-CM

## 2023-05-03 DIAGNOSIS — S22.070A CLOSED WEDGE COMPRESSION FRACTURE OF T10 VERTEBRA, INITIAL ENCOUNTER: Primary | ICD-10-CM

## 2023-05-03 DIAGNOSIS — M54.41 ACUTE RIGHT-SIDED LOW BACK PAIN WITH RIGHT-SIDED SCIATICA: ICD-10-CM

## 2023-05-03 NOTE — PROGRESS NOTES
Chief Complaint  Follow-up (Discuss MRI T)    Subjective          Nilda Julien who is a 77 y.o. year old female who presents to [facility] for follow up of mid back pain. MRI Thoracic Spine.     History of Present Illness  MRI Thoracic Spine on 4/27/23 personally reviewed. Stable T6, T11, T12, and L1 compresion fractures. At T10 there appears to be an inferior endplate fracture with less than 20% height loss, appearing acute to subacute in nature.      Pt having pain in the mid back. She is not able to wear a brace due to loss of height in the thoracic spine secondary to multilevel vertebral fractures.     She is followed by pain management, prescribed Norco for pain which provides her benefit.     She has concerns today regarding new pain in the low back and right leg. This started when she bent over getting out of her car.      Interval History 3/16/23     Nilda Julien who is a 77 y.o. year old female who presents to [facility]UR[facility] NE[facility]ERY for follow up of worsening mid back pain. Recent CT Abdomen and Pelvis.      History of Present Illness  Pt has been followed by pain management for her chronic back pain since her last visit.  She has recently had some concerns of worsening back pain. She has had GI issues and recently had CT Abdomen and Pelvis, which mentioned T11, T12, and L1 vertebral fractures. The radiologist noted the T11 compression fracture to be new since her last CT Abdomen and Pelvis. This is where she had prior vertebroplasty with Dr Cisneros in April of last year.      Her worsening back pain is in the upper thoracic spine. She continues norco for her pain. She is having some pain into the ribs.      Recent Interventions: See above      Review of Systems   Musculoskeletal: Positive for arthralgias, back pain and gait problem.   All other systems reviewed and are negative.       Objective   Vital Signs:   /62 (BP  "Location: Left arm)   Pulse 80   Ht 149.9 cm (59\")   Wt 35.5 kg (78 lb 3.2 oz)   BMI 15.79 kg/m²       Physical Exam  Vitals reviewed.   Constitutional:       Appearance: Normal appearance.   Musculoskeletal:      Thoracic back: Tenderness present.      Lumbar back: Tenderness present.   Neurological:      Mental Status: She is alert and oriented to person, place, and time.      Motor: Motor strength is normal.      Gait: Gait is intact.      Deep Tendon Reflexes:      Reflex Scores:       Patellar reflexes are 2+ on the right side and 2+ on the left side.       Achilles reflexes are 2+ on the right side and 2+ on the left side.       Neurologic Exam     Mental Status   Oriented to person, place, and time.   Level of consciousness: alert    Motor Exam   Muscle bulk: normal  Overall muscle tone: normal    Strength   Strength 5/5 throughout.     Sensory Exam   Light touch normal.     Gait, Coordination, and Reflexes     Gait  Gait: normal    Reflexes   Right patellar: 2+  Left patellar: 2+  Right achilles: 2+  Left achilles: 2+  Right ankle clonus: absent  Left ankle clonus: absent       Result Review :       Data reviewed: Radiologic studies MRI Thoracic Spine on 4/27/23 personally reviewed. Stable T6, T11, T12, and L1 compresion fractures. At T10 there appears to be an inferior endplate fracture with less than 20% height loss, appearing acute to subacute in nature.           Assessment and Plan    Diagnoses and all orders for this visit:    1. Closed wedge compression fracture of T10 vertebra, initial encounter (Primary)  -     XR Spine Thoracic 2 View; Future    2. Fracture of vertebra due to osteoporosis with delayed healing, subsequent encounter    3. Acute right-sided low back pain with right-sided sciatica    We reviewed her MRI, demonstrating a mild T10 compression fracture. She is unable to wear a brace. Advised to limit bending and twisting at the waist and avoid strenuous activity. No lifting greater " than 5 pounds. Continue medication management with pain management. Will order a follow up XR for 4 weeks.     She is having acute back pain with right sided sciatica. She will call if this continues and we will proceed with MRI Lumbar Spine.    Follow up in 4 weeks.       Follow Up   Return in about 4 weeks (around 5/31/2023).  Patient was given instructions and counseling regarding her condition or for health maintenance advice.     -XR T Spine prior to next appointment  -Continue with pain management  -Follow up in 4 weeks

## 2023-05-15 DIAGNOSIS — J30.9 ALLERGIC RHINITIS, UNSPECIFIED SEASONALITY, UNSPECIFIED TRIGGER: ICD-10-CM

## 2023-05-15 RX ORDER — CETIRIZINE HYDROCHLORIDE 10 MG/1
TABLET ORAL
Qty: 90 TABLET | Refills: 1 | Status: SHIPPED | OUTPATIENT
Start: 2023-05-15

## 2023-05-15 RX ORDER — FLUTICASONE PROPIONATE 50 MCG
SPRAY, SUSPENSION (ML) NASAL
Qty: 16 ML | Refills: 1 | Status: SHIPPED | OUTPATIENT
Start: 2023-05-15

## 2023-05-18 ENCOUNTER — OFFICE VISIT (OUTPATIENT)
Dept: OBSTETRICS AND GYNECOLOGY | Facility: CLINIC | Age: 78
End: 2023-05-18
Payer: MEDICARE

## 2023-05-18 VITALS
DIASTOLIC BLOOD PRESSURE: 73 MMHG | BODY MASS INDEX: 15.32 KG/M2 | HEART RATE: 82 BPM | SYSTOLIC BLOOD PRESSURE: 113 MMHG | WEIGHT: 76 LBS | HEIGHT: 59 IN

## 2023-05-18 DIAGNOSIS — N39.41 URGE INCONTINENCE OF URINE: ICD-10-CM

## 2023-05-18 DIAGNOSIS — Z12.31 BREAST CANCER SCREENING BY MAMMOGRAM: ICD-10-CM

## 2023-05-18 DIAGNOSIS — N89.8 VAGINAL DISCHARGE: Primary | ICD-10-CM

## 2023-05-18 LAB
BACTERIA UR QL AUTO: ABNORMAL /HPF
BILIRUB UR QL STRIP: NEGATIVE
CANDIDA SPECIES: NEGATIVE
CLARITY UR: CLEAR
COD CRY URNS QL: ABNORMAL /HPF
COLOR UR: ABNORMAL
GARDNERELLA VAGINALIS: NEGATIVE
GLUCOSE UR STRIP-MCNC: NEGATIVE MG/DL
HGB UR QL STRIP.AUTO: NEGATIVE
HYALINE CASTS UR QL AUTO: ABNORMAL /LPF
KETONES UR QL STRIP: ABNORMAL
LEUKOCYTE ESTERASE UR QL STRIP.AUTO: ABNORMAL
NITRITE UR QL STRIP: NEGATIVE
PH UR STRIP.AUTO: 5.5 [PH] (ref 5–8)
PROT UR QL STRIP: ABNORMAL
RBC # UR STRIP: ABNORMAL /HPF
REF LAB TEST METHOD: ABNORMAL
SP GR UR STRIP: >=1.03 (ref 1–1.03)
SQUAMOUS #/AREA URNS HPF: ABNORMAL /HPF
T VAGINALIS DNA VAG QL PROBE+SIG AMP: NEGATIVE
UROBILINOGEN UR QL STRIP: ABNORMAL
WBC # UR STRIP: ABNORMAL /HPF

## 2023-05-18 PROCEDURE — 87660 TRICHOMONAS VAGIN DIR PROBE: CPT | Performed by: STUDENT IN AN ORGANIZED HEALTH CARE EDUCATION/TRAINING PROGRAM

## 2023-05-18 PROCEDURE — 1160F RVW MEDS BY RX/DR IN RCRD: CPT | Performed by: STUDENT IN AN ORGANIZED HEALTH CARE EDUCATION/TRAINING PROGRAM

## 2023-05-18 PROCEDURE — 3074F SYST BP LT 130 MM HG: CPT | Performed by: STUDENT IN AN ORGANIZED HEALTH CARE EDUCATION/TRAINING PROGRAM

## 2023-05-18 PROCEDURE — 3078F DIAST BP <80 MM HG: CPT | Performed by: STUDENT IN AN ORGANIZED HEALTH CARE EDUCATION/TRAINING PROGRAM

## 2023-05-18 PROCEDURE — 1159F MED LIST DOCD IN RCRD: CPT | Performed by: STUDENT IN AN ORGANIZED HEALTH CARE EDUCATION/TRAINING PROGRAM

## 2023-05-18 PROCEDURE — 87480 CANDIDA DNA DIR PROBE: CPT | Performed by: STUDENT IN AN ORGANIZED HEALTH CARE EDUCATION/TRAINING PROGRAM

## 2023-05-18 PROCEDURE — 81001 URINALYSIS AUTO W/SCOPE: CPT | Performed by: STUDENT IN AN ORGANIZED HEALTH CARE EDUCATION/TRAINING PROGRAM

## 2023-05-18 PROCEDURE — 87510 GARDNER VAG DNA DIR PROBE: CPT | Performed by: STUDENT IN AN ORGANIZED HEALTH CARE EDUCATION/TRAINING PROGRAM

## 2023-05-18 PROCEDURE — 99204 OFFICE O/P NEW MOD 45 MIN: CPT | Performed by: STUDENT IN AN ORGANIZED HEALTH CARE EDUCATION/TRAINING PROGRAM

## 2023-05-18 NOTE — PROGRESS NOTES
GYN Problem Visit -     Chief Complaint   Patient presents with   • Vaginal Discharge           HPI  Nilda Julien is a 77 y.o. female, , who presents for concerns for vaginal discharge. Has been present for the past 5 months. Reports it is kind of greenish and yellow. Reports that it is pretty heavy. Denies any odor or irritation. Denies any vaginal bleeding. She still has her uterus. She has her ovaries and fallopian tubes. She is not sexually active. Denies any pain with urination. No blood in urine. She reports she gets yearly mammograms with her primary care physician. Reports last mammogram greater than 2 years ago. She denies any personal or family history of breast, uterine, ovarian or colon cancer. Denies any use of antibiotics over the past 5 months. Denies any vulvar irritation. She reports issues with urinary leakage. Reports urge incontinence.          Additional OB/GYN History   No LMP recorded. Patient is postmenopausal.  Current contraception: contraceptive methods: Abstinence  Desires to: do not start contraception  Allergies : Patient has no known allergies.     The additional following portions of the patient's history were reviewed and updated as appropriate: allergies, current medications, past family history, past medical history, past social history, past surgical history and problem list.    Review of Systems   Constitutional: Positive for unexpected weight loss. Negative for fatigue and fever.   Respiratory: Negative for cough and shortness of breath.    Cardiovascular: Negative for chest pain.   Gastrointestinal: Negative for abdominal distention and abdominal pain.   Genitourinary: Positive for amenorrhea, urgency, urinary incontinence and vaginal discharge. Negative for breast discharge, breast lump, breast pain, difficulty urinating, dysuria, genital sores, menstrual problem, pelvic pain, pelvic pressure, vaginal bleeding and vaginal pain.       I have reviewed and agree with the  "HPI, ROS, and historical information as entered above. Yuriy Sandhu MD    Objective   /73   Pulse 82   Ht 149.9 cm (59\")   Wt 34.5 kg (76 lb)   Breastfeeding No   BMI 15.35 kg/m²     Physical Exam  Vitals and nursing note reviewed. Exam conducted with a chaperone present.   Constitutional:       General: She is not in acute distress.     Appearance: Normal appearance. She is not toxic-appearing.      Comments: Cachectic   HENT:      Head: Normocephalic and atraumatic.   Eyes:      Extraocular Movements: Extraocular movements intact.      Conjunctiva/sclera: Conjunctivae normal.   Cardiovascular:      Pulses: Normal pulses.   Pulmonary:      Effort: Pulmonary effort is normal.   Chest:   Breasts:     Bird Score is 5.      Right: Normal.      Left: Normal.          Comments: Well-healed midline vertical incision from previous cardiac surgery  Abdominal:      General: There is no distension.      Palpations: Abdomen is soft.      Tenderness: There is no abdominal tenderness.   Genitourinary:     General: Normal vulva.      Exam position: Lithotomy position.      Labia:         Right: No tenderness, lesion or injury.         Left: No tenderness, lesion or injury.       Cervix: Discharge present. No cervical motion tenderness, friability, lesion, erythema, cervical bleeding or eversion.      Uterus: Normal.       Adnexa: Right adnexa normal and left adnexa normal.      Comments: Vaginal atrophy    Musculoskeletal:      Cervical back: Normal range of motion.   Skin:     General: Skin is warm and dry.   Neurological:      Mental Status: She is alert and oriented to person, place, and time.   Psychiatric:         Mood and Affect: Mood normal.         Behavior: Behavior normal.         Thought Content: Thought content normal.            Assessment and Plan  Nilda Julien is a 77 y.o.  presenting for evaluation for vaginal discharge.  Patient also with urge incontinence.  Patient declines referral to " urogynecology for evaluation of urge incontinence.  Urinalysis will be performed to rule out urinary tract infection.  Vaginal discharge upon examination.  Vaginitis panel collected.  Review of CT scan with extensive colonic diverticulosis and fluid within the uterus.  Patient denies any vaginal bleeding.  Discussed with patient potential for a possible fistula however this is not likely given no concern for odor with discharge.  Patient in need of mammogram which will be ordered today.  Patient with history of spinal fracture secondary to osteoporosis.  Patient will follow-up with primary care physician.  Recommendation for fall prevention at home.  Patient cachectic with a BMI of 15.35.  Recommendation for nutritional supplementation.  Follow-up with primary care physician.  Follow-up with GYN as needed    Diagnoses and all orders for this visit:    1. Vaginal discharge (Primary)  -     Gardnerella vaginalis, Trichomonas vaginalis, Candida albicans, DNA - Swab, Vagina    2. Urge incontinence of urine  -     Urinalysis With Culture If Indicated - Urine, Clean Catch    3. Breast cancer screening by mammogram  -     Cancel: Mammo Screening Digital Tomosynthesis Bilateral With CAD; Future  -     Mammo Screening Digital Tomosynthesis Bilateral With CAD; Future        She understands the importance of having the above orders performed in a timely fashion.  The risks of not performing them include, but are not limited to, cancer and/or subsequent increase in morbidity and/or mortality.  She is encouraged to review her results online and/or contact or office if she has questions.     Follow Up:  Return if symptoms worsen or fail to improve.        Yuriy Sandhu MD  05/18/2023

## 2023-05-30 ENCOUNTER — TELEPHONE (OUTPATIENT)
Dept: NEUROSURGERY | Facility: CLINIC | Age: 78
End: 2023-05-30

## 2023-05-30 NOTE — TELEPHONE ENCOUNTER
Left message reminding Skylar Munguia ordered an Xray of Thoracic to be done prior to appt tomorrow.

## 2023-05-31 ENCOUNTER — HOSPITAL ENCOUNTER (OUTPATIENT)
Dept: GENERAL RADIOLOGY | Facility: HOSPITAL | Age: 78
Discharge: HOME | End: 2023-05-31
Admitting: NURSE PRACTITIONER

## 2023-05-31 ENCOUNTER — OFFICE VISIT (OUTPATIENT)
Dept: NEUROSURGERY | Facility: CLINIC | Age: 78
End: 2023-05-31

## 2023-05-31 VITALS
HEART RATE: 80 BPM | WEIGHT: 75.4 LBS | DIASTOLIC BLOOD PRESSURE: 58 MMHG | HEIGHT: 59 IN | SYSTOLIC BLOOD PRESSURE: 138 MMHG | BODY MASS INDEX: 15.2 KG/M2

## 2023-05-31 DIAGNOSIS — S22.070A CLOSED WEDGE COMPRESSION FRACTURE OF T10 VERTEBRA, INITIAL ENCOUNTER: ICD-10-CM

## 2023-05-31 DIAGNOSIS — S22.070D CLOSED WEDGE COMPRESSION FRACTURE OF T10 VERTEBRA WITH ROUTINE HEALING, SUBSEQUENT ENCOUNTER: Primary | ICD-10-CM

## 2023-05-31 DIAGNOSIS — M80.08XG FRACTURE OF VERTEBRA DUE TO OSTEOPOROSIS WITH DELAYED HEALING, SUBSEQUENT ENCOUNTER: ICD-10-CM

## 2023-05-31 PROCEDURE — 72070 X-RAY EXAM THORAC SPINE 2VWS: CPT

## 2023-05-31 NOTE — PROGRESS NOTES
Chief Complaint  Follow-up (Discuss XR T, Pt is still having back pain. She is unable to wear brace, it is painful.)    Subjective          Nilda Julien who is a 77 y.o. year old female who presents to [facility]UR[facility]URGERY for follow up of T10 compression fracture.     History of Present Illness  XR Thoracic Spine on 5/31/23 demonstrates stable mild inferior endplate fracture at T10. Chronic T11, T12, and L1 compression deformities. Vertebroplasty at T11.     She continues to be followed by pain management, prescribed Norco for her pain which provides her benefit. She did ask them to increase her pain medication due to the fracture. Norco increased to three times daily for 15 days, with plans to reduce back to twice daily after that. She rates her pain a 7/10 today. Pain is primarily in the back. Will occasionally radiate to the hip. She denies any further pain symptoms into the legs.      Interval History 5/3/23    Nilda Julien who is a 77 y.o. year old female who presents to [facility]UR[facility]UR[facility]URGERY for follow up of mid back pain. MRI Thoracic Spine.      History of Present Illness  MRI Thoracic Spine on 4/27/23 personally reviewed. Stable T6, T11, T12, and L1 compresion fractures. At T10 there appears to be an inferior endplate fracture with less than 20% height loss, appearing acute to subacute in nature.       Pt having pain in the mid back. She is not able to wear a brace due to loss of height in the thoracic spine secondary to multilevel vertebral fractures.      She is followed by pain management, prescribed Norco for pain which provides her benefit.      She has concerns today regarding new pain in the low back and right leg. This started when she bent over getting out of her car.    Recent Interventions: See above      Review of Systems   Musculoskeletal: Positive for arthralgias and back pain.   All other systems reviewed and are  "negative.       Objective   Vital Signs:   /58 (BP Location: Right arm, Patient Position: Sitting, Cuff Size: Small Adult)   Pulse 80   Ht 149.9 cm (59\")   Wt 34.2 kg (75 lb 6.4 oz)   BMI 15.23 kg/m²       Physical Exam  Vitals reviewed.   Constitutional:       Appearance: Normal appearance.   Musculoskeletal:      Thoracic back: Tenderness present.      Lumbar back: Tenderness present.      Comments: Kyphosis   Neurological:      Mental Status: She is alert and oriented to person, place, and time.      Motor: Motor strength is normal.      Gait: Gait is intact.        Neurologic Exam     Mental Status   Oriented to person, place, and time.   Level of consciousness: alert    Motor Exam   Muscle bulk: normal  Overall muscle tone: normal    Strength   Strength 5/5 throughout.     Gait, Coordination, and Reflexes     Gait  Gait: normal       Result Review :       Data reviewed: Radiologic studies XR Thoracic Spine on 5/31/23 demonstrates stable mild inferior endplate fracture at T10. Chronic T11, T12, and L1 compression deformities. Vertebroplasty at T11.           Assessment and Plan    Diagnoses and all orders for this visit:    1. Closed wedge compression fracture of T10 vertebra with routine healing, subsequent encounter (Primary)  -     XR Spine Thoracic 2 View; Future    2. Fracture of vertebra due to osteoporosis with delayed healing, subsequent encounter  -     XR Spine Thoracic 2 View; Future    We reviewed her XR, demonstrating stable T10 fracture. Pain is primarily in the back. Pain management has temporarily increased her pain medication. Will plan on repeating XR in one month and if stable at the point she can likely follow up as needed.     Follow up in 4 weeks.       Follow Up   Return in about 4 weeks (around 6/28/2023).  Patient was given instructions and counseling regarding her condition or for health maintenance advice.       -XR Thoracic Spine prior to next appointment  -Follow up in 4 " weeks

## 2023-08-08 RX ORDER — CARVEDILOL 6.25 MG/1
TABLET ORAL
Qty: 180 TABLET | Refills: 1 | Status: SHIPPED | OUTPATIENT
Start: 2023-08-08

## 2023-08-14 DIAGNOSIS — R19.7 DIARRHEA, UNSPECIFIED TYPE: ICD-10-CM

## 2023-08-14 RX ORDER — DIPHENOXYLATE HYDROCHLORIDE AND ATROPINE SULFATE 2.5; .025 MG/1; MG/1
TABLET ORAL
Qty: 60 TABLET | Refills: 0 | Status: SHIPPED | OUTPATIENT
Start: 2023-08-14

## 2023-08-24 DIAGNOSIS — E78.5 HYPERLIPIDEMIA LDL GOAL <70: ICD-10-CM

## 2023-08-24 RX ORDER — ATORVASTATIN CALCIUM 80 MG/1
TABLET, FILM COATED ORAL
Qty: 90 TABLET | Refills: 1 | Status: SHIPPED | OUTPATIENT
Start: 2023-08-24

## 2023-10-01 DIAGNOSIS — J30.9 ALLERGIC RHINITIS, UNSPECIFIED SEASONALITY, UNSPECIFIED TRIGGER: ICD-10-CM

## 2023-10-02 RX ORDER — FLUTICASONE PROPIONATE 50 MCG
SPRAY, SUSPENSION (ML) NASAL
Qty: 16 ML | Refills: 1 | Status: SHIPPED | OUTPATIENT
Start: 2023-10-02

## 2023-10-10 DIAGNOSIS — R19.7 DIARRHEA, UNSPECIFIED TYPE: ICD-10-CM

## 2023-10-10 DIAGNOSIS — R11.0 NAUSEA: ICD-10-CM

## 2023-10-11 RX ORDER — DIPHENOXYLATE HYDROCHLORIDE AND ATROPINE SULFATE 2.5; .025 MG/1; MG/1
TABLET ORAL
Qty: 60 TABLET | Refills: 0 | Status: SHIPPED | OUTPATIENT
Start: 2023-10-11

## 2023-10-11 RX ORDER — ONDANSETRON 4 MG/1
TABLET, FILM COATED ORAL
Qty: 90 TABLET | Refills: 1 | Status: SHIPPED | OUTPATIENT
Start: 2023-10-11

## 2023-10-16 ENCOUNTER — CLINICAL SUPPORT (OUTPATIENT)
Dept: GASTROENTEROLOGY | Facility: CLINIC | Age: 78
End: 2023-10-16
Payer: MEDICARE

## 2023-10-16 ENCOUNTER — PREP FOR SURGERY (OUTPATIENT)
Dept: OTHER | Facility: HOSPITAL | Age: 78
End: 2023-10-16
Payer: MEDICARE

## 2023-10-16 DIAGNOSIS — Z86.010 HISTORY OF COLON POLYPS: ICD-10-CM

## 2023-10-16 DIAGNOSIS — Z12.11 ENCOUNTER FOR SCREENING FOR MALIGNANT NEOPLASM OF COLON: Primary | ICD-10-CM

## 2023-10-16 RX ORDER — ALENDRONATE SODIUM 70 MG/1
70 TABLET ORAL
COMMUNITY

## 2023-10-16 NOTE — PROGRESS NOTES
Nilda Wily  1945  78 y.o.    Reason for call: Recall- 3 yr recall, hx colon polyps, last colon 2019- [facility]  Prep prescribed: Suprep  Prep instructions reviewed with patient and sent to patient via regular mail to the home address on file  Is the patient currently on any injectable medications for weight loss or diabetes? No  Clearance needed? Yes  If yes, what clearance is needed? Blood thinner- Pletal  Clearance has been requested from [facility]  The patient has been scheduled for: Colonoscopy  Family history of colon cancer? No  If yes, indicate relative: N/A  Family History   Problem Relation Age of Onset    Stroke Father         MINI    Hypertension Father     COPD Father         Black lung  age 88    Other Father         Family history of Stroke    Skin cancer Father     Hypertension Mother     Heart attack Mother         MI    Other Mother         Family History of Heart Disease    Skin cancer Mother     Breast cancer Neg Hx     Ovarian cancer Neg Hx     Uterine cancer Neg Hx     Cervical cancer Neg Hx     Colon cancer Neg Hx     Stomach cancer Neg Hx      Past Medical History:   Diagnosis Date    Allergic rhinitis     Seasonal allergies    Aortic stenosis, mild 10/18/2021    Arthritis     Back pain     CAD s/p CABG 10/18/2021    Chronic heart failure with preserved ejection fraction (HFpEF) 2021    Diverticulitis     Essential hypertension 2021    Fatigue     Heart attack 10/18/2021    Hemorrhoids     HFrEF (heart failure with reduced ejection fraction) 2021    Hyperlipidemia LDL goal <70 2021    Irritable bowel syndrome with diarrhea     Memory loss     forgetfulness    Microhematuria 2019    Nephrolithiasis 2019    Smoker 2021     No Known Allergies  Past Surgical History:   Procedure Laterality Date    CARDIAC SURGERY  2013    4 way bypass    COLONOSCOPY  2016, 2019    CORONARY ARTERY BYPASS GRAFT  2013    ENDOSCOPY  2019    HERNIA  REPAIR      OTHER SURGICAL HISTORY  2001    cardiac stents, 2 stents placed    VERTEBROPLASTY N/A 4/1/2022    Procedure: VERTEBROPLASTY, THORACIC 11;  Surgeon: Redd Cisneros MD;  Location: [facility] MAIN OR;  Service: Neurosurgery;  Laterality: N/A;     Social History     Socioeconomic History    Marital status:     Number of children: 2   Tobacco Use    Smoking status: Every Day     Packs/day: 0.50     Years: 40.00     Additional pack years: 0.00     Total pack years: 20.00     Types: Cigarettes     Passive exposure: Current    Smokeless tobacco: Never    Tobacco comments:     Current every day smoker, 0.5 PPD, smoked for 31 or more years   Vaping Use    Vaping Use: Never used   Substance and Sexual Activity    Alcohol use: Never    Drug use: Never    Sexual activity: Not Currently       Current Outpatient Medications:     alendronate (Fosamax) 70 MG tablet, Take 1 tablet by mouth Every 7 (Seven) Days., Disp: , Rfl:     aspirin 81 MG EC tablet, Take 1 tablet by mouth Daily. Last dose 03/16/22 per Dr. Obando instructed, Disp: , Rfl:     atorvastatin (LIPITOR) 80 MG tablet, TAKE ONE TABLET BY MOUTH EVERY NIGHT AT BEDTIME, Disp: 90 tablet, Rfl: 1    carvedilol (COREG) 6.25 MG tablet, TAKE ONE TABLET BY MOUTH TWICE A DAY WITH MEALS, Disp: 180 tablet, Rfl: 1    cetirizine (zyrTEC) 10 MG tablet, TAKE ONE TABLET BY MOUTH DAILY, Disp: 90 tablet, Rfl: 1    cilostazol (PLETAL) 100 MG tablet, TAKE ONE TABLET BY MOUTH TWICE A DAY, Disp: 180 tablet, Rfl: 3    diphenoxylate-atropine (LOMOTIL) 2.5-0.025 MG per tablet, TAKE 2 TABLETS BY MOUTH DAILY AS NEEDED FOR DIARRHEA, Disp: 60 tablet, Rfl: 0    fluticasone (FLONASE) 50 MCG/ACT nasal spray, SPRAY TWO SPRAYS IN EACH NOSTRIL ONCE DAILY, Disp: 16 mL, Rfl: 1    HYDROcodone-acetaminophen (NORCO) 5-325 MG per tablet, Take 1 tablet by mouth 2 (Two) Times a Day As Needed for Moderate Pain or Severe Pain., Disp: 20 tablet, Rfl: 0    omeprazole (priLOSEC) 20 MG capsule, Take 1  capsule by mouth Daily., Disp: , Rfl:     ondansetron (ZOFRAN) 4 MG tablet, TAKE ONE TABLET BY MOUTH EVERY 8 HOURS AS NEEDED FOR NAUSEA OR VOMITING, Disp: 90 tablet, Rfl: 1    sertraline (ZOLOFT) 50 MG tablet, TAKE ONE TABLET BY MOUTH DAILY, Disp: 90 tablet, Rfl: 1    vitamin E 400 UNIT capsule, Take 1 capsule by mouth 2 (Two) Times a Day., Disp: , Rfl:     Yes

## 2023-10-17 ENCOUNTER — HOSPITAL ENCOUNTER (OUTPATIENT)
Dept: MAMMOGRAPHY | Facility: HOSPITAL | Age: 78
Discharge: HOME | End: 2023-10-17
Payer: MEDICARE

## 2023-10-17 ENCOUNTER — LAB (OUTPATIENT)
Dept: LAB | Facility: HOSPITAL | Age: 78
End: 2023-10-17
Payer: MEDICARE

## 2023-10-17 ENCOUNTER — HOSPITAL ENCOUNTER (OUTPATIENT)
Dept: CT IMAGING | Facility: HOSPITAL | Age: 78
Discharge: HOME | End: 2023-10-17
Payer: MEDICARE

## 2023-10-17 DIAGNOSIS — Z12.31 BREAST CANCER SCREENING BY MAMMOGRAM: ICD-10-CM

## 2023-10-17 DIAGNOSIS — I10 ESSENTIAL HYPERTENSION: ICD-10-CM

## 2023-10-17 DIAGNOSIS — M81.0 AGE-RELATED OSTEOPOROSIS WITHOUT CURRENT PATHOLOGICAL FRACTURE: ICD-10-CM

## 2023-10-17 DIAGNOSIS — R91.8 PULMONARY NODULES: ICD-10-CM

## 2023-10-17 PROBLEM — Z86.0100 HISTORY OF COLON POLYPS: Status: ACTIVE | Noted: 2023-10-16

## 2023-10-17 PROBLEM — Z86.010 HISTORY OF COLON POLYPS: Status: ACTIVE | Noted: 2023-10-16

## 2023-10-17 PROBLEM — Z12.11 ENCOUNTER FOR SCREENING FOR MALIGNANT NEOPLASM OF COLON: Status: ACTIVE | Noted: 2023-10-16

## 2023-10-17 LAB
25(OH)D3 SERPL-MCNC: 40.8 NG/ML (ref 30–100)
ALBUMIN SERPL-MCNC: 4.1 G/DL (ref 3.5–5.2)
ALBUMIN/GLOB SERPL: 1.1 G/DL
ALP SERPL-CCNC: 151 U/L (ref 39–117)
ALT SERPL W P-5'-P-CCNC: 20 U/L (ref 1–33)
ANION GAP SERPL CALCULATED.3IONS-SCNC: 9.2 MMOL/L (ref 5–15)
AST SERPL-CCNC: 19 U/L (ref 1–32)
BILIRUB SERPL-MCNC: 0.4 MG/DL (ref 0–1.2)
BILIRUB UR QL STRIP: NEGATIVE
BUN SERPL-MCNC: 15 MG/DL (ref 8–23)
BUN/CREAT SERPL: 15.8 (ref 7–25)
CALCIUM SPEC-SCNC: 9.1 MG/DL (ref 8.6–10.5)
CHLORIDE SERPL-SCNC: 103 MMOL/L (ref 98–107)
CHOLEST SERPL-MCNC: 114 MG/DL (ref 0–200)
CLARITY UR: CLEAR
CO2 SERPL-SCNC: 24.8 MMOL/L (ref 22–29)
COLOR UR: ABNORMAL
CREAT SERPL-MCNC: 0.95 MG/DL (ref 0.57–1)
DEPRECATED RDW RBC AUTO: 46 FL (ref 37–54)
EGFRCR SERPLBLD CKD-EPI 2021: 61.5 ML/MIN/1.73
ERYTHROCYTE [DISTWIDTH] IN BLOOD BY AUTOMATED COUNT: 14.5 % (ref 12.3–15.4)
GLOBULIN UR ELPH-MCNC: 3.6 GM/DL
GLUCOSE SERPL-MCNC: 106 MG/DL (ref 65–99)
GLUCOSE UR STRIP-MCNC: NEGATIVE MG/DL
HCT VFR BLD AUTO: 40.7 % (ref 34–46.6)
HDLC SERPL QL: 3.56
HDLC SERPL-MCNC: 32 MG/DL (ref 40–60)
HGB BLD-MCNC: 13.7 G/DL (ref 12–15.9)
HGB UR QL STRIP.AUTO: NEGATIVE
HOLD SPECIMEN: NORMAL
KETONES UR QL STRIP: ABNORMAL
LDLC SERPL CALC-MCNC: 56 MG/DL (ref 0–100)
LEUKOCYTE ESTERASE UR QL STRIP.AUTO: ABNORMAL
MAGNESIUM SERPL-MCNC: 2.1 MG/DL (ref 1.6–2.4)
MCH RBC QN AUTO: 29.3 PG (ref 26.6–33)
MCHC RBC AUTO-ENTMCNC: 33.7 G/DL (ref 31.5–35.7)
MCV RBC AUTO: 87.2 FL (ref 79–97)
NITRITE UR QL STRIP: NEGATIVE
PH UR STRIP.AUTO: 5.5 [PH] (ref 5–8)
PHOSPHATE SERPL-MCNC: 4.4 MG/DL (ref 2.5–4.5)
PLATELET # BLD AUTO: 152 10*3/MM3 (ref 140–450)
PMV BLD AUTO: 11.4 FL (ref 6–12)
POTASSIUM SERPL-SCNC: 4.1 MMOL/L (ref 3.5–5.2)
PROT SERPL-MCNC: 7.7 G/DL (ref 6–8.5)
PROT UR QL STRIP: ABNORMAL
RBC # BLD AUTO: 4.67 10*6/MM3 (ref 3.77–5.28)
SODIUM SERPL-SCNC: 137 MMOL/L (ref 136–145)
SP GR UR STRIP: 1.03 (ref 1–1.03)
TRIGL SERPL-MCNC: 148 MG/DL (ref 0–150)
UROBILINOGEN UR QL STRIP: ABNORMAL
VLDLC SERPL-MCNC: 26 MG/DL (ref 5–40)
WBC NRBC COR # BLD: 5.43 10*3/MM3 (ref 3.4–10.8)

## 2023-10-17 PROCEDURE — 87086 URINE CULTURE/COLONY COUNT: CPT

## 2023-10-17 PROCEDURE — 85027 COMPLETE CBC AUTOMATED: CPT

## 2023-10-17 PROCEDURE — 36415 COLL VENOUS BLD VENIPUNCTURE: CPT

## 2023-10-17 PROCEDURE — 77063 BREAST TOMOSYNTHESIS BI: CPT

## 2023-10-17 PROCEDURE — 80053 COMPREHEN METABOLIC PANEL: CPT

## 2023-10-17 PROCEDURE — 77067 SCR MAMMO BI INCL CAD: CPT

## 2023-10-17 PROCEDURE — 80061 LIPID PANEL: CPT

## 2023-10-17 PROCEDURE — 81001 URINALYSIS AUTO W/SCOPE: CPT

## 2023-10-17 PROCEDURE — 83735 ASSAY OF MAGNESIUM: CPT

## 2023-10-17 PROCEDURE — 71250 CT THORAX DX C-: CPT

## 2023-10-17 PROCEDURE — 82306 VITAMIN D 25 HYDROXY: CPT

## 2023-10-17 PROCEDURE — 84100 ASSAY OF PHOSPHORUS: CPT

## 2023-10-17 RX ORDER — SODIUM, POTASSIUM,MAG SULFATES 17.5-3.13G
1 SOLUTION, RECONSTITUTED, ORAL ORAL EVERY 12 HOURS
Qty: 354 ML | Refills: 0 | Status: SHIPPED | OUTPATIENT
Start: 2023-10-17

## 2023-10-18 LAB
BACTERIA UR QL AUTO: ABNORMAL /HPF
HYALINE CASTS UR QL AUTO: ABNORMAL /LPF
RBC # UR STRIP: ABNORMAL /HPF
REF LAB TEST METHOD: ABNORMAL
SQUAMOUS #/AREA URNS HPF: ABNORMAL /HPF
WBC # UR STRIP: ABNORMAL /HPF

## 2023-10-19 LAB — BACTERIA SPEC AEROBE CULT: NORMAL

## 2023-10-20 ENCOUNTER — OFFICE VISIT (OUTPATIENT)
Dept: FAMILY MEDICINE CLINIC | Facility: CLINIC | Age: 78
End: 2023-10-20
Payer: MEDICARE

## 2023-10-20 VITALS
BODY MASS INDEX: 15.68 KG/M2 | HEIGHT: 59 IN | TEMPERATURE: 96.8 F | DIASTOLIC BLOOD PRESSURE: 60 MMHG | OXYGEN SATURATION: 96 % | WEIGHT: 77.8 LBS | HEART RATE: 91 BPM | RESPIRATION RATE: 16 BRPM | SYSTOLIC BLOOD PRESSURE: 100 MMHG

## 2023-10-20 DIAGNOSIS — E78.5 HYPERLIPIDEMIA LDL GOAL <70: Primary | ICD-10-CM

## 2023-10-20 DIAGNOSIS — R73.09 ELEVATED GLUCOSE: ICD-10-CM

## 2023-10-20 DIAGNOSIS — I10 ESSENTIAL HYPERTENSION: ICD-10-CM

## 2023-10-20 DIAGNOSIS — E55.9 VITAMIN D DEFICIENCY: ICD-10-CM

## 2023-10-20 DIAGNOSIS — E03.9 ACQUIRED HYPOTHYROIDISM: ICD-10-CM

## 2023-10-20 DIAGNOSIS — H61.21 EXCESSIVE CERUMEN IN RIGHT EAR CANAL: ICD-10-CM

## 2023-10-20 PROBLEM — E11.65 TYPE 2 DIABETES MELLITUS WITH HYPERGLYCEMIA, WITHOUT LONG-TERM CURRENT USE OF INSULIN: Status: RESOLVED | Noted: 2021-06-07 | Resolved: 2023-10-20

## 2023-10-20 RX ORDER — ERGOCALCIFEROL 1.25 MG/1
50000 CAPSULE ORAL WEEKLY
COMMUNITY

## 2023-10-20 NOTE — PROGRESS NOTES
Chief Complaint  Ear Fullness (bilateral), Hypertension, and Depression    Hermelinda Juilen presents to [facility]  History of Present Illness  Diarrhea:  Has been on lomotil states hasn't had any workup that she remembers for it but has had even before her back surgery.   Back pain: Hydrocodone is heling some but not a lot.    Hand and wrist on the left wrist is pain.      Having some discharge and mucus so  Would like tested for UTI.  Back always hurts so not sure of cause.    Back pain:  States tramadol no longer helping.  States that   Ear Fullness   Associated symptoms include diarrhea. Pertinent negatives include no coughing.   Hypertension  Pertinent negatives include no chest pain, palpitations or shortness of breath.   DepressionPatient is not experiencing: palpitations and shortness of breath.      Diabetes  Pertinent negatives for diabetes include no chest pain and no weakness.   Back Pain  Pertinent negatives include no chest pain or weakness.   Diarrhea   Pertinent negatives include no coughing.   Hand Pain   Pertinent negatives include no chest pain.       The following portions of the patient's history were personally reviewed and updated as appropriate: allergies, current medications, past medical history, past surgical history, past family history, and past social history.     Body mass index is 15.71 kg/m².     Patient has been erroneously marked as diabetic. Based on the available clinical information, she does not have diabetes and should therefore be excluded from diabetic health maintenance and quality measures for the remainder of the reporting period.       Past History:    Medical History: has a past medical history of Allergic rhinitis, Aortic stenosis, mild (10/18/2021), Arthritis, Back pain, CAD s/p CABG (10/18/2021), Chronic heart failure with preserved ejection fraction (HFpEF) (12/30/2021), Diverticulitis, Essential hypertension  (06/07/2021), Fatigue, Heart attack (10/18/2021), Hemorrhoids (2013), HFrEF (heart failure with reduced ejection fraction) (12/30/2021), Hyperlipidemia LDL goal <70 (06/07/2021), Irritable bowel syndrome with diarrhea, Memory loss, Microhematuria (01/13/2019), Nephrolithiasis (01/13/2019), and Smoker (12/30/2021).     Surgical History: has a past surgical history that includes Other surgical history (2001); Colonoscopy (05/23/2016, 2019); Esophagogastroduodenoscopy (2019); Hernia repair; Cardiac surgery (04/19/2013); Coronary artery bypass graft (2013); and Vertebroplasty (N/A, 4/1/2022).     Family History: family history includes COPD in her father; Heart attack in her mother; Hypertension in her father and mother; Other in her father and mother; Skin cancer in her father and mother; Stroke in her father.     Social History: reports that she has been smoking cigarettes. She has a 20.00 pack-year smoking history. She has been exposed to tobacco smoke. She has never used smokeless tobacco. She reports that she does not drink alcohol and does not use drugs.    Allergies: Patient has no known allergies.          Current Outpatient Medications:     aspirin 81 MG EC tablet, Take 1 tablet by mouth Daily. Last dose 03/16/22 per Dr. Obando instructed, Disp: , Rfl:     atorvastatin (LIPITOR) 80 MG tablet, TAKE ONE TABLET BY MOUTH EVERY NIGHT AT BEDTIME, Disp: 90 tablet, Rfl: 1    carvedilol (COREG) 6.25 MG tablet, TAKE ONE TABLET BY MOUTH TWICE A DAY WITH MEALS, Disp: 180 tablet, Rfl: 1    cetirizine (zyrTEC) 10 MG tablet, TAKE ONE TABLET BY MOUTH DAILY, Disp: 90 tablet, Rfl: 1    cilostazol (PLETAL) 100 MG tablet, TAKE ONE TABLET BY MOUTH TWICE A DAY, Disp: 180 tablet, Rfl: 3    diphenoxylate-atropine (LOMOTIL) 2.5-0.025 MG per tablet, TAKE 2 TABLETS BY MOUTH DAILY AS NEEDED FOR DIARRHEA, Disp: 60 tablet, Rfl: 0    fluticasone (FLONASE) 50 MCG/ACT nasal spray, SPRAY TWO SPRAYS IN EACH NOSTRIL ONCE DAILY, Disp: 16 mL,  "Rfl: 1    HYDROcodone-acetaminophen (NORCO) 5-325 MG per tablet, Take 1 tablet by mouth 2 (Two) Times a Day As Needed for Moderate Pain or Severe Pain., Disp: 20 tablet, Rfl: 0    omeprazole (priLOSEC) 20 MG capsule, Take 1 capsule by mouth Daily., Disp: , Rfl:     ondansetron (ZOFRAN) 4 MG tablet, TAKE ONE TABLET BY MOUTH EVERY 8 HOURS AS NEEDED FOR NAUSEA OR VOMITING, Disp: 90 tablet, Rfl: 1    sertraline (ZOLOFT) 50 MG tablet, TAKE ONE TABLET BY MOUTH DAILY, Disp: 90 tablet, Rfl: 1    vitamin D (ERGOCALCIFEROL) 1.25 MG (25694 UT) capsule capsule, Take 1 capsule by mouth 1 (One) Time Per Week., Disp: , Rfl:     vitamin E 400 UNIT capsule, Take 1 capsule by mouth 2 (Two) Times a Day., Disp: , Rfl:     alendronate (Fosamax) 70 MG tablet, Take 1 tablet by mouth Every 7 (Seven) Days. (Patient not taking: Reported on 10/20/2023), Disp: , Rfl:     sodium-potassium-magnesium sulfates (Suprep Bowel Prep Kit) 17.5-3.13-1.6 GM/177ML solution oral solution, Take 1 bottle by mouth Every 12 (Twelve) Hours., Disp: 354 mL, Rfl: 0    Zoster Vac Recomb Adjuvanted 50 MCG/0.5ML reconstituted suspension, Inject 0.5 mL into the appropriate muscle as directed by prescriber 1 (One) Time for 1 dose., Disp: 1 each, Rfl: 1    There are no discontinued medications.      Review of Systems   Respiratory:  Negative for cough and shortness of breath.    Cardiovascular:  Negative for chest pain and palpitations.   Gastrointestinal:  Positive for diarrhea.   Musculoskeletal:  Positive for back pain.   Neurological:  Negative for weakness.        Objective         Vitals:    10/20/23 1408   BP: 100/60   BP Location: Right arm   Patient Position: Sitting   Cuff Size: Adult   Pulse: 91   Resp: 16   Temp: 96.8 °F (36 °C)   TempSrc: Temporal   SpO2: 96%   Weight: 35.3 kg (77 lb 12.8 oz)   Height: 149.9 cm (59.02\")     Body mass index is 15.71 kg/m².         Physical Exam  Vitals reviewed.   Constitutional:       Appearance: Normal appearance. She is " well-developed.   HENT:      Head: Normocephalic and atraumatic.      Right Ear: There is impacted cerumen.      Left Ear: Ear canal normal.      Mouth/Throat:      Pharynx: No oropharyngeal exudate.   Eyes:      Conjunctiva/sclera: Conjunctivae normal.      Pupils: Pupils are equal, round, and reactive to light.   Cardiovascular:      Rate and Rhythm: Normal rate and regular rhythm.      Heart sounds: Normal heart sounds. No murmur heard.     No friction rub. No gallop.   Pulmonary:      Effort: Pulmonary effort is normal.      Breath sounds: Normal breath sounds. No wheezing or rhonchi.   Skin:     General: Skin is warm and dry.   Neurological:      Mental Status: She is alert and oriented to person, place, and time.   Psychiatric:         Mood and Affect: Mood and affect normal.         Behavior: Behavior normal.         Thought Content: Thought content normal.         Judgment: Judgment normal.             Result Review :      Ear Cerumen Removal    Date/Time: 10/20/2023 2:37 PM    Performed by: Ralph Marcus APRN  Authorized by: Ralph Marcus APRN    Anesthesia:  Local Anesthetic: none  Location details: right ear  Patient tolerance: patient tolerated the procedure well with no immediate complications  Procedure type: irrigation   Sedation:  Patient sedated: no                Assessment and Plan     Diagnoses and all orders for this visit:    1. Hyperlipidemia LDL goal <70 (Primary)    2. Essential hypertension  -     Magnesium; Future  -     Lipid Panel With / Chol / HDL Ratio; Future  -     Comprehensive Metabolic Panel; Future  -     CBC (No Diff); Future  -     Urinalysis With Culture If Indicated -; Future  -     Phosphorus; Future    3. Acquired hypothyroidism  -     TSH; Future    4. Vitamin D deficiency  -     Vitamin D,25-Hydroxy; Future    5. Elevated glucose  -     Hemoglobin A1c; Future    6. Excessive cerumen in right ear canal    Other orders  -     Zoster Vac Recomb Adjuvanted 50 MCG/0.5ML  reconstituted suspension; Inject 0.5 mL into the appropriate muscle as directed by prescriber 1 (One) Time for 1 dose.  Dispense: 1 each; Refill: 1  -     Ear Cerumen Removal              Follow Up     Return in about 6 months (around 4/20/2024).    Patient was given instructions and counseling regarding her condition or for health maintenance advice. Please see specific information pulled into the AVS if appropriate.

## 2023-10-23 ENCOUNTER — TELEPHONE (OUTPATIENT)
Dept: FAMILY MEDICINE CLINIC | Facility: CLINIC | Age: 78
End: 2023-10-23
Payer: MEDICARE

## 2023-10-23 NOTE — TELEPHONE ENCOUNTER
Patient is returning phone call  from today 8:30 AM. The patient said she would like to be called back at [phone]

## 2023-11-17 RX ORDER — CETIRIZINE HYDROCHLORIDE 10 MG/1
TABLET ORAL
Qty: 90 TABLET | Refills: 1 | Status: SHIPPED | OUTPATIENT
Start: 2023-11-17

## 2023-11-28 ENCOUNTER — TELEPHONE (OUTPATIENT)
Dept: GASTROENTEROLOGY | Facility: CLINIC | Age: 78
End: 2023-11-28
Payer: MEDICARE

## 2023-11-28 NOTE — TELEPHONE ENCOUNTER
Procedure: Colonoscopy and/or EGD     Med Directive: Pletal     PMH: CAD prior CABG, HFpEF, HTN, HLD     Last Seen: 2/9/23

## 2023-11-28 NOTE — TELEPHONE ENCOUNTER
11/28/2023    Dear Dr. Obando,     Patient: Nilda Julien   YOB: 1945        This patient is waiting to have a Colonoscopy and/or Esophagogastroduodenoscopy which I will perform at [facility] on 1/8/2024.     Our records indicate this patient is currently taking Pletal. This procedure requires the patient to suspend their anticoagulant medication prior to surgery.     Please respond to this request noting your recommendations. You may contact our office at [phone] Option 1 with any questions. I appreciate your prompt response in this matter.     Please return this form to our office no later than two weeks prior to the procedure date listed above. Please return form to [phone].     ____ I approve my patient to stop taking their Anticoagulant Therapy medication 2 days prior to the scheduled procedure.    ____ I do NOT approve my patient to stop taking their Anticoagulant Therapy medication at this time.      Please specify clearance expiration date:_____________________________    Approving physician name (please print):     _____________________________________________    Approving physician signature:     ________________________________    Date:________________        Sincerely,  [facility]   Gastroenterology - Dr.Kevin Pearce

## 2023-12-27 ENCOUNTER — TELEPHONE (OUTPATIENT)
Dept: GASTROENTEROLOGY | Facility: CLINIC | Age: 78
End: 2023-12-27
Payer: MEDICARE

## 2023-12-27 NOTE — TELEPHONE ENCOUNTER
Caller: Nilda Julien    Relationship to patient: Self    Best call back number: [phone]    Patient is needing: PATIENT CALLED NEEDING TO RESCHEDULE PROCEDURE WITH DR FINE ON 1-8-24

## 2023-12-28 NOTE — TELEPHONE ENCOUNTER
Nilda Julien  1945    Patient requested to Reschedule their Colonoscopy. I have offered to reschedule this patient and patient has AGREED.     Patient has been rescheduled to 02.22.24.    Reason for cancelling/rescheduling: PERSONAL    This procedure was ordered by JOSE Campos for an important reason. We want to inform you that there are risks associated with not proceeding with the procedure at this time such as a delay in diagnosis, risk of incurable disease, or cancer.    Patient plans to call us back to reschedule: N/A    Updated clearance needed?: YES/BLOOD THINNER    If yes, clearance request has been submitted to: DR NAVARRETE    Is the patient currently on any injectable medications for weight loss or diabetes? NO    Patient verbalized understanding for all of the above information.

## 2024-01-16 DIAGNOSIS — R19.7 DIARRHEA, UNSPECIFIED TYPE: ICD-10-CM

## 2024-01-16 DIAGNOSIS — R11.0 NAUSEA: ICD-10-CM

## 2024-01-16 DIAGNOSIS — J30.9 ALLERGIC RHINITIS, UNSPECIFIED SEASONALITY, UNSPECIFIED TRIGGER: ICD-10-CM

## 2024-01-16 RX ORDER — ONDANSETRON 4 MG/1
4 TABLET, FILM COATED ORAL EVERY 8 HOURS PRN
Qty: 90 TABLET | Refills: 1 | Status: SHIPPED | OUTPATIENT
Start: 2024-01-16

## 2024-01-16 RX ORDER — DIPHENOXYLATE HYDROCHLORIDE AND ATROPINE SULFATE 2.5; .025 MG/1; MG/1
TABLET ORAL
Qty: 60 TABLET | Refills: 0 | Status: SHIPPED | OUTPATIENT
Start: 2024-01-16

## 2024-01-16 RX ORDER — FLUTICASONE PROPIONATE 50 MCG
SPRAY, SUSPENSION (ML) NASAL
Qty: 16 ML | Refills: 1 | Status: SHIPPED | OUTPATIENT
Start: 2024-01-16

## 2024-01-31 ENCOUNTER — OFFICE VISIT (OUTPATIENT)
Dept: CARDIOLOGY | Facility: CLINIC | Age: 79
End: 2024-01-31
Payer: MEDICARE

## 2024-01-31 VITALS
DIASTOLIC BLOOD PRESSURE: 53 MMHG | HEIGHT: 59 IN | HEART RATE: 90 BPM | BODY MASS INDEX: 16.53 KG/M2 | SYSTOLIC BLOOD PRESSURE: 124 MMHG | WEIGHT: 82 LBS

## 2024-01-31 DIAGNOSIS — I50.32 CHRONIC HEART FAILURE WITH PRESERVED EJECTION FRACTION (HFPEF): ICD-10-CM

## 2024-01-31 DIAGNOSIS — I25.10 CORONARY ARTERY DISEASE INVOLVING NATIVE CORONARY ARTERY OF NATIVE HEART WITHOUT ANGINA PECTORIS: Primary | ICD-10-CM

## 2024-01-31 DIAGNOSIS — I10 ESSENTIAL HYPERTENSION: ICD-10-CM

## 2024-01-31 DIAGNOSIS — I35.0 AORTIC STENOSIS, MILD: ICD-10-CM

## 2024-01-31 DIAGNOSIS — E78.5 HYPERLIPIDEMIA LDL GOAL <70: ICD-10-CM

## 2024-01-31 DIAGNOSIS — F17.200 SMOKER: ICD-10-CM

## 2024-01-31 PROBLEM — R19.7 DIARRHEA: Status: RESOLVED | Noted: 2021-10-18 | Resolved: 2024-01-31

## 2024-01-31 NOTE — PROGRESS NOTES
Chief Complaint  Follow-up    Subjective            History of Present Illness  Nilda Julien is a 78-year-old female patient who presents to the office today for follow-up.  She has CAD with prior CABG, chronic HFpEF, hypertension, hyperlipidemia, mild aortic stenosis, and is a smoker.  She is compliant with medication.  She denies any chest pain, shortness of breath, syncope, palpitations, or edema.    PMH  Past Medical History:   Diagnosis Date    Allergic rhinitis     Seasonal allergies    Aortic stenosis, mild 10/18/2021    Arthritis     CAD s/p CABG 10/18/2021    Chronic heart failure with preserved ejection fraction (HFpEF) 12/30/2021    Diverticulitis     Essential hypertension 06/07/2021    Fatigue     Heart attack 10/18/2021    Hemorrhoids 2013    Hyperlipidemia LDL goal <70 06/07/2021    Irritable bowel syndrome with diarrhea     Memory loss     forgetfulness    Microhematuria 01/13/2019    Nephrolithiasis 01/13/2019    Smoker 12/30/2021         ALLERGY  No Known Allergies       SURGICALHX  Past Surgical History:   Procedure Laterality Date    CARDIAC SURGERY  04/19/2013    4 way bypass    COLONOSCOPY  05/23/2016, 2019    CORONARY ARTERY BYPASS GRAFT  2013    ENDOSCOPY  2019    HERNIA REPAIR      OTHER SURGICAL HISTORY  2001    cardiac stents, 2 stents placed    VERTEBROPLASTY N/A 4/1/2022    Procedure: VERTEBROPLASTY, THORACIC 11;  Surgeon: Redd Cisneros MD;  [facility]: [facility];  Service: Neurosurgery;  Laterality: N/A;          SOC  Social History     Socioeconomic History    Marital status:     Number of children: 2   Tobacco Use    Smoking status: Every Day     Packs/day: 0.50     Years: 40.00     Additional pack years: 0.00     Total pack years: 20.00     Types: Cigarettes     Passive exposure: Current    Smokeless tobacco: Never    Tobacco comments:     Current every day smoker, 0.5 PPD, smoked for 31 or more years   Vaping Use    Vaping Use: Never used   Substance and Sexual Activity     Alcohol use: Never    Drug use: Never    Sexual activity: Not Currently         FAMHX  Family History   Problem Relation Age of Onset    Stroke Father         MINI    Hypertension Father     COPD Father         Black lung  age 88    Other Father         Family history of Stroke    Skin cancer Father     Hypertension Mother     Heart attack Mother         MI    Other Mother         Family History of Heart Disease    Skin cancer Mother     Breast cancer Neg Hx     Ovarian cancer Neg Hx     Uterine cancer Neg Hx     Cervical cancer Neg Hx     Colon cancer Neg Hx     Stomach cancer Neg Hx           MEDSIGONLY  Current Outpatient Medications on File Prior to Visit   Medication Sig    aspirin 81 MG EC tablet Take 1 tablet by mouth Daily. Last dose 22 per Dr. Obando instructed    atorvastatin (LIPITOR) 80 MG tablet TAKE ONE TABLET BY MOUTH EVERY NIGHT AT BEDTIME    carvedilol (COREG) 6.25 MG tablet TAKE ONE TABLET BY MOUTH TWICE A DAY WITH MEALS    cetirizine (zyrTEC) 10 MG tablet TAKE ONE TABLET BY MOUTH DAILY    cilostazol (PLETAL) 100 MG tablet TAKE ONE TABLET BY MOUTH TWICE A DAY    diphenoxylate-atropine (LOMOTIL) 2.5-0.025 MG per tablet TAKE 2 TABLETS BY MOUTH DAILY AS NEEDED FOR DIARRHEA    fluticasone (FLONASE) 50 MCG/ACT nasal spray SPRAY TWO SPRAYS IN EACH NOSTRIL ONCE DAILY    HYDROcodone-acetaminophen (NORCO) 5-325 MG per tablet Take 1 tablet by mouth 2 (Two) Times a Day As Needed for Moderate Pain or Severe Pain.    omeprazole (priLOSEC) 20 MG capsule Take 1 capsule by mouth Daily.    ondansetron (ZOFRAN) 4 MG tablet TAKE 1 TABLET BY MOUTH EVERY 8 HOURS AS NEEDED FOR NAUSEA OR VOMITING    sertraline (ZOLOFT) 50 MG tablet TAKE ONE TABLET BY MOUTH DAILY    vitamin D (ERGOCALCIFEROL) 1.25 MG (81927 UT) capsule capsule Take 1 capsule by mouth 1 (One) Time Per Week.    vitamin E 400 UNIT capsule Take 1 capsule by mouth 2 (Two) Times a Day.    [DISCONTINUED] alendronate (Fosamax) 70 MG tablet  "Take 1 tablet by mouth Every 7 (Seven) Days. (Patient not taking: Reported on 10/20/2023)    [DISCONTINUED] sodium-potassium-magnesium sulfates (Suprep Bowel Prep Kit) 17.5-3.13-1.6 GM/177ML solution oral solution Take 1 bottle by mouth Every 12 (Twelve) Hours. (Patient not taking: Reported on 1/31/2024)     No current facility-administered medications on file prior to visit.         Objective   /53   Pulse 90   Ht 149.9 cm (59.02\")   Wt 37.2 kg (82 lb)   BMI 16.55 kg/m²       Physical Exam  HENT:      Head: Normocephalic.   Neck:      Vascular: No carotid bruit.   Cardiovascular:      Rate and Rhythm: Normal rate and regular rhythm.      Pulses: Normal pulses.      Heart sounds: Normal heart sounds. No murmur heard.  Pulmonary:      Effort: Pulmonary effort is normal.      Breath sounds: Normal breath sounds.   Musculoskeletal:      Cervical back: Neck supple.      Right lower leg: No edema.      Left lower leg: No edema.   Skin:     General: Skin is dry.   Neurological:      Mental Status: She is alert and oriented to person, place, and time.   Psychiatric:         Behavior: Behavior normal.         Result Review :   The following data was reviewed by: JOSE Mar on 01/31/2024:  No results found for: \"PROBNP\"  CMP          10/17/2023    15:37   CMP   Glucose 106    BUN 15    Creatinine 0.95    EGFR 61.5    Sodium 137    Potassium 4.1    Chloride 103    Calcium 9.1    Total Protein 7.7    Albumin 4.1    Globulin 3.6    Total Bilirubin 0.4    Alkaline Phosphatase 151    AST (SGOT) 19    ALT (SGPT) 20    Albumin/Globulin Ratio 1.1    BUN/Creatinine Ratio 15.8    Anion Gap 9.2      CBC w/diff          10/17/2023    15:37   CBC w/Diff   WBC 5.43    RBC 4.67    Hemoglobin 13.7    Hematocrit 40.7    MCV 87.2    MCH 29.3    MCHC 33.7    RDW 14.5    Platelets 152    Neutrophil Rel %    Immature Granulocyte Rel %    Lymphocyte Rel %    Monocyte Rel %    Eosinophil Rel %    Basophil Rel %       Lab " "Results   Component Value Date    TSH 1.310 12/03/2021      Lab Results   Component Value Date    FREET4 1.32 12/03/2021      No results found for: \"DDIMERQUANT\"  Magnesium   Date Value Ref Range Status   10/17/2023 2.1 1.6 - 2.4 mg/dL Final      No results found for: \"DIGOXIN\"   No results found for: \"TROPONINT\"        Lipid Panel          10/17/2023    15:37   Lipid Panel   Total Cholesterol 114    Triglycerides 148    HDL Cholesterol 32    VLDL Cholesterol 26    LDL Cholesterol  56        Results for orders placed in visit on 06/20/22    Adult Transthoracic Echo Complete W/ Cont if Necessary Per Protocol    Interpretation Summary  Mild diffuse hypokinesis with adequate left ventricular systolic function .  Fibrocalcific mitral and aortic valves.  Mild MR and mild TR.  Mild AI.           Assessment and Plan    Diagnoses and all orders for this visit:    1. CAD s/p CABG (Primary)  She denies any anginal symptoms, continue aspirin 81 mg daily.    2. Chronic heart failure with preserved ejection fraction (HFpEF)  Symptomatically stable at this time and euvolemic on exam today, continue to monitor for fluid retention.    3. Essential hypertension  Currently controlled and without adverse effects from medication, continue carvedilol 6.25 mg twice daily.    4. Hyperlipidemia LDL goal <70  Last lipid panel was 10/17/2023 with LDL 56 which is within goal range, continue atorvastatin 80 mg daily.    5. Smoker  Tobacco abuse cessation counseling provided to patient today but was unable to get patient to commit to a cessation plan.    6. Aortic stenosis, mild  Asymptomatic, continue to monitor with repeat echocardiogram          Follow Up   Return in about 6 months (around 7/31/2024) for Follow up with Dr Obando.    Patient was given instructions and counseling regarding her condition or for health maintenance advice. Please see specific information pulled into the AVS if appropriate.     Nilda Julien  reports that she " has been smoking cigarettes. She has a 20.00 pack-year smoking history. She has been exposed to tobacco smoke. She has never used smokeless tobacco.. I have educated her on the risk of diseases from using tobacco products such as cancer, COPD, and heart disease.     I advised her to quit and she is not willing to quit.    I spent 3  minutes counseling the patient.           Cnidy Beltran, JOSE  01/31/24  14:03 EST    Dictated Utilizing Dragon Dictation

## 2024-02-05 RX ORDER — CARVEDILOL 6.25 MG/1
TABLET ORAL
Qty: 180 TABLET | Refills: 3 | Status: SHIPPED | OUTPATIENT
Start: 2024-02-05

## 2024-02-19 NOTE — PRE-PROCEDURE INSTRUCTIONS
"Instructed on date and arrival time of 0800. Come to entrance \"C\". Must have  over age 18 to drive home.  May have two visitors; however, children under 12 must stay in waiting room.  Discussed clear liquid diet (no red or purple), bowel prep, and NPO.  May take medications as usual except for blood thinners, diabetic medications, and weight loss medications.  Bring list of medications.  Verbalized understanding of instructions given.  Instructed to call for questions or concerns.  Hold Pletal for 3 days prior to procedure.  "

## 2024-02-20 DIAGNOSIS — E78.5 HYPERLIPIDEMIA LDL GOAL <70: ICD-10-CM

## 2024-02-20 RX ORDER — ATORVASTATIN CALCIUM 80 MG/1
TABLET, FILM COATED ORAL
Qty: 90 TABLET | Refills: 1 | Status: SHIPPED | OUTPATIENT
Start: 2024-02-20

## 2024-02-21 ENCOUNTER — ANESTHESIA EVENT (OUTPATIENT)
Dept: GASTROENTEROLOGY | Facility: HOSPITAL | Age: 79
End: 2024-02-21
Payer: MEDICARE

## 2024-02-21 NOTE — ANESTHESIA PREPROCEDURE EVALUATION
Anesthesia Evaluation     Patient summary reviewed   no history of anesthetic complications:   NPO Solid Status: > 8 hours  NPO Liquid Status: > 2 hours           Airway   Mallampati: II  TM distance: >3 FB  Neck ROM: full  No difficulty expected  Dental    (+) edentulous, upper dentures and lower dentures    Pulmonary - normal exam    breath sounds clear to auscultation  (+) a smoker Current, Smoked day of surgery, cigarettes,  Cardiovascular   Exercise tolerance: poor (<4 METS)    ECG reviewed  PT is on anticoagulation therapy  Patient on routine beta blocker  Rhythm: regular  Rate: normal    (+) hypertension well controlled, valvular problems/murmurs AI and MR, past MI , CAD, CABG >6 Months, murmur, hyperlipidemia    ROS comment: 10/2021  Mild diffuse hypokinesis with adequate left ventricular systolic function .  Fibrocalcific mitral and aortic valves.  Mild MR and mild TR.  Mild AI.    Takes PLETAL    Neuro/Psych  GI/Hepatic/Renal/Endo    (+) GERD, renal disease-, thyroid problem hypothyroidism    Musculoskeletal     (+) back pain, neck stiffness      ROS comment: 4/27/23 Thoracic spine MRI:  IMPRESSION:                 1. Probably acute to subacute T10 inferior endplate fracture with mild central height loss.  2. Stable appearance of T6, T11, T12, L1 fractures with height loss, as described above.    3. Multilevel degenerative changes with multilevel mild spinal canal narrowing and multilevel   mild-to-moderate neural foraminal narrowing, similar compared to previous MRI.    Abdominal    Substance History      OB/GYN          Other   arthritis,     ROS/Med Hx Other:                    Anesthesia Plan    ASA 3     general     (Total IV Anesthesia    Patient understands anesthesia not responsible for dental damage.  )  intravenous induction     Anesthetic plan, risks, benefits, and alternatives have been provided, discussed and informed consent has been obtained with: patient and child.  Pre-procedure education  provided  Plan discussed with CRNA.      CODE STATUS:

## 2024-02-22 ENCOUNTER — HOSPITAL ENCOUNTER (OUTPATIENT)
Facility: HOSPITAL | Age: 79
Setting detail: HOSPITAL OUTPATIENT SURGERY
Discharge: HOME | End: 2024-02-22
Attending: INTERNAL MEDICINE | Admitting: INTERNAL MEDICINE
Payer: MEDICARE

## 2024-02-22 ENCOUNTER — ANESTHESIA (OUTPATIENT)
Dept: GASTROENTEROLOGY | Facility: HOSPITAL | Age: 79
End: 2024-02-22
Payer: MEDICARE

## 2024-02-22 VITALS
OXYGEN SATURATION: 96 % | RESPIRATION RATE: 18 BRPM | WEIGHT: 78.48 LBS | TEMPERATURE: 97.3 F | BODY MASS INDEX: 15.82 KG/M2 | HEART RATE: 73 BPM | DIASTOLIC BLOOD PRESSURE: 100 MMHG | SYSTOLIC BLOOD PRESSURE: 156 MMHG | HEIGHT: 59 IN

## 2024-02-22 DIAGNOSIS — Z12.11 ENCOUNTER FOR SCREENING FOR MALIGNANT NEOPLASM OF COLON: ICD-10-CM

## 2024-02-22 DIAGNOSIS — Z86.010 HISTORY OF COLON POLYPS: ICD-10-CM

## 2024-02-22 PROCEDURE — 25810000003 LACTATED RINGERS PER 1000 ML: Performed by: NURSE ANESTHETIST, CERTIFIED REGISTERED

## 2024-02-22 PROCEDURE — 88305 TISSUE EXAM BY PATHOLOGIST: CPT | Performed by: INTERNAL MEDICINE

## 2024-02-22 PROCEDURE — 25010000002 PROPOFOL 10 MG/ML EMULSION: Performed by: NURSE ANESTHETIST, CERTIFIED REGISTERED

## 2024-02-22 PROCEDURE — 45385 COLONOSCOPY W/LESION REMOVAL: CPT | Performed by: INTERNAL MEDICINE

## 2024-02-22 RX ORDER — LIDOCAINE HYDROCHLORIDE 20 MG/ML
INJECTION, SOLUTION EPIDURAL; INFILTRATION; INTRACAUDAL; PERINEURAL AS NEEDED
Status: DISCONTINUED | OUTPATIENT
Start: 2024-02-22 | End: 2024-02-22 | Stop reason: SURG

## 2024-02-22 RX ORDER — PROPOFOL 10 MG/ML
VIAL (ML) INTRAVENOUS AS NEEDED
Status: DISCONTINUED | OUTPATIENT
Start: 2024-02-22 | End: 2024-02-22 | Stop reason: SURG

## 2024-02-22 RX ORDER — SODIUM CHLORIDE, SODIUM LACTATE, POTASSIUM CHLORIDE, CALCIUM CHLORIDE 600; 310; 30; 20 MG/100ML; MG/100ML; MG/100ML; MG/100ML
INJECTION, SOLUTION INTRAVENOUS CONTINUOUS PRN
Status: DISCONTINUED | OUTPATIENT
Start: 2024-02-22 | End: 2024-02-22 | Stop reason: SURG

## 2024-02-22 RX ADMIN — LIDOCAINE HYDROCHLORIDE 50 MG: 20 INJECTION, SOLUTION EPIDURAL; INFILTRATION; INTRACAUDAL; PERINEURAL at 10:21

## 2024-02-22 RX ADMIN — PROPOFOL 50 MG: 10 INJECTION, EMULSION INTRAVENOUS at 10:21

## 2024-02-22 RX ADMIN — SODIUM CHLORIDE, POTASSIUM CHLORIDE, SODIUM LACTATE AND CALCIUM CHLORIDE: 600; 310; 30; 20 INJECTION, SOLUTION INTRAVENOUS at 10:16

## 2024-02-22 RX ADMIN — PROPOFOL 175 MCG/KG/MIN: 10 INJECTION, EMULSION INTRAVENOUS at 10:21

## 2024-02-22 NOTE — H&P
ScreeningPre Procedure History & Physical    Chief Complaint:   Screening     Subjective     HPI:   Screening     Past Medical History:   Past Medical History:   Diagnosis Date    Allergic rhinitis     Seasonal allergies    Aortic stenosis, mild 10/18/2021    Arthritis     CAD s/p CABG 10/18/2021    Chronic heart failure with preserved ejection fraction (HFpEF) 2021    Diverticulitis     Essential hypertension 2021    Fatigue     Heart attack 10/18/2021    Hemorrhoids     Hyperlipidemia LDL goal <70 2021    Irritable bowel syndrome with diarrhea     Memory loss     forgetfulness    Microhematuria 2019    Nephrolithiasis 2019    Smoker 2021       Past Surgical History:  Past Surgical History:   Procedure Laterality Date    CARDIAC SURGERY  2013    4 way bypass    COLONOSCOPY  2016,     CORONARY ARTERY BYPASS GRAFT  2013    ENDOSCOPY  2019    HERNIA REPAIR      OTHER SURGICAL HISTORY      cardiac stents, 2 stents placed    VERTEBROPLASTY N/A 2022    Procedure: VERTEBROPLASTY, THORACIC 11;  Surgeon: Redd Cisneros MD;  Location: formerly [facility] MAIN OR;  Service: Neurosurgery;  Laterality: N/A;       Family History:  Family History   Problem Relation Age of Onset    Stroke Father         MINI    Hypertension Father     COPD Father         Black lung  age 88    Other Father         Family history of Stroke    Skin cancer Father     Hypertension Mother     Heart attack Mother         MI    Other Mother         Family History of Heart Disease    Skin cancer Mother     Breast cancer Neg Hx     Ovarian cancer Neg Hx     Uterine cancer Neg Hx     Cervical cancer Neg Hx     Colon cancer Neg Hx     Stomach cancer Neg Hx        Social History:   reports that she has been smoking cigarettes. She has a 20.00 pack-year smoking history. She has been exposed to tobacco smoke. She has never used smokeless tobacco. She reports that she does not drink alcohol and does not  "use drugs.    Medications:   Medications Prior to Admission   Medication Sig Dispense Refill Last Dose    atorvastatin (LIPITOR) 80 MG tablet TAKE ONE TABLET BY MOUTH EVERY NIGHT AT BEDTIME 90 tablet 1 2/21/2024    carvedilol (COREG) 6.25 MG tablet TAKE 1 TABLET BY MOUTH TWICE A DAY WITH A MEAL 180 tablet 3 2/22/2024    cetirizine (zyrTEC) 10 MG tablet TAKE ONE TABLET BY MOUTH DAILY 90 tablet 1 2/22/2024    fluticasone (FLONASE) 50 MCG/ACT nasal spray SPRAY TWO SPRAYS IN EACH NOSTRIL ONCE DAILY 16 mL 1 2/21/2024    omeprazole (priLOSEC) 20 MG capsule Take 1 capsule by mouth Daily.   2/22/2024    ondansetron (ZOFRAN) 4 MG tablet TAKE 1 TABLET BY MOUTH EVERY 8 HOURS AS NEEDED FOR NAUSEA OR VOMITING 90 tablet 1 2/22/2024    sertraline (ZOLOFT) 50 MG tablet TAKE ONE TABLET BY MOUTH DAILY 90 tablet 1 2/22/2024    vitamin D (ERGOCALCIFEROL) 1.25 MG (53987 UT) capsule capsule Take 1 capsule by mouth 1 (One) Time Per Week.   Past Week    vitamin E 400 UNIT capsule Take 1 capsule by mouth 2 (Two) Times a Day.   2/22/2024    aspirin 81 MG EC tablet Take 1 tablet by mouth Daily. Last dose 03/16/22 per Dr. Obando instructed   2/19/2024    cilostazol (PLETAL) 100 MG tablet TAKE ONE TABLET BY MOUTH TWICE A  tablet 3 2/18/2024    diphenoxylate-atropine (LOMOTIL) 2.5-0.025 MG per tablet TAKE 2 TABLETS BY MOUTH DAILY AS NEEDED FOR DIARRHEA 60 tablet 0     HYDROcodone-acetaminophen (NORCO) 5-325 MG per tablet Take 1 tablet by mouth 2 (Two) Times a Day As Needed for Moderate Pain or Severe Pain. 20 tablet 0 More than a month       Allergies:  Patient has no known allergies.        Objective     Blood pressure 113/72, pulse 82, temperature 96.6 °F (35.9 °C), resp. rate 18, height 149.9 cm (59\"), weight 35.6 kg (78 lb 7.7 oz), SpO2 95%, not currently breastfeeding.    Physical Exam   Constitutional: Pt is oriented to person, place, and time and well-developed, well-nourished, and in no distress.   Mouth/Throat: Oropharynx is " clear and moist.   Neck: Normal range of motion.   Cardiovascular: Normal rate, regular rhythm and normal heart sounds.    Pulmonary/Chest: Effort normal and breath sounds normal.   Abdominal: Soft. Nontender  Skin: Skin is warm and dry.   Psychiatric: Mood, memory, affect and judgment normal.     Assessment & Plan     Diagnosis:  Screening colonoscopy  H/o colon polyps     Anticipated Surgical Procedure:  colonoscopy    The risks, benefits, and alternatives of this procedure have been discussed with the patient or the responsible party- the patient understands and agrees to proceed.

## 2024-02-22 NOTE — ANESTHESIA POSTPROCEDURE EVALUATION
Patient: Nilda Julien    Procedure Summary       Date: 02/22/24 Room / Location: [facility] ENDOSCOPY 2 / [facility]    Anesthesia Start: 1016 Anesthesia Stop: 1050    Procedure: COLONOSCOPY WITH HOT SNARE POLYPECTOMIES Diagnosis:       Encounter for screening for malignant neoplasm of colon      History of colon polyps      (Encounter for screening for malignant neoplasm of colon [Z12.11])      (History of colon polyps [Z86.010])    Surgeons: Can Pearce MD Provider: Bar Gale CRNA    Anesthesia Type: general ASA Status: 3            Anesthesia Type: general    Vitals  Vitals Value Taken Time   /100 02/22/24 1104   Temp 36.3 °C (97.3 °F) 02/22/24 1104   Pulse 73 02/22/24 1104   Resp 18 02/22/24 1104   SpO2 96 % 02/22/24 1104           Post Anesthesia Care and Evaluation    Patient location during evaluation: bedside  Patient participation: complete - patient participated  Level of consciousness: awake  Pain management: adequate    Airway patency: patent  Anesthetic complications: No anesthetic complications  PONV Status: controlled  Cardiovascular status: acceptable and stable  Respiratory status: acceptable

## 2024-02-23 LAB
CYTO UR: NORMAL
LAB AP CASE REPORT: NORMAL
LAB AP CLINICAL INFORMATION: NORMAL
PATH REPORT.FINAL DX SPEC: NORMAL
PATH REPORT.GROSS SPEC: NORMAL

## 2024-02-26 RX ORDER — ERGOCALCIFEROL 1.25 MG/1
50000 CAPSULE ORAL WEEKLY
Qty: 13 CAPSULE | Refills: 0 | Status: SHIPPED | OUTPATIENT
Start: 2024-02-26

## 2024-03-02 DIAGNOSIS — R19.7 DIARRHEA, UNSPECIFIED TYPE: ICD-10-CM

## 2024-03-04 RX ORDER — DIPHENOXYLATE HYDROCHLORIDE AND ATROPINE SULFATE 2.5; .025 MG/1; MG/1
TABLET ORAL
Qty: 60 TABLET | Refills: 0 | Status: SHIPPED | OUTPATIENT
Start: 2024-03-04

## 2024-04-11 ENCOUNTER — LAB (OUTPATIENT)
Dept: LAB | Facility: HOSPITAL | Age: 79
End: 2024-04-11
Payer: MEDICARE

## 2024-04-11 DIAGNOSIS — I10 ESSENTIAL HYPERTENSION: ICD-10-CM

## 2024-04-11 DIAGNOSIS — E55.9 VITAMIN D DEFICIENCY: ICD-10-CM

## 2024-04-11 DIAGNOSIS — E03.9 ACQUIRED HYPOTHYROIDISM: ICD-10-CM

## 2024-04-11 DIAGNOSIS — R73.09 ELEVATED GLUCOSE: ICD-10-CM

## 2024-04-11 LAB — HOLD SPECIMEN: NORMAL

## 2024-04-11 PROCEDURE — 84443 ASSAY THYROID STIM HORMONE: CPT

## 2024-04-11 PROCEDURE — 82306 VITAMIN D 25 HYDROXY: CPT

## 2024-04-11 PROCEDURE — 36415 COLL VENOUS BLD VENIPUNCTURE: CPT

## 2024-04-11 PROCEDURE — 83735 ASSAY OF MAGNESIUM: CPT

## 2024-04-11 PROCEDURE — 80053 COMPREHEN METABOLIC PANEL: CPT

## 2024-04-11 PROCEDURE — 83036 HEMOGLOBIN GLYCOSYLATED A1C: CPT

## 2024-04-11 PROCEDURE — 85027 COMPLETE CBC AUTOMATED: CPT

## 2024-04-11 PROCEDURE — 80061 LIPID PANEL: CPT

## 2024-04-11 PROCEDURE — 84100 ASSAY OF PHOSPHORUS: CPT

## 2024-04-11 PROCEDURE — 81001 URINALYSIS AUTO W/SCOPE: CPT

## 2024-04-12 LAB
25(OH)D3 SERPL-MCNC: 63.5 NG/ML (ref 30–100)
ALBUMIN SERPL-MCNC: 4.1 G/DL (ref 3.5–5.2)
ALBUMIN/GLOB SERPL: 1.2 G/DL
ALP SERPL-CCNC: 151 U/L (ref 39–117)
ALT SERPL W P-5'-P-CCNC: 14 U/L (ref 1–33)
AMORPH URATE CRY URNS QL MICRO: NORMAL /HPF
ANION GAP SERPL CALCULATED.3IONS-SCNC: 11 MMOL/L (ref 5–15)
AST SERPL-CCNC: 17 U/L (ref 1–32)
BACTERIA UR QL AUTO: NORMAL /HPF
BILIRUB SERPL-MCNC: 0.4 MG/DL (ref 0–1.2)
BILIRUB UR QL STRIP: NEGATIVE
BUN SERPL-MCNC: 17 MG/DL (ref 8–23)
BUN/CREAT SERPL: 14.3 (ref 7–25)
CALCIUM SPEC-SCNC: 8.6 MG/DL (ref 8.6–10.5)
CHLORIDE SERPL-SCNC: 101 MMOL/L (ref 98–107)
CHOLEST SERPL-MCNC: 108 MG/DL (ref 0–200)
CLARITY UR: ABNORMAL
CO2 SERPL-SCNC: 25 MMOL/L (ref 22–29)
COLOR UR: YELLOW
CREAT SERPL-MCNC: 1.19 MG/DL (ref 0.57–1)
DEPRECATED RDW RBC AUTO: 43.3 FL (ref 37–54)
EGFRCR SERPLBLD CKD-EPI 2021: 46.9 ML/MIN/1.73
ERYTHROCYTE [DISTWIDTH] IN BLOOD BY AUTOMATED COUNT: 15.2 % (ref 12.3–15.4)
GLOBULIN UR ELPH-MCNC: 3.3 GM/DL
GLUCOSE SERPL-MCNC: 108 MG/DL (ref 65–99)
GLUCOSE UR STRIP-MCNC: NEGATIVE MG/DL
HBA1C MFR BLD: 5.9 % (ref 4.8–5.6)
HCT VFR BLD AUTO: 41.3 % (ref 34–46.6)
HDLC SERPL QL: 3.27
HDLC SERPL-MCNC: 33 MG/DL (ref 40–60)
HGB BLD-MCNC: 12.6 G/DL (ref 12–15.9)
HGB UR QL STRIP.AUTO: NEGATIVE
HYALINE CASTS UR QL AUTO: NORMAL /LPF
KETONES UR QL STRIP: NEGATIVE
LDLC SERPL CALC-MCNC: 48 MG/DL (ref 0–100)
LEUKOCYTE ESTERASE UR QL STRIP.AUTO: ABNORMAL
MAGNESIUM SERPL-MCNC: 2.1 MG/DL (ref 1.6–2.4)
MCH RBC QN AUTO: 24.2 PG (ref 26.6–33)
MCHC RBC AUTO-ENTMCNC: 30.5 G/DL (ref 31.5–35.7)
MCV RBC AUTO: 79.3 FL (ref 79–97)
NITRITE UR QL STRIP: NEGATIVE
PH UR STRIP.AUTO: 6 [PH] (ref 5–8)
PHOSPHATE SERPL-MCNC: 4.4 MG/DL (ref 2.5–4.5)
PLATELET # BLD AUTO: 175 10*3/MM3 (ref 140–450)
PMV BLD AUTO: 11.3 FL (ref 6–12)
POTASSIUM SERPL-SCNC: 4.3 MMOL/L (ref 3.5–5.2)
PROT SERPL-MCNC: 7.4 G/DL (ref 6–8.5)
PROT UR QL STRIP: ABNORMAL
RBC # BLD AUTO: 5.21 10*6/MM3 (ref 3.77–5.28)
RBC # UR STRIP: NORMAL /HPF
REF LAB TEST METHOD: NORMAL
SODIUM SERPL-SCNC: 137 MMOL/L (ref 136–145)
SP GR UR STRIP: >=1.03 (ref 1–1.03)
SQUAMOUS #/AREA URNS HPF: NORMAL /HPF
TRIGL SERPL-MCNC: 158 MG/DL (ref 0–150)
TSH SERPL DL<=0.05 MIU/L-ACNC: 1.79 UIU/ML (ref 0.27–4.2)
UROBILINOGEN UR QL STRIP: ABNORMAL
VLDLC SERPL-MCNC: 27 MG/DL (ref 5–40)
WBC # UR STRIP: NORMAL /HPF
WBC NRBC COR # BLD AUTO: 5.45 10*3/MM3 (ref 3.4–10.8)

## 2024-04-15 DIAGNOSIS — R19.7 DIARRHEA, UNSPECIFIED TYPE: ICD-10-CM

## 2024-04-15 DIAGNOSIS — R11.0 NAUSEA: ICD-10-CM

## 2024-04-15 RX ORDER — ONDANSETRON 4 MG/1
TABLET, FILM COATED ORAL
Qty: 90 TABLET | Refills: 1 | Status: SHIPPED | OUTPATIENT
Start: 2024-04-15

## 2024-04-15 RX ORDER — DIPHENOXYLATE HYDROCHLORIDE AND ATROPINE SULFATE 2.5; .025 MG/1; MG/1
TABLET ORAL
Qty: 60 TABLET | Refills: 0 | Status: SHIPPED | OUTPATIENT
Start: 2024-04-15

## 2024-04-22 ENCOUNTER — OFFICE VISIT (OUTPATIENT)
Dept: FAMILY MEDICINE CLINIC | Facility: CLINIC | Age: 79
End: 2024-04-22
Payer: MEDICARE

## 2024-04-22 VITALS
HEART RATE: 78 BPM | TEMPERATURE: 98.2 F | WEIGHT: 83.2 LBS | OXYGEN SATURATION: 99 % | SYSTOLIC BLOOD PRESSURE: 138 MMHG | DIASTOLIC BLOOD PRESSURE: 60 MMHG | RESPIRATION RATE: 16 BRPM | BODY MASS INDEX: 18.72 KG/M2 | HEIGHT: 56 IN

## 2024-04-22 DIAGNOSIS — F41.9 ANXIETY: ICD-10-CM

## 2024-04-22 DIAGNOSIS — E55.9 VITAMIN D DEFICIENCY: ICD-10-CM

## 2024-04-22 DIAGNOSIS — R80.1 PERSISTENT PROTEINURIA: ICD-10-CM

## 2024-04-22 DIAGNOSIS — Z51.81 MEDICATION MONITORING ENCOUNTER: Primary | ICD-10-CM

## 2024-04-22 DIAGNOSIS — K58.0 IRRITABLE BOWEL SYNDROME WITH DIARRHEA: ICD-10-CM

## 2024-04-22 DIAGNOSIS — E78.5 HYPERLIPIDEMIA LDL GOAL <70: ICD-10-CM

## 2024-04-22 DIAGNOSIS — E03.9 ACQUIRED HYPOTHYROIDISM: ICD-10-CM

## 2024-04-22 DIAGNOSIS — I10 ESSENTIAL HYPERTENSION: ICD-10-CM

## 2024-04-22 DIAGNOSIS — L30.9 DERMATITIS: ICD-10-CM

## 2024-04-22 LAB
AMPHET+METHAMPHET UR QL: NEGATIVE
AMPHETAMINE INTERNAL CONTROL: NORMAL
AMPHETAMINES UR QL: NEGATIVE
BARBITURATE INTERNAL CONTROL: NORMAL
BARBITURATES UR QL SCN: NEGATIVE
BENZODIAZ UR QL SCN: NEGATIVE
BENZODIAZEPINE INTERNAL CONTROL: NORMAL
BUPRENORPHINE INTERNAL CONTROL: NORMAL
BUPRENORPHINE SERPL-MCNC: NEGATIVE NG/ML
CANNABINOIDS SERPL QL: NEGATIVE
COCAINE INTERNAL CONTROL: NORMAL
COCAINE UR QL: NEGATIVE
EXPIRATION DATE: NORMAL
Lab: NORMAL
MDMA (ECSTASY) INTERNAL CONTROL: NORMAL
MDMA UR QL SCN: NEGATIVE
METHADONE INTERNAL CONTROL: NORMAL
METHADONE UR QL SCN: NEGATIVE
METHAMPHETAMINE INTERNAL CONTROL: NORMAL
MORPHINE INTERNAL CONTROL: NORMAL
MORPHINE/OPIATES SCREEN, URINE: NEGATIVE
OXYCODONE INTERNAL CONTROL: NORMAL
OXYCODONE UR QL SCN: NEGATIVE
PCP UR QL SCN: NEGATIVE
PHENCYCLIDINE INTERNAL CONTROL: NORMAL
THC INTERNAL CONTROL: NORMAL

## 2024-04-22 PROCEDURE — G0439 PPPS, SUBSEQ VISIT: HCPCS | Performed by: NURSE PRACTITIONER

## 2024-04-22 PROCEDURE — 3078F DIAST BP <80 MM HG: CPT | Performed by: NURSE PRACTITIONER

## 2024-04-22 PROCEDURE — 1159F MED LIST DOCD IN RCRD: CPT | Performed by: NURSE PRACTITIONER

## 2024-04-22 PROCEDURE — 1160F RVW MEDS BY RX/DR IN RCRD: CPT | Performed by: NURSE PRACTITIONER

## 2024-04-22 PROCEDURE — 80305 DRUG TEST PRSMV DIR OPT OBS: CPT | Performed by: NURSE PRACTITIONER

## 2024-04-22 PROCEDURE — 3075F SYST BP GE 130 - 139MM HG: CPT | Performed by: NURSE PRACTITIONER

## 2024-04-22 PROCEDURE — 99214 OFFICE O/P EST MOD 30 MIN: CPT | Performed by: NURSE PRACTITIONER

## 2024-04-22 PROCEDURE — 1170F FXNL STATUS ASSESSED: CPT | Performed by: NURSE PRACTITIONER

## 2024-04-22 RX ORDER — TRIAMCINOLONE ACETONIDE 0.25 MG/G
1 OINTMENT TOPICAL 2 TIMES DAILY
Qty: 15 G | Refills: 1 | Status: SHIPPED | OUTPATIENT
Start: 2024-04-22

## 2024-04-22 NOTE — PROGRESS NOTES
The ABCs of the Annual Wellness Visit  Subsequent Medicare Wellness Visit    Subjective    Nilda Julien is a 78 y.o. female who presents for a Subsequent Medicare Wellness Visit.    The following portions of the patient's history were reviewed and   updated as appropriate: allergies, current medications, past family history, past medical history, past social history, past surgical history, and problem list.    Compared to one year ago, the patient feels her physical   health is the same.    Compared to one year ago, the patient feels her mental   health is the same.    Recent Hospitalizations:  She was not admitted to the hospital during the last year.       Current Medical Providers:  Patient Care Team:  Ralph Marcus APRN as PCP - General (Nurse Practitioner)  Dannie Obando MD as Consulting Physician (Cardiology)  Ann Scott APRN as Nurse Practitioner (Nurse Practitioner)    Outpatient Medications Prior to Visit   Medication Sig Dispense Refill    aspirin 81 MG EC tablet Take 1 tablet by mouth Daily. Last dose 03/16/22 per Dr. Obando instructed      atorvastatin (LIPITOR) 80 MG tablet TAKE ONE TABLET BY MOUTH EVERY NIGHT AT BEDTIME 90 tablet 1    carvedilol (COREG) 6.25 MG tablet TAKE 1 TABLET BY MOUTH TWICE A DAY WITH A MEAL 180 tablet 3    cetirizine (zyrTEC) 10 MG tablet TAKE ONE TABLET BY MOUTH DAILY 90 tablet 1    cilostazol (PLETAL) 100 MG tablet TAKE ONE TABLET BY MOUTH TWICE A  tablet 3    diphenoxylate-atropine (LOMOTIL) 2.5-0.025 MG per tablet TAKE 2 TABLETS BY MOUTH DAILY AS NEEDED FOR DIARRHEA 60 tablet 0    fluticasone (FLONASE) 50 MCG/ACT nasal spray SPRAY TWO SPRAYS IN EACH NOSTRIL ONCE DAILY 16 mL 1    HYDROcodone-acetaminophen (NORCO) 5-325 MG per tablet Take 1 tablet by mouth 2 (Two) Times a Day As Needed for Moderate Pain or Severe Pain. 20 tablet 0    omeprazole (priLOSEC) 20 MG capsule Take 1 capsule by mouth Daily.      ondansetron (ZOFRAN) 4 MG  tablet TAKE 1 TABLET BY MOUTH EVERY 8 HOURS AS NEEDED FOR NAUSEA AND/OR VOMITING 90 tablet 1    vitamin D (ERGOCALCIFEROL) 1.25 MG (00189 UT) capsule capsule TAKE ONE CAPSULE BY MOUTH ONCE WEEKLY 13 capsule 0    vitamin E 400 UNIT capsule Take 1 capsule by mouth 2 (Two) Times a Day.      sertraline (ZOLOFT) 50 MG tablet TAKE ONE TABLET BY MOUTH DAILY 90 tablet 1     No facility-administered medications prior to visit.       Opioid medication/s are on active medication list.  and I have evaluated her active treatment plan and pain score trends (see table).  Vitals:    04/22/24 1450   PainSc:   8   PainLoc: Back     I have reviewed the chart for potential of high risk medication and harmful drug interactions in the elderly.          Aspirin is on active medication list. Aspirin use is indicated based on review of current medical condition/s. Pros and cons of this therapy have been discussed today. Benefits of this medication outweigh potential harm.  Patient has been encouraged to continue taking this medication.  .      Patient Active Problem List   Diagnosis    Hyperlipidemia LDL goal <70    Essential hypertension    Gastroesophageal reflux disease without esophagitis    Acquired hypothyroidism    Allergic rhinitis    Aortic stenosis, mild    Arthritis    CAD s/p CABG    Diverticulitis    Fatigue    Hemorrhoids    Irritable bowel syndrome with diarrhea    Microhematuria    Nephrolithiasis    Smoker    Chronic heart failure with preserved ejection fraction (HFpEF)    Age-related osteoporosis without current pathological fracture    Encounter for screening for malignant neoplasm of colon    History of colon polyps    Anxiety     Advance Care Planning   Advance Care Planning     Advance Directive is on file.  ACP discussion was held with the patient during this visit. Patient has an advance directive in EMR which is still valid.      Objective    Vitals:    04/22/24 1450   BP: 138/60   BP Location: Right arm   Patient  "Position: Sitting   Cuff Size: Adult   Pulse: 78   Resp: 16   Temp: 98.2 °F (36.8 °C)   TempSrc: Temporal   SpO2: 99%   Weight: 37.7 kg (83 lb 3.2 oz)   Height: 141 cm (55.5\")   PainSc:   8   PainLoc: Back     Estimated body mass index is 18.99 kg/m² as calculated from the following:    Height as of this encounter: 141 cm (55.5\").    Weight as of this encounter: 37.7 kg (83 lb 3.2 oz).    BMI is within normal parameters. No other follow-up for BMI required.      Does the patient have evidence of cognitive impairment? No    Lab Results   Component Value Date    TRIG 158 (H) 2024    HDL 33 (L) 2024    LDL 48 2024    VLDL 27 2024    HGBA1C 5.90 (H) 2024        HEALTH RISK ASSESSMENT    Smoking Status:  Social History     Tobacco Use   Smoking Status Every Day    Current packs/day: 0.50    Average packs/day: 0.5 packs/day for 40.0 years (20.0 ttl pk-yrs)    Types: Cigarettes    Passive exposure: Current   Smokeless Tobacco Never   Tobacco Comments    Current every day smoker, 0.5 PPD, smoked for 31 or more years     Alcohol Consumption:  Social History     Substance and Sexual Activity   Alcohol Use Never     Fall Risk Screen:    STEADI Fall Risk Assessment was completed, and patient is at MODERATE risk for falls. Assessment completed on:2024    Depression Screenin/22/2024     2:53 PM   PHQ-2/PHQ-9 Depression Screening   Little Interest or Pleasure in Doing Things 1-->several days   Feeling Down, Depressed or Hopeless 0-->not at all   PHQ-9: Brief Depression Severity Measure Score 1       Health Habits and Functional and Cognitive Screenin/22/2024     2:54 PM   Functional & Cognitive Status   Do you have difficulty preparing food and eating? Yes   Do you have difficulty bathing yourself, getting dressed or grooming yourself? No   Do you have difficulty using the toilet? Yes   Do you have difficulty moving around from place to place? Yes   Do you have trouble with " steps or getting out of a bed or a chair? Yes   Current Diet Unhealthy Diet   Dental Exam Up to date   Eye Exam Up to date   Exercise (times per week) 0 times per week   Current Exercises Include No Regular Exercise   Do you need help using the phone?  No   Are you deaf or do you have serious difficulty hearing?  Yes   Do you need help to go to places out of walking distance? No   Do you need help shopping? No   Do you need help preparing meals?  Yes   Do you need help with housework?  No   Do you need help with laundry? No   Do you need help taking your medications? No   Do you need help managing money? No   Do you ever drive or ride in a car without wearing a seat belt? No   Have you felt unusual stress, anger or loneliness in the last month? No   Who do you live with? Alone   If you need help, do you have trouble finding someone available to you? No   Have you been bothered in the last four weeks by sexual problems? No   Do you have difficulty concentrating, remembering or making decisions? Yes       Age-appropriate Screening Schedule:  Refer to the list below for future screening recommendations based on patient's age, sex and/or medical conditions. Orders for these recommended tests are listed in the plan section. The patient has been provided with a written plan.    Health Maintenance   Topic Date Due    ZOSTER VACCINE (1 of 2) 04/22/2024 (Originally 9/3/1995)    COVID-19 Vaccine (5 - 2023-24 season) 04/22/2025 (Originally 9/1/2023)    RSV Vaccine - Adults (1 - 1-dose 60+ series) 04/22/2025 (Originally 9/3/2005)    INFLUENZA VACCINE  08/01/2024    DXA SCAN  09/13/2024    LIPID PANEL  04/11/2025    TDAP/TD VACCINES (3 - Tdap) 04/17/2025    ANNUAL WELLNESS VISIT  04/22/2025    COLORECTAL CANCER SCREENING  02/22/2027    HEPATITIS C SCREENING  Completed    Pneumococcal Vaccine 65+  Completed    LUNG CANCER SCREENING  Discontinued                  CMS Preventative Services Quick Reference  Risk Factors Identified  "During Encounter  None Identified  states is lazy to fix food.  Able to care for herself.  The above risks/problems have been discussed with the patient.  Pertinent information has been shared with the patient in the After Visit Summary.  An After Visit Summary and PPPS were made available to the patient.    Follow Up:   Next Medicare Wellness visit to be scheduled in 1 year.       Additional E&M Note during same encounter follows:  Patient has multiple medical problems which are significant and separately identifiable that require additional work above and beyond the Medicare Wellness Visit.      Chief Complaint  Hypertension and Depression    Subjective        Diarrhea:  Has been on lomotil states hasn't had any workup that she remembers for it but has had even before her back surgery.   Back pain: Hydrocodone is helping some but not a lot.    Back pain:  States tramadol no longer helping.  States that was taking hydrocodone and stopped taking and now when gets bad will lay down and suffer through she states.    Has an area on her rectum she would like treatment but doesn't want to have it looked at today but if doesn't get better will come back.    Hypertension  Pertinent negatives include no chest pain, headaches or shortness of breath.   DepressionPatient is not experiencing: shortness of breath and chest pain.  Her past medical history is significant for depression.     Nilda Julien is also being seen today for 6 month followup.    Review of Systems   Constitutional:  Negative for fever.   Respiratory:  Negative for cough, chest tightness and shortness of breath.    Cardiovascular:  Negative for chest pain.       Objective   Vital Signs:  /60 (BP Location: Right arm, Patient Position: Sitting, Cuff Size: Adult)   Pulse 78   Temp 98.2 °F (36.8 °C) (Temporal)   Resp 16   Ht 141 cm (55.5\")   Wt 37.7 kg (83 lb 3.2 oz)   SpO2 99%   BMI 18.99 kg/m²     Physical Exam  Vitals reviewed.   Constitutional: "       Appearance: Normal appearance. She is well-developed.   HENT:      Head: Normocephalic and atraumatic.      Mouth/Throat:      Pharynx: No oropharyngeal exudate.   Eyes:      Conjunctiva/sclera: Conjunctivae normal.      Pupils: Pupils are equal, round, and reactive to light.   Cardiovascular:      Rate and Rhythm: Normal rate and regular rhythm.      Heart sounds: Normal heart sounds. No murmur heard.     No friction rub. No gallop.   Pulmonary:      Effort: Pulmonary effort is normal.      Breath sounds: Normal breath sounds. No wheezing or rhonchi.   Skin:     General: Skin is warm and dry.   Neurological:      Mental Status: She is alert and oriented to person, place, and time.   Psychiatric:         Mood and Affect: Mood and affect normal.         Behavior: Behavior normal.         Thought Content: Thought content normal.         Judgment: Judgment normal.                         Assessment and Plan   Diagnoses and all orders for this visit:    1. Medication monitoring encounter (Primary)  -     POC Medline 12 Panel Urine Drug Screen    2. Anxiety  -     sertraline (ZOLOFT) 50 MG tablet; Take 1 tablet by mouth Daily.  Dispense: 90 tablet; Refill: 1    3. Persistent proteinuria  -     Protein, Urine, 24 Hour - Urine, Clean Catch; Future    4. Hyperlipidemia LDL goal <70    5. Essential hypertension  -     Lipid Panel With / Chol / HDL Ratio; Future  -     Comprehensive Metabolic Panel; Future  -     CBC (No Diff); Future  -     Urinalysis With Culture If Indicated -; Future  -     Phosphorus; Future    6. Acquired hypothyroidism    7. Irritable bowel syndrome with diarrhea  -     Phosphorus; Future    8. Vitamin D deficiency  -     Vitamin D,25-Hydroxy; Future    9. Dermatitis  -     triamcinolone (KENALOG) 0.025 % ointment; Apply 1 Application topically to the appropriate area as directed 2 (Two) Times a Day.  Dispense: 15 g; Refill: 1             Follow Up   Return in about 6 months (around  10/22/2024).  Patient was given instructions and counseling regarding her condition or for health maintenance advice. Please see specific information pulled into the AVS if appropriate.

## 2024-05-24 RX ORDER — ERGOCALCIFEROL 1.25 MG/1
50000 CAPSULE ORAL WEEKLY
Qty: 13 CAPSULE | Refills: 0 | Status: SHIPPED | OUTPATIENT
Start: 2024-05-24

## 2024-05-28 DIAGNOSIS — R19.7 DIARRHEA, UNSPECIFIED TYPE: ICD-10-CM

## 2024-05-28 RX ORDER — DIPHENOXYLATE HYDROCHLORIDE AND ATROPINE SULFATE 2.5; .025 MG/1; MG/1
TABLET ORAL
Qty: 60 TABLET | Refills: 9 | Status: SHIPPED | OUTPATIENT
Start: 2024-05-28

## 2024-05-28 NOTE — TELEPHONE ENCOUNTER
UPCOMING APPTS  With Family Medicine ([facility])  10/21/2024 at 3:00 PM  LAST OFFICE VISIT - THIS DEPT  4/22/2024 Ralph Marcus APRN    UDS 4/22/24

## 2024-06-21 DIAGNOSIS — I70.219 ATHEROSCLEROSIS OF LOWER EXTREMITY WITH CLAUDICATION: ICD-10-CM

## 2024-06-21 RX ORDER — CILOSTAZOL 100 MG/1
100 TABLET ORAL 2 TIMES DAILY
Qty: 180 TABLET | Refills: 3 | Status: SHIPPED | OUTPATIENT
Start: 2024-06-21

## 2024-06-27 DIAGNOSIS — L30.9 DERMATITIS: ICD-10-CM

## 2024-06-27 DIAGNOSIS — R11.0 NAUSEA: ICD-10-CM

## 2024-06-27 RX ORDER — TRIAMCINOLONE ACETONIDE 0.25 MG/G
OINTMENT TOPICAL
Qty: 15 G | Refills: 1 | Status: SHIPPED | OUTPATIENT
Start: 2024-06-27

## 2024-06-27 RX ORDER — CETIRIZINE HYDROCHLORIDE 10 MG/1
10 TABLET ORAL DAILY
Qty: 90 TABLET | Refills: 1 | Status: SHIPPED | OUTPATIENT
Start: 2024-06-27

## 2024-06-27 RX ORDER — ONDANSETRON 4 MG/1
TABLET, FILM COATED ORAL
Qty: 90 TABLET | Refills: 1 | Status: SHIPPED | OUTPATIENT
Start: 2024-06-27

## 2024-06-27 NOTE — TELEPHONE ENCOUNTER
UPCOMING APPTS  With Family Medicine ([facility])  10/21/2024 at 3:00 PM  LAST OFFICE VISIT - THIS DEPT  4/22/2024 Ralph Marcus APRN

## 2024-07-03 ENCOUNTER — TELEPHONE (OUTPATIENT)
Dept: GASTROENTEROLOGY | Facility: CLINIC | Age: 79
End: 2024-07-03

## 2024-07-03 NOTE — TELEPHONE ENCOUNTER
Attempted to contact the patient to inform her that she missed her appointment at 2:15pm with Nivia Leigh. Left the patient a VM to contact our office to r/s.     No show letter sent to the patient.

## 2024-07-05 ENCOUNTER — TELEPHONE (OUTPATIENT)
Dept: VASCULAR SURGERY | Facility: HOSPITAL | Age: 79
End: 2024-07-05
Payer: MEDICARE

## 2024-07-05 NOTE — TELEPHONE ENCOUNTER
Caller: Nilda Julien    Relationship: Self    Best call back number: [phone]    What is the best time to reach you: ANY    Who are you requesting to speak with (clinical staff, provider,  specific staff member): CLINICAL    Do you know the name of the person who called: PT    What was the call regarding: PT WOULD LIKE TO BE SEEN LAST APPT 2021 PLEASE GIVE PT A CALL TO DISCUSS. THANK YOU    Is it okay if the provider responds through [email or web address]: NO

## 2024-07-22 ENCOUNTER — OFFICE VISIT (OUTPATIENT)
Dept: VASCULAR SURGERY | Facility: HOSPITAL | Age: 79
End: 2024-07-22
Payer: MEDICARE

## 2024-07-22 VITALS
OXYGEN SATURATION: 92 % | SYSTOLIC BLOOD PRESSURE: 102 MMHG | RESPIRATION RATE: 18 BRPM | DIASTOLIC BLOOD PRESSURE: 60 MMHG | TEMPERATURE: 97.8 F | HEART RATE: 61 BPM

## 2024-07-22 DIAGNOSIS — M79.606 ISCHEMIC REST PAIN OF LOWER EXTREMITY: Primary | ICD-10-CM

## 2024-07-22 DIAGNOSIS — R09.89 OTHER SPECIFIED SYMPTOMS AND SIGNS INVOLVING THE CIRCULATORY AND RESPIRATORY SYSTEMS: ICD-10-CM

## 2024-07-22 DIAGNOSIS — I77.79 DISSECTION OF OTHER SPECIFIED ARTERY: ICD-10-CM

## 2024-07-22 DIAGNOSIS — I99.8 ISCHEMIC REST PAIN OF LOWER EXTREMITY: Primary | ICD-10-CM

## 2024-07-22 PROCEDURE — 1159F MED LIST DOCD IN RCRD: CPT | Performed by: SURGERY

## 2024-07-22 PROCEDURE — 1160F RVW MEDS BY RX/DR IN RCRD: CPT | Performed by: SURGERY

## 2024-07-22 PROCEDURE — 3078F DIAST BP <80 MM HG: CPT | Performed by: SURGERY

## 2024-07-22 PROCEDURE — 99213 OFFICE O/P EST LOW 20 MIN: CPT | Performed by: SURGERY

## 2024-07-22 PROCEDURE — 3074F SYST BP LT 130 MM HG: CPT | Performed by: SURGERY

## 2024-07-22 PROCEDURE — G0463 HOSPITAL OUTPT CLINIC VISIT: HCPCS | Performed by: SURGERY

## 2024-07-22 NOTE — PROGRESS NOTES
[facility]   Follow up Office    Patient Name: Nilda Julien  : 1945  MRN: [id number]  Primary Care Physician:  Ralph Marcus APRN      Subjective   Subjective     HPI:    Nilda Julien is a 78 y.o. female with no significant peripheral vascular disease.  Since last seen her symptoms have significantly worsened.  She now wakes up at least once a night sometimes more often with pain in her feet, right side worse than left.  In her house she can barely walk across the kitchen.      Objective     Vitals:   Temp:  [97.8 °F (36.6 °C)] 97.8 °F (36.6 °C)  Heart Rate:  [61] 61  Resp:  [18] 18  BP: (102)/(60) 102/60    Physical Exam      General: Alert, no acute distress.  HEENT: PERRLA  Abdomen: Benign  Extremities: Symmetric.  Right foot: Ruborous.  Pulses: Nonpalpable right pedal pulses.  Neuro: No gross deficits    Assessment & Plan   Assessment / Plan     Diagnoses and all orders for this visit:    1. Ischemic rest pain of lower extremity (Primary)  -     CT angio abdominal aorta bilat iliofem runoff w wo contrast; Future  -     Doppler Ankle Brachial Index Single Level CAR; Future    2. Dissection of other specified artery  -     CT angio abdominal aorta bilat iliofem runoff w wo contrast; Future    3. Other specified symptoms and signs involving the circulatory and respiratory systems  -     Doppler Ankle Brachial Index Single Level CAR; Future       Assessment/Plan:   Mrs. Julien has significant peripheral vascular disease.  She is experiencing ischemic rest pain.  Given that it has been 3 years since the last noninvasive assessment I am recommending that we obtain a repeat CTA of the aorta and bilateral lower extremities as well as single level ABIs.  She will follow-up with me after the above.        Electronically signed by Selvin Vásquez MD, 24, 2:10 PM EDT.

## 2024-07-29 ENCOUNTER — HOSPITAL ENCOUNTER (OUTPATIENT)
Dept: CT IMAGING | Facility: HOSPITAL | Age: 79
Discharge: HOME | End: 2024-07-29
Admitting: SURGERY
Payer: MEDICARE

## 2024-07-29 DIAGNOSIS — I99.8 ISCHEMIC REST PAIN OF LOWER EXTREMITY: ICD-10-CM

## 2024-07-29 DIAGNOSIS — I77.79 DISSECTION OF OTHER SPECIFIED ARTERY: ICD-10-CM

## 2024-07-29 DIAGNOSIS — M79.606 ISCHEMIC REST PAIN OF LOWER EXTREMITY: ICD-10-CM

## 2024-07-29 LAB
CREAT BLDA-MCNC: 0.9 MG/DL (ref 0.6–1.3)
EGFRCR SERPLBLD CKD-EPI 2021: 65.6 ML/MIN/1.73

## 2024-07-29 PROCEDURE — 25510000001 IOPAMIDOL PER 1 ML: Performed by: SURGERY

## 2024-07-29 PROCEDURE — 82565 ASSAY OF CREATININE: CPT

## 2024-07-29 PROCEDURE — 75635 CT ANGIO ABDOMINAL ARTERIES: CPT

## 2024-07-29 RX ADMIN — IOPAMIDOL 100 ML: 755 INJECTION, SOLUTION INTRAVENOUS at 14:24

## 2024-08-01 ENCOUNTER — HOSPITAL ENCOUNTER (OUTPATIENT)
Dept: CARDIOLOGY | Facility: HOSPITAL | Age: 79
Discharge: HOME | End: 2024-08-01
Admitting: SURGERY
Payer: MEDICARE

## 2024-08-01 DIAGNOSIS — I99.8 ISCHEMIC REST PAIN OF LOWER EXTREMITY: ICD-10-CM

## 2024-08-01 DIAGNOSIS — R09.89 OTHER SPECIFIED SYMPTOMS AND SIGNS INVOLVING THE CIRCULATORY AND RESPIRATORY SYSTEMS: ICD-10-CM

## 2024-08-01 DIAGNOSIS — M79.606 ISCHEMIC REST PAIN OF LOWER EXTREMITY: ICD-10-CM

## 2024-08-01 LAB
BH CV LOWER ARTERIAL LEFT ABI RATIO: 0.38
BH CV LOWER ARTERIAL LEFT DORSALIS PEDIS SYS MAX: 44
BH CV LOWER ARTERIAL LEFT GREAT TOE SYS MAX: 36
BH CV LOWER ARTERIAL LEFT POST TIBIAL SYS MAX: 49
BH CV LOWER ARTERIAL LEFT TBI RATIO: 0.28
BH CV LOWER ARTERIAL RIGHT ABI RATIO: 0.25
BH CV LOWER ARTERIAL RIGHT DORSALIS PEDIS SYS MAX: 32
BH CV LOWER ARTERIAL RIGHT GREAT TOE SYS MAX: 10
BH CV LOWER ARTERIAL RIGHT POST TIBIAL SYS MAX: 32
BH CV LOWER ARTERIAL RIGHT TBI RATIO: 0.08
UPPER ARTERIAL LEFT ARM BRACHIAL SYS MAX: 121
UPPER ARTERIAL RIGHT ARM BRACHIAL SYS MAX: 129

## 2024-08-01 PROCEDURE — 93922 UPR/L XTREMITY ART 2 LEVELS: CPT

## 2024-08-05 ENCOUNTER — OFFICE VISIT (OUTPATIENT)
Dept: VASCULAR SURGERY | Facility: HOSPITAL | Age: 79
End: 2024-08-05
Payer: MEDICARE

## 2024-08-05 VITALS
SYSTOLIC BLOOD PRESSURE: 110 MMHG | RESPIRATION RATE: 18 BRPM | DIASTOLIC BLOOD PRESSURE: 60 MMHG | OXYGEN SATURATION: 95 % | HEART RATE: 92 BPM | TEMPERATURE: 97.6 F

## 2024-08-05 DIAGNOSIS — K86.89 PANCREATIC DUCT DILATED: ICD-10-CM

## 2024-08-05 DIAGNOSIS — I70.0 AORTIC OCCLUSION: Primary | ICD-10-CM

## 2024-08-05 DIAGNOSIS — J43.9 PULMONARY EMPHYSEMA, UNSPECIFIED EMPHYSEMA TYPE: ICD-10-CM

## 2024-08-05 PROCEDURE — G0463 HOSPITAL OUTPT CLINIC VISIT: HCPCS | Performed by: SURGERY

## 2024-08-05 PROCEDURE — 1159F MED LIST DOCD IN RCRD: CPT | Performed by: SURGERY

## 2024-08-05 PROCEDURE — 3074F SYST BP LT 130 MM HG: CPT | Performed by: SURGERY

## 2024-08-05 PROCEDURE — 3078F DIAST BP <80 MM HG: CPT | Performed by: SURGERY

## 2024-08-05 PROCEDURE — 1160F RVW MEDS BY RX/DR IN RCRD: CPT | Performed by: SURGERY

## 2024-08-05 PROCEDURE — 99213 OFFICE O/P EST LOW 20 MIN: CPT | Performed by: SURGERY

## 2024-08-05 NOTE — PROGRESS NOTES
[facility]   Follow up Office    Patient Name: Nilda Julien  : 1945  MRN: [id number]  Primary Care Physician:  Ralph Marcus APRN      Subjective   Subjective     HPI:    Nilda Julien is a 78 y.o. female here for follow-up after a CTA.  She has significant bilateral rest pain, right side worse than left.      Objective     Vitals:   Temp:  [97.6 °F (36.4 °C)] 97.6 °F (36.4 °C)  Heart Rate:  [92] 92  Resp:  [18] 18  BP: (110)/(60) 110/60    Physical Exam      General: Alert, no acute distress.  HEENT: PERRLA  Abdomen: Benign  Extremities: Symmetric.  Neuro: No gross deficits    Diagnostic studies: CTA dated 2024 has been reviewed.  There is aneurysmal dilatation of the infrarenal abdominal aorta up to 3 cm and complete occlusion of the aorta below the level of the FRANCESCO.  There is also complete occlusion of the left iliac system.    Assessment & Plan   Assessment / Plan     Diagnoses and all orders for this visit:    1. Aortic occlusion (Primary)    2. Pancreatic duct dilated  -     MRI abdomen w wo contrast mrcp; Future    3. Pulmonary emphysema, unspecified emphysema type  -     Ambulatory Referral to Pulmonology       Assessment/Plan:   Mrs. Julien has aortic occlusion.  She is experiencing rest pain.  She will need surgical reconstruction.  I have discussed with her that the options exist of proceeding to an aortobifemoral bypass graft versus an ax bifemoral bypass graft.  Given her level of activity and frailty he may be best to proceed to an ax bifemoral bypass graft.  In either case we will need evaluation from her cardiologist.  I am also recommending for her to be evaluated by pulmonary.  She will follow-up with me after the above to discuss definitive treatment.  The CT also demonstrates pancreatic duct dilatation and an MRCP is recommended.  I have discussed this with her and arrangements will be made for the MRCP.        Electronically signed by Selvin Vásquez MD, 24, 4:42 PM  EDT.

## 2024-08-06 ENCOUNTER — TELEPHONE (OUTPATIENT)
Dept: CARDIOLOGY | Facility: CLINIC | Age: 79
End: 2024-08-06
Payer: MEDICARE

## 2024-08-06 NOTE — TELEPHONE ENCOUNTER
Procedure: Femoral bypass    Med Directive: NA    PMH: CAD prior CABG, HFrEF, HTN, HLD, mild aortic stenosis    Last Seen: 1/31/24

## 2024-08-19 ENCOUNTER — TELEPHONE (OUTPATIENT)
Dept: FAMILY MEDICINE CLINIC | Facility: CLINIC | Age: 79
End: 2024-08-19
Payer: MEDICARE

## 2024-08-19 NOTE — TELEPHONE ENCOUNTER
----- Message from Ralph Marcus sent at 10/18/2023 12:14 PM EDT -----  Please let patient know it is time for their follow up chest CT for pulmonary nodule.  Pend and send the order for me to sign.

## 2024-08-21 NOTE — TELEPHONE ENCOUNTER
Pt called back, has pulmonary appt and will discuss with him, it he doesn't order she will let us know and we will place order.

## 2024-08-24 DIAGNOSIS — E78.5 HYPERLIPIDEMIA LDL GOAL <70: ICD-10-CM

## 2024-08-26 RX ORDER — ERGOCALCIFEROL 1.25 MG/1
50000 CAPSULE, LIQUID FILLED ORAL WEEKLY
Qty: 13 CAPSULE | Refills: 0 | Status: SHIPPED | OUTPATIENT
Start: 2024-08-26

## 2024-08-26 RX ORDER — ATORVASTATIN CALCIUM 80 MG/1
80 TABLET, FILM COATED ORAL
Qty: 90 TABLET | Refills: 1 | Status: SHIPPED | OUTPATIENT
Start: 2024-08-26

## 2024-08-26 NOTE — PROGRESS NOTES
[facility]  Cardiology progress Note    Patient Name: Nilda Julien  : 1945    CHIEF COMPLAINT  CAD        Subjective   Subjective     HISTORY OF PRESENT ILLNESS    Nilda Julien is a 78 y.o. female with CAD status post CABG.  No chest pain.    REVIEW OF SYSTEMS    Constitutional:    No fever, no weight loss  Skin:     No rash  Otolaryngeal:    No difficulty swallowing  Cardiovascular: See HPI.  Pulmonary:    No cough, no sputum production    Personal History     Social History:    reports that she has been smoking cigarettes. She has a 20 pack-year smoking history. She has been exposed to tobacco smoke. She has never used smokeless tobacco. She reports that she does not drink alcohol and does not use drugs.    Home Medications:  Current Outpatient Medications on File Prior to Visit   Medication Sig    aspirin 81 MG EC tablet Take 1 tablet by mouth Daily. Last dose 22 per Dr. Obando instructed    atorvastatin (LIPITOR) 80 MG tablet TAKE 1 TABLET BY MOUTH EVERY NIGHT AT BEDTIME    carvedilol (COREG) 6.25 MG tablet TAKE 1 TABLET BY MOUTH TWICE A DAY WITH A MEAL    cetirizine (zyrTEC) 10 MG tablet TAKE 1 TABLET BY MOUTH DAILY    cilostazol (PLETAL) 100 MG tablet TAKE 1 TABLET BY MOUTH TWICE A DAY    diphenoxylate-atropine (LOMOTIL) 2.5-0.025 MG per tablet TAKE 2 TABLETS BY MOUTH DAILY AS NEEDED FOR DIARRHEA    fluticasone (FLONASE) 50 MCG/ACT nasal spray SPRAY TWO SPRAYS IN EACH NOSTRIL ONCE DAILY    HYDROcodone-acetaminophen (NORCO) 5-325 MG per tablet Take 1 tablet by mouth 2 (Two) Times a Day As Needed for Moderate Pain or Severe Pain.    omeprazole (priLOSEC) 20 MG capsule Take 1 capsule by mouth Daily.    ondansetron (ZOFRAN) 4 MG tablet TAKE 1 TABLET BY MOUTH EVERY 8 HOURS AS NEEDED FOR NAUSEA AND/OR VOMITING    sertraline (ZOLOFT) 50 MG tablet Take 1 tablet by mouth Daily.    triamcinolone (KENALOG) 0.025 % ointment APPLY TO THE AFFECTED AREA(S) 2 TIMES A DAY AS DIRECTED    vitamin  D (ERGOCALCIFEROL) 1.25 MG (89236 UT) capsule capsule TAKE 1 CAPSULE BY MOUTH ONCE WEEKLY    vitamin E 400 UNIT capsule Take 1 capsule by mouth 2 (Two) Times a Day.     No current facility-administered medications on file prior to visit.       Past Medical History:   Diagnosis Date    Allergic rhinitis     Seasonal allergies    Anxiety 4/22/2024    Aortic stenosis, mild 10/18/2021    Arthritis     CAD s/p CABG 10/18/2021    Chronic heart failure with preserved ejection fraction (HFpEF) 12/30/2021    Diverticulitis     Essential hypertension 06/07/2021    Fatigue     Heart attack 10/18/2021    Hemorrhoids 2013    Hyperlipidemia LDL goal <70 06/07/2021    Irritable bowel syndrome with diarrhea     Memory loss     forgetfulness    Microhematuria 01/13/2019    Nephrolithiasis 01/13/2019    Smoker 12/30/2021       Allergies:  No Known Allergies    Objective    Objective       Vitals:   Heart Rate:  [98] 98  BP: (106)/(54) 106/54  Body mass index is 17.2 kg/m².     PHYSICAL EXAM:    General Appearance:   well developed  well nourished  HENT:   oropharynx moist  lips not cyanotic  Neck:  thyroid not enlarged  supple  Respiratory:  no respiratory distress  normal breath sounds  no rales  Cardiovascular:  no jugular venous distention  regular rhythm  apical impulse normal  S1 normal, S2 normal  no S3, no S4   no murmur  no rub, no thrill  carotid pulses normal; no bruit  pedal pulses normal  lower extremity edema: none    Skin:   warm, dry  Psychiatric:  judgement and insight appropriate  normal mood and affect        Result Review:  I have personally reviewed the available results from  [x]  Laboratory  [x]  EKG  [x]  Cardiology  [x]  Medications  [x]  Old records  []  Other:     Procedures  Lab Results   Component Value Date    CHOL 108 04/11/2024    CHOL 114 10/17/2023    CHOL 113 02/27/2023     Lab Results   Component Value Date    TRIG 158 (H) 04/11/2024    TRIG 148 10/17/2023    TRIG 85 02/27/2023     Lab Results    Component Value Date    HDL 33 (L) 04/11/2024    HDL 32 (L) 10/17/2023    HDL 39 (L) 02/27/2023     Lab Results   Component Value Date    LDL 48 04/11/2024    LDL 56 10/17/2023    LDL 57 02/27/2023     Lab Results   Component Value Date    VLDL 27 04/11/2024    VLDL 26 10/17/2023    VLDL 17 02/27/2023     Results for orders placed in visit on 06/20/22    Adult Transthoracic Echo Complete W/ Cont if Necessary Per Protocol    Interpretation Summary  Mild diffuse hypokinesis with adequate left ventricular systolic function .  Fibrocalcific mitral and aortic valves.  Mild MR and mild TR.  Mild AI.     Impression/Plan:  1.  Coronary artery s/p CABG stable: Continue aspirin 81 mg a day.  No chest pain.  2.  Essential hypertension controlled: Continue carvedilol 6.25 mg twice daily.  Blood pressure controlled at home.  3.  Mixed hyperlipidemia: Continue Lipitor 80 mg a day.  Lipid profile showed LDL of 53.  4.  Mild valvular heart disease/mild aortic regurgitation: Asymptomatic.              Dannie Obando MD   08/27/24   14:03 EDT

## 2024-08-27 ENCOUNTER — OFFICE VISIT (OUTPATIENT)
Dept: CARDIOLOGY | Facility: CLINIC | Age: 79
End: 2024-08-27
Payer: MEDICARE

## 2024-08-27 VITALS
SYSTOLIC BLOOD PRESSURE: 106 MMHG | BODY MASS INDEX: 17.13 KG/M2 | DIASTOLIC BLOOD PRESSURE: 54 MMHG | HEIGHT: 55 IN | HEART RATE: 98 BPM | WEIGHT: 74 LBS

## 2024-08-27 DIAGNOSIS — E78.2 HYPERLIPEMIA, MIXED: ICD-10-CM

## 2024-08-27 DIAGNOSIS — I10 HYPERTENSION, ESSENTIAL: ICD-10-CM

## 2024-08-27 DIAGNOSIS — Z95.1 HX OF CABG: ICD-10-CM

## 2024-08-27 DIAGNOSIS — I25.10 CORONARY ARTERY DISEASE INVOLVING NATIVE CORONARY ARTERY OF NATIVE HEART WITHOUT ANGINA PECTORIS: Primary | ICD-10-CM

## 2024-08-27 PROCEDURE — 99214 OFFICE O/P EST MOD 30 MIN: CPT | Performed by: SPECIALIST

## 2024-08-27 PROCEDURE — 3074F SYST BP LT 130 MM HG: CPT | Performed by: SPECIALIST

## 2024-08-27 PROCEDURE — 1159F MED LIST DOCD IN RCRD: CPT | Performed by: SPECIALIST

## 2024-08-27 PROCEDURE — 1160F RVW MEDS BY RX/DR IN RCRD: CPT | Performed by: SPECIALIST

## 2024-08-27 PROCEDURE — 3078F DIAST BP <80 MM HG: CPT | Performed by: SPECIALIST

## 2024-09-04 ENCOUNTER — HOSPITAL ENCOUNTER (OUTPATIENT)
Dept: MRI IMAGING | Facility: HOSPITAL | Age: 79
Discharge: HOME | End: 2024-09-04
Admitting: SURGERY
Payer: MEDICARE

## 2024-09-04 DIAGNOSIS — K86.89 PANCREATIC DUCT DILATED: ICD-10-CM

## 2024-09-04 LAB
CREAT BLDA-MCNC: 0.9 MG/DL (ref 0.6–1.3)
EGFRCR SERPLBLD CKD-EPI 2021: 65.2 ML/MIN/1.73

## 2024-09-04 PROCEDURE — 74183 MRI ABD W/O CNTR FLWD CNTR: CPT

## 2024-09-04 PROCEDURE — 82565 ASSAY OF CREATININE: CPT

## 2024-09-04 PROCEDURE — A9577 INJ MULTIHANCE: HCPCS | Performed by: SURGERY

## 2024-09-04 PROCEDURE — 0 GADOBENATE DIMEGLUMINE 529 MG/ML SOLUTION: Performed by: SURGERY

## 2024-09-04 RX ADMIN — GADOBENATE DIMEGLUMINE 6 ML: 529 INJECTION, SOLUTION INTRAVENOUS at 13:28

## 2024-09-30 ENCOUNTER — OFFICE VISIT (OUTPATIENT)
Dept: PULMONOLOGY | Facility: CLINIC | Age: 79
End: 2024-09-30
Payer: MEDICARE

## 2024-09-30 VITALS
OXYGEN SATURATION: 94 % | HEIGHT: 55 IN | TEMPERATURE: 98.2 F | SYSTOLIC BLOOD PRESSURE: 134 MMHG | DIASTOLIC BLOOD PRESSURE: 80 MMHG | HEART RATE: 96 BPM | WEIGHT: 76.4 LBS | RESPIRATION RATE: 16 BRPM | BODY MASS INDEX: 17.68 KG/M2

## 2024-09-30 DIAGNOSIS — J41.8 MIXED SIMPLE AND MUCOPURULENT CHRONIC BRONCHITIS: Primary | ICD-10-CM

## 2024-09-30 DIAGNOSIS — Z72.0 TOBACCO ABUSE: ICD-10-CM

## 2024-09-30 PROCEDURE — 3075F SYST BP GE 130 - 139MM HG: CPT | Performed by: INTERNAL MEDICINE

## 2024-09-30 PROCEDURE — 1159F MED LIST DOCD IN RCRD: CPT | Performed by: INTERNAL MEDICINE

## 2024-09-30 PROCEDURE — 99203 OFFICE O/P NEW LOW 30 MIN: CPT | Performed by: INTERNAL MEDICINE

## 2024-09-30 PROCEDURE — 3079F DIAST BP 80-89 MM HG: CPT | Performed by: INTERNAL MEDICINE

## 2024-09-30 PROCEDURE — 1160F RVW MEDS BY RX/DR IN RCRD: CPT | Performed by: INTERNAL MEDICINE

## 2024-09-30 RX ORDER — ALBUTEROL SULFATE 90 UG/1
2 INHALANT RESPIRATORY (INHALATION) EVERY 4 HOURS PRN
Qty: 1 G | Refills: 3 | Status: SHIPPED | OUTPATIENT
Start: 2024-09-30

## 2024-09-30 NOTE — PROGRESS NOTES
Pulmonary Consultation    Selvin Vásquez MD,    Thank you for asking me to see Nilda Julien for   Chief Complaint   Patient presents with    Establish Care    Emphysema     PULMONARY CLEARANCE    Shortness of Breath    Cough    Wheezing     TOBACCO USE   .      History of Present Illness  Nilda Julien is a 79 y.o. female with a PMH significant for tobacco abuse COPD and coronary artery disease status post CABG with tobacco abuse presents for evaluation patient has peripheral vascular disease and she is planned to have vascular surgery in the form of bypass patient complains of worsening breathlessness along with cough wheeze and me mucopurulent expectoration off-and-on over the past 6 months or so she denies any chest pain fever or hemoptysis patient denies any calf pain or swelling      Tobacco use history:        Review of Systems: History obtained from chart review and the patient.  Review of Systems   Respiratory:  Positive for shortness of breath and wheezing.    All other systems reviewed and are negative.    As described in the HPI. Otherwise, remainder of ROS (14 systems) were negative.    Patient Active Problem List   Diagnosis    Hyperlipidemia LDL goal <70    Essential hypertension    Gastroesophageal reflux disease without esophagitis    Acquired hypothyroidism    Allergic rhinitis    Aortic stenosis, mild    Arthritis    CAD s/p CABG    Diverticulitis    Fatigue    Hemorrhoids    Irritable bowel syndrome with diarrhea    Microhematuria    Nephrolithiasis    Smoker    Chronic heart failure with preserved ejection fraction (HFpEF)    Age-related osteoporosis without current pathological fracture    Encounter for screening for malignant neoplasm of colon    History of colon polyps    Anxiety         Current Outpatient Medications:     aspirin 81 MG EC tablet, Take 1 tablet by mouth Daily. Last dose 03/16/22 per Dr. Obando instructed, Disp: , Rfl:     atorvastatin (LIPITOR) 80 MG tablet, TAKE 1  TABLET BY MOUTH EVERY NIGHT AT BEDTIME, Disp: 90 tablet, Rfl: 1    carvedilol (COREG) 6.25 MG tablet, TAKE 1 TABLET BY MOUTH TWICE A DAY WITH A MEAL, Disp: 180 tablet, Rfl: 3    cetirizine (zyrTEC) 10 MG tablet, TAKE 1 TABLET BY MOUTH DAILY, Disp: 90 tablet, Rfl: 1    cilostazol (PLETAL) 100 MG tablet, TAKE 1 TABLET BY MOUTH TWICE A DAY, Disp: 180 tablet, Rfl: 3    diphenoxylate-atropine (LOMOTIL) 2.5-0.025 MG per tablet, TAKE 2 TABLETS BY MOUTH DAILY AS NEEDED FOR DIARRHEA, Disp: 60 tablet, Rfl: 9    fluticasone (FLONASE) 50 MCG/ACT nasal spray, SPRAY TWO SPRAYS IN EACH NOSTRIL ONCE DAILY, Disp: 16 mL, Rfl: 1    HYDROcodone-acetaminophen (NORCO) 5-325 MG per tablet, Take 1 tablet by mouth 2 (Two) Times a Day As Needed for Moderate Pain or Severe Pain., Disp: 20 tablet, Rfl: 0    omeprazole (priLOSEC) 20 MG capsule, Take 1 capsule by mouth Daily., Disp: , Rfl:     ondansetron (ZOFRAN) 4 MG tablet, TAKE 1 TABLET BY MOUTH EVERY 8 HOURS AS NEEDED FOR NAUSEA AND/OR VOMITING, Disp: 90 tablet, Rfl: 1    sertraline (ZOLOFT) 50 MG tablet, Take 1 tablet by mouth Daily., Disp: 90 tablet, Rfl: 1    vitamin E 400 UNIT capsule, Take 1 capsule by mouth 2 (Two) Times a Day., Disp: , Rfl:     triamcinolone (KENALOG) 0.025 % ointment, APPLY TO THE AFFECTED AREA(S) 2 TIMES A DAY AS DIRECTED, Disp: 15 g, Rfl: 1    vitamin D (ERGOCALCIFEROL) 1.25 MG (48115 UT) capsule capsule, TAKE 1 CAPSULE BY MOUTH ONCE WEEKLY (Patient not taking: Reported on 9/30/2024), Disp: 13 capsule, Rfl: 0    No Known Allergies    Past Medical History:   Diagnosis Date    Allergic rhinitis     Seasonal allergies    Anxiety 4/22/2024    Aortic stenosis, mild 10/18/2021    Arthritis     CAD s/p CABG 10/18/2021    Chronic heart failure with preserved ejection fraction (HFpEF) 12/30/2021    Diverticulitis     Essential hypertension 06/07/2021    Fatigue     Heart attack 10/18/2021    Hemorrhoids 2013    Hyperlipidemia LDL goal <70 06/07/2021    Irritable bowel  syndrome with diarrhea     Memory loss     forgetfulness    Microhematuria 2019    Nephrolithiasis 2019    Smoker 2021     Past Surgical History:   Procedure Laterality Date    CARDIAC SURGERY  2013    4 way bypass    COLONOSCOPY  2016,     COLONOSCOPY N/A 2024    Procedure: COLONOSCOPY WITH HOT SNARE POLYPECTOMIES;  Surgeon: Can Pearce MD;  Location: [facility] ENDOSCOPY;  Service: Gastroenterology;  Laterality: N/A;  COLON POLYPS, DIVERTICULOSIS    CORONARY ARTERY BYPASS GRAFT      ENDOSCOPY      HERNIA REPAIR      OTHER SURGICAL HISTORY  2001    cardiac stents, 2 stents placed    VERTEBROPLASTY N/A 2022    Procedure: VERTEBROPLASTY, THORACIC 11;  Surgeon: Redd Cisneros MD;  Location: [facility] MAIN OR;  Service: Neurosurgery;  Laterality: N/A;     Social History     Socioeconomic History    Marital status:     Number of children: 2   Tobacco Use    Smoking status: Every Day     Current packs/day: 0.50     Average packs/day: 0.5 packs/day for 40.0 years (20.0 ttl pk-yrs)     Types: Cigarettes     Passive exposure: Current    Smokeless tobacco: Never    Tobacco comments:     Current every day smoker, 0.5 PPD, smoked for 31 or more years   Vaping Use    Vaping status: Never Used   Substance and Sexual Activity    Alcohol use: Never    Drug use: Never    Sexual activity: Not Currently     Family History   Problem Relation Age of Onset    Stroke Father         MINI    Hypertension Father     COPD Father         Black lung  age 88    Other Father         Family history of Stroke    Skin cancer Father     Hypertension Mother     Heart attack Mother         MI    Other Mother         Family History of Heart Disease    Skin cancer Mother     Breast cancer Neg Hx     Ovarian cancer Neg Hx     Uterine cancer Neg Hx     Cervical cancer Neg Hx     Colon cancer Neg Hx     Stomach cancer Neg Hx        MRI abdomen w wo contrast mrcp    Result Date:  "9/5/2024  Impression: 1. Mildly dilated main pancreatic duct with few ectatic sidebranches suggesting sequelae of chronic pancreatitis, without active inflammation or obvious mass within limitations of motion. Main pancreatic duct measures up to 5 mm. Suspect few small pancreatic cystic lesions which could reflect postinflammatory cyst or sidebranch IPMNs without worrisome features. These can be surveilled by every other year MRI/MRCP. 2. Cholelithiasis. Unremarkable cholangiogram. 3. No acute MRI findings. Other chronic/ancillary findings detailed above. Electronically Signed: Jr Nicole MD  9/5/2024 9:59 PM EDT  Workstation ID: [id number]    CT Angio Abdominal Aorta Bilateral Iliofem Runoff    Result Date: 7/30/2024  Impression: 1. Infrarenal abdominal aortic aneurysm measuring 3 cm. 2. Complete occlusion of the aorta below the FRANCESCO with occlusion of the left iliac system and minimal flow within the right iliac system. 3. Moderate stenosis of the bilateral common femoral arteries with occlusion of the bilateral SFAs with reconstitution of severely diseased bilateral above-the-knee popliteal arteries. The below the knee popliteal arteries demonstrate mild disease, but less than the above-the-knee popliteal arteries 4. Three-vessel runoff on the right and two-vessel runoff on the left 5. Pancreatic duct dilatation which is more prominent than on prior exams. Recommend MRCP or ERCP for further evaluation. Electronically Signed: Abdullahi Kay MD  7/30/2024 10:05 AM EDT  Workstation ID: [id number]         Objective     Blood pressure 134/80, pulse 96, temperature 98.2 °F (36.8 °C), temperature source Tympanic, resp. rate 16, height 139.7 cm (55\"), weight 34.7 kg (76 lb 6.4 oz), SpO2 94%, not currently breastfeeding.  Physical Exam  Vitals and nursing note reviewed.   Constitutional:       Appearance: Normal appearance.   HENT:      Head: Normocephalic and atraumatic.      Nose: Nose normal.      Mouth/Throat:      " Mouth: Mucous membranes are moist.      Pharynx: Oropharynx is clear.   Eyes:      Extraocular Movements: Extraocular movements intact.      Conjunctiva/sclera: Conjunctivae normal.      Pupils: Pupils are equal, round, and reactive to light.   Cardiovascular:      Rate and Rhythm: Normal rate and regular rhythm.      Pulses: Normal pulses.      Heart sounds: Murmur heard.   Pulmonary:      Effort: Pulmonary effort is normal.      Breath sounds: Rhonchi present.   Abdominal:      General: Abdomen is flat. Bowel sounds are normal.      Palpations: Abdomen is soft.   Musculoskeletal:         General: Normal range of motion.      Cervical back: Normal range of motion and neck supple.   Skin:     General: Skin is warm.      Capillary Refill: Capillary refill takes 2 to 3 seconds.   Neurological:      General: No focal deficit present.      Mental Status: She is alert and oriented to person, place, and time.   Psychiatric:         Mood and Affect: Mood normal.         Behavior: Behavior normal.       Immunization History   Administered Date(s) Administered    COVID-19 (PFIZER) BIVALENT 12+YRS 12/01/2022    COVID-19 (PFIZER) Purple Cap Monovalent 03/29/2021, 04/26/2021    Covid-19 (Pfizer) Gray Cap Monovalent 05/25/2022    Fluzone High-Dose 65+yrs 10/18/2021    Fluzone Quad >6mos (Multi-dose) 10/28/2013, 10/19/2015    Pneumococcal Conjugate 13-Valent (PCV13) 04/17/2015, 04/17/2015    Pneumococcal Polysaccharide (PPSV23) 10/18/2021    TD Preservative Free (Tenivac) 04/17/2015    Td (TDVAX) 04/17/2015            Assessment & Plan     Diagnoses and all orders for this visit:    1. Mixed simple and mucopurulent chronic bronchitis (Primary)  -     CT Chest With Contrast Diagnostic; Future  -     Complete PFT - Pre & Post Bronchodilator; Future    2. Tobacco abuse  -     CT Chest With Contrast Diagnostic; Future  -     Complete PFT - Pre & Post Bronchodilator; Future         Result Review :       Data reviewed : Cardiology  studies        Discussion/ Recommendations:   Patient is strongly advised to stop smoking  We will order PFTs and CT scan of the chest for evaluation of her COPD  Albuterol inhaler 2 puffs every 6 hours as needed  Patient is advised regular exercise  Discussed vaccination and recommended    BMI is below normal parameters (malnutrition). Recommendations: referral to primary care           Return in about 3 months (around 12/30/2024).      Thank you for allowing me to participate in the care of Nilda Julien. Please do not hesitate to contact me with any questions.         This document has been electronically signed by Woodrow Samaniego MD on September 30, 2024 13:52 EDT

## 2024-10-07 ENCOUNTER — OFFICE VISIT (OUTPATIENT)
Dept: VASCULAR SURGERY | Facility: HOSPITAL | Age: 79
End: 2024-10-07
Payer: MEDICARE

## 2024-10-07 VITALS
RESPIRATION RATE: 18 BRPM | SYSTOLIC BLOOD PRESSURE: 122 MMHG | DIASTOLIC BLOOD PRESSURE: 74 MMHG | TEMPERATURE: 97.6 F | HEART RATE: 84 BPM | OXYGEN SATURATION: 99 %

## 2024-10-07 DIAGNOSIS — M79.606 ISCHEMIC REST PAIN OF LOWER EXTREMITY: Primary | ICD-10-CM

## 2024-10-07 DIAGNOSIS — I99.8 ISCHEMIC REST PAIN OF LOWER EXTREMITY: Primary | ICD-10-CM

## 2024-10-07 PROBLEM — I70.223 REST PAIN OF BOTH LOWER EXTREMITIES DUE TO ATHEROSCLEROSIS: Status: ACTIVE | Noted: 2024-10-07

## 2024-10-07 PROCEDURE — 1159F MED LIST DOCD IN RCRD: CPT | Performed by: SURGERY

## 2024-10-07 PROCEDURE — 3078F DIAST BP <80 MM HG: CPT | Performed by: SURGERY

## 2024-10-07 PROCEDURE — 1160F RVW MEDS BY RX/DR IN RCRD: CPT | Performed by: SURGERY

## 2024-10-07 PROCEDURE — 3074F SYST BP LT 130 MM HG: CPT | Performed by: SURGERY

## 2024-10-07 PROCEDURE — 99214 OFFICE O/P EST MOD 30 MIN: CPT | Performed by: SURGERY

## 2024-10-07 PROCEDURE — G0463 HOSPITAL OUTPT CLINIC VISIT: HCPCS | Performed by: SURGERY

## 2024-10-07 NOTE — PROGRESS NOTES
[facility]   Follow up Office    Patient Name: Nilda Julien  : 1945  MRN: [id number]  Primary Care Physician:  Ralph Marcus APRN      Subjective   Subjective     HPI:    Nilda Julien is a 79 y.o. female here to discuss her planned surgical revascularization.  She continues to have rest pain in her feet and lifestyle-limiting intermittent claudication.  No other complaints at this time.      Objective     Vitals:   Temp:  [97.6 °F (36.4 °C)] 97.6 °F (36.4 °C)  Heart Rate:  [84] 84  Resp:  [18] 18  BP: (122)/(74) 122/74    Physical Exam      General: Alert, no acute distress.  HEENT: PERRLA  Abdomen: Benign  Extremities: Symmetric.  Pulses: +2 right radial, +2 right brachial.  Neuro: No gross deficits    Diagnostic studies:  CTA dated 2024 has been reviewed. There is aneurysmal dilatation of the infrarenal abdominal aorta up to 3 cm and complete occlusion of the aorta below the level of the FRANCESCO. There is also complete occlusion of the left iliac system.     Assessment & Plan   Assessment / Plan     Diagnoses and all orders for this visit:    1. Ischemic rest pain of lower extremity (Primary)  -     Case Request; Standing  -     ceFAZolin 2000 mg IVPB in 100 mL NS (VTB)  -     Case Request    Other orders  -     Follow Anesthesia Guidelines / Protocol; Future  -     Follow Anesthesia Guidelines / Protocol; Standing  -     Obtain Informed Consent; Future  -     Provide NPO Instructions to Patient; Future  -     Chlorhexidine Skin Prep; Future  -     CBC & Differential; Standing  -     Basic Metabolic Panel; Standing  -     Type & Screen; Standing  -     Inpatient Admission; Standing       Assessment/Plan:   Mrs. Julien has aortic occlusion which is resulting in bilateral lower extremity symptoms of rest pain.  After previous evaluation by cardiology as well as pulmonary and further discussion with her at this time I am recommending that we proceed to an axillobifemoral bypass graft.  I have  discussed with her in detail the mechanics of the procedure, the indications, benefits, risks, alternatives, as well as potential complications to include but not limited to infection, bleeding, hematoma, transfusion, reoperation,  injury to intra-abdominal contents requiring to abort the surgery, death.  She appears to understand and desires to proceed.        Electronically signed by Selvin Vásquez MD, 10/07/24, 1:45 PM EDT.

## 2024-10-09 DIAGNOSIS — J30.9 ALLERGIC RHINITIS, UNSPECIFIED SEASONALITY, UNSPECIFIED TRIGGER: ICD-10-CM

## 2024-10-09 RX ORDER — FLUTICASONE PROPIONATE 50 UG/1
SPRAY, METERED NASAL
Qty: 16 ML | Refills: 1 | Status: SHIPPED | OUTPATIENT
Start: 2024-10-09

## 2024-10-14 DIAGNOSIS — F41.9 ANXIETY: ICD-10-CM

## 2024-10-21 ENCOUNTER — OFFICE VISIT (OUTPATIENT)
Dept: FAMILY MEDICINE CLINIC | Facility: CLINIC | Age: 79
End: 2024-10-21
Payer: MEDICARE

## 2024-10-21 VITALS
RESPIRATION RATE: 20 BRPM | WEIGHT: 78.6 LBS | BODY MASS INDEX: 18.19 KG/M2 | HEIGHT: 55 IN | SYSTOLIC BLOOD PRESSURE: 130 MMHG | HEART RATE: 78 BPM | TEMPERATURE: 97.9 F | OXYGEN SATURATION: 98 % | DIASTOLIC BLOOD PRESSURE: 62 MMHG

## 2024-10-21 DIAGNOSIS — J30.9 ALLERGIC RHINITIS, UNSPECIFIED SEASONALITY, UNSPECIFIED TRIGGER: ICD-10-CM

## 2024-10-21 DIAGNOSIS — E03.9 ACQUIRED HYPOTHYROIDISM: ICD-10-CM

## 2024-10-21 DIAGNOSIS — M19.90 ARTHRITIS: ICD-10-CM

## 2024-10-21 DIAGNOSIS — H61.22 IMPACTED CERUMEN OF LEFT EAR: ICD-10-CM

## 2024-10-21 DIAGNOSIS — E55.9 VITAMIN D DEFICIENCY: ICD-10-CM

## 2024-10-21 DIAGNOSIS — Z51.81 MEDICATION MONITORING ENCOUNTER: Primary | ICD-10-CM

## 2024-10-21 DIAGNOSIS — G47.00 INSOMNIA, UNSPECIFIED TYPE: ICD-10-CM

## 2024-10-21 DIAGNOSIS — I10 ESSENTIAL HYPERTENSION: ICD-10-CM

## 2024-10-21 DIAGNOSIS — E78.5 HYPERLIPIDEMIA LDL GOAL <70: ICD-10-CM

## 2024-10-21 PROCEDURE — 1159F MED LIST DOCD IN RCRD: CPT | Performed by: NURSE PRACTITIONER

## 2024-10-21 PROCEDURE — 3075F SYST BP GE 130 - 139MM HG: CPT | Performed by: NURSE PRACTITIONER

## 2024-10-21 PROCEDURE — 69209 REMOVE IMPACTED EAR WAX UNI: CPT | Performed by: NURSE PRACTITIONER

## 2024-10-21 PROCEDURE — 3078F DIAST BP <80 MM HG: CPT | Performed by: NURSE PRACTITIONER

## 2024-10-21 PROCEDURE — 99214 OFFICE O/P EST MOD 30 MIN: CPT | Performed by: NURSE PRACTITIONER

## 2024-10-21 PROCEDURE — 1125F AMNT PAIN NOTED PAIN PRSNT: CPT | Performed by: NURSE PRACTITIONER

## 2024-10-21 PROCEDURE — 1160F RVW MEDS BY RX/DR IN RCRD: CPT | Performed by: NURSE PRACTITIONER

## 2024-10-21 RX ORDER — TRAZODONE HYDROCHLORIDE 50 MG/1
50 TABLET, FILM COATED ORAL NIGHTLY
Qty: 30 TABLET | Refills: 1 | Status: SHIPPED | OUTPATIENT
Start: 2024-10-21

## 2024-10-21 NOTE — PROGRESS NOTES
Chief Complaint  Hyperlipidemia, Hypertension, Depression, Peripheral Vascular Disease (Pt has upcoming surgery on 11/19/24), and Insomnia    Subjective        Nilda Julien presents to [facility]  History of Present Illness  Diarrhea:  Has been on lomotil states hasn't had any workup that she remembers for it but has had even before her back surgery.   Back pain: Hydrocodone is heling some but not a lot.    Hand and wrist on the left wrist is pain.      Having some discharge and mucus so  Would like tested for UTI.  Back always hurts so not sure of cause.    Back pain:  States tramadol no longer helping.  States that     Sinus drainage allergies.      Having surgery on vessels.    Insomnia:  states is waking up at 3 am or 5 am and can't go back to sleep.  Has been going on a while.    Hyperlipidemia  Pertinent negatives include no chest pain or shortness of breath.   Hypertension  Pertinent negatives include no chest pain, palpitations or shortness of breath.   DepressionSymptoms include insomnia. Patient is not experiencing: palpitations, shortness of breath, chest pain and dizziness.  Her past medical history is significant for depression.   Insomnia  Pertinent negatives include no chest pain, coughing, fever, numbness or weakness.   Ear Fullness   Associated symptoms include diarrhea. Pertinent negatives include no coughing.   Diabetes  Pertinent negatives for hypoglycemia include no dizziness. Pertinent negatives for diabetes include no chest pain and no weakness.   Back Pain  Pertinent negatives include no chest pain, fever, numbness or weakness.   Diarrhea   Pertinent negatives include no coughing or fever.   Hand Pain   Pertinent negatives include no chest pain or numbness.       The following portions of the patient's history were personally reviewed and updated as appropriate: allergies, current medications, past medical history, past surgical history, past family  history, and past social history.     Body mass index is 18.61 kg/m².    BMI is within normal parameters. No other follow-up for BMI required.      Past History:    Medical History: has a past medical history of Allergic rhinitis, Anxiety (4/22/2024), Aortic stenosis, mild (10/18/2021), Arthritis, CAD s/p CABG (10/18/2021), Chronic heart failure with preserved ejection fraction (HFpEF) (12/30/2021), Diverticulitis, Essential hypertension (06/07/2021), Fatigue, Heart attack (10/18/2021), Hemorrhoids (2013), Hyperlipidemia LDL goal <70 (06/07/2021), Irritable bowel syndrome with diarrhea, Memory loss, Microhematuria (01/13/2019), Nephrolithiasis (01/13/2019), and Smoker (12/30/2021).     Surgical History: has a past surgical history that includes Other surgical history (2001); Colonoscopy (05/23/2016, 2019); Esophagogastroduodenoscopy (2019); Hernia repair; Cardiac surgery (04/19/2013); Coronary artery bypass graft (2013); Vertebroplasty (N/A, 4/1/2022); and Colonoscopy (N/A, 2/22/2024).     Family History: family history includes COPD in her father; Heart attack in her mother; Hypertension in her father and mother; Other in her father and mother; Skin cancer in her father and mother; Stroke in her father.     Social History: reports that she has been smoking cigarettes. She started smoking about 63 years ago. She has a 20 pack-year smoking history. She has been exposed to tobacco smoke. She has never used smokeless tobacco. She reports that she does not drink alcohol and does not use drugs.    Allergies: Patient has no known allergies.          Current Outpatient Medications:     albuterol sulfate HFA (Ventolin HFA) 108 (90 Base) MCG/ACT inhaler, Inhale 2 puffs Every 4 (Four) Hours As Needed for Wheezing or Shortness of Air., Disp: 1 g, Rfl: 3    aspirin 81 MG EC tablet, Take 1 tablet by mouth Daily. Last dose 03/16/22 per Dr. Obando instructed, Disp: , Rfl:     atorvastatin (LIPITOR) 80 MG tablet, TAKE 1 TABLET  BY MOUTH EVERY NIGHT AT BEDTIME, Disp: 90 tablet, Rfl: 1    carvedilol (COREG) 6.25 MG tablet, TAKE 1 TABLET BY MOUTH TWICE A DAY WITH A MEAL, Disp: 180 tablet, Rfl: 3    cetirizine (zyrTEC) 10 MG tablet, TAKE 1 TABLET BY MOUTH DAILY, Disp: 90 tablet, Rfl: 1    cilostazol (PLETAL) 100 MG tablet, TAKE 1 TABLET BY MOUTH TWICE A DAY, Disp: 180 tablet, Rfl: 3    diphenoxylate-atropine (LOMOTIL) 2.5-0.025 MG per tablet, TAKE 2 TABLETS BY MOUTH DAILY AS NEEDED FOR DIARRHEA, Disp: 60 tablet, Rfl: 9    fluticasone (FLONASE) 50 MCG/ACT nasal spray, SPRAY 2 SPRAYS IN EACH NOSTRIL ONCE DAILY, Disp: 16 mL, Rfl: 1    HYDROcodone-acetaminophen (NORCO) 5-325 MG per tablet, Take 1 tablet by mouth 2 (Two) Times a Day As Needed for Moderate Pain or Severe Pain., Disp: 20 tablet, Rfl: 0    omeprazole (priLOSEC) 20 MG capsule, Take 1 capsule by mouth Daily., Disp: , Rfl:     ondansetron (ZOFRAN) 4 MG tablet, TAKE 1 TABLET BY MOUTH EVERY 8 HOURS AS NEEDED FOR NAUSEA AND/OR VOMITING, Disp: 90 tablet, Rfl: 1    sertraline (ZOLOFT) 50 MG tablet, TAKE 1 TABLET BY MOUTH DAILY, Disp: 90 tablet, Rfl: 1    triamcinolone (KENALOG) 0.025 % ointment, APPLY TO THE AFFECTED AREA(S) 2 TIMES A DAY AS DIRECTED, Disp: 15 g, Rfl: 1    vitamin D (ERGOCALCIFEROL) 1.25 MG (41633 UT) capsule capsule, TAKE 1 CAPSULE BY MOUTH ONCE WEEKLY, Disp: 13 capsule, Rfl: 0    vitamin E 400 UNIT capsule, Take 1 capsule by mouth 2 (Two) Times a Day., Disp: , Rfl:     traZODone (DESYREL) 50 MG tablet, Take 1 tablet by mouth Every Night., Disp: 30 tablet, Rfl: 1    There are no discontinued medications.      Review of Systems   Constitutional:  Negative for fever.   Respiratory:  Negative for cough and shortness of breath.    Cardiovascular:  Negative for chest pain, palpitations and leg swelling.   Gastrointestinal:  Positive for diarrhea.   Neurological:  Negative for dizziness, weakness, numbness and headache.   Psychiatric/Behavioral:  The patient has insomnia.      "    Objective         Vitals:    10/21/24 1514   BP: 130/62   BP Location: Right arm   Patient Position: Sitting   Cuff Size: Adult   Pulse: 78   Resp: 20   Temp: 97.9 °F (36.6 °C)   TempSrc: Temporal   SpO2: 98%   Weight: 35.7 kg (78 lb 9.6 oz)   Height: 138.4 cm (54.5\")     Body mass index is 18.61 kg/m².         Physical Exam  Vitals reviewed.   Constitutional:       Appearance: Normal appearance. She is well-developed.   HENT:      Head: Normocephalic and atraumatic.      Mouth/Throat:      Pharynx: No oropharyngeal exudate.   Eyes:      Conjunctiva/sclera: Conjunctivae normal.      Pupils: Pupils are equal, round, and reactive to light.   Cardiovascular:      Rate and Rhythm: Normal rate and regular rhythm.      Heart sounds: Normal heart sounds. No murmur heard.     No friction rub. No gallop.   Pulmonary:      Effort: Pulmonary effort is normal.      Breath sounds: Normal breath sounds. No wheezing or rhonchi.   Skin:     General: Skin is warm and dry.   Neurological:      Mental Status: She is alert and oriented to person, place, and time.   Psychiatric:         Mood and Affect: Mood and affect normal.         Behavior: Behavior normal.         Thought Content: Thought content normal.         Judgment: Judgment normal.             Result Review :      Ear Cerumen Removal    Date/Time: 10/21/2024 4:01 PM    Performed by: Ralph Marcus APRN  Authorized by: Ralph Marcus APRN    Anesthesia:  Local Anesthetic: none  Location details: left ear  Patient tolerance: patient tolerated the procedure well with no immediate complications  Procedure type: irrigation   Sedation:  Patient sedated: no                Assessment and Plan     Diagnoses and all orders for this visit:    1. Medication monitoring encounter (Primary)  -     POC Medline 12 Panel Urine Drug Screen    2. Hyperlipidemia LDL goal <70    3. Essential hypertension  -     Lipid Panel With / Chol / HDL Ratio; Future  -     Comprehensive Metabolic " Panel; Future  -     CBC (No Diff); Future  -     Urinalysis With Culture If Indicated -; Future    4. Acquired hypothyroidism  -     TSH; Future    5. Arthritis    6. Allergic rhinitis, unspecified seasonality, unspecified trigger    7. Insomnia, unspecified type  -     traZODone (DESYREL) 50 MG tablet; Take 1 tablet by mouth Every Night.  Dispense: 30 tablet; Refill: 1    8. Vitamin D deficiency  -     Vitamin D,25-Hydroxy; Future    9. Impacted cerumen of left ear    Other orders  -     Ear Cerumen Removal              Follow Up     Return in about 6 months (around 4/21/2025).    Patient was given instructions and counseling regarding her condition or for health maintenance advice. Please see specific information pulled into the AVS if appropriate.

## 2024-11-12 ENCOUNTER — PRE-ADMISSION TESTING (OUTPATIENT)
Dept: PREADMISSION TESTING | Facility: HOSPITAL | Age: 79
End: 2024-11-12
Payer: MEDICARE

## 2024-11-12 VITALS
DIASTOLIC BLOOD PRESSURE: 78 MMHG | OXYGEN SATURATION: 94 % | RESPIRATION RATE: 18 BRPM | WEIGHT: 76.72 LBS | BODY MASS INDEX: 17.76 KG/M2 | TEMPERATURE: 99.3 F | HEIGHT: 55 IN | SYSTOLIC BLOOD PRESSURE: 112 MMHG | HEART RATE: 86 BPM

## 2024-11-12 DIAGNOSIS — Z01.818 PREOP TESTING: Primary | ICD-10-CM

## 2024-11-12 LAB
ABO GROUP BLD: NORMAL
ALBUMIN SERPL-MCNC: 4 G/DL (ref 3.5–5.2)
ALBUMIN/GLOB SERPL: 1.1 G/DL
ALP SERPL-CCNC: 159 U/L (ref 39–117)
ALT SERPL W P-5'-P-CCNC: 13 U/L (ref 1–33)
ANION GAP SERPL CALCULATED.3IONS-SCNC: 7.5 MMOL/L (ref 5–15)
AST SERPL-CCNC: 18 U/L (ref 1–32)
BASOPHILS # BLD AUTO: 0.04 10*3/MM3 (ref 0–0.2)
BASOPHILS NFR BLD AUTO: 1 % (ref 0–1.5)
BILIRUB SERPL-MCNC: 0.4 MG/DL (ref 0–1.2)
BUN SERPL-MCNC: 16 MG/DL (ref 8–23)
BUN/CREAT SERPL: 15.5 (ref 7–25)
CALCIUM SPEC-SCNC: 9.1 MG/DL (ref 8.6–10.5)
CHLORIDE SERPL-SCNC: 101 MMOL/L (ref 98–107)
CO2 SERPL-SCNC: 25.5 MMOL/L (ref 22–29)
CREAT SERPL-MCNC: 1.03 MG/DL (ref 0.57–1)
DEPRECATED RDW RBC AUTO: 49.8 FL (ref 37–54)
EGFRCR SERPLBLD CKD-EPI 2021: 55.4 ML/MIN/1.73
EOSINOPHIL # BLD AUTO: 0.01 10*3/MM3 (ref 0–0.4)
EOSINOPHIL NFR BLD AUTO: 0.2 % (ref 0.3–6.2)
ERYTHROCYTE [DISTWIDTH] IN BLOOD BY AUTOMATED COUNT: 16 % (ref 12.3–15.4)
GLOBULIN UR ELPH-MCNC: 3.6 GM/DL
GLUCOSE SERPL-MCNC: 76 MG/DL (ref 65–99)
HCT VFR BLD AUTO: 41.3 % (ref 34–46.6)
HGB BLD-MCNC: 13.5 G/DL (ref 12–15.9)
IMM GRANULOCYTES # BLD AUTO: 0.01 10*3/MM3 (ref 0–0.05)
IMM GRANULOCYTES NFR BLD AUTO: 0.2 % (ref 0–0.5)
LYMPHOCYTES # BLD AUTO: 1.3 10*3/MM3 (ref 0.7–3.1)
LYMPHOCYTES NFR BLD AUTO: 32 % (ref 19.6–45.3)
MCH RBC QN AUTO: 27.8 PG (ref 26.6–33)
MCHC RBC AUTO-ENTMCNC: 32.7 G/DL (ref 31.5–35.7)
MCV RBC AUTO: 85.2 FL (ref 79–97)
MONOCYTES # BLD AUTO: 0.27 10*3/MM3 (ref 0.1–0.9)
MONOCYTES NFR BLD AUTO: 6.7 % (ref 5–12)
NEUTROPHILS NFR BLD AUTO: 2.43 10*3/MM3 (ref 1.7–7)
NEUTROPHILS NFR BLD AUTO: 59.9 % (ref 42.7–76)
NRBC BLD AUTO-RTO: 0 /100 WBC (ref 0–0.2)
PLATELET # BLD AUTO: 165 10*3/MM3 (ref 140–450)
PMV BLD AUTO: 10.5 FL (ref 6–12)
POTASSIUM SERPL-SCNC: 4.5 MMOL/L (ref 3.5–5.2)
PROT SERPL-MCNC: 7.6 G/DL (ref 6–8.5)
RBC # BLD AUTO: 4.85 10*6/MM3 (ref 3.77–5.28)
RH BLD: POSITIVE
SODIUM SERPL-SCNC: 134 MMOL/L (ref 136–145)
WBC NRBC COR # BLD AUTO: 4.06 10*3/MM3 (ref 3.4–10.8)

## 2024-11-12 PROCEDURE — 93005 ELECTROCARDIOGRAM TRACING: CPT

## 2024-11-12 PROCEDURE — 80053 COMPREHEN METABOLIC PANEL: CPT

## 2024-11-12 PROCEDURE — 86901 BLOOD TYPING SEROLOGIC RH(D): CPT

## 2024-11-12 PROCEDURE — 36415 COLL VENOUS BLD VENIPUNCTURE: CPT

## 2024-11-12 PROCEDURE — 85025 COMPLETE CBC W/AUTO DIFF WBC: CPT | Performed by: SURGERY

## 2024-11-12 PROCEDURE — 86900 BLOOD TYPING SEROLOGIC ABO: CPT

## 2024-11-12 RX ORDER — HYDROCODONE BITARTRATE AND ACETAMINOPHEN 5; 325 MG/1; MG/1
1 TABLET ORAL EVERY 12 HOURS PRN
COMMUNITY

## 2024-11-12 NOTE — DISCHARGE INSTRUCTIONS
IMPORTANT INSTRUCTIONS - PRE-ADMISSION TESTING  DO NOT EAT OR CHEW anything after midnight the night before your procedure.    You may have CLEAR liquids up to ____2__ hours prior to ARRIVAL time.   Take the following medications the morning of your procedure with JUST A SIP OF WATER:  ____ALBUTEROL INHALER AS NEEDED, HYDROCODONE AS NEEDED.  ASPIRIN, SERTRALINE, CARVEDILOL, CETIRIZINE, CILOSTAZOLE, OMEPRAZOLE___________________________________________________________________________________________________________________________________________________________________________________    DO NOT BRING your medications to the hospital with you, UNLESS something has changed since your PRE-Admission Testing appointment.  Hold all vitamins, supplements, and NSAIDS (Non- steroidal anti-inflammatory meds) for one week prior to surgery (you MAY take Tylenol or Acetaminophen). LAST DOSE VITAMINS 11/12/24  If you are diabetic, check your blood sugar the morning of your procedure. If it is less than 70 or if you are feeling symptomatic, call the following number for further instructions: [phone] SAME DAY SURGERY WILL CALL YOUR ARRIVAL TIME 11/18/24 BY 4 P.M._______.  Use your inhalers/nebulizers as usual, the morning of your procedure. BRING YOUR INHALERS with you. !!!!  Bring your CPAP or BIPAP to hospital, ONLY IF YOU WILL BE SPENDING THE NIGHT. NA  Make sure you have a ride home and have someone who will stay with you the day of your procedure after you go home.  If you have any questions, please call your Pre-Admission Testing Nurse, ___________SELINA_____ at [phone]__.   Per anesthesia request, do not smoke for 24 hours before your procedure or as instructed by your surgeon.  !!!1    PREOPERATIVE (BEFORE SURGERY)              BATHING INSTRUCTIONS  Instructions:    You will need to shower 1 X utilizing the soap provided; at the times indicated   below:     - AM THE DAY OF SURGERY OR PM THE NIGHT  BEFORE     Wash your hair and face with normal shampoo and soap, rinse it well before using the surgical soap.      In the shower, wet the skin completely with water from your neck to your feet. Apply the cleanser to your   body ONLY FROM THE NECK TO YOUR FEET.     Do NOT USE THE CLEANSER ON YOUR FACE, HEAD, OR GENITAL (PRIVATE) AREAS.   Keep it out of your eyes, ears, and mouth because of the risk of injury to those areas.      Scrub with a clean washcloth for each bath utilizing the soap provided from the top of your body to the   bottom starting at the neck area.      Pay close attention to your armpits, groin area, and the site of surgery.      Wash your body gently for 5 minutes. Stand outside the stream or turn off the water while scrubbing your   body. Do NOT wash with your regular soap after the surgical cleanser is used.      RINSE THE CLEANSER OFF COMPLETELY with plenty of water. Rinse the area again thoroughly.      Dry off with a clean towel. The surgical soap can cause dryness; however do NOT APPLY LOTION,   CREAM, POWDER, and/or DEODORANT AFTER SHOWERING.     Be sure to where clean clothes after showering.      Ensure CLEAN BED LINENS AFTER FIRST wash with the surgical soap.      NO PETS ALLOWED IN THE BED with you after utilizing the surgical soap.

## 2024-11-13 ENCOUNTER — ANESTHESIA EVENT (OUTPATIENT)
Dept: PERIOP | Facility: HOSPITAL | Age: 79
End: 2024-11-13
Payer: MEDICARE

## 2024-11-15 DIAGNOSIS — L30.9 DERMATITIS: ICD-10-CM

## 2024-11-15 DIAGNOSIS — R11.0 NAUSEA: ICD-10-CM

## 2024-11-15 LAB
QT INTERVAL: 371 MS
QTC INTERVAL: 443 MS

## 2024-11-15 RX ORDER — TRIAMCINOLONE ACETONIDE 0.25 MG/G
OINTMENT TOPICAL
Qty: 15 G | Refills: 1 | Status: SHIPPED | OUTPATIENT
Start: 2024-11-15

## 2024-11-15 RX ORDER — ONDANSETRON 4 MG/1
TABLET, FILM COATED ORAL
Qty: 90 TABLET | Refills: 1 | Status: SHIPPED | OUTPATIENT
Start: 2024-11-15

## 2024-11-18 NOTE — H&P
[facility]   HISTORY AND PHYSICAL    Patient Name: Nilda Julien  : 1945  MRN: [id number]  Primary Care Physician:  Ralph Marcus APRN  Date of admission: (Not on file)    Subjective   Subjective     Chief Complaint: Rest pain    HPI:    Nilda Julien is a 79 y.o. female with rest pain in her feet.    Review of Systems    Non contributory except for the History of Present Illness    Personal History     Past Medical History:   Diagnosis Date    AAA (abdominal aortic aneurysm)     INFRA RENAL, 3 CM LAST CT ABDOMEN    Allergic rhinitis     Seasonal allergies    Anxiety 2024    Aortic stenosis, mild 10/18/2021    Arthritis     CAD s/p CABG 10/18/2021    Chronic heart failure with preserved ejection fraction (HFpEF) 2021    Claudication of both lower extremities     Diverticulitis     Essential hypertension 2021    Fatigue     Heart attack 10/18/2021    Hemorrhoids     Hyperlipidemia LDL goal <70 2021    Irritable bowel syndrome with diarrhea     Memory loss     forgetfulness    Microhematuria 2019    Nephrolithiasis 2019    Smoker 2021       Past Surgical History:   Procedure Laterality Date    CARDIAC SURGERY  2013    4 way bypass    COLONOSCOPY  2016,     COLONOSCOPY N/A 2024    Procedure: COLONOSCOPY WITH HOT SNARE POLYPECTOMIES;  Surgeon: Can Pearce MD;  Location: [facility];  Service: Gastroenterology;  Laterality: N/A;  COLON POLYPS, DIVERTICULOSIS    CORONARY ARTERY BYPASS GRAFT  2013    ENDOSCOPY  2019    HERNIA REPAIR      OTHER SURGICAL HISTORY  2001    cardiac stents, 2 stents placed    VERTEBROPLASTY N/A 2022    Procedure: VERTEBROPLASTY, THORACIC 11;  Surgeon: Redd Cisneros MD;  Location: [facility] MAIN OR;  Service: Neurosurgery;  Laterality: N/A;       Family History: family history includes COPD in her father; Heart attack in her mother; Hypertension in her father and mother; Skin cancer in her father  and mother; Stroke in her father. Otherwise pertinent FHx was reviewed and not pertinent to current issue.    Social History:  reports that she has been smoking cigarettes. She started smoking about 63 years ago. She has a 20 pack-year smoking history. She has been exposed to tobacco smoke. She has never used smokeless tobacco. She reports that she does not drink alcohol and does not use drugs.    Home Medications:  No current facility-administered medications on file prior to encounter.     Current Outpatient Medications on File Prior to Encounter   Medication Sig    albuterol sulfate HFA (Ventolin HFA) 108 (90 Base) MCG/ACT inhaler Inhale 2 puffs Every 4 (Four) Hours As Needed for Wheezing or Shortness of Air.    aspirin 81 MG EC tablet Take 1 tablet by mouth Daily. Last dose 03/16/22 per Dr. Obando instructed    atorvastatin (LIPITOR) 80 MG tablet TAKE 1 TABLET BY MOUTH EVERY NIGHT AT BEDTIME    carvedilol (COREG) 6.25 MG tablet TAKE 1 TABLET BY MOUTH TWICE A DAY WITH A MEAL    cetirizine (zyrTEC) 10 MG tablet TAKE 1 TABLET BY MOUTH DAILY    cilostazol (PLETAL) 100 MG tablet TAKE 1 TABLET BY MOUTH TWICE A DAY    diphenoxylate-atropine (LOMOTIL) 2.5-0.025 MG per tablet TAKE 2 TABLETS BY MOUTH DAILY AS NEEDED FOR DIARRHEA    omeprazole (priLOSEC) 20 MG capsule Take 1 capsule by mouth Daily.    vitamin D (ERGOCALCIFEROL) 1.25 MG (91844 UT) capsule capsule TAKE 1 CAPSULE BY MOUTH ONCE WEEKLY    vitamin E 400 UNIT capsule Take 1 capsule by mouth 2 (Two) Times a Day.          Allergies:  No Known Allergies    Objective   Objective     Vitals:        Physical Exam    General: Awake, alert, NAD   Eyes:  JON   Neck: Supple   Lungs: Clear   Heart: RRR   Abdomen: benign   Musculoskeletal:  normal motor tone, symmetric   Skin: warm, normal turgor   Neuro: strength 5/5 all extremities   Pulses: Nonpalpable bilateral pedal pulses.  +2 right radial, +2 right brachial.    Diagnostic studies:   CTA dated 7/29/2024 has been  reviewed. There is aneurysmal dilatation of the infrarenal abdominal aorta up to 3 cm and complete occlusion of the aorta below the level of the FRANCESCO. There is also complete occlusion of the left iliac system.     Assessment & Plan   Assessment / Plan     Active Hospital Problems:  Active Hospital Problems    Diagnosis     **Ischemic rest pain of lower extremity     Rest pain of both lower extremities due to atherosclerosis        There are no diagnoses linked to this encounter.    Assessment/plan:   Mrs. Julien has aortic occlusion which is resulting in bilateral lower extremity symptoms of rest pain.  After previous evaluation by cardiology as well as pulmonary and further discussion with her at this time I am recommending that we proceed to an axillobifemoral bypass graft.  I have discussed with her in detail the mechanics of the procedure, the indications, benefits, risks, alternatives, as well as potential complications to include but not limited to infection, bleeding, hematoma, transfusion, reoperation,  injury to intra-abdominal contents requiring to abort the surgery, death.  She appears to understand and desires to proceed.         Electronically signed by Selvin Vásquez MD, 11/18/24, 1:41 PM EST.

## 2024-11-19 ENCOUNTER — ANESTHESIA EVENT CONVERTED (OUTPATIENT)
Dept: ANESTHESIOLOGY | Facility: HOSPITAL | Age: 79
End: 2024-11-19
Payer: MEDICARE

## 2024-11-19 ENCOUNTER — ANESTHESIA (OUTPATIENT)
Dept: PERIOP | Facility: HOSPITAL | Age: 79
End: 2024-11-19
Payer: MEDICARE

## 2024-11-19 ENCOUNTER — HOSPITAL ENCOUNTER (INPATIENT)
Facility: HOSPITAL | Age: 79
LOS: 6 days | End: 2024-11-25
Attending: SURGERY | Admitting: SURGERY
Payer: MEDICARE

## 2024-11-19 DIAGNOSIS — R26.2 DIFFICULTY WALKING: ICD-10-CM

## 2024-11-19 DIAGNOSIS — Z78.9 DECREASED ACTIVITIES OF DAILY LIVING (ADL): Primary | ICD-10-CM

## 2024-11-19 DIAGNOSIS — I99.8 ISCHEMIC REST PAIN OF LOWER EXTREMITY: ICD-10-CM

## 2024-11-19 DIAGNOSIS — I70.223 REST PAIN OF BOTH LOWER EXTREMITIES DUE TO ATHEROSCLEROSIS: ICD-10-CM

## 2024-11-19 DIAGNOSIS — M79.606 ISCHEMIC REST PAIN OF LOWER EXTREMITY: ICD-10-CM

## 2024-11-19 LAB
ABO GROUP BLD: NORMAL
BLD GP AB SCN SERPL QL: NEGATIVE
DEPRECATED RDW RBC AUTO: 49.1 FL (ref 37–54)
ERYTHROCYTE [DISTWIDTH] IN BLOOD BY AUTOMATED COUNT: 15.7 % (ref 12.3–15.4)
HCT VFR BLD AUTO: 30.2 % (ref 34–46.6)
HGB BLD-MCNC: 9.9 G/DL (ref 12–15.9)
MCH RBC QN AUTO: 28.3 PG (ref 26.6–33)
MCHC RBC AUTO-ENTMCNC: 32.8 G/DL (ref 31.5–35.7)
MCV RBC AUTO: 86.3 FL (ref 79–97)
PLATELET # BLD AUTO: 126 10*3/MM3 (ref 140–450)
PMV BLD AUTO: 10.1 FL (ref 6–12)
RBC # BLD AUTO: 3.5 10*6/MM3 (ref 3.77–5.28)
RH BLD: POSITIVE
T&S EXPIRATION DATE: NORMAL
WBC NRBC COR # BLD AUTO: 10.44 10*3/MM3 (ref 3.4–10.8)

## 2024-11-19 PROCEDURE — 25010000002 SUGAMMADEX 200 MG/2ML SOLUTION

## 2024-11-19 PROCEDURE — 25010000002 LIDOCAINE PF 0.5 % SOLUTION 50 ML VIAL: Performed by: SURGERY

## 2024-11-19 PROCEDURE — 25010000002 HEPARIN (PORCINE) PER 1000 UNITS: Performed by: SURGERY

## 2024-11-19 PROCEDURE — P9041 ALBUMIN (HUMAN),5%, 50ML: HCPCS | Performed by: ANESTHESIOLOGY

## 2024-11-19 PROCEDURE — 35654 BPG AXILLARY-FEMORAL-FEMORAL: CPT | Performed by: SURGERY

## 2024-11-19 PROCEDURE — 25010000002 LIDOCAINE PF 2% 2 % SOLUTION: Performed by: ANESTHESIOLOGY

## 2024-11-19 PROCEDURE — 86901 BLOOD TYPING SEROLOGIC RH(D): CPT | Performed by: SURGERY

## 2024-11-19 PROCEDURE — 25010000002 PHENYLEPHRINE 10 MG/ML SOLUTION 5 ML VIAL

## 2024-11-19 PROCEDURE — 04CL0ZZ EXTIRPATION OF MATTER FROM LEFT FEMORAL ARTERY, OPEN APPROACH: ICD-10-PCS | Performed by: SURGERY

## 2024-11-19 PROCEDURE — C1768 GRAFT, VASCULAR: HCPCS | Performed by: SURGERY

## 2024-11-19 PROCEDURE — 25010000002 HYDROMORPHONE 1 MG/ML SOLUTION

## 2024-11-19 PROCEDURE — 25010000002 HEPARIN (PORCINE) PER 1000 UNITS

## 2024-11-19 PROCEDURE — 25810000003 LACTATED RINGERS PER 1000 ML: Performed by: ANESTHESIOLOGY

## 2024-11-19 PROCEDURE — 25010000002 LIDOCAINE 1 % SOLUTION 20 ML VIAL: Performed by: SURGERY

## 2024-11-19 PROCEDURE — 25010000002 FENTANYL CITRATE (PF) 50 MCG/ML SOLUTION: Performed by: ANESTHESIOLOGY

## 2024-11-19 PROCEDURE — 88311 DECALCIFY TISSUE: CPT | Performed by: SURGERY

## 2024-11-19 PROCEDURE — 25010000002 PROTAMINE SULFATE PER 10 MG

## 2024-11-19 PROCEDURE — 25010000002 ONDANSETRON PER 1 MG

## 2024-11-19 PROCEDURE — 25010000002 PROPOFOL 10 MG/ML EMULSION: Performed by: ANESTHESIOLOGY

## 2024-11-19 PROCEDURE — 35654 BPG AXILLARY-FEMORAL-FEMORAL: CPT

## 2024-11-19 PROCEDURE — 25010000002 ALBUMIN HUMAN 5% PER 50 ML: Performed by: ANESTHESIOLOGY

## 2024-11-19 PROCEDURE — 86900 BLOOD TYPING SEROLOGIC ABO: CPT | Performed by: SURGERY

## 2024-11-19 PROCEDURE — 99221 1ST HOSP IP/OBS SF/LOW 40: CPT | Performed by: INTERNAL MEDICINE

## 2024-11-19 PROCEDURE — 86850 RBC ANTIBODY SCREEN: CPT | Performed by: SURGERY

## 2024-11-19 PROCEDURE — 25010000002 DEXAMETHASONE PER 1 MG: Performed by: ANESTHESIOLOGY

## 2024-11-19 PROCEDURE — 88304 TISSUE EXAM BY PATHOLOGIST: CPT | Performed by: SURGERY

## 2024-11-19 PROCEDURE — 25010000002 CEFAZOLIN PER 500 MG: Performed by: SURGERY

## 2024-11-19 PROCEDURE — 04CK0ZZ EXTIRPATION OF MATTER FROM RIGHT FEMORAL ARTERY, OPEN APPROACH: ICD-10-PCS | Performed by: SURGERY

## 2024-11-19 PROCEDURE — 25810000003 SODIUM CHLORIDE 0.9 % SOLUTION: Performed by: SURGERY

## 2024-11-19 PROCEDURE — 25810000003 SODIUM CHLORIDE 0.9 % SOLUTION: Performed by: ANESTHESIOLOGY

## 2024-11-19 PROCEDURE — 85027 COMPLETE CBC AUTOMATED: CPT | Performed by: SURGERY

## 2024-11-19 PROCEDURE — 25810000003 SODIUM CHLORIDE 0.9 % SOLUTION 250 ML FLEX CONT

## 2024-11-19 PROCEDURE — 25810000003 SODIUM CHLORIDE 0.9 % SOLUTION: Performed by: PHYSICIAN ASSISTANT

## 2024-11-19 DEVICE — ABSORBABLE HEMOSTAT (OXIDIZED REGENERATED CELLULOSE, U.S.P.)
Type: IMPLANTABLE DEVICE | Site: GROIN | Status: FUNCTIONAL
Brand: SURGICEL

## 2024-11-19 DEVICE — LIGACLIP MCA MULTIPLE CLIP APPLIERS, 20 MEDIUM CLIPS
Type: IMPLANTABLE DEVICE | Site: GROIN | Status: FUNCTIONAL
Brand: LIGACLIP

## 2024-11-19 DEVICE — LIGACLIP MCA MULTIPLE CLIP APPLIERS, 20 SMALL CLIPS
Type: IMPLANTABLE DEVICE | Site: GROIN | Status: FUNCTIONAL
Brand: LIGACLIP

## 2024-11-19 DEVICE — IMPLANTABLE DEVICE: Type: IMPLANTABLE DEVICE | Site: GROIN | Status: FUNCTIONAL

## 2024-11-19 RX ORDER — FENTANYL CITRATE 50 UG/ML
INJECTION, SOLUTION INTRAMUSCULAR; INTRAVENOUS AS NEEDED
Status: DISCONTINUED | OUTPATIENT
Start: 2024-11-19 | End: 2024-11-19 | Stop reason: SURG

## 2024-11-19 RX ORDER — OXYCODONE HYDROCHLORIDE 5 MG/1
5 TABLET ORAL
Status: DISCONTINUED | OUTPATIENT
Start: 2024-11-19 | End: 2024-11-19 | Stop reason: HOSPADM

## 2024-11-19 RX ORDER — NALOXONE HCL 0.4 MG/ML
0.4 VIAL (ML) INJECTION
Status: DISCONTINUED | OUTPATIENT
Start: 2024-11-19 | End: 2024-11-25 | Stop reason: HOSPADM

## 2024-11-19 RX ORDER — CETIRIZINE HYDROCHLORIDE 10 MG/1
10 TABLET ORAL DAILY
Status: DISCONTINUED | OUTPATIENT
Start: 2024-11-20 | End: 2024-11-25 | Stop reason: HOSPADM

## 2024-11-19 RX ORDER — KETAMINE HCL IN NACL, ISO-OSM 100MG/10ML
SYRINGE (ML) INJECTION AS NEEDED
Status: DISCONTINUED | OUTPATIENT
Start: 2024-11-19 | End: 2024-11-19 | Stop reason: SURG

## 2024-11-19 RX ORDER — SODIUM CHLORIDE 9 MG/ML
50 INJECTION, SOLUTION INTRAVENOUS CONTINUOUS
Status: DISCONTINUED | OUTPATIENT
Start: 2024-11-19 | End: 2024-11-20

## 2024-11-19 RX ORDER — SODIUM CHLORIDE, SODIUM LACTATE, POTASSIUM CHLORIDE, CALCIUM CHLORIDE 600; 310; 30; 20 MG/100ML; MG/100ML; MG/100ML; MG/100ML
INJECTION, SOLUTION INTRAVENOUS CONTINUOUS PRN
Status: DISCONTINUED | OUTPATIENT
Start: 2024-11-19 | End: 2024-11-19 | Stop reason: SURG

## 2024-11-19 RX ORDER — ONDANSETRON 2 MG/ML
4 INJECTION INTRAMUSCULAR; INTRAVENOUS EVERY 6 HOURS PRN
Status: DISCONTINUED | OUTPATIENT
Start: 2024-11-19 | End: 2024-11-25 | Stop reason: HOSPADM

## 2024-11-19 RX ORDER — SODIUM CHLORIDE, SODIUM LACTATE, POTASSIUM CHLORIDE, CALCIUM CHLORIDE 600; 310; 30; 20 MG/100ML; MG/100ML; MG/100ML; MG/100ML
9 INJECTION, SOLUTION INTRAVENOUS CONTINUOUS PRN
Status: DISCONTINUED | OUTPATIENT
Start: 2024-11-19 | End: 2024-11-19 | Stop reason: HOSPADM

## 2024-11-19 RX ORDER — LIDOCAINE HYDROCHLORIDE 20 MG/ML
INJECTION, SOLUTION EPIDURAL; INFILTRATION; INTRACAUDAL; PERINEURAL AS NEEDED
Status: DISCONTINUED | OUTPATIENT
Start: 2024-11-19 | End: 2024-11-19 | Stop reason: SURG

## 2024-11-19 RX ORDER — SODIUM CHLORIDE 9 MG/ML
INJECTION, SOLUTION INTRAVENOUS CONTINUOUS PRN
Status: DISCONTINUED | OUTPATIENT
Start: 2024-11-19 | End: 2024-11-19 | Stop reason: SURG

## 2024-11-19 RX ORDER — ONDANSETRON 2 MG/ML
4 INJECTION INTRAMUSCULAR; INTRAVENOUS ONCE AS NEEDED
Status: DISCONTINUED | OUTPATIENT
Start: 2024-11-19 | End: 2024-11-19 | Stop reason: HOSPADM

## 2024-11-19 RX ORDER — LABETALOL HYDROCHLORIDE 5 MG/ML
10 INJECTION, SOLUTION INTRAVENOUS EVERY 6 HOURS PRN
Status: ACTIVE | OUTPATIENT
Start: 2024-11-19 | End: 2024-11-20

## 2024-11-19 RX ORDER — TRAZODONE HYDROCHLORIDE 50 MG/1
50 TABLET, FILM COATED ORAL NIGHTLY
Status: DISCONTINUED | OUTPATIENT
Start: 2024-11-19 | End: 2024-11-25 | Stop reason: HOSPADM

## 2024-11-19 RX ORDER — HYDRALAZINE HYDROCHLORIDE 20 MG/ML
10 INJECTION INTRAMUSCULAR; INTRAVENOUS EVERY 6 HOURS PRN
Status: DISCONTINUED | OUTPATIENT
Start: 2024-11-19 | End: 2024-11-25 | Stop reason: HOSPADM

## 2024-11-19 RX ORDER — DEXAMETHASONE SODIUM PHOSPHATE 4 MG/ML
INJECTION, SOLUTION INTRA-ARTICULAR; INTRALESIONAL; INTRAMUSCULAR; INTRAVENOUS; SOFT TISSUE AS NEEDED
Status: DISCONTINUED | OUTPATIENT
Start: 2024-11-19 | End: 2024-11-19 | Stop reason: SURG

## 2024-11-19 RX ORDER — ONDANSETRON 4 MG/1
4 TABLET, ORALLY DISINTEGRATING ORAL EVERY 6 HOURS PRN
Status: DISCONTINUED | OUTPATIENT
Start: 2024-11-19 | End: 2024-11-25 | Stop reason: HOSPADM

## 2024-11-19 RX ORDER — ASPIRIN 81 MG/1
81 TABLET ORAL DAILY
Status: DISCONTINUED | OUTPATIENT
Start: 2024-11-20 | End: 2024-11-25 | Stop reason: HOSPADM

## 2024-11-19 RX ORDER — CILOSTAZOL 100 MG/1
100 TABLET ORAL 2 TIMES DAILY
Status: DISCONTINUED | OUTPATIENT
Start: 2024-11-19 | End: 2024-11-25 | Stop reason: HOSPADM

## 2024-11-19 RX ORDER — MORPHINE SULFATE 2 MG/ML
2 INJECTION, SOLUTION INTRAMUSCULAR; INTRAVENOUS
Status: DISCONTINUED | OUTPATIENT
Start: 2024-11-19 | End: 2024-11-25 | Stop reason: HOSPADM

## 2024-11-19 RX ORDER — ATORVASTATIN CALCIUM 40 MG/1
80 TABLET, FILM COATED ORAL NIGHTLY
Status: DISCONTINUED | OUTPATIENT
Start: 2024-11-19 | End: 2024-11-25 | Stop reason: HOSPADM

## 2024-11-19 RX ORDER — ONDANSETRON 2 MG/ML
INJECTION INTRAMUSCULAR; INTRAVENOUS AS NEEDED
Status: DISCONTINUED | OUTPATIENT
Start: 2024-11-19 | End: 2024-11-19 | Stop reason: SURG

## 2024-11-19 RX ORDER — OXYCODONE AND ACETAMINOPHEN 5; 325 MG/1; MG/1
1 TABLET ORAL EVERY 6 HOURS PRN
Status: DISCONTINUED | OUTPATIENT
Start: 2024-11-19 | End: 2024-11-25 | Stop reason: HOSPADM

## 2024-11-19 RX ORDER — CARVEDILOL 6.25 MG/1
6.25 TABLET ORAL 2 TIMES DAILY WITH MEALS
Status: DISCONTINUED | OUTPATIENT
Start: 2024-11-19 | End: 2024-11-25 | Stop reason: HOSPADM

## 2024-11-19 RX ORDER — PROTAMINE SULFATE 10 MG/ML
INJECTION, SOLUTION INTRAVENOUS AS NEEDED
Status: DISCONTINUED | OUTPATIENT
Start: 2024-11-19 | End: 2024-11-19 | Stop reason: SURG

## 2024-11-19 RX ORDER — EPHEDRINE SULFATE 50 MG/ML
INJECTION INTRAVENOUS AS NEEDED
Status: DISCONTINUED | OUTPATIENT
Start: 2024-11-19 | End: 2024-11-19 | Stop reason: SURG

## 2024-11-19 RX ORDER — PHENYLEPHRINE HCL IN 0.9% NACL 1 MG/10 ML
SYRINGE (ML) INTRAVENOUS AS NEEDED
Status: DISCONTINUED | OUTPATIENT
Start: 2024-11-19 | End: 2024-11-19 | Stop reason: SURG

## 2024-11-19 RX ORDER — ENOXAPARIN SODIUM 100 MG/ML
30 INJECTION SUBCUTANEOUS DAILY
Status: DISCONTINUED | OUTPATIENT
Start: 2024-11-20 | End: 2024-11-25 | Stop reason: HOSPADM

## 2024-11-19 RX ORDER — ALBUMIN, HUMAN INJ 5% 5 %
SOLUTION INTRAVENOUS CONTINUOUS PRN
Status: DISCONTINUED | OUTPATIENT
Start: 2024-11-19 | End: 2024-11-19 | Stop reason: SURG

## 2024-11-19 RX ORDER — ALBUTEROL SULFATE 90 UG/1
2 INHALANT RESPIRATORY (INHALATION) EVERY 4 HOURS PRN
Status: DISCONTINUED | OUTPATIENT
Start: 2024-11-19 | End: 2024-11-23 | Stop reason: SDUPTHER

## 2024-11-19 RX ORDER — HEPARIN SODIUM 1000 [USP'U]/ML
INJECTION, SOLUTION INTRAVENOUS; SUBCUTANEOUS AS NEEDED
Status: DISCONTINUED | OUTPATIENT
Start: 2024-11-19 | End: 2024-11-19 | Stop reason: SURG

## 2024-11-19 RX ORDER — ROCURONIUM BROMIDE 10 MG/ML
INJECTION, SOLUTION INTRAVENOUS AS NEEDED
Status: DISCONTINUED | OUTPATIENT
Start: 2024-11-19 | End: 2024-11-19 | Stop reason: SURG

## 2024-11-19 RX ORDER — ACETAMINOPHEN 500 MG
1000 TABLET ORAL ONCE
Status: COMPLETED | OUTPATIENT
Start: 2024-11-19 | End: 2024-11-19

## 2024-11-19 RX ORDER — PROPOFOL 10 MG/ML
VIAL (ML) INTRAVENOUS AS NEEDED
Status: DISCONTINUED | OUTPATIENT
Start: 2024-11-19 | End: 2024-11-19 | Stop reason: SURG

## 2024-11-19 RX ORDER — PANTOPRAZOLE SODIUM 40 MG/1
40 TABLET, DELAYED RELEASE ORAL
Status: DISCONTINUED | OUTPATIENT
Start: 2024-11-20 | End: 2024-11-25 | Stop reason: HOSPADM

## 2024-11-19 RX ORDER — FLUTICASONE PROPIONATE 50 MCG
1 SPRAY, SUSPENSION (ML) NASAL DAILY PRN
Status: DISCONTINUED | OUTPATIENT
Start: 2024-11-19 | End: 2024-11-25 | Stop reason: HOSPADM

## 2024-11-19 RX ORDER — LABETALOL HYDROCHLORIDE 5 MG/ML
10 INJECTION, SOLUTION INTRAVENOUS EVERY 6 HOURS PRN
Status: DISCONTINUED | OUTPATIENT
Start: 2024-11-20 | End: 2024-11-25 | Stop reason: HOSPADM

## 2024-11-19 RX ADMIN — ACETAMINOPHEN 1000 MG: 500 TABLET ORAL at 07:20

## 2024-11-19 RX ADMIN — Medication 100 MCG: at 08:05

## 2024-11-19 RX ADMIN — SODIUM CHLORIDE 2 G: 9 INJECTION, SOLUTION INTRAVENOUS at 23:49

## 2024-11-19 RX ADMIN — SODIUM CHLORIDE, POTASSIUM CHLORIDE, SODIUM LACTATE AND CALCIUM CHLORIDE: 600; 310; 30; 20 INJECTION, SOLUTION INTRAVENOUS at 07:29

## 2024-11-19 RX ADMIN — Medication 200 MCG: at 07:38

## 2024-11-19 RX ADMIN — ROCURONIUM BROMIDE 50 MG: 10 INJECTION, SOLUTION INTRAVENOUS at 07:32

## 2024-11-19 RX ADMIN — FENTANYL CITRATE 50 MCG: 50 INJECTION, SOLUTION INTRAMUSCULAR; INTRAVENOUS at 08:42

## 2024-11-19 RX ADMIN — Medication 20 MG: at 08:08

## 2024-11-19 RX ADMIN — HEPARIN SODIUM 500 UNITS: 1000 INJECTION INTRAVENOUS; SUBCUTANEOUS at 10:00

## 2024-11-19 RX ADMIN — PROTAMINE SULFATE 10 MG: 10 INJECTION, SOLUTION INTRAVENOUS at 10:35

## 2024-11-19 RX ADMIN — Medication 100 MCG: at 07:31

## 2024-11-19 RX ADMIN — ALBUMIN (HUMAN): 12.5 INJECTION, SOLUTION INTRAVENOUS at 08:10

## 2024-11-19 RX ADMIN — SODIUM CHLORIDE 2 G: 9 INJECTION, SOLUTION INTRAVENOUS at 07:39

## 2024-11-19 RX ADMIN — ONDANSETRON 4 MG: 2 INJECTION INTRAMUSCULAR; INTRAVENOUS at 08:03

## 2024-11-19 RX ADMIN — SODIUM CHLORIDE 2 G: 9 INJECTION, SOLUTION INTRAVENOUS at 17:56

## 2024-11-19 RX ADMIN — PROTAMINE SULFATE 10 MG: 10 INJECTION, SOLUTION INTRAVENOUS at 10:37

## 2024-11-19 RX ADMIN — ATORVASTATIN CALCIUM 80 MG: 40 TABLET, FILM COATED ORAL at 20:48

## 2024-11-19 RX ADMIN — DEXAMETHASONE SODIUM PHOSPHATE 8 MG: 4 INJECTION, SOLUTION INTRAMUSCULAR; INTRAVENOUS at 07:31

## 2024-11-19 RX ADMIN — SUGAMMADEX 200 MG: 100 INJECTION, SOLUTION INTRAVENOUS at 11:16

## 2024-11-19 RX ADMIN — SODIUM CHLORIDE, SODIUM LACTATE, POTASSIUM CHLORIDE, CALCIUM CHLORIDE 9 ML/HR: 20; 30; 600; 310 INJECTION, SOLUTION INTRAVENOUS at 07:21

## 2024-11-19 RX ADMIN — SODIUM CHLORIDE: 9 INJECTION, SOLUTION INTRAVENOUS at 07:35

## 2024-11-19 RX ADMIN — PHENYLEPHRINE HYDROCHLORIDE 100 MCG: 50 INJECTION INTRAVENOUS at 10:12

## 2024-11-19 RX ADMIN — FENTANYL CITRATE 50 MCG: 50 INJECTION, SOLUTION INTRAMUSCULAR; INTRAVENOUS at 07:31

## 2024-11-19 RX ADMIN — PHENYLEPHRINE HYDROCHLORIDE 0.5 MCG/KG/MIN: 50 INJECTION INTRAVENOUS at 08:22

## 2024-11-19 RX ADMIN — HYDROMORPHONE HYDROCHLORIDE 0.25 MG: 1 INJECTION, SOLUTION INTRAMUSCULAR; INTRAVENOUS; SUBCUTANEOUS at 12:20

## 2024-11-19 RX ADMIN — LIDOCAINE HYDROCHLORIDE 50 MG: 20 INJECTION, SOLUTION EPIDURAL; INFILTRATION; INTRACAUDAL; PERINEURAL at 07:31

## 2024-11-19 RX ADMIN — EPHEDRINE SULFATE 10 MG: 50 INJECTION INTRAVENOUS at 08:05

## 2024-11-19 RX ADMIN — SODIUM CHLORIDE 50 ML/HR: 9 INJECTION, SOLUTION INTRAVENOUS at 13:34

## 2024-11-19 RX ADMIN — SODIUM CHLORIDE 500 ML: 9 INJECTION, SOLUTION INTRAVENOUS at 23:49

## 2024-11-19 RX ADMIN — PROTAMINE SULFATE 15 MG: 10 INJECTION, SOLUTION INTRAVENOUS at 10:30

## 2024-11-19 RX ADMIN — CARVEDILOL 6.25 MG: 6.25 TABLET, FILM COATED ORAL at 17:58

## 2024-11-19 RX ADMIN — PROTAMINE SULFATE 10 MG: 10 INJECTION, SOLUTION INTRAVENOUS at 10:29

## 2024-11-19 RX ADMIN — PROPOFOL 100 MG: 10 INJECTION, EMULSION INTRAVENOUS at 07:31

## 2024-11-19 RX ADMIN — TRAZODONE HYDROCHLORIDE 50 MG: 50 TABLET ORAL at 20:48

## 2024-11-19 RX ADMIN — Medication 10 MG: at 09:30

## 2024-11-19 RX ADMIN — PROTAMINE SULFATE 10 MG: 10 INJECTION, SOLUTION INTRAVENOUS at 10:28

## 2024-11-19 RX ADMIN — CILOSTAZOL 100 MG: 100 TABLET ORAL at 20:49

## 2024-11-19 RX ADMIN — ROCURONIUM BROMIDE 30 MG: 10 INJECTION, SOLUTION INTRAVENOUS at 10:11

## 2024-11-19 RX ADMIN — HEPARIN SODIUM 3000 UNITS: 1000 INJECTION INTRAVENOUS; SUBCUTANEOUS at 09:03

## 2024-11-19 NOTE — CONSULTS
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  Patient was seen outpatient by vascular surgery.  Patient has past medical history of abdominal aortic aneurysm at 3 cm being monitored as outpatient, anxiety, aortic stenosis, CAD status post CABG, congestive heart failure with preserved ejection fraction, claudication of both lower extremities, hypertension, hyperlipidemia and ongoing tobacco abuse.  At outpatient visit patient was found to have CT a with aneurysm dilation of the infrarenal abdominal aorta up to 3 cm in complete occlusion of the aorta below the level of the FRANCESCO.  There was also complete occlusion of the left iliac system.  Patient had scheduled procedure today with axillobifemoral bypass graft, bilateral femoral endarterectomy.  Post operatively patient was admitted to CCU for ongoing treatment and evaluation.  She is currently somnolent but arousable.  Surgical site with 2 wound vacs in place.  No drainage or discharge around it.  Has mild pain around surgical site.      Review of Systems  Rest pain in feet  Of this could not be obtained, patient not optimally responsive.      Personal History     Past Medical History:   Diagnosis Date    AAA (abdominal aortic aneurysm)     INFRA RENAL, 3 CM LAST CT ABDOMEN    Allergic rhinitis     Seasonal allergies    Anxiety 2024    Aortic stenosis, mild 10/18/2021    Arthritis     CAD s/p CABG 10/18/2021    CHF (congestive heart failure)     Chronic heart failure with preserved ejection  fraction (HFpEF) 12/30/2021    Claudication of both lower extremities     COPD (chronic obstructive pulmonary disease)     Diverticulitis     Elevated cholesterol     Essential hypertension 06/07/2021    Fatigue     GERD (gastroesophageal reflux disease)     Heart attack 10/18/2021    Hemorrhoids 2013    Hyperlipidemia LDL goal <70 06/07/2021    Irritable bowel syndrome with diarrhea     Memory loss     forgetfulness    Microhematuria 01/13/2019    Nephrolithiasis 01/13/2019    Smoker 12/30/2021       Past Surgical History:   Procedure Laterality Date    CARDIAC SURGERY  04/19/2013    4 way bypass    COLONOSCOPY  05/23/2016, 2019    COLONOSCOPY N/A 2/22/2024    Procedure: COLONOSCOPY WITH HOT SNARE POLYPECTOMIES;  Surgeon: Can Pearce MD;  Location: [facility] ENDOSCOPY;  Service: Gastroenterology;  Laterality: N/A;  COLON POLYPS, DIVERTICULOSIS    CORONARY ARTERY BYPASS GRAFT  2013    ENDOSCOPY  2019    HERNIA REPAIR      OTHER SURGICAL HISTORY  2001    cardiac stents, 2 stents placed    VERTEBROPLASTY N/A 4/1/2022    Procedure: VERTEBROPLASTY, THORACIC 11;  Surgeon: Redd Cisneros MD;  Location: [facility] MAIN OR;  Service: Neurosurgery;  Laterality: N/A;       Family History: family history includes COPD in her father; Heart attack in her mother; Hypertension in her father and mother; Skin cancer in her father and mother; Stroke in her father. Otherwise pertinent FHx was reviewed.     Social History:  reports that she has been smoking cigarettes. She started smoking about 63 years ago. She has a 20 pack-year smoking history. She has been exposed to tobacco smoke. She has never used smokeless tobacco. She reports that she does not drink alcohol and does not use drugs.    Home Medications:  HYDROcodone-acetaminophen, albuterol sulfate HFA, aspirin, atorvastatin, carvedilol, cetirizine, cilostazol, diphenoxylate-atropine, fluticasone, omeprazole, ondansetron, sertraline, traZODone, triamcinolone, vitamin D, and  vitamin E    Allergies:  No Known Allergies    Objective    Objective     Vitals:   Temp:  [97.1 °F (36.2 °C)-97.4 °F (36.3 °C)] 97.4 °F (36.3 °C)  Heart Rate:  [67-82] 82  Resp:  [16-18] 18  BP: ()/(57-81) 122/59  Flow (L/min) (Oxygen Therapy):  [2-6] 2    Physical Exam:  Vital Signs Reviewed   Somnolent, in bed, arousable  HEENT:  PERRL, EOMI.  OP, nares clear, MMM  Chest:  good aeration, clear to auscultation bilaterally,  no work of breathing noted  CV: RRR, no MGR, pulses 2+, equal; postop incisions are without drainage, wound vacs in place  Abd:  Soft, NT, ND, + BS, no HSM  EXT:  no clubbing, no cyanosis, no edema, no joint tenderness      Result Review    Result Review:  I have personally reviewed the results from the time of this admission to 11/19/2024 14:03 EST and agree with these findings:  [x]  Laboratory  [x]  Microbiology  [x]  Radiology  [x]  EKG/Telemetry   [x]  Cardiology/Vascular   []  Pathology  []  Old records  []  Other:  Most notable findings include:       Lab 11/19/24  1236 11/12/24  1426   WBC 10.44 4.06   HEMOGLOBIN 9.9* 13.5   HEMATOCRIT 30.2* 41.3   PLATELETS 126* 165   SODIUM  --  134*   POTASSIUM  --  4.5   CHLORIDE  --  101   CO2  --  25.5   BUN  --  16   CREATININE  --  1.03*   GLUCOSE  --  76   CALCIUM  --  9.1   TOTAL PROTEIN  --  7.6   ALBUMIN  --  4.0   GLOBULIN  --  3.6         Assessment & Plan   Assessment / Plan     Active Hospital Problems:  Active Hospital Problems    Diagnosis     **Ischemic rest pain of lower extremity     Rest pain of both lower extremities due to atherosclerosis          Impression:  Rest pain of both lower extremities status post axillobifemoral bypass graft, bilateral femoral endarterectomy  Abdominal aortic aneurysm  Anxiety  CAD status post CABG  Congestive heart failure with preserved ejection fraction  Hypertension  Hyperlipidemia  Ongoing tobacco abuse        PLAN:  Pain medications, fluids, pressors, antibiotics and anticoagulation per  vascular surgery recommendations  Continue supplemental O2 to keep sats greater than 90%  Arterial line in place.  Management per vascular surgery  Start incentive spirometer Q1 hours w/a  Nicotine patch if needed  Albuterol inhaler as needed      IMirian PA spent 15 minutes in accordance with split shared billing.  Electronically signed by MIGUELANGEL Gann, 11/19/24, 2:26 PM EST.        Pt is critically ill in ICU status post rest pain of both lower extremity status post axillobifemoral bypass graft, bilateral femoral endarterectomy  We spent 32 minutes of critical care time, excluding any procedure notes, in review, analysis, obtaining history and physical, formulating care plan, and I led multi-disciplinary critical care rounds with bedside nurse, respiratory therapist, clinical pharmacist and other allied services. I have discussed the case with primary service and other consultants as well. I, Dr Lama, spent 17 mins of critical care time according to split shared critical care billing guidelines.       Electronically signed by Florentin Lama MD, 11/19/2024, 14:03 EST.

## 2024-11-19 NOTE — SIGNIFICANT NOTE
Wound Eval / Progress Noted     Asa     Patient Name: Nilda Julien  : 1945  MRN: [id number]  Today's Date: 2024                 Admit Date: 2024    Visit Dx:    ICD-10-CM ICD-9-CM   1. Ischemic rest pain of lower extremity  M79.606 729.5    I99.8 459.9         Ischemic rest pain of lower extremity    Rest pain of both lower extremities due to atherosclerosis        Past Medical History:   Diagnosis Date    AAA (abdominal aortic aneurysm)     INFRA RENAL, 3 CM LAST CT ABDOMEN    Allergic rhinitis     Seasonal allergies    Anxiety 2024    Aortic stenosis, mild 10/18/2021    Arthritis     CAD s/p CABG 10/18/2021    CHF (congestive heart failure)     Chronic heart failure with preserved ejection fraction (HFpEF) 2021    Claudication of both lower extremities     COPD (chronic obstructive pulmonary disease)     Diverticulitis     Elevated cholesterol     Essential hypertension 2021    Fatigue     GERD (gastroesophageal reflux disease)     Heart attack 10/18/2021    Hemorrhoids     Hyperlipidemia LDL goal <70 2021    Irritable bowel syndrome with diarrhea     Memory loss     forgetfulness    Microhematuria 2019    Nephrolithiasis 2019    Smoker 2021        Past Surgical History:   Procedure Laterality Date    CARDIAC SURGERY  2013    4 way bypass    COLONOSCOPY  2016,     COLONOSCOPY N/A 2024    Procedure: COLONOSCOPY WITH HOT SNARE POLYPECTOMIES;  Surgeon: Can Pearce MD;  Location: [facility] ENDOSCOPY;  Service: Gastroenterology;  Laterality: N/A;  COLON POLYPS, DIVERTICULOSIS    CORONARY ARTERY BYPASS GRAFT  2013    ENDOSCOPY  2019    HERNIA REPAIR      OTHER SURGICAL HISTORY      cardiac stents, 2 stents placed    VERTEBROPLASTY N/A 2022    Procedure: VERTEBROPLASTY, THORACIC 11;  Surgeon: Redd Cisneros MD;  Location: [facility] MAIN OR;  Service: Neurosurgery;  Laterality: N/A;         Physical  Assessment:  Wound 11/19/24 0814 Bilateral groin (Active)   Dressing Appearance intact;dry;no drainage 11/19/24 1440   Closure Unable to assess 11/19/24 1440   Base unable to visualize;other (see comments) 11/19/24 1440   Drainage Amount none 11/19/24 1440   Care, Wound negative pressure wound therapy 11/19/24 1440   Dressing Care dressing applied 11/19/24 0826   NPWT (Negative Pressure Wound Therapy) 11/19/24 1440 Bilateral groin (Active)   Therapy Setting continuous therapy 11/19/24 1440   Dressing other (see comments) 11/19/24 1440   Pressure Setting 125 mmHg 11/19/24 1440        Wound Check / Follow-up:  Patient seen today for a wound consult. Patient is awake, alert and oriented at the time of assessment. Patient is s/p axillo-bifemoral bypass graft with the application of the prevena incisional wound vac.     Wound vac functioning appropriately with no alarms noted, foam is well compressed; no drainage in cannister. Wound vac dressings are to be removed on 11/26. Plan is for patient to DC to rehab. Discussed purpose of the incisional wound vac to patient and family.    No other issues identified at this time.     Impression: surgical incisions with primary closure and application of prevena incisional wound vac    Short term goals:  regain skin integrity, skin protection, negative pressure wound vac therapy weekly    Melisa Kong RN    11/19/2024    14:42 EST

## 2024-11-19 NOTE — ANESTHESIA PREPROCEDURE EVALUATION
Anesthesia Evaluation     Patient summary reviewed and Nursing notes reviewed   no history of anesthetic complications:   NPO Solid Status: > 8 hours  NPO Liquid Status: > 2 hours           Airway   Mallampati: II  TM distance: >3 FB  Neck ROM: full  No difficulty expected  Dental    (+) upper dentures and lower dentures    Pulmonary - normal exam    breath sounds clear to auscultation  (+) a smoker Current, Abstained day of surgery, cigarettes,  Cardiovascular   Exercise tolerance: poor (<4 METS)    ECG reviewed  PT is on anticoagulation therapy  Patient on routine beta blocker  Rhythm: regular  Rate: normal    (+) hypertension, valvular problems/murmurs MR and AS, past MI , CAD, CABG >6 Months, cardiac stents more than 12 months ago , murmur, PVD, hyperlipidemia    ROS comment: EK bpm.    Atrial-paced complexes  Ventricular premature complex  Left atrial enlargement  Borderline  intraventricular conduction delay  LVH with secondary repolarization abnormality  Inferior  infarct, old  Anterior  infarct, old  When compared with ECG of 2018 11:29:02,  Significant change in rhythm  New or worsened ischemia or infarction  Electronically Signed By: David Fuchs ([facility] 2024-11-15 14:25:42  Date and Time of Study:2024 14:07:09    Echo (22, murmur):  Mild diffuse hypokinesis with adequate left ventricular systolic function .  EF 51%  Fibrocalcific mitral and aortic valves.  Mild MR and mild TR.  Mild AI.      Neuro/Psych  (+) psychiatric history Anxiety  GI/Hepatic/Renal/Endo    (+) GERD well controlled, renal disease- stones    Musculoskeletal     (+) back pain, neck stiffness      ROS comment: 23 Thoracic spine MRI:  IMPRESSION:                 1. Probably acute to subacute T10 inferior endplate fracture with mild central height loss.  2. Stable appearance of T6, T11, T12, L1 fractures with height loss, as described above.    3. Multilevel degenerative changes with multilevel mild spinal canal  narrowing and multilevel   mild-to-moderate neural foraminal narrowing, similar compared to previous MRI.    Abdominal  - normal exam   Substance History - negative use     OB/GYN negative ob/gyn ROS         Other   arthritis,     ROS/Med Hx Other: <4METS,DEC. MOBILITY/PAIN.HX HTN,CAD/MI-STENTS X2 2001,4V CABG 2013,COPD,MILD AS. STRESS 6/21 EF ~15%,FIXED DEFECT,ECHO 6/20/22 EF 51%,MILD MR,TR,AI; NO VALVE STENOSIS.CARDS CLEARANCE,MOD RISK 8/27/24. KT                     Anesthesia Plan    ASA 3     general     (Patient unable to lay flat due to multiple vertebral fractures)  intravenous induction     Anesthetic plan, risks, benefits, and alternatives have been provided, discussed and informed consent has been obtained with: patient and child.  Pre-procedure education provided  Plan discussed with CRNA.        CODE STATUS:

## 2024-11-19 NOTE — ANESTHESIA PROCEDURE NOTES
Arterial Line      Patient reassessed immediately prior to procedure    Patient location during procedure: OR  Start time: 11/19/2024 7:40 AM  Stop Time:11/19/2024 7:45 AM       Line placed for hemodynamic monitoring.  Performed By   Anesthesiologist: Milton Sumner MD   Preanesthetic Checklist  Completed: patient identified, IV checked, site marked, risks and benefits discussed, surgical consent, monitors and equipment checked, pre-op evaluation and timeout performed  Arterial Line Prep    Sterile Tech: cap, gloves and mask  Prep: ChloraPrep  Patient monitoring: blood pressure monitoring, continuous pulse oximetry and EKG  Arterial Line Procedure   Laterality:left  Location:  radial artery  Catheter size: 20 G   Guidance: ultrasound guided, landmark technique and palpation technique  Number of attempts: 1  Successful placement: yes   Post Assessment   Dressing Type: occlusive dressing applied, secured with tape and wrist guard applied.   Complications no  Circ/Move/Sens Assessment: normal.   Patient Tolerance: patient tolerated the procedure well with no apparent complications  Additional Notes  Wire intact. Calibrated/Connections checked, line flushed.  Appropriate waveform. Clear occlusive opsite applied over catheter insertion site. Pt remained hemodynamically stable throughout procedure.

## 2024-11-19 NOTE — OP NOTE
AXILLARY BIFEMORAL BYPASS  Procedure Report    Patient Name:  Nilda Julien  YOB: 1945    Date of Surgery:  11/19/2024     Indications: Rest pain    Pre-op Diagnosis:   Ischemic rest pain of lower extremity [M79.606, I99.8]       Post-Op Diagnosis Codes:     * Ischemic rest pain of lower extremity [M79.606, I99.8]    Procedure(s):  AXILLO-BIFEMORAL BYPASS GRAFT, bilateral femoral endarterectomy    Staff:  Surgeon(s):  Selvin Vásquez MD    Assistant: Karena Sauer RN CSA    Anesthesia: General    Estimated Blood Loss: 450 mL    Implants:    Implant Name Type Inv. Item Serial No.  Lot No. LRB No. Used Action   CLIPAPPLR M/ ENDO LIGACLIP 9 3/8IN SM - [id number] Implant CLIPAPPLR M/ ENDO LIGACLIP 9 3/8IN   [employer]  DIV OF  AND J [id number]  1 Implanted   CLIPAPPLR M/ ENDO LIGACLIP9 3/8IN MD - [id number] Implant CLIPAPPLR M/ ENDO LIGACLIP9 3/8IN MD  [employer]  DIV OF  AND J [id number]  1 Implanted   GRFT VASC PROPATEN STD AXILLOBIFEMORAL REMVRNG 8MM [id number] - [id number] Implant GRFT VASC PROPATEN STD AXILLOBIFEMORAL REMVRNG 8MM [id number]  [employer] AND  [id number] Right 1 Implanted   HEMOST ABS SURGICEL 2X14 - [id number] Implant HEMOST ABS SURGICEL 2X14  [employer]  DIV OF  AND J [id number] Right 1 Implanted       Specimen: Right femoral plaque, left femoral plaque       Findings: Strong monophasic right superficial femoral artery Doppler signal nearly completely dependent on the graft, strong biphasic left superficial femoral artery Doppler signal nearly completely dependent on the graft, strong biphasic bilateral profundofemoral artery Doppler signals nearly completely dependent on the graft.    Complications: None    Description of Procedure: The patient was brought into the operating room and was placed in the supine position.  She was then induced into general anesthesia.  An arterial line had been previously placed by anesthesia.  A Hilario catheter was inserted.  She was  then prepped and draped in the usual aseptic fashion over the right neck, chest, abdomen and both groins.  Ioban was applied.  A timeout was taken to confirm patient and procedure.  An incision was then made in the right groin in a longitudinal fashion over the expected location of the femoral artery.  The subcutaneous tissue was traversed using electrocautery.  Blunt and sharp dissection were used to dissected the common femoral, superficial femoral and profundofemoral arteries.  Once exposed these were all controlled using Vesseloops.  A moist Ray-Niya pad was placed into the wound.  A similar incision was then made in the left groin in a longitudinal fashion over the expected location of the common femoral artery.  The wound was deepened through the tissues down to the femoral vessels.  The common femoral, superficial femoral and profunda femoral arteries were all dissected and circumferentially controlled using Vesseloops.  A moist Ray-Niya was placed into the wound.  A transverse incision was then made in the right upper chest inferior to the clavicle over the expected location of the axillary vessels.  The subcutaneous tissue was traversed using electrocautery.  The pectoralis major muscle was identified and fibers  following the direction of the fibers to expose the underlying pectoralis minor muscle.  The pectoralis minor muscle was traversed using electrocautery.  Blunt and sharp dissection were then used in combination with electrocautery as needed to dissect and expose the axillary artery.  Circumferential control was obtained proximally and distally using Vesseloops.  A moist Ray-Niya was then placed into the wound.  A tunnel was then created from the right groin incision along the lateral aspect of the abdomen and chest all the way to the lateral aspect of the incision created in the right upper chest.  An 8 mm ringed Propaten PTFE axillobifemoral graft was selected.  The straight portion was  brought through the tunnel.  The site arm was then brought from the right to the left groin using a large Sarah clamp.  Extreme care was taken in both tunnels to ensure that the graft was not twisted.  These were confirmed by flushing with heparin saline and ensuring that flow was unimpeded.  The patient was given systemic anticoagulation.  3 minutes were allowed for the heparin to circulate.  The axillary artery was then clamped proximally and distally.  A longitudinal arteriotomy was made using an 11 blade and extended using Faustin scissors.  The end of the graft was fashioned to me the dimensions of the arteriotomy.  The excess graft was cut just above the rings to avoid kinking.  An anastomosis was then created using 6-0 Prolene in a running fashion.  Just prior to completion of the anastomosis all vessels were bled.  The anastomosis was completed.  Blood flow through the graft was tested and found to be excellent.  A Connor clamp was then placed on the graft immediately distal to the anastomosis.  The right common femoral artery was then clamped proximally and distally and a longitudinal arteriotomy made using an 11 blade and extended using Faustin scissors.  A standard endarterectomy was performed using a Columbus and removing the plaque with a hemostat.  The graft was then trimmed in length and fashioned to meet the dimensions of the arteriotomy.  An anastomosis was then created using 5-0 Prolene in a running fashion.  Just prior to completion of the anastomosis all vessels were bled and flow through the graft was tested.  Flow through the graft was excellent.  The anastomosis was completed.  Blood flow was reestablished.  The left femoral vessels were then exposed, clamped at the common femoral, superficial femoral and profunda femoral vessels.  A longitudinal arteriotomy was made with an 11 blade and extended using Faustin scissors.  A standard endarterectomy was performed using a Columbus and the plaque removed  proximally distally using a hemostat.  The graft was then trimmed in length and fashioned to meet the dimensions of the arteriotomy.  An anastomosis was then created using 5-0 Prolene in a running fashion.  Just prior to completion of the anastomosis all vessels were bled.  The anastomosis was completed.  Blood flow was reestablished.  A Doppler examination revealed the above-mentioned findings.  All wounds were inspected for hemostasis and hemostasis assured.  The patient was given protamine to reverse the heparin and assist with hemostasis.  The Doppler examination was repeated after the protamine circulated with the same findings.  The chest incision was approximated using 2-0 Vicryl in a running fashion for approximation of the pectoral fascia and subcutaneous tissue followed by 3-0 Vicryl in a running fashion for approximation of the subcutaneous tissue followed by a 4-0 Monocryl in a running subcuticular stitch for approximation of the skin edges.  The bilateral groin incisions were approximated using 2-0 Vicryl in a running fashion for approximation of the femoral sheath followed by 2-0 Vicryl in a running fashion for approximation of the deep subcutaneous tissue followed by 3-0 Vicryl in a running fashion for approximation of the superficial subcutaneous tissue followed by surgical staples for approximation of the skin edges with application of Prevenas bilaterally.  Dermabond was applied to the chest wound.  The patient tolerated procedure well.        Assistant: Karena Sauer RN CSA  was responsible for performing the following activities: First assist and their skilled assistance was necessary for the success of this case.    Selvin Vásquez MD     Date: 11/19/2024  Time: 11:28 EST

## 2024-11-19 NOTE — ANESTHESIA POSTPROCEDURE EVALUATION
Patient: Nilda Julien    Procedure Summary       Date: 11/19/24 Room / Location: [facility] OR 01 / [facility] MAIN OR    Anesthesia Start: 0729 Anesthesia Stop: 1127    Procedure: AXILLO-BIFEMORAL BYPASS GRAFT (Bilateral: Groin) Diagnosis:       Ischemic rest pain of lower extremity      (Ischemic rest pain of lower extremity [M79.606, I99.8])    Surgeons: Selvin Vásquez MD Provider: Milton Sumner MD    Anesthesia Type: general ASA Status: 3            Anesthesia Type: general    Vitals  Vitals Value Taken Time   /57 11/19/24 1241   Temp 36.2 °C (97.1 °F) 11/19/24 1135   Pulse 80 11/19/24 1243   Resp 16 11/19/24 1220   SpO2 99 % 11/19/24 1243   Vitals shown include unfiled device data.        Post Anesthesia Care and Evaluation    Patient location during evaluation: bedside  Patient participation: complete - patient participated  Level of consciousness: awake  Pain management: adequate    Airway patency: patent  PONV Status: none  Cardiovascular status: acceptable and stable  Respiratory status: acceptable  Hydration status: acceptable

## 2024-11-19 NOTE — PLAN OF CARE
Goal Outcome Evaluation:         Patient AO x4. 97% on RA. C/O of right eye pain. Eye is little swollen, warm washcloth applied. Family visited and gone for the night.

## 2024-11-20 LAB
ALBUMIN SERPL-MCNC: 3 G/DL (ref 3.5–5.2)
ANION GAP SERPL CALCULATED.3IONS-SCNC: 10 MMOL/L (ref 5–15)
BASOPHILS # BLD AUTO: 0.03 10*3/MM3 (ref 0–0.2)
BASOPHILS NFR BLD AUTO: 0.5 % (ref 0–1.5)
BUN SERPL-MCNC: 14 MG/DL (ref 8–23)
BUN/CREAT SERPL: 19.2 (ref 7–25)
CALCIUM SPEC-SCNC: 7.7 MG/DL (ref 8.6–10.5)
CHLORIDE SERPL-SCNC: 107 MMOL/L (ref 98–107)
CO2 SERPL-SCNC: 19 MMOL/L (ref 22–29)
CREAT SERPL-MCNC: 0.73 MG/DL (ref 0.57–1)
DEPRECATED RDW RBC AUTO: 48.1 FL (ref 37–54)
EGFRCR SERPLBLD CKD-EPI 2021: 83.8 ML/MIN/1.73
EOSINOPHIL # BLD AUTO: 0 10*3/MM3 (ref 0–0.4)
EOSINOPHIL NFR BLD AUTO: 0 % (ref 0.3–6.2)
ERYTHROCYTE [DISTWIDTH] IN BLOOD BY AUTOMATED COUNT: 15.7 % (ref 12.3–15.4)
GLUCOSE SERPL-MCNC: 96 MG/DL (ref 65–99)
HCT VFR BLD AUTO: 24.1 % (ref 34–46.6)
HCT VFR BLD AUTO: 24.4 % (ref 34–46.6)
HCT VFR BLD AUTO: 28.4 % (ref 34–46.6)
HGB BLD-MCNC: 8 G/DL (ref 12–15.9)
HGB BLD-MCNC: 8 G/DL (ref 12–15.9)
HGB BLD-MCNC: 9.2 G/DL (ref 12–15.9)
IMM GRANULOCYTES # BLD AUTO: 0.04 10*3/MM3 (ref 0–0.05)
IMM GRANULOCYTES NFR BLD AUTO: 0.6 % (ref 0–0.5)
LYMPHOCYTES # BLD AUTO: 1.19 10*3/MM3 (ref 0.7–3.1)
LYMPHOCYTES NFR BLD AUTO: 17.9 % (ref 19.6–45.3)
MAGNESIUM SERPL-MCNC: 1.7 MG/DL (ref 1.6–2.4)
MCH RBC QN AUTO: 28.4 PG (ref 26.6–33)
MCHC RBC AUTO-ENTMCNC: 33.2 G/DL (ref 31.5–35.7)
MCV RBC AUTO: 85.5 FL (ref 79–97)
MONOCYTES # BLD AUTO: 0.43 10*3/MM3 (ref 0.1–0.9)
MONOCYTES NFR BLD AUTO: 6.5 % (ref 5–12)
NEUTROPHILS NFR BLD AUTO: 4.97 10*3/MM3 (ref 1.7–7)
NEUTROPHILS NFR BLD AUTO: 74.5 % (ref 42.7–76)
NRBC BLD AUTO-RTO: 0 /100 WBC (ref 0–0.2)
PHOSPHATE SERPL-MCNC: 3.5 MG/DL (ref 2.5–4.5)
PLATELET # BLD AUTO: 123 10*3/MM3 (ref 140–450)
PMV BLD AUTO: 10.3 FL (ref 6–12)
POTASSIUM SERPL-SCNC: 4.2 MMOL/L (ref 3.5–5.2)
RBC # BLD AUTO: 2.82 10*6/MM3 (ref 3.77–5.28)
SODIUM SERPL-SCNC: 136 MMOL/L (ref 136–145)
WBC NRBC COR # BLD AUTO: 6.66 10*3/MM3 (ref 3.4–10.8)

## 2024-11-20 PROCEDURE — 85018 HEMOGLOBIN: CPT | Performed by: SURGERY

## 2024-11-20 PROCEDURE — 25010000002 MORPHINE PER 10 MG: Performed by: SURGERY

## 2024-11-20 PROCEDURE — 25010000002 ENOXAPARIN PER 10 MG: Performed by: SURGERY

## 2024-11-20 PROCEDURE — P9016 RBC LEUKOCYTES REDUCED: HCPCS

## 2024-11-20 PROCEDURE — 99232 SBSQ HOSP IP/OBS MODERATE 35: CPT | Performed by: INTERNAL MEDICINE

## 2024-11-20 PROCEDURE — 85014 HEMATOCRIT: CPT | Performed by: SURGERY

## 2024-11-20 PROCEDURE — 86900 BLOOD TYPING SEROLOGIC ABO: CPT

## 2024-11-20 PROCEDURE — 99024 POSTOP FOLLOW-UP VISIT: CPT | Performed by: SURGERY

## 2024-11-20 PROCEDURE — 86923 COMPATIBILITY TEST ELECTRIC: CPT

## 2024-11-20 PROCEDURE — 36430 TRANSFUSION BLD/BLD COMPNT: CPT

## 2024-11-20 PROCEDURE — 80069 RENAL FUNCTION PANEL: CPT | Performed by: INTERNAL MEDICINE

## 2024-11-20 PROCEDURE — 83735 ASSAY OF MAGNESIUM: CPT | Performed by: PHYSICIAN ASSISTANT

## 2024-11-20 PROCEDURE — 85025 COMPLETE CBC W/AUTO DIFF WBC: CPT | Performed by: SURGERY

## 2024-11-20 RX ORDER — DOCUSATE SODIUM 100 MG/1
100 CAPSULE, LIQUID FILLED ORAL 2 TIMES DAILY
Status: DISCONTINUED | OUTPATIENT
Start: 2024-11-20 | End: 2024-11-25

## 2024-11-20 RX ADMIN — SERTRALINE HYDROCHLORIDE 50 MG: 50 TABLET ORAL at 08:20

## 2024-11-20 RX ADMIN — CILOSTAZOL 100 MG: 100 TABLET ORAL at 08:37

## 2024-11-20 RX ADMIN — TRAZODONE HYDROCHLORIDE 50 MG: 50 TABLET ORAL at 20:36

## 2024-11-20 RX ADMIN — OXYCODONE AND ACETAMINOPHEN 1 TABLET: 5; 325 TABLET ORAL at 12:19

## 2024-11-20 RX ADMIN — OXYCODONE AND ACETAMINOPHEN 1 TABLET: 5; 325 TABLET ORAL at 20:36

## 2024-11-20 RX ADMIN — PANTOPRAZOLE SODIUM 40 MG: 40 TABLET, DELAYED RELEASE ORAL at 05:00

## 2024-11-20 RX ADMIN — CILOSTAZOL 100 MG: 100 TABLET ORAL at 21:12

## 2024-11-20 RX ADMIN — ENOXAPARIN SODIUM 30 MG: 100 INJECTION SUBCUTANEOUS at 08:20

## 2024-11-20 RX ADMIN — CARVEDILOL 6.25 MG: 6.25 TABLET, FILM COATED ORAL at 18:26

## 2024-11-20 RX ADMIN — ASPIRIN 81 MG: 81 TABLET, COATED ORAL at 08:20

## 2024-11-20 RX ADMIN — DOCUSATE SODIUM 100 MG: 100 CAPSULE, LIQUID FILLED ORAL at 20:36

## 2024-11-20 RX ADMIN — MORPHINE SULFATE 2 MG: 2 INJECTION, SOLUTION INTRAMUSCULAR; INTRAVENOUS at 14:42

## 2024-11-20 RX ADMIN — CETIRIZINE HYDROCHLORIDE 10 MG: 10 TABLET, FILM COATED ORAL at 08:19

## 2024-11-20 RX ADMIN — ATORVASTATIN CALCIUM 80 MG: 40 TABLET, FILM COATED ORAL at 20:37

## 2024-11-20 NOTE — CONSULTS
11/20/24 1111   Spiritual Care   Use of Spiritual Resources non-Adventism use of spiritual care   Spiritual Care Source patient request (describe)   Spiritual Care Follow-Up follow-up, none required as presently assessed   Response to Spiritual Care emotion expressed;engaged in conversation;receptive of support;thanks expressed   Spiritual Care Interventions supportive conversation provided   Spiritual Care Visit Type initial   Spiritual Care Request coping/stress of illness support;spiritual/moral support   Receptivity to Spiritual Care visit welcomed

## 2024-11-20 NOTE — SIGNIFICANT NOTE
11/20/24 1111   Spiritual Care   Use of Spiritual Resources non-Orthodoxy use of spiritual care   Spiritual Care Source patient request (describe)   Spiritual Care Follow-Up follow-up, none required as presently assessed   Response to Spiritual Care emotion expressed;engaged in conversation;receptive of support;thanks expressed   Spiritual Care Interventions supportive conversation provided   Spiritual Care Visit Type initial   Spiritual Care Request coping/stress of illness support;spiritual/moral support   Receptivity to Spiritual Care visit welcomed

## 2024-11-20 NOTE — CONSULTS
"Nutrition Services    Patient Name: Nilda Julien  YOB: 1945  MRN: [id number]  Admission date: 11/19/2024      CLINICAL NUTRITION ASSESSMENT      Reason for Assessment  BMI   H&P:  Past Medical History:   Diagnosis Date    AAA (abdominal aortic aneurysm)     INFRA RENAL, 3 CM LAST CT ABDOMEN    Allergic rhinitis     Seasonal allergies    Anxiety 04/22/2024    Aortic stenosis, mild 10/18/2021    Arthritis     CAD s/p CABG 10/18/2021    CHF (congestive heart failure)     Chronic heart failure with preserved ejection fraction (HFpEF) 12/30/2021    Claudication of both lower extremities     COPD (chronic obstructive pulmonary disease)     Diverticulitis     Elevated cholesterol     Essential hypertension 06/07/2021    Fatigue     GERD (gastroesophageal reflux disease)     Heart attack 10/18/2021    Hemorrhoids 2013    Hyperlipidemia LDL goal <70 06/07/2021    Irritable bowel syndrome with diarrhea     Memory loss     forgetfulness    Microhematuria 01/13/2019    Nephrolithiasis 01/13/2019    Smoker 12/30/2021        Current Problems:   Active Hospital Problems    Diagnosis     **Ischemic rest pain of lower extremity     Rest pain of both lower extremities due to atherosclerosis         Nutrition/Diet History         Narrative   Visited patient during interdisciplinary rounds. Patient laying in bed awake. Reports UBW ~76 lbs. This is consistent with weight history documented. While patient is weight stable, her BMI of 17.50 is worrisome. Pt reports her typical intake is 2 meals/day. Reports some difficulties with swallowing on occasion. Agrees to modifying food textures to soft to chew/chopped meat. NFPE performed finding significant fat loss and muscle wasting. See full report below. Amenable to chocolate nutrition supplement.      Anthropometrics        Current Height, Weight Height: 142.2 cm (56\")  Weight: 35.4 kg (78 lb 0.7 oz)   Current BMI Body mass index is 17.5 kg/m².   BMI Classification " Underweight   % IBW 81%   Adjusted Body Weight (ABW) N/A   Weight Hx  Wt Readings from Last 30 Encounters:   11/19/24 0638 35.4 kg (78 lb 0.7 oz)   11/12/24 1423 34.8 kg (76 lb 11.5 oz)   10/21/24 1514 35.7 kg (78 lb 9.6 oz)   09/30/24 1342 34.7 kg (76 lb 6.4 oz)   08/27/24 1401 33.6 kg (74 lb)   04/22/24 1450 37.7 kg (83 lb 3.2 oz)   02/22/24 0807 35.6 kg (78 lb 7.7 oz)   01/31/24 1339 37.2 kg (82 lb)   10/20/23 1408 35.3 kg (77 lb 12.8 oz)   07/18/23 1533 35.7 kg (78 lb 11.2 oz)   05/31/23 1323 34.2 kg (75 lb 6.4 oz)   05/18/23 1510 34.5 kg (76 lb)   05/03/23 1314 35.5 kg (78 lb 3.2 oz)   03/16/23 1414 34.3 kg (75 lb 9.6 oz)   03/09/23 1537 34.3 kg (75 lb 9.9 oz)   02/24/23 1257 34.2 kg (75 lb 6.4 oz)   02/09/23 1525 34.9 kg (77 lb)   10/19/22 1507 34.9 kg (77 lb)   08/24/22 1257 32.9 kg (72 lb 9.6 oz)   08/16/22 1704 32.5 kg (71 lb 10.4 oz)   08/09/22 1336 33.1 kg (73 lb)   07/19/22 1528 35.4 kg (78 lb)   06/20/22 1417 33.1 kg (73 lb)   05/19/22 1327 33.6 kg (74 lb)   04/21/22 1328 34 kg (75 lb)   04/01/22 0731 34.9 kg (76 lb 15.1 oz)   02/24/22 1336 34 kg (75 lb)   02/21/22 1418 34 kg (75 lb)   01/24/22 1609 34.5 kg (76 lb)   12/30/21 1242 35.8 kg (79 lb)          Wt Change Observation stable     Estimated/Assessed Needs  Estimated Needs based on: Current Body Weight       Energy Requirements 30-35 kcal/kg    EST Needs (kcal/day) 8969-5277       Protein Requirements 1.0-1.2 g/kg   EST Daily Needs (g/day) 35-42       Fluid Requirements 1 ml/kcal    Estimated Needs (mL/day) 7197-3382     Labs/Medications         Pertinent Labs Reviewed.   Results from last 7 days   Lab Units 11/20/24  0459   SODIUM mmol/L 136   POTASSIUM mmol/L 4.2   CHLORIDE mmol/L 107   CO2 mmol/L 19.0*   BUN mg/dL 14   CREATININE mg/dL 0.73   CALCIUM mg/dL 7.7*   GLUCOSE mg/dL 96     Results from last 7 days   Lab Units 11/20/24  0459   MAGNESIUM mg/dL 1.7   PHOSPHORUS mg/dL 3.5   HEMOGLOBIN g/dL 8.0*   HEMATOCRIT % 24.1*     Coronavirus  (COVID-19)   Date Value Ref Range Status   01/18/2021 NOT DETECTED NA Final     Comment:     The SARS-CoV-2 assay is a real-time, RT-PCR test intended  for the qualitative detection of nucleic acid from the  SARS-CoV-2 in respiratory specimens from individuals,  testing performed at [facility] reference lab.       Lab Results   Component Value Date    HGBA1C 5.90 (H) 04/11/2024         Pertinent Medications Reviewed.     Malnutrition Severity Assessment      Patient meets criteria for : Severe Malnutrition  Malnutrition Type (Last 8 Hours)       Malnutrition Severity Assessment       Row Name 11/20/24 1245       Malnutrition Severity Assessment    Malnutrition Type Chronic Disease - Related Malnutrition      Row Name 11/20/24 1245       Muscle Loss    Loss of Muscle Mass Findings Severe    Nondenominational Region Severe - deep hollowing/scooping, lack of muscle to touch, facial bones well defined    Clavicle Bone Region Severe - protruding prominent bone    Acromion Bone Region Severe - squared shoulders, bones, and acromion process protrusion prominent    Patellar Region Severe - prominent bone, square looking, very little muscle definition    Anterior Thigh Region Severe - line/depression along thigh, obviously thin    Posterior Calf Region Severe - thin with very little definition/firmness      Row Name 11/20/24 1245       Fat Loss    Subcutaneous Fat Loss Findings Severe    Orbital Region  Severe - pronounced hollowness/depression, dark circles, loose saggy skin    Upper Arm Region Severe - mostly skin, very little space between folds, fingers touch      Row Name 11/20/24 1245       Criteria Met (Must meet criteria for severity in at least 2 of these categories: M Wasting, Fat Loss, Fluid, Secondary Signs, Wt. Status, Intake)    Patient meets criteria for  Severe Malnutrition                     Nutrition Diagnosis         Nutrition Dx Problem 1 Severe malnutrition related to Inability to consume sufficient  energy as evidenced by body composition changes. BMI 17.50.     Nutrition Intervention           Current Nutrition Orders & Evaluation of Intake       Current PO Diet Diet: Regular/House; Texture: Soft to Chew (NDD 3); Soft to Chew: Chopped Meat; Fluid Consistency: Thin (IDDSI 0)   Supplement Orders Placed This Encounter      Dietary Nutrition Supplements Boost Glucose Control (Glucerna Shake); chocolate           Nutrition Intervention/Prescription        Boost GC, chocolate TID (+190 kcal, 16 g each)        Medical Nutrition Therapy/Nutrition Education          Learner     Readiness Patient  Acceptance     Method     Response Explanation  Verbalizes understanding     Monitor/Evaluation        Monitor Per protocol, PO intake, Supplement intake, Pertinent labs, Weight, POC/GOC     Nutrition Discharge Plan         Recommend to continue oral nutrition supplements on discharge.      Electronically signed by:  Shira Mullins RD  11/20/24 12:59 EST

## 2024-11-20 NOTE — PROGRESS NOTES
[facility]     Progress Note    Patient Name: Nilda Julien  : 1945  MRN: [id number]  Primary Care Physician:  Ralph Marcus APRN  Date of admission: 2024    Subjective   Subjective     POD #1 status post axillobifemoral bypass graft    Doing well.  No significant events overnight.    Objective   Objective     Vitals:   Temp:  [97.2 °F (36.2 °C)-99 °F (37.2 °C)] 98.9 °F (37.2 °C)  Heart Rate:  [] 104  Resp:  [13-23] 23  BP: ()/(52-71) 131/71  Flow (L/min) (Oxygen Therapy):  [2] 2    Physical Exam   General: Alert, no acute distress  Wounds: Healing well, no signs of infection, no bleeding, no hematoma.  Extremities: Warm, well-perfused.    Hgb: 8.0  Creatinine: 0.73    Assessment & Plan   Assessment / Plan     Assessment/Plan:    Satisfactory progress following axillobifemoral bypass graft.  Transferred to a surgical floor.  PT/OT.  Plan for rehab placement.    Active Hospital Problems:  Active Hospital Problems    Diagnosis     **Ischemic rest pain of lower extremity     Rest pain of both lower extremities due to atherosclerosis            Electronically signed by Selvin Vásquez MD, 24, 12:31 PM EST.

## 2024-11-20 NOTE — PROGRESS NOTES
[facility]     Progress Note    Patient Name: Nilda Julien  : 1945  MRN: [id number]  Primary Care Physician:  Ralph Marcus APRN  Date of admission: 2024    Subjective   Subjective       Chief Complaint:   Patient is critically ill in ICU with rest pain of both lower extremities status post axillobifemoral bypass graft, bifemoral endarterectomy, abdominal aortic aneurysm, coronary disease status post CABG      Over last 24 hours, patient had surgery and was transferred to ICU for postop care.  Had wound VAC in place bilateral groin.  Arterial line was in place.    Overnight, no acute events.     This morning,  Patient is much more awake.  Complaining of mild pain around the surgical site  She is hard of hearing  Has no fever or chills.  No nausea or vomiting.  No chest pain or chest tightness.  Mild surgical site pain       Review of Systems  General:  No Fatigue, No Fever  HEENT: No dysphagia, No Visual Changes, no rhinorrhea, hard of hearing  Respiratory:  No cough,+Dyspnea, No Pleuritic Pain, + wheezing, no hemoptysis  Cardiovascular: Denies chest pain, denies palpitations,+RODRIGUEZ, No Chest Pressure  Gastrointestinal:  No Abdominal Pain, No Nausea, No Vomiting, No Diarrhea  Genitourinary:  No Dysuria, No Frequency, No Hesitancy  Musculoskeletal: No muscle pain or swelling, surgical site pain  Endocrine:  No Heat Intolerance, No Cold Intolerance  Neurologic:  No Confusion, no Dysarthria, No Headaches  Skin:  No Rash, No Open Wounds      Objective   Objective     Vitals:   Temp:  [97.1 °F (36.2 °C)-98.4 °F (36.9 °C)] 98.4 °F (36.9 °C)  Heart Rate:  [] 101  Resp:  [13-18] 15  BP: ()/(56-70) 101/56  Flow (L/min) (Oxygen Therapy):  [2-6] 2  Physical Exam   Vital Signs Reviewed   Somnolent, in bed, arousable  HEENT:  PERRL, EOMI.  OP, nares clear, MMM  Chest:  good aeration, clear to auscultation bilaterally,  no work of breathing noted  CV: RRR, no MGR, pulses 2+, equal; postop incisions  are without drainage, wound vacs in place  Abd:  Soft, NT, ND, + BS, no HSM  EXT:  no clubbing, no cyanosis, no edema, no joint tenderness      Result Review    Result Review:  I have personally reviewed the results from the time of this admission to 11/20/2024 07:58 EST and agree with these findings:  [x]  Laboratory  [x]  Microbiology  [x]  Radiology  [x]  EKG/Telemetry   [x]  Cardiology/Vascular   []  Pathology  []  Old records  []  Other:  Most notable findings include:         Lab 11/20/24  1337 11/20/24  0459 11/20/24  0015 11/19/24  1236   WBC  --  6.66  --  10.44   HEMOGLOBIN 9.2* 8.0* 8.0* 9.9*   HEMATOCRIT 28.4* 24.1* 24.4* 30.2*   PLATELETS  --  123*  --  126*   SODIUM  --  136  --   --    POTASSIUM  --  4.2  --   --    CHLORIDE  --  107  --   --    CO2  --  19.0*  --   --    BUN  --  14  --   --    CREATININE  --  0.73  --   --    GLUCOSE  --  96  --   --    CALCIUM  --  7.7*  --   --    PHOSPHORUS  --  3.5  --   --    ALBUMIN  --  3.0*  --   --             Assessment & Plan   Assessment / Plan     Brief Patient Summary:  Nilda Julien is a 79 y.o. female   Rest pain of both lower extremities status post axillobifemoral bypass graft, bilateral femoral endarterectomy  Abdominal aortic aneurysm  Anxiety  CAD status post CABG  Congestive heart failure with preserved ejection fraction  Hypertension  Hyperlipidemia  Ongoing tobacco abuse    Active Hospital Problems:  Active Hospital Problems    Diagnosis     **Ischemic rest pain of lower extremity     Rest pain of both lower extremities due to atherosclerosis      Plan:   Pain medications, fluids, pressors, antibiotics and anticoagulation per vascular surgery recommendations  Can DC A line from pulm stand point.   Continue ASA, statin, coreg, pletal, protonix.   Continue zoloft.   Continue supplemental O2 to keep sats greater than 90%  Arterial line in place.  Management per vascular surgery  Start incentive spirometer Q1 hours w/a  Nicotine patch if  needed  Albuterol inhaler as needed.  Can be transferred out of ICU from pulm stand point.     VTE Prophylaxis:  Pharmacologic VTE prophylaxis orders are present.        CODE STATUS:   Level Of Support Discussed With: Patient  Code Status (Patient has no pulse and is not breathing): CPR (Attempt to Resuscitate)  Medical Interventions (Patient has pulse or is breathing): Full    I have reviewed labs, imaging, pertinent clinical data and provider notes.   I have discussed with bedside nurse and primary service.       Electronically signed by Florentin Lama MD, 11/20/2024, 07:58 EST.

## 2024-11-21 LAB
BH BB BLOOD EXPIRATION DATE: NORMAL
BH BB BLOOD TYPE BARCODE: 6200
BH BB DISPENSE STATUS: NORMAL
BH BB PRODUCT CODE: NORMAL
BH BB UNIT NUMBER: NORMAL
CROSSMATCH INTERPRETATION: NORMAL
CYTO UR: NORMAL
LAB AP CASE REPORT: NORMAL
LAB AP CLINICAL INFORMATION: NORMAL
PATH REPORT.FINAL DX SPEC: NORMAL
PATH REPORT.GROSS SPEC: NORMAL
UNIT  ABO: NORMAL
UNIT  RH: NORMAL

## 2024-11-21 PROCEDURE — 25010000002 ENOXAPARIN PER 10 MG: Performed by: SURGERY

## 2024-11-21 PROCEDURE — 97161 PT EVAL LOW COMPLEX 20 MIN: CPT

## 2024-11-21 PROCEDURE — 97165 OT EVAL LOW COMPLEX 30 MIN: CPT

## 2024-11-21 PROCEDURE — 99024 POSTOP FOLLOW-UP VISIT: CPT | Performed by: SURGERY

## 2024-11-21 PROCEDURE — 63710000001 ONDANSETRON ODT 4 MG TABLET DISPERSIBLE: Performed by: SURGERY

## 2024-11-21 RX ADMIN — TRAZODONE HYDROCHLORIDE 50 MG: 50 TABLET ORAL at 20:21

## 2024-11-21 RX ADMIN — DOCUSATE SODIUM 100 MG: 100 CAPSULE, LIQUID FILLED ORAL at 08:08

## 2024-11-21 RX ADMIN — ONDANSETRON 4 MG: 4 TABLET, ORALLY DISINTEGRATING ORAL at 05:02

## 2024-11-21 RX ADMIN — ENOXAPARIN SODIUM 30 MG: 100 INJECTION SUBCUTANEOUS at 08:07

## 2024-11-21 RX ADMIN — CILOSTAZOL 100 MG: 100 TABLET ORAL at 20:21

## 2024-11-21 RX ADMIN — CARVEDILOL 6.25 MG: 6.25 TABLET, FILM COATED ORAL at 08:07

## 2024-11-21 RX ADMIN — ATORVASTATIN CALCIUM 80 MG: 40 TABLET, FILM COATED ORAL at 20:21

## 2024-11-21 RX ADMIN — OXYCODONE AND ACETAMINOPHEN 1 TABLET: 5; 325 TABLET ORAL at 18:01

## 2024-11-21 RX ADMIN — SERTRALINE HYDROCHLORIDE 50 MG: 50 TABLET ORAL at 08:07

## 2024-11-21 RX ADMIN — ASPIRIN 81 MG: 81 TABLET, COATED ORAL at 08:07

## 2024-11-21 RX ADMIN — OXYCODONE AND ACETAMINOPHEN 1 TABLET: 5; 325 TABLET ORAL at 05:02

## 2024-11-21 RX ADMIN — CETIRIZINE HYDROCHLORIDE 10 MG: 10 TABLET, FILM COATED ORAL at 08:08

## 2024-11-21 RX ADMIN — PANTOPRAZOLE SODIUM 40 MG: 40 TABLET, DELAYED RELEASE ORAL at 05:02

## 2024-11-21 RX ADMIN — CILOSTAZOL 100 MG: 100 TABLET ORAL at 08:07

## 2024-11-21 RX ADMIN — CARVEDILOL 6.25 MG: 6.25 TABLET, FILM COATED ORAL at 17:59

## 2024-11-21 RX ADMIN — DOCUSATE SODIUM 100 MG: 100 CAPSULE, LIQUID FILLED ORAL at 20:21

## 2024-11-21 NOTE — THERAPY EVALUATION
Acute Care - Physical Therapy Initial Evaluation  [facility]     Patient Name: Nilda Julien  : 1945  MRN: [id number]  Today's Date: 2024      Visit Dx:     ICD-10-CM ICD-9-CM   1. Decreased activities of daily living (ADL)  Z78.9 V49.89   2. Ischemic rest pain of lower extremity  M79.606 729.5    I99.8 459.9   3. Difficulty walking  R26.2 719.7     Patient Active Problem List   Diagnosis    Hyperlipidemia LDL goal <70    Essential hypertension    Gastroesophageal reflux disease without esophagitis    Acquired hypothyroidism    Allergic rhinitis    Aortic stenosis, mild    Arthritis    CAD s/p CABG    Diverticulitis    Fatigue    Hemorrhoids    Irritable bowel syndrome with diarrhea    Microhematuria    Nephrolithiasis    Smoker    Chronic heart failure with preserved ejection fraction (HFpEF)    Age-related osteoporosis without current pathological fracture    Encounter for screening for malignant neoplasm of colon    History of colon polyps    Anxiety    Rest pain of both lower extremities due to atherosclerosis    Ischemic rest pain of lower extremity     Past Medical History:   Diagnosis Date    AAA (abdominal aortic aneurysm)     INFRA RENAL, 3 CM LAST CT ABDOMEN    Allergic rhinitis     Seasonal allergies    Anxiety 2024    Aortic stenosis, mild 10/18/2021    Arthritis     CAD s/p CABG 10/18/2021    CHF (congestive heart failure)     Chronic heart failure with preserved ejection fraction (HFpEF) 2021    Claudication of both lower extremities     COPD (chronic obstructive pulmonary disease)     Diverticulitis     Elevated cholesterol     Essential hypertension 2021    Fatigue     GERD (gastroesophageal reflux disease)     Heart attack 10/18/2021    Hemorrhoids 2013    Hyperlipidemia LDL goal <70 2021    Irritable bowel syndrome with diarrhea     Memory loss     forgetfulness    Microhematuria 2019    Nephrolithiasis 2019    Smoker 2021     Past Surgical  History:   Procedure Laterality Date    AXILLARY BIFEMORAL BYPASS Bilateral 11/19/2024    Procedure: AXILLO-BIFEMORAL BYPASS GRAFT;  Surgeon: Selvin Vásquez MD;  Location: [facility] MAIN OR;  Service: Vascular;  Laterality: Bilateral;    CARDIAC SURGERY  04/19/2013    4 way bypass    COLONOSCOPY  05/23/2016, 2019    COLONOSCOPY N/A 2/22/2024    Procedure: COLONOSCOPY WITH HOT SNARE POLYPECTOMIES;  Surgeon: Can Pearce MD;  Location: [facility] ENDOSCOPY;  Service: Gastroenterology;  Laterality: N/A;  COLON POLYPS, DIVERTICULOSIS    CORONARY ARTERY BYPASS GRAFT  2013    ENDOSCOPY  2019    HERNIA REPAIR      OTHER SURGICAL HISTORY  2001    cardiac stents, 2 stents placed    VERTEBROPLASTY N/A 4/1/2022    Procedure: VERTEBROPLASTY, THORACIC 11;  Surgeon: Redd Cisneros MD;  Location: [facility] MAIN OR;  Service: Neurosurgery;  Laterality: N/A;     PT Assessment (Last 12 Hours)       PT Evaluation and Treatment       Row Name 11/21/24 1511          Physical Therapy Time and Intention    Subjective Information complains of;fatigue  -AV     Document Type evaluation  -AV     Mode of Treatment individual therapy;physical therapy  -AV       Row Name 11/21/24 1518          General Information    Patient Profile Reviewed yes  -AV     Patient Observations alert;cooperative;agree to therapy  -AV     Prior Level of Function independent:;all household mobility;gait;transfer;ADL's  Ambulated without an assistive device. Has RW to use if needed. No home O2.  -AV     Equipment Currently Used at Home none  Has RW to use if needed.  -AV     Existing Precautions/Restrictions fall;other (see comments)  wound vac  -AV       Row Name 11/21/24 151          Living Environment    Current Living Arrangements home  -AV     Home Accessibility stairs to enter home  -AV     People in Home alone  -AV       Row Name 11/21/24 1510          Home Main Entrance    Number of Stairs, Main Entrance three  -AV     Stair Railings, Main Entrance none  -AV        Row Name 11/21/24 1510          Cognition    Orientation Status (Cognition) oriented x 3  -AV       Row Name 11/21/24 1510          Range of Motion (ROM)    Range of Motion bilateral lower extremities;ROM is WFL  -AV       Row Name 11/21/24 1510          Bed Mobility    Bed Mobility supine-sit  -AV     Supine-Sit [place] (Bed Mobility) minimum assist (75% patient effort)  -AV     Bed Mobility, Safety Issues decreased use of legs for bridging/pushing  -AV     Assistive Device (Bed Mobility) head of bed elevated;bed rails  -AV       Row Name 11/21/24 1510          Transfers    Transfers sit-stand transfer;stand-sit transfer;toilet transfer  -AV       Row Name 11/21/24 1510          Sit-Stand Transfer    Sit-Stand [place] (Transfers) minimum assist (75% patient effort)  -AV     Assistive Device (Sit-Stand Transfers) walker, front-wheeled  -AV       Row Name 11/21/24 1510          Stand-Sit Transfer    Stand-Sit [place] (Transfers) minimum assist (75% patient effort)  -AV     Assistive Device (Stand-Sit Transfers) walker, front-wheeled  -AV       Row Name 11/21/24 1510          Toilet Transfer    Type (Toilet Transfer) sit-stand;stand-sit  -AV     [place] Level (Toilet Transfer) minimum assist (75% patient effort)  -AV     Assistive Device (Toilet Transfer) commode, 3-in-1  -AV       Row Name 11/21/24 1510          Gait/Stairs (Locomotion)    Gait/Stairs Locomotion gait/ambulation independence;gait/ambulation assistive device;distance ambulated  -AV     [place] Level (Gait) contact guard  -AV     Assistive Device (Gait) walker, front-wheeled  -AV     Distance in Feet (Gait) 60  -AV     Pattern (Gait) step-to  -AV     Deviations/Abnormal Patterns (Gait) gait speed decreased;stride length decreased  -AV     Bilateral Gait Deviations forward flexed posture  -AV       Row Name 11/21/24 1510          Safety Issues/Impairments Affecting Functional Mobility    Impairments Affecting Function  (Mobility) balance;endurance/activity tolerance;pain  -AV       Row Name 11/21/24 1510          Balance    Balance Assessment standing dynamic balance  -AV     Dynamic Standing Balance contact guard  -AV     Position/Device Used, Standing Balance supported;walker, front-wheeled  -AV       Row Name             Wound 11/19/24 0814 Bilateral groin    Wound - Properties Group Placement Date: 11/19/24  -KK Placement Time: 0814  -KK Side: Bilateral  -KK Location: groin  -KK Primary Wound Type: Incision  -KK Present on Original Admission: N  -KK    Retired Wound - Properties Group Placement Date: 11/19/24  -KK Placement Time: 0814  -KK Present on Original Admission: N  -KK Side: Bilateral  -KK Location: groin  -KK Primary Wound Type: Incision  -KK    Retired Wound - Properties Group Placement Date: 11/19/24  -KK Placement Time: 0814  -KK Present on Original Admission: N  -KK Side: Bilateral  -KK Location: groin  -KK Primary Wound Type: Incision  -KK    Retired Wound - Properties Group Date first assessed: 11/19/24  -KK Time first assessed: 0814  -KK Present on Original Admission: N  -KK Side: Bilateral  -KK Location: groin  -KK Primary Wound Type: Incision  -KK      Row Name             Wound 11/19/24 0814 Right axilla    Wound - Properties Group Placement Date: 11/19/24  -KK Placement Time: 0814  -KK Side: Right  -KK Location: axilla  -KK Primary Wound Type: Incision  -KK Present on Original Admission: N  -KK    Retired Wound - Properties Group Placement Date: 11/19/24  -KK Placement Time: 0814  -KK Present on Original Admission: N  -KK Side: Right  -KK Location: axilla  -KK Primary Wound Type: Incision  -KK    Retired Wound - Properties Group Placement Date: 11/19/24  -KK Placement Time: 0814  -KK Present on Original Admission: N  -KK Side: Right  -KK Location: axilla  -KK Primary Wound Type: Incision  -KK    Retired Wound - Properties Group Date first assessed: 11/19/24  -KK Time first assessed: 0814  -KK Present on  Original Admission: N  -KK Side: Right  -KK Location: axilla  -KK Primary Wound Type: Incision  -KK      Row Name             NPWT (Negative Pressure Wound Therapy) 11/19/24 1440 Bilateral groin    NPWT (Negative Pressure Wound Therapy) - Properties Group Placement Date: 11/19/24  -ОЛЕГ Placement Time: 1440  -ОЛЕГ Location: Bilateral groin  -ОЛЕГ    Retired NPWT (Negative Pressure Wound Therapy) - Properties Group Placement Date: 11/19/24  -ОЛЕГ Placement Time: 1440  -ОЛЕГ Location: Bilateral groin  -ОЛЕГ    Retired NPWT (Negative Pressure Wound Therapy) - Properties Group Placement Date: 11/19/24  -ОЛЕГ Placement Time: 1440  -ОЛЕГ Location: Bilateral groin  -ОЛЕГ    Retired NPWT (Negative Pressure Wound Therapy) - Properties Group Placement Date: 11/19/24  -ОЛЕГ Placement Time: 1440  -ОЛЕГ Location: Bilateral groin  -ОЛЕГ      Row Name 11/21/24 1518          Plan of Care Review    Plan of Care Reviewed With patient  -AV     Progress no change  -AV     Outcome Evaluation Patient presents with deficits in balance, endurance, transfers, and ambulation. Patient will benefit from skilled PT services to address these mobility deficits and decrease risk of falls.  -AV       Row Name 11/21/24 1518          Positioning and Restraints    Pre-Treatment Position in bed  -AV     Post Treatment Position chair  -AV     In Chair reclined;call light within reach;encouraged to call for assist;exit alarm on;notified nsg  -AV       Row Name 11/21/24 1518          Therapy Assessment/Plan (PT)    Rehab Potential (PT) good  -AV     Criteria for Skilled Interventions Met (PT) yes;meets criteria  -AV     Therapy Frequency (PT) daily  -AV     Predicted Duration of Therapy Intervention (PT) 10 days  -AV     Problem List (PT) problems related to;balance;mobility;pain  -AV     Activity Limitations Related to Problem List (PT) unable to transfer safely;unable to ambulate safely  -AV       Row Name 11/21/24 1518          PT Evaluation Complexity    History, PT  Evaluation Complexity no personal factors and/or comorbidities  -AV     Examination of Body Systems (PT Eval Complexity) total of 4 or more elements  -AV     Clinical Presentation (PT Evaluation Complexity) stable  -AV     Clinical Decision Making (PT Evaluation Complexity) low complexity  -AV     Overall Complexity (PT Evaluation Complexity) low complexity  -AV       Row Name 11/21/24 1518          Therapy Plan Review/Discharge Plan (PT)    Therapy Plan Review (PT) evaluation/treatment results reviewed;patient  -AV       Row Name 11/21/24 1518          Physical Therapy Goals    Bed Mobility Goal Selection (PT) bed mobility, PT goal 1  -AV     Transfer Goal Selection (PT) transfer, PT goal 1  -AV     Gait Training Goal Selection (PT) gait training, PT goal 1  -AV       Row Name 11/21/24 1518          Bed Mobility Goal 1 (PT)    Activity/Assistive Device (Bed Mobility Goal 1, PT) sit to supine/supine to sit  -AV     [place] Level/Cues Needed (Bed Mobility Goal 1, PT) modified independence  -AV     Time Frame (Bed Mobility Goal 1, PT) 10 days  -AV       Row Name 11/21/24 1518          Transfer Goal 1 (PT)    Activity/Assistive Device (Transfer Goal 1, PT) sit-to-stand/stand-to-sit;bed-to-chair/chair-to-bed;walker, rolling  -AV     [place] Level/Cues Needed (Transfer Goal 1, PT) modified independence  -AV     Time Frame (Transfer Goal 1, PT) 10 days  -AV       Row Name 11/21/24 1518          Gait Training Goal 1 (PT)    Activity/Assistive Device (Gait Training Goal 1, PT) gait (walking locomotion);assistive device use;walker, rolling  -AV     [place] Level (Gait Training Goal 1, PT) modified independence  -AV     Distance (Gait Training Goal 1, PT) 200  -AV     Time Frame (Gait Training Goal 1, PT) 10 days  -AV               User Key  (r) = Recorded By, (t) = Taken By, (c) = Cosigned By      Initials Name Provider Type    Melisa Cuevas, RN Registered Nurse    Roberto Pierre, PT Physical  Therapist    Carrie Cartwright RN Registered Nurse                    Physical Therapy Education       Title: PT OT SLP Therapies (In Progress)       Topic: Physical Therapy (In Progress)       Point: Mobility training (Done)       Learning Progress Summary            Patient Acceptance, E,TB, VU by AV at 11/21/2024 1526                      Point: Home exercise program (Not Started)       Learner Progress:  Not documented in this visit.              Point: Body mechanics (Done)       Learning Progress Summary            Patient Acceptance, E,TB, VU by AV at 11/21/2024 1526                      Point: Precautions (Done)       Learning Progress Summary            Patient Acceptance, E,TB, VU by AV at 11/21/2024 1526                                      User Key       Initials Effective Dates Name Provider Type Discipline    AV 06/11/21 -  Roberto Guajardo, PT Physical Therapist PT                  PT Recommendation and Plan  Anticipated Discharge Disposition (PT): skilled nursing facility  Planned Therapy Interventions (PT): balance training, bed mobility training, gait training, home exercise program, neuromuscular re-education, strengthening, transfer training  Therapy Frequency (PT): daily  Plan of Care Reviewed With: patient  Progress: no change  Outcome Evaluation: Patient presents with deficits in balance, endurance, transfers, and ambulation. Patient will benefit from skilled PT services to address these mobility deficits and decrease risk of falls.   Outcome Measures       Row Name 11/21/24 1525             How much help from another person do you currently need...    Turning from your back to your side while in flat bed without using bedrails? 3  -AV      Moving from lying on back to sitting on the side of a flat bed without bedrails? 3  -AV      Moving to and from a bed to a chair (including a wheelchair)? 3  -AV      Standing up from a chair using your arms (e.g., wheelchair, bedside chair)? 3  -AV       Climbing 3-5 steps with a railing? 2  -AV      To walk in hospital room? 3  -AV      AM-PAC 6 Clicks Score (PT) 17  -AV         Functional Assessment    Outcome Measure Options AM-PAC 6 Clicks Basic Mobility (PT)  -AV                User Key  (r) = Recorded By, (t) = Taken By, (c) = Cosigned By      Initials Name Provider Type    AV Roberto Guajardo, PT Physical Therapist                     Time Calculation:    PT Charges       Row Name 11/21/24 1525             Time Calculation    PT Received On 11/21/24  -AV      PT Goal Re-Cert Due Date 11/30/24  -AV         Untimed Charges    PT Eval/Re-eval Minutes 40  -AV         Total Minutes    Untimed Charges Total Minutes 40  -AV       Total Minutes 40  -AV                User Key  (r) = Recorded By, (t) = Taken By, (c) = Cosigned By      Initials Name Provider Type    AV Roberto Guajardo, PT Physical Therapist                  Therapy Charges for Today       Code Description Service Date Service Provider Modifiers Qty    [id number] HC PT EVAL LOW COMPLEXITY 3 11/21/2024 Roberto Guajardo, PT GP 1            PT G-Codes  Outcome Measure Options: AM-PAC 6 Clicks Basic Mobility (PT)  AM-PAC 6 Clicks Score (PT): 17  AM-PAC 6 Clicks Score (OT): 16    Roberto Guajardo PT  11/21/2024

## 2024-11-21 NOTE — THERAPY EVALUATION
Patient Name: Nilda Julien  : 1945    MRN: [id number]                              Today's Date: 2024       Admit Date: 2024    Visit Dx:     ICD-10-CM ICD-9-CM   1. Decreased activities of daily living (ADL)  Z78.9 V49.89   2. Ischemic rest pain of lower extremity  M79.606 729.5    I99.8 459.9   3. Difficulty walking  R26.2 719.7     Patient Active Problem List   Diagnosis    Hyperlipidemia LDL goal <70    Essential hypertension    Gastroesophageal reflux disease without esophagitis    Acquired hypothyroidism    Allergic rhinitis    Aortic stenosis, mild    Arthritis    CAD s/p CABG    Diverticulitis    Fatigue    Hemorrhoids    Irritable bowel syndrome with diarrhea    Microhematuria    Nephrolithiasis    Smoker    Chronic heart failure with preserved ejection fraction (HFpEF)    Age-related osteoporosis without current pathological fracture    Encounter for screening for malignant neoplasm of colon    History of colon polyps    Anxiety    Rest pain of both lower extremities due to atherosclerosis    Ischemic rest pain of lower extremity     Past Medical History:   Diagnosis Date    AAA (abdominal aortic aneurysm)     INFRA RENAL, 3 CM LAST CT ABDOMEN    Allergic rhinitis     Seasonal allergies    Anxiety 2024    Aortic stenosis, mild 10/18/2021    Arthritis     CAD s/p CABG 10/18/2021    CHF (congestive heart failure)     Chronic heart failure with preserved ejection fraction (HFpEF) 2021    Claudication of both lower extremities     COPD (chronic obstructive pulmonary disease)     Diverticulitis     Elevated cholesterol     Essential hypertension 2021    Fatigue     GERD (gastroesophageal reflux disease)     Heart attack 10/18/2021    Hemorrhoids 2013    Hyperlipidemia LDL goal <70 2021    Irritable bowel syndrome with diarrhea     Memory loss     forgetfulness    Microhematuria 2019    Nephrolithiasis 2019    Smoker 2021     Past Surgical History:    Procedure Laterality Date    AXILLARY BIFEMORAL BYPASS Bilateral 11/19/2024    Procedure: AXILLO-BIFEMORAL BYPASS GRAFT;  Surgeon: Selvin Vásquez MD;  Location: [facility] MAIN OR;  Service: Vascular;  Laterality: Bilateral;    CARDIAC SURGERY  04/19/2013    4 way bypass    COLONOSCOPY  05/23/2016, 2019    COLONOSCOPY N/A 2/22/2024    Procedure: COLONOSCOPY WITH HOT SNARE POLYPECTOMIES;  Surgeon: Can Pearce MD;  Location: [facility] ENDOSCOPY;  Service: Gastroenterology;  Laterality: N/A;  COLON POLYPS, DIVERTICULOSIS    CORONARY ARTERY BYPASS GRAFT  2013    ENDOSCOPY  2019    HERNIA REPAIR      OTHER SURGICAL HISTORY  2001    cardiac stents, 2 stents placed    VERTEBROPLASTY N/A 4/1/2022    Procedure: VERTEBROPLASTY, THORACIC 11;  Surgeon: Redd Cisneros MD;  Location: [facility] MAIN OR;  Service: Neurosurgery;  Laterality: N/A;      General Information       Row Name 11/21/24 1434          OT Time and Intention    Document Type evaluation  -     Mode of Treatment individual therapy;occupational therapy  -       Row Name 11/21/24 1434          General Information    Patient Profile Reviewed yes  -     Prior Level of Function --  (I) with ADLs, ambulated w/o a device, has a step over tub that she sits within to bathe with grab bar in place, standard commode, stands to groom, drives, and no home O2.  -     Existing Precautions/Restrictions fall;other (see comments)  wound vac  -       Row Name 11/21/24 1434          Occupational Profile    Reason for Services/Referral (Occupational Profile) Patient is a 79 year old female who is currently status post axillo-bifemoral bypass graft and bilateral femoral endarterectomy on November 19th, 2024. Occupational therapy consulted due to recent decline in ADLs/functional transfers. No previous occupational therapy services for current condition.  -       Row Name 11/21/24 1439          Living Environment    People in Home alone  Son and daughter in law live  nearby  -[facility] Name 11/21/24 1434          Home Main Entrance    Number of Stairs, Main Entrance three  -     Stair Railings, Main Entrance none  -[facility] Name 11/21/24 1434          Cognition    Orientation Status (Cognition) oriented x 3  -LF       Row Name 11/21/24 1434          Safety Issues/Impairments Affecting Functional Mobility    Impairments Affecting Function (Mobility) balance;endurance/activity tolerance;pain  -               User Key  (r) = Recorded By, (t) = Taken By, (c) = Cosigned By      Initials Name Provider Type     Gayla Ernst OT Occupational Therapist                     Mobility/ADL's       Row Name 11/21/24 1504          Bed Mobility    Comment, (Bed Mobility) Pt upright and seated in recliner upon OT's arrival.  -[facility] Name 11/21/24 1504          Transfers    Transfers sit-stand transfer;stand-sit transfer  -LF       Row Name 11/21/24 1504          Sit-Stand Transfer    Sit-Stand [place] (Transfers) contact guard  -     Assistive Device (Sit-Stand Transfers) walker, front-wheeled  -LF       Row Name 11/21/24 1504          Stand-Sit Transfer    Stand-Sit [place] (Transfers) contact guard  -     Assistive Device (Stand-Sit Transfers) walker, front-wheeled  -[facility] Name 11/21/24 1504          Activities of Daily Living    BADL Assessment/Intervention bathing;upper body dressing;lower body dressing;grooming;feeding;toileting  -[facility] Name 11/21/24 1504          Bathing Assessment/Intervention    [place] Level (Bathing) bathing skills;upper body;standby assist;lower body;maximum assist (25% patient effort)  -LF       Row Name 11/21/24 1504          Upper Body Dressing Assessment/Training    [place] Level (Upper Body Dressing) upper body dressing skills;standby assist  -LF       Row Name 11/21/24 1504          Lower Body Dressing Assessment/Training    [place] Level (Lower Body Dressing) lower body dressing skills;maximum  assist (25% patient effort)  -       Row Name 11/21/24 1504          Grooming Assessment/Training    [place] Level (Grooming) grooming skills;standby assist  -[facility] Name 11/21/24 1504          Self-Feeding Assessment/Training    [place] Level (Feeding) feeding skills;set up  -[facility] Name 11/21/24 1504          Toileting Assessment/Training    [place] Level (Toileting) toileting skills;maximum assist (25% patient effort)  -               User Key  (r) = Recorded By, (t) = Taken By, (c) = Cosigned By      Initials Name Provider Type     Gayla Ernst OT Occupational Therapist                   Obj/Interventions       Row Name 11/21/24 1509          Sensory Assessment (Somatosensory)    Sensory Assessment (Somatosensory) UE sensation intact  -LF       Row Name 11/21/24 1509          Vision Assessment/Intervention    Visual Impairment/Limitations WFL  -LF       Row Name 11/21/24 1509          Range of Motion Comprehensive    General Range of Motion bilateral upper extremity ROM WFL  -LF       Row Name 11/21/24 1509          Strength Comprehensive (MMT)    Comment, General Manual Muscle Testing (MMT) Assessment 4/5 BUEs  -[facility] Name 11/21/24 1509          Motor Skills    Motor Skills coordination;functional endurance  -     Coordination bilateral;upper extremity;WFL  -     Functional Endurance fair/fair-  -LF       Row Name 11/21/24 1509          Balance    Balance Assessment sitting dynamic balance;standing dynamic balance  -     Dynamic Sitting Balance supervision  -LF     Position, Sitting Balance supported;sitting in chair  -LF     Dynamic Standing Balance contact guard  -LF     Position/Device Used, Standing Balance supported;walker, front-wheeled  -               User Key  (r) = Recorded By, (t) = Taken By, (c) = Cosigned By      Initials Name Provider Type     Gayla Ernst OT Occupational Therapist                   Goals/Plan       Row Name 11/21/24 1515           Bed Mobility Goal 1 (OT)    Activity/Assistive Device (Bed Mobility Goal 1, OT) bed mobility activities, all  -LF     [place] Level/Cues Needed (Bed Mobility Goal 1, OT) modified independence  -LF     Time Frame (Bed Mobility Goal 1, OT) long term goal (LTG);10 days  -LF       Row Name 11/21/24 1510          Transfer Goal 1 (OT)    Activity/Assistive Device (Transfer Goal 1, OT) transfers, all  -LF     [place] Level/Cues Needed (Transfer Goal 1, OT) modified independence  -LF     Time Frame (Transfer Goal 1, OT) long term goal (LTG);10 days  -LF       Row Name 11/21/24 1510          Bathing Goal 1 (OT)    Activity/Device (Bathing Goal 1, OT) bathing skills, all  -LF     [place] Level/Cues Needed (Bathing Goal 1, OT) modified independence  -LF     Time Frame (Bathing Goal 1, OT) long term goal (LTG);10 days  -LF       Row Name 11/21/24 1510          Dressing Goal 1 (OT)    Activity/Device (Dressing Goal 1, OT) dressing skills, all  -LF     [place]/Cues Needed (Dressing Goal 1, OT) modified independence  -LF     Time Frame (Dressing Goal 1, OT) long term goal (LTG);10 days  -LF       Row Name 11/21/24 1510          Toileting Goal 1 (OT)    Activity/Device (Toileting Goal 1, OT) toileting skills, all  -LF     [place] Level/Cues Needed (Toileting Goal 1, OT) modified independence  -LF     Time Frame (Toileting Goal 1, OT) long term goal (LTG);10 days  -LF       Row Name 11/21/24 1510          Problem Specific Goal 1 (OT)    Problem Specific Goal 1 (OT) Patient will demonstrate good endurance to support ADLs/functional transfers.  -LF     Time Frame (Problem Specific Goal 1, OT) long term goal (LTG);10 days  -LF       Row Name 11/21/24 1510          Therapy Assessment/Plan (OT)    Planned Therapy Interventions (OT) activity tolerance training;BADL retraining;functional balance retraining;occupation/activity based interventions;patient/caregiver education/training;strengthening  exercise;transfer/mobility retraining  -               User Key  (r) = Recorded By, (t) = Taken By, (c) = Cosigned By      Initials Name Provider Type     Gayla Ernst, OT Occupational Therapist                   Clinical Impression       Row Name 11/21/24 1510          Pain Assessment    Additional Documentation Pain Scale: FACES Pre/Post-Treatment (Group)  -       Row Name 11/21/24 1510          Pain Scale: FACES Pre/Post-Treatment    Pain: FACES Scale, Pretreatment 2-->hurts little bit  -     Posttreatment Pain Rating 2-->hurts little bit  -       Row Name 11/21/24 1510          Plan of Care Review    Plan of Care Reviewed With patient  -LF     Progress no change  -     Outcome Evaluation Patient presents with limitations in self-care, functional transfers, balance, and endurance. She would benefit from continued skilled occupational therapy services to maximize independence with ADLs/functional transfers.  -       Row Name 11/21/24 1510          Therapy Assessment/Plan (OT)    Patient/Family Therapy Goal Statement (OT) To maximize independence.  -     Rehab Potential (OT) good  -     Criteria for Skilled Therapeutic Interventions Met (OT) yes;meets criteria;skilled treatment is necessary  -     Therapy Frequency (OT) 5 times/wk  -       Row Name 11/21/24 1510          Therapy Plan Review/Discharge Plan (OT)    Anticipated Discharge Disposition (OT) skilled nursing facility  -       Row Name 11/21/24 1510          Vital Signs    O2 Delivery Pre Treatment nasal cannula  -     O2 Delivery Intra Treatment nasal cannula  -     O2 Delivery Post Treatment nasal cannula  -[facility] Name 11/21/24 1510          Positioning and Restraints    Pre-Treatment Position sitting in chair/recliner  -     Post Treatment Position chair  -     In Chair reclined;call light within reach;encouraged to call for assist;exit alarm on  -               User Key  (r) = Recorded By, (t) = Taken By, (c)  = Cosigned By      Initials Name Provider Type    LF Gayla Ernst OT Occupational Therapist                   Outcome Measures       Row Name 11/21/24 1512          How much help from another is currently needed...    Putting on and taking off regular lower body clothing? 2  -LF     Bathing (including washing, rinsing, and drying) 2  -LF     Toileting (which includes using toilet bed pan or urinal) 2  -LF     Putting on and taking off regular upper body clothing 3  -LF     Taking care of personal grooming (such as brushing teeth) 3  -LF     Eating meals 4  -LF     AM-PAC 6 Clicks Score (OT) 16  -LF       Row Name 11/21/24 0807          How much help from another person do you currently need...    Turning from your back to your side while in flat bed without using bedrails? 3  -NO     Moving from lying on back to sitting on the side of a flat bed without bedrails? 3  -NO     Moving to and from a bed to a chair (including a wheelchair)? 3  -NO     Standing up from a chair using your arms (e.g., wheelchair, bedside chair)? 3  -NO     Climbing 3-5 steps with a railing? 2  -NO     To walk in hospital room? 2  -NO     AM-PAC 6 Clicks Score (PT) 16  -NO       Row Name 11/21/24 1512          Functional Assessment    Outcome Measure Options AM-PAC 6 Clicks Daily Activity (OT);Optimal Instrument  -LF       Row Name 11/21/24 1512          Optimal Instrument    Optimal Instrument Optimal - 3  -LF     Bending/Stooping 4  -LF     Standing 2  -LF     Reaching 2  -LF     From the list, choose the 3 activities you would most like to be able to do without any difficulty Bending/stooping;Standing;Reaching  -LF     Total Score Optimal - 3 8  -LF               User Key  (r) = Recorded By, (t) = Taken By, (c) = Cosigned By      Initials Name Provider Type    LF Gayla Ernst OT Occupational Therapist    Viviana Cerna RN Registered Nurse                    Occupational Therapy Education       Title: PT OT SLP Therapies (Done)        Topic: Occupational Therapy (Done)       Point: ADL training (Done)       Description:   Instruct learner(s) on proper safety adaptation and remediation techniques during self care or transfers.   Instruct in proper use of assistive devices.                  Learning Progress Summary            Patient Acceptance, E,TB, VU by  at 11/21/2024 1514                      Point: Precautions (Done)       Description:   Instruct learner(s) on prescribed precautions during self-care and functional transfers.                  Learning Progress Summary            Patient Acceptance, E,TB, VU by  at 11/21/2024 1514                      Point: Body mechanics (Done)       Description:   Instruct learner(s) on proper positioning and spine alignment during self-care, functional mobility activities and/or exercises.                  Learning Progress Summary            Patient Acceptance, E,TB, VU by  at 11/21/2024 1514                                      User Key       Initials Effective Dates Name Provider Type Discipline     06/16/21 -  Gayla Ernst OT Occupational Therapist OT                  OT Recommendation and Plan  Planned Therapy Interventions (OT): activity tolerance training, BADL retraining, functional balance retraining, occupation/activity based interventions, patient/caregiver education/training, strengthening exercise, transfer/mobility retraining  Therapy Frequency (OT): 5 times/wk  Plan of Care Review  Plan of Care Reviewed With: patient  Progress: no change  Outcome Evaluation: Patient presents with limitations in self-care, functional transfers, balance, and endurance. She would benefit from continued skilled occupational therapy services to maximize independence with ADLs/functional transfers.     Time Calculation:   Evaluation Complexity (OT)  Review Occupational Profile/Medical/Therapy History Complexity: brief/low complexity  Assessment, Occupational Performance/Identification of Deficit  Complexity: 3-5 performance deficits  Clinical Decision Making Complexity (OT): problem focused assessment/low complexity  Overall Complexity of Evaluation (OT): low complexity     Time Calculation- OT       Row Name 11/21/24 1516             Time Calculation- OT    OT Received On 11/21/24  -LF      OT Goal Re-Cert Due Date 11/30/24  -LF         Untimed Charges    OT Eval/Re-eval Minutes 35  -LF         Total Minutes    Untimed Charges Total Minutes 35  -LF       Total Minutes 35  -LF                User Key  (r) = Recorded By, (t) = Taken By, (c) = Cosigned By      Initials Name Provider Type    LF Gayla Ernst OT Occupational Therapist                  Therapy Charges for Today       Code Description Service Date Service Provider Modifiers Qty    [id number] HC OT EVAL LOW COMPLEXITY 3 11/21/2024 Gayla Ernst OT GO 1                 Gayla Ernst OT  11/21/2024

## 2024-11-21 NOTE — PLAN OF CARE
Goal Outcome Evaluation:  Plan of Care Reviewed With: patient        Progress: no change  Outcome Evaluation: Patient presents with deficits in balance, endurance, transfers, and ambulation. Patient will benefit from skilled PT services to address these mobility deficits and decrease risk of falls.    Anticipated Discharge Disposition (PT): skilled nursing facility

## 2024-11-21 NOTE — PLAN OF CARE
Goal Outcome Evaluation:      No acute events overnight, patient given pain medication PRN for leg and shoulder pain. Patient given zofran for nausea. Patient urinated after calles catheter removed, patient up with assistance to the BSC. Patient continue to have 2L NC on.

## 2024-11-21 NOTE — PLAN OF CARE
Goal Outcome Evaluation:  Plan of Care Reviewed With: patient        Progress: no change  Outcome Evaluation: Patient presents with limitations in self-care, functional transfers, balance, and endurance. She would benefit from continued skilled occupational therapy services to maximize independence with ADLs/functional transfers.    Anticipated Discharge Disposition (OT): skilled nursing facility

## 2024-11-21 NOTE — PROGRESS NOTES
[facility]     Progress Note    Patient Name: Nilda Julien  : 1945  MRN: [id number]  Primary Care Physician:  Ralph Marcus APRN  Date of admission: 2024    Subjective   Subjective     POD #2 status post axillobifemoral bypass graft    Doing well.  No significant events overnight.  Still with moderate pain, mostly in the lateral aspect of the right chest along the tunneling of the graft.  No other complaints at this time.    Objective   Objective     Vitals:   Temp:  [97.7 °F (36.5 °C)-99 °F (37.2 °C)] 97.7 °F (36.5 °C)  Heart Rate:  [87-96] 92  Resp:  [16] 16  BP: ()/(60-77) 143/74  Flow (L/min) (Oxygen Therapy):  [2] 2    Physical Exam   General: Alert, no acute distress  Wounds: Healing well, no signs of infection, no bleeding, no hematoma.  Extremities: Warm, well-perfused.    Hgb: 9.2      Assessment & Plan   Assessment / Plan     Assessment/Plan:    Satisfactory progress following axillobifemoral bypass graft.  Continue PT/OT.  Ready for rehab when bed available.    Active Hospital Problems:  Active Hospital Problems    Diagnosis     **Ischemic rest pain of lower extremity     Rest pain of both lower extremities due to atherosclerosis          Electronically signed by Selvin Vásquez MD, 24, 2:56 PM EST.

## 2024-11-21 NOTE — PLAN OF CARE
Goal Outcome Evaluation:           Progress: improving  Outcome Evaluation: Transfer at 1630, no urine output out since calles removal, tolerating minimal oral intake, no appetite, no c/o pain or nausea.

## 2024-11-21 NOTE — PLAN OF CARE
Goal Outcome Evaluation:           Progress: improving  Outcome Evaluation: Up in chair for majority of day, up in room with PT, no c/o pain or nausea, passing gas, no BM, remains on and off 2L, O2 sats in high 80s to low 90s, tolerating regular diet, minimal appetite.

## 2024-11-22 PROCEDURE — 97530 THERAPEUTIC ACTIVITIES: CPT

## 2024-11-22 PROCEDURE — 99024 POSTOP FOLLOW-UP VISIT: CPT | Performed by: SURGERY

## 2024-11-22 PROCEDURE — 25010000002 ENOXAPARIN PER 10 MG: Performed by: SURGERY

## 2024-11-22 PROCEDURE — 97116 GAIT TRAINING THERAPY: CPT

## 2024-11-22 RX ORDER — AMOXICILLIN 250 MG
2 CAPSULE ORAL 2 TIMES DAILY PRN
Status: DISCONTINUED | OUTPATIENT
Start: 2024-11-22 | End: 2024-11-25 | Stop reason: HOSPADM

## 2024-11-22 RX ADMIN — SENNOSIDES AND DOCUSATE SODIUM 2 TABLET: 50; 8.6 TABLET ORAL at 21:24

## 2024-11-22 RX ADMIN — CARVEDILOL 6.25 MG: 6.25 TABLET, FILM COATED ORAL at 17:45

## 2024-11-22 RX ADMIN — ATORVASTATIN CALCIUM 80 MG: 40 TABLET, FILM COATED ORAL at 21:24

## 2024-11-22 RX ADMIN — ASPIRIN 81 MG: 81 TABLET, COATED ORAL at 09:04

## 2024-11-22 RX ADMIN — ENOXAPARIN SODIUM 30 MG: 100 INJECTION SUBCUTANEOUS at 09:05

## 2024-11-22 RX ADMIN — PANTOPRAZOLE SODIUM 40 MG: 40 TABLET, DELAYED RELEASE ORAL at 05:40

## 2024-11-22 RX ADMIN — CETIRIZINE HYDROCHLORIDE 10 MG: 10 TABLET, FILM COATED ORAL at 09:04

## 2024-11-22 RX ADMIN — CARVEDILOL 6.25 MG: 6.25 TABLET, FILM COATED ORAL at 09:04

## 2024-11-22 RX ADMIN — SERTRALINE HYDROCHLORIDE 50 MG: 50 TABLET ORAL at 09:04

## 2024-11-22 RX ADMIN — TRAZODONE HYDROCHLORIDE 50 MG: 50 TABLET ORAL at 21:24

## 2024-11-22 RX ADMIN — DOCUSATE SODIUM 100 MG: 100 CAPSULE, LIQUID FILLED ORAL at 09:04

## 2024-11-22 RX ADMIN — CILOSTAZOL 100 MG: 100 TABLET ORAL at 09:04

## 2024-11-22 RX ADMIN — CILOSTAZOL 100 MG: 100 TABLET ORAL at 21:24

## 2024-11-22 NOTE — PLAN OF CARE
Goal Outcome Evaluation:  Plan of Care Reviewed With: patient        Progress: improving  Outcome Evaluation: pt q2 hr turns. blue top barrier cream applied to spine and coccyx. pt and daughter refused bed alert as it keeps triggering poss. due to pt lower body weight. incentive spirometer education and benefits provided.                             Problem: Adult Inpatient Plan of Care  Goal: Plan of Care Review  11/22/2024 0637 by Janie Araujo RN  Reactivated  11/22/2024 0630 by Janie Araujo RN  Outcome: Unable to Meet  Flowsheets (Taken 11/22/2024 0630)  Progress: improving  Outcome Evaluation: pt q2 hr turns. blue top barrier cream applied to spine and coccyx. pt and daughter refused bed alert as it keeps triggering poss. due to pt lower body weight. incentive spirometer education and benefits provided.  Plan of Care Reviewed With: patient  Goal: Patient-Specific Goal (Individualized)  11/22/2024 0637 by Janie Araujo RN  Reactivated  11/22/2024 0630 by Janie Araujo RN  Outcome: Unable to Meet  Goal: Absence of Hospital-Acquired Illness or Injury  11/22/2024 0637 by Janie Araujo RN  Reactivated  11/22/2024 0630 by Janie Araujo RN  Outcome: Unable to Meet  Goal: Optimal Comfort and Wellbeing  11/22/2024 0637 by Janie Araujo RN  Reactivated  11/22/2024 0630 by Janie Araujo RN  Outcome: Unable to Meet  Goal: Readiness for Transition of Care  11/22/2024 0637 by Janie Araujo RN  Reactivated  11/22/2024 0630 by Janie Araujo RN  Outcome: Unable to Meet  Goal: Plan of Care Review  11/22/2024 0637 by Janie Araujo RN  Reactivated  11/22/2024 0630 by Janie Araujo RN  Outcome: Unable to Meet  Flowsheets (Taken 11/22/2024 0630)  Progress: improving  Outcome Evaluation: pt q2 hr turns. blue top barrier cream applied to spine and coccyx. pt and daughter refused bed alert as it keeps triggering poss. due to pt lower body  weight. incentive spirometer education and benefits provided.  Plan of Care Reviewed With: patient  Goal: Patient-Specific Goal (Individualized)  11/22/2024 0637 by Janie Araujo RN  Reactivated  11/22/2024 0630 by Janie Araujo RN  Outcome: Unable to Meet  Goal: Absence of Hospital-Acquired Illness or Injury  11/22/2024 0637 by Janie Araujo RN  Reactivated  11/22/2024 0630 by Janie Araujo RN  Outcome: Unable to Meet  Goal: Optimal Comfort and Wellbeing  11/22/2024 0637 by Janie Araujo RN  Reactivated  11/22/2024 0630 by Janie Araujo RN  Outcome: Unable to Meet  Goal: Readiness for Transition of Care  11/22/2024 0637 by Janie Araujo RN  Reactivated  11/22/2024 0630 by Janie Araujo RN  Outcome: Unable to Meet     Problem: Pain Acute  Goal: Optimal Pain Control and Function  11/22/2024 0637 by Janie Araujo RN  Reactivated  11/22/2024 0630 by Janie Araujo RN  Outcome: Unable to Meet     Problem: Tissue Perfusion Altered  Goal: Improved Tissue Perfusion  11/22/2024 0637 by Janie Araujo RN  Reactivated  11/22/2024 0630 by Janie Araujo RN  Outcome: Unable to Meet     Problem: Skin Injury Risk Increased  Goal: Skin Health and Integrity  11/22/2024 0637 by Janie Araujo RN  Reactivated  11/22/2024 0630 by Janie Araujo RN  Outcome: Unable to Meet     Problem: Fall Injury Risk  Goal: Absence of Fall and Fall-Related Injury  11/22/2024 0637 by Janie Araujo RN  Reactivated  11/22/2024 0630 by Janie Araujo RN  Outcome: Unable to Meet

## 2024-11-22 NOTE — PLAN OF CARE
Goal Outcome Evaluation:  Plan of Care Reviewed With: patient        Progress: improving  Outcome Evaluation: pt q2 hr turns. blue top barrier cream applied to spine and coccyx. pt and daughter refused bed alert as it keeps triggering poss. due to pt lower body weight. incentive spirometer education and benefits provided.Janie Araujo RN                               Problem: Adult Inpatient Plan of Care  Goal: Plan of Care Review  Outcome: Unable to Meet  Flowsheets (Taken 11/22/2024 0630)  Progress: improving  Outcome Evaluation: pt q2 hr turns. blue top barrier cream applied to spine and coccyx. pt and daughter refused bed alert as it keeps triggering poss. due to pt lower body weight. incentive spirometer education and benefits provided.  Plan of Care Reviewed With: patient  Goal: Patient-Specific Goal (Individualized)  Outcome: Unable to Meet  Goal: Absence of Hospital-Acquired Illness or Injury  Outcome: Unable to Meet  Goal: Optimal Comfort and Wellbeing  Outcome: Unable to Meet  Goal: Readiness for Transition of Care  Outcome: Unable to Meet  Goal: Plan of Care Review  Outcome: Unable to Meet  Flowsheets (Taken 11/22/2024 0630)  Progress: improving  Outcome Evaluation: pt q2 hr turns. blue top barrier cream applied to spine and coccyx. pt and daughter refused bed alert as it keeps triggering poss. due to pt lower body weight. incentive spirometer education and benefits provided.  Plan of Care Reviewed With: patient  Goal: Patient-Specific Goal (Individualized)  Outcome: Unable to Meet  Goal: Absence of Hospital-Acquired Illness or Injury  Outcome: Unable to Meet  Goal: Optimal Comfort and Wellbeing  Outcome: Unable to Meet  Goal: Readiness for Transition of Care  Outcome: Unable to Meet     Problem: Pain Acute  Goal: Optimal Pain Control and Function  Outcome: Unable to Meet     Problem: Tissue Perfusion Altered  Goal: Improved Tissue Perfusion  Outcome: Unable to Meet     Problem: Skin Injury Risk  Increased  Goal: Skin Health and Integrity  Outcome: Unable to Meet     Problem: Fall Injury Risk  Goal: Absence of Fall and Fall-Related Injury  Outcome: Unable to Meet

## 2024-11-22 NOTE — PROGRESS NOTES
[facility]     Progress Note    Patient Name: Nilda Julien  : 1945  MRN: [id number]  Primary Care Physician:  Ralph Marcus APRN  Date of admission: 2024    Subjective   Subjective     POD #3 status post axillobifemoral bypass graft    Doing well.  No significant events overnight.  No new complaints.    Objective   Objective     Vitals:   Temp:  [97.3 °F (36.3 °C)-98.6 °F (37 °C)] 98.1 °F (36.7 °C)  Heart Rate:  [] 90  Resp:  [16-18] 16  BP: ()/(57-67) 105/57  Flow (L/min) (Oxygen Therapy):  [2] 2    Physical Exam   General: Alert, no acute distress  Wounds: Healing well, no signs of infection, no bleeding, no hematoma.  Extremities: Warm, well-perfused.        Assessment & Plan   Assessment / Plan     Assessment/Plan:    Satisfactory progress following axillobifemoral bypass graft.  Continue PT/OT.  Ready for rehab when bed available.    Active Hospital Problems:  Active Hospital Problems    Diagnosis     **Ischemic rest pain of lower extremity     Rest pain of both lower extremities due to atherosclerosis          Electronically signed by Selvin Vásquez MD, 24, 11:34 AM EST.

## 2024-11-22 NOTE — THERAPY TREATMENT NOTE
Acute Care - Physical Therapy Progress Note  [facility]     Patient Name: Nilda Julien  : 1945  MRN: [id number]  Today's Date: 2024      Visit Dx:     ICD-10-CM ICD-9-CM   1. Decreased activities of daily living (ADL)  Z78.9 V49.89   2. Ischemic rest pain of lower extremity  M79.606 729.5    I99.8 459.9   3. Difficulty walking  R26.2 719.7     Patient Active Problem List   Diagnosis    Hyperlipidemia LDL goal <70    Essential hypertension    Gastroesophageal reflux disease without esophagitis    Acquired hypothyroidism    Allergic rhinitis    Aortic stenosis, mild    Arthritis    CAD s/p CABG    Diverticulitis    Fatigue    Hemorrhoids    Irritable bowel syndrome with diarrhea    Microhematuria    Nephrolithiasis    Smoker    Chronic heart failure with preserved ejection fraction (HFpEF)    Age-related osteoporosis without current pathological fracture    Encounter for screening for malignant neoplasm of colon    History of colon polyps    Anxiety    Rest pain of both lower extremities due to atherosclerosis    Ischemic rest pain of lower extremity     Past Medical History:   Diagnosis Date    AAA (abdominal aortic aneurysm)     INFRA RENAL, 3 CM LAST CT ABDOMEN    Allergic rhinitis     Seasonal allergies    Anxiety 2024    Aortic stenosis, mild 10/18/2021    Arthritis     CAD s/p CABG 10/18/2021    CHF (congestive heart failure)     Chronic heart failure with preserved ejection fraction (HFpEF) 2021    Claudication of both lower extremities     COPD (chronic obstructive pulmonary disease)     Diverticulitis     Elevated cholesterol     Essential hypertension 2021    Fatigue     GERD (gastroesophageal reflux disease)     Heart attack 10/18/2021    Hemorrhoids 2013    Hyperlipidemia LDL goal <70 2021    Irritable bowel syndrome with diarrhea     Memory loss     forgetfulness    Microhematuria 2019    Nephrolithiasis 2019    Smoker 2021     Past Surgical History:    Procedure Laterality Date    AXILLARY BIFEMORAL BYPASS Bilateral 11/19/2024    Procedure: AXILLO-BIFEMORAL BYPASS GRAFT;  Surgeon: Selvin Vásquez MD;  Location: [facility] MAIN OR;  Service: Vascular;  Laterality: Bilateral;    CARDIAC SURGERY  04/19/2013    4 way bypass    COLONOSCOPY  05/23/2016, 2019    COLONOSCOPY N/A 2/22/2024    Procedure: COLONOSCOPY WITH HOT SNARE POLYPECTOMIES;  Surgeon: Can Pearce MD;  Location: [facility] ENDOSCOPY;  Service: Gastroenterology;  Laterality: N/A;  COLON POLYPS, DIVERTICULOSIS    CORONARY ARTERY BYPASS GRAFT  2013    ENDOSCOPY  2019    HERNIA REPAIR      OTHER SURGICAL HISTORY  2001    cardiac stents, 2 stents placed    VERTEBROPLASTY N/A 4/1/2022    Procedure: VERTEBROPLASTY, THORACIC 11;  Surgeon: Redd Cisneros MD;  Location: [facility] MAIN OR;  Service: Neurosurgery;  Laterality: N/A;     PT Assessment (Last 12 Hours)       PT Evaluation and Treatment       Row Name 11/22/24 1700          Physical Therapy Time and Intention    Subjective Information complains of;weakness;fatigue  -CS     Document Type therapy note (daily note)  -CS     Mode of Treatment individual therapy;physical therapy  -CS     Patient Effort good  -CS     Symptoms Noted During/After Treatment fatigue  -CS       Row Name 11/22/24 1700          Bed Mobility    Supine-Sit [place] (Bed Mobility) verbal cues;minimum assist (75% patient effort);1 person assist  -CS     Bed Mobility, Safety Issues decreased use of legs for bridging/pushing  -CS     Assistive Device (Bed Mobility) head of bed elevated;bed rails  -CS       Row Name 11/22/24 1700          Sit-Stand Transfer    Sit-Stand [place] (Transfers) verbal cues;contact guard;1 person assist  -CS     Assistive Device (Sit-Stand Transfers) walker, front-wheeled  -CS       Row Name 11/22/24 1700          Stand-Sit Transfer    Stand-Sit [place] (Transfers) verbal cues;contact guard;1 person assist  -CS     Assistive Device (Stand-Sit  Transfers) walker, front-wheeled  -CS       Row Name 11/22/24 1700          Gait/Stairs (Locomotion)    [place] Level (Gait) verbal cues;contact guard;1 person assist  -CS     Assistive Device (Gait) walker, front-wheeled  -CS     Distance in Feet (Gait) 125  x 2 reps with standing rest break between reps  -CS     Pattern (Gait) 4-point;step-to  -CS     Deviations/Abnormal Patterns (Gait) gait speed decreased;stride length decreased  -CS     Bilateral Gait Deviations forward flexed posture  -CS       Row Name             Wound 11/19/24 0814 Bilateral groin    Wound - Properties Group Placement Date: 11/19/24  -KK Placement Time: 0814 -KK Side: Bilateral  -KK Location: groin  -KK Primary Wound Type: Incision  -KK Present on Original Admission: N  -KK    Retired Wound - Properties Group Placement Date: 11/19/24  -KK Placement Time: 0814 -KK Present on Original Admission: N  -KK Side: Bilateral  -KK Location: groin  -KK Primary Wound Type: Incision  -KK    Retired Wound - Properties Group Placement Date: 11/19/24  -KK Placement Time: 0814  -KK Present on Original Admission: N  -KK Side: Bilateral  -KK Location: groin  -KK Primary Wound Type: Incision  -KK    Retired Wound - Properties Group Date first assessed: 11/19/24 -KK Time first assessed: 0814  -KK Present on Original Admission: N  -KK Side: Bilateral  -KK Location: groin  -KK Primary Wound Type: Incision  -KK      Row Name             Wound 11/19/24 0814 Right axilla    Wound - Properties Group Placement Date: 11/19/24 -KK Placement Time: 0814 -KK Side: Right  -KK Location: axilla  -KK Primary Wound Type: Incision  -KK Present on Original Admission: N  -KK    Retired Wound - Properties Group Placement Date: 11/19/24  -KK Placement Time: 0814  -KK Present on Original Admission: N  -KK Side: Right  -KK Location: axilla  -KK Primary Wound Type: Incision  -KK    Retired Wound - Properties Group Placement Date: 11/19/24  -KK Placement Time: 0814  -KK  Present on Original Admission: N  -KK Side: Right  -KK Location: axilla  -KK Primary Wound Type: Incision  -KK    Retired Wound - Properties Group Date first assessed: 11/19/24  -KK Time first assessed: 0814 -KK Present on Original Admission: N  -KK Side: Right  -KK Location: axilla  -KK Primary Wound Type: Incision  -KK      Row Name             NPWT (Negative Pressure Wound Therapy) 11/19/24 1440 Bilateral groin    NPWT (Negative Pressure Wound Therapy) - Properties Group Placement Date: 11/19/24  -ОЛЕГ Placement Time: 1440  -ОЛЕГ Location: Bilateral groin  -ОЛЕГ    Retired NPWT (Negative Pressure Wound Therapy) - Properties Group Placement Date: 11/19/24  -ОЛЕГ Placement Time: 1440  -ОЛЕГ Location: Bilateral groin  -ОЛЕГ    Retired NPWT (Negative Pressure Wound Therapy) - Properties Group Placement Date: 11/19/24  -ОЛЕГ Placement Time: 1440  -ОЛЕГ Location: Bilateral groin  -ОЛЕГ    Retired NPWT (Negative Pressure Wound Therapy) - Properties Group Placement Date: 11/19/24  -ОЛЕГ Placement Time: 1440  -ОЛЕГ Location: Bilateral groin  -ОЛЕГ      Row Name 11/22/24 1700          Positioning and Restraints    Pre-Treatment Position in bed  -CS     Post Treatment Position chair  -CS     In Chair reclined;call light within reach;encouraged to call for assist;legs elevated;heels elevated  no alarms active upon entering room  -       Row Name 11/22/24 1700          Progress Summary (PT)    Progress Toward Functional Goals (PT) progress toward functional goals is good  -               User Key  (r) = Recorded By, (t) = Taken By, (c) = Cosigned By      Initials Name Provider Type    Melisa Cuevas, RN Registered Nurse    Vince Hinton PTA Physical Therapist Assistant    Carrie Cartwright RN Registered Nurse                    Physical Therapy Education       Title: PT OT SLP Therapies (In Progress)       Topic: Physical Therapy (In Progress)       Point: Mobility training (Done)       Learning Progress Summary             Patient Acceptance, E,TB, VU by AV at 11/21/2024 1526                      Point: Home exercise program (Not Started)       Learner Progress:  Not documented in this visit.              Point: Body mechanics (Done)       Learning Progress Summary            Patient Acceptance, E,TB, VU by AV at 11/21/2024 1526                      Point: Precautions (Done)       Learning Progress Summary            Patient Acceptance, E,TB, VU by AV at 11/21/2024 1526                                      User Key       Initials Effective Dates Name Provider Type Discipline    AV 06/11/21 -  Roberto Guajardo, PT Physical Therapist PT                  PT Recommendation and Plan     Progress Summary (PT)  Progress Toward Functional Goals (PT): progress toward functional goals is good   Outcome Measures       Row Name 11/22/24 1700 11/21/24 1525          How much help from another person do you currently need...    Turning from your back to your side while in flat bed without using bedrails? 3  -CS 3  -AV     Moving from lying on back to sitting on the side of a flat bed without bedrails? 3  -CS 3  -AV     Moving to and from a bed to a chair (including a wheelchair)? 3  -CS 3  -AV     Standing up from a chair using your arms (e.g., wheelchair, bedside chair)? 3  -CS 3  -AV     Climbing 3-5 steps with a railing? 3  -CS 2  -AV     To walk in hospital room? 3  -CS 3  -AV     AM-PAC 6 Clicks Score (PT) 18  -CS 17  -AV        Functional Assessment    Outcome Measure Options AM-PAC 6 Clicks Basic Mobility (PT)  -CS AM-PAC 6 Clicks Basic Mobility (PT)  -AV               User Key  (r) = Recorded By, (t) = Taken By, (c) = Cosigned By      Initials Name Provider Type    AV Roberto Guajardo, PT Physical Therapist    Vince Hinton PTA Physical Therapist Assistant                     Time Calculation:    PT Charges       Row Name 11/22/24 1706             Time Calculation    Start Time 1305  -CS      PT Received On 11/22/24  -          Timed Charges    33641 - Gait Training Minutes  15  -CS      67078 - PT Therapeutic Activity Minutes 10  -CS         Total Minutes    Timed Charges Total Minutes 25  -CS       Total Minutes 25  -CS                User Key  (r) = Recorded By, (t) = Taken By, (c) = Cosigned By      Initials Name Provider Type    CS Vince Lopez PTA Physical Therapist Assistant                  Therapy Charges for Today       Code Description Service Date Service Provider Modifiers Qty    21196492663 HC GAIT TRAINING EA 15 MIN 11/22/2024 Vince Lopez PTA GP 1    29885876673 HC PT THERAPEUTIC ACT EA 15 MIN 11/22/2024 Vince Lopez PTA GP 1            PT G-Codes  Outcome Measure Options: AM-PAC 6 Clicks Basic Mobility (PT)  AM-PAC 6 Clicks Score (PT): 18  AM-PAC 6 Clicks Score (OT): 16    Vince Lopez PTA  11/22/2024

## 2024-11-22 NOTE — SIGNIFICANT NOTE
11/22/24 1048   OTHER   Discipline occupational therapist   Rehab Time/Intention   Session Not Performed patient unavailable for treatment  (x2 attempts)

## 2024-11-23 ENCOUNTER — APPOINTMENT (OUTPATIENT)
Dept: GENERAL RADIOLOGY | Facility: HOSPITAL | Age: 79
End: 2024-11-23
Payer: MEDICARE

## 2024-11-23 PROBLEM — E43 SEVERE PROTEIN-CALORIE MALNUTRITION: Status: ACTIVE | Noted: 2024-11-23

## 2024-11-23 PROCEDURE — 94799 UNLISTED PULMONARY SVC/PX: CPT

## 2024-11-23 PROCEDURE — 94640 AIRWAY INHALATION TREATMENT: CPT

## 2024-11-23 PROCEDURE — 71045 X-RAY EXAM CHEST 1 VIEW: CPT

## 2024-11-23 PROCEDURE — 97116 GAIT TRAINING THERAPY: CPT

## 2024-11-23 PROCEDURE — 25010000002 ENOXAPARIN PER 10 MG: Performed by: SURGERY

## 2024-11-23 PROCEDURE — 97530 THERAPEUTIC ACTIVITIES: CPT

## 2024-11-23 RX ORDER — ALBUTEROL SULFATE 0.83 MG/ML
2.5 SOLUTION RESPIRATORY (INHALATION) EVERY 6 HOURS PRN
Status: DISCONTINUED | OUTPATIENT
Start: 2024-11-23 | End: 2024-11-25 | Stop reason: HOSPADM

## 2024-11-23 RX ORDER — GUAIFENESIN AND DEXTROMETHORPHAN HYDROBROMIDE 600; 30 MG/1; MG/1
1 TABLET, EXTENDED RELEASE ORAL 2 TIMES DAILY PRN
Status: DISCONTINUED | OUTPATIENT
Start: 2024-11-23 | End: 2024-11-25 | Stop reason: HOSPADM

## 2024-11-23 RX ADMIN — SERTRALINE HYDROCHLORIDE 50 MG: 50 TABLET ORAL at 08:22

## 2024-11-23 RX ADMIN — ALBUTEROL SULFATE 2 PUFF: 90 AEROSOL, METERED RESPIRATORY (INHALATION) at 09:57

## 2024-11-23 RX ADMIN — GUAIFENESIN AND DEXTROMETHORPHAN HYDROBROMIDE 1 TABLET: 600; 30 TABLET, EXTENDED RELEASE ORAL at 21:01

## 2024-11-23 RX ADMIN — PANTOPRAZOLE SODIUM 40 MG: 40 TABLET, DELAYED RELEASE ORAL at 08:22

## 2024-11-23 RX ADMIN — ATORVASTATIN CALCIUM 80 MG: 40 TABLET, FILM COATED ORAL at 21:02

## 2024-11-23 RX ADMIN — TRAZODONE HYDROCHLORIDE 50 MG: 50 TABLET ORAL at 21:02

## 2024-11-23 RX ADMIN — DOCUSATE SODIUM 100 MG: 100 CAPSULE, LIQUID FILLED ORAL at 08:22

## 2024-11-23 RX ADMIN — GUAIFENESIN AND DEXTROMETHORPHAN HYDROBROMIDE 1 TABLET: 600; 30 TABLET, EXTENDED RELEASE ORAL at 09:57

## 2024-11-23 RX ADMIN — ASPIRIN 81 MG: 81 TABLET, COATED ORAL at 08:22

## 2024-11-23 RX ADMIN — CETIRIZINE HYDROCHLORIDE 10 MG: 10 TABLET, FILM COATED ORAL at 08:22

## 2024-11-23 RX ADMIN — CILOSTAZOL 100 MG: 100 TABLET ORAL at 21:02

## 2024-11-23 RX ADMIN — CILOSTAZOL 100 MG: 100 TABLET ORAL at 08:22

## 2024-11-23 RX ADMIN — ENOXAPARIN SODIUM 30 MG: 100 INJECTION SUBCUTANEOUS at 08:23

## 2024-11-23 RX ADMIN — ALBUTEROL SULFATE 2.5 MG: 2.5 SOLUTION RESPIRATORY (INHALATION) at 17:28

## 2024-11-23 RX ADMIN — CARVEDILOL 6.25 MG: 6.25 TABLET, FILM COATED ORAL at 17:18

## 2024-11-23 RX ADMIN — CARVEDILOL 6.25 MG: 6.25 TABLET, FILM COATED ORAL at 08:22

## 2024-11-23 NOTE — THERAPY TREATMENT NOTE
Acute Care - Physical Therapy Progress Note  [facility]     Patient Name: Nilda Julien  : 1945  MRN: [id number]  Today's Date: 2024      Visit Dx:     ICD-10-CM ICD-9-CM   1. Decreased activities of daily living (ADL)  Z78.9 V49.89   2. Ischemic rest pain of lower extremity  M79.606 729.5    I99.8 459.9   3. Difficulty walking  R26.2 719.7     Patient Active Problem List   Diagnosis    Hyperlipidemia LDL goal <70    Essential hypertension    Gastroesophageal reflux disease without esophagitis    Acquired hypothyroidism    Allergic rhinitis    Aortic stenosis, mild    Arthritis    CAD s/p CABG    Diverticulitis    Fatigue    Hemorrhoids    Irritable bowel syndrome with diarrhea    Microhematuria    Nephrolithiasis    Smoker    Chronic heart failure with preserved ejection fraction (HFpEF)    Age-related osteoporosis without current pathological fracture    Encounter for screening for malignant neoplasm of colon    History of colon polyps    Anxiety    Rest pain of both lower extremities due to atherosclerosis    Ischemic rest pain of lower extremity    Severe protein-calorie malnutrition     Past Medical History:   Diagnosis Date    AAA (abdominal aortic aneurysm)     INFRA RENAL, 3 CM LAST CT ABDOMEN    Allergic rhinitis     Seasonal allergies    Anxiety 2024    Aortic stenosis, mild 10/18/2021    Arthritis     CAD s/p CABG 10/18/2021    CHF (congestive heart failure)     Chronic heart failure with preserved ejection fraction (HFpEF) 2021    Claudication of both lower extremities     COPD (chronic obstructive pulmonary disease)     Diverticulitis     Elevated cholesterol     Essential hypertension 2021    Fatigue     GERD (gastroesophageal reflux disease)     Heart attack 10/18/2021    Hemorrhoids 2013    Hyperlipidemia LDL goal <70 2021    Irritable bowel syndrome with diarrhea     Memory loss     forgetfulness    Microhematuria 2019    Nephrolithiasis 2019     Smoker 12/30/2021     Past Surgical History:   Procedure Laterality Date    AXILLARY BIFEMORAL BYPASS Bilateral 11/19/2024    Procedure: AXILLO-BIFEMORAL BYPASS GRAFT;  Surgeon: Selvin Vásquez MD;  Location: [facility] MAIN OR;  Service: Vascular;  Laterality: Bilateral;    CARDIAC SURGERY  04/19/2013    4 way bypass    COLONOSCOPY  05/23/2016, 2019    COLONOSCOPY N/A 2/22/2024    Procedure: COLONOSCOPY WITH HOT SNARE POLYPECTOMIES;  Surgeon: Can Pearce MD;  Location: [facility] ENDOSCOPY;  Service: Gastroenterology;  Laterality: N/A;  COLON POLYPS, DIVERTICULOSIS    CORONARY ARTERY BYPASS GRAFT  2013    ENDOSCOPY  2019    HERNIA REPAIR      OTHER SURGICAL HISTORY  2001    cardiac stents, 2 stents placed    VERTEBROPLASTY N/A 4/1/2022    Procedure: VERTEBROPLASTY, THORACIC 11;  Surgeon: Redd Cisneros MD;  Location: [facility] MAIN OR;  Service: Neurosurgery;  Laterality: N/A;     PT Assessment (Last 12 Hours)       PT Evaluation and Treatment       Row Name 11/23/24 1300          Physical Therapy Time and Intention    Subjective Information no complaints  -CS     Document Type therapy note (daily note)  -CS     Mode of Treatment individual therapy;physical therapy  -CS     Patient Effort good  -CS     Symptoms Noted During/After Treatment fatigue  -CS       Row Name 11/23/24 1300          Bed Mobility    Supine-Sit [place] (Bed Mobility) contact guard;1 person assist  -CS     Bed Mobility, Safety Issues decreased use of legs for bridging/pushing  -CS     Assistive Device (Bed Mobility) head of bed elevated;bed rails  -CS       Row Name 11/23/24 1300          Sit-Stand Transfer    Sit-Stand [place] (Transfers) verbal cues;contact guard;1 person assist  -CS     Assistive Device (Sit-Stand Transfers) walker, front-wheeled  -CS       Row Name 11/23/24 1300          Stand-Sit Transfer    Stand-Sit [place] (Transfers) verbal cues;contact guard;1 person assist  -CS     Assistive Device (Stand-Sit  Transfers) walker, front-wheeled  -CS       Row Name 11/23/24 1300          Gait/Stairs (Locomotion)    [place] Level (Gait) verbal cues;contact guard;1 person assist  -CS     Assistive Device (Gait) walker, front-wheeled  -CS     Distance in Feet (Gait) 125  x 2 reps with brief standing rest break between reps and checking SPO2 levels  -CS     Pattern (Gait) 4-point;step-through  -CS     Deviations/Abnormal Patterns (Gait) gait speed decreased;stride length decreased  -CS     Bilateral Gait Deviations forward flexed posture  -CS       Row Name             Wound 11/19/24 0814 Bilateral groin    Wound - Properties Group Placement Date: 11/19/24  -KK Placement Time: 0814  -KK Side: Bilateral  -KK Location: groin  -KK Primary Wound Type: Incision  -KK Present on Original Admission: N  -KK    Retired Wound - Properties Group Placement Date: 11/19/24  -KK Placement Time: 0814  -KK Present on Original Admission: N  -KK Side: Bilateral  -KK Location: groin  -KK Primary Wound Type: Incision  -KK    Retired Wound - Properties Group Placement Date: 11/19/24  -KK Placement Time: 0814  -KK Present on Original Admission: N  -KK Side: Bilateral  -KK Location: groin  -KK Primary Wound Type: Incision  -KK    Retired Wound - Properties Group Date first assessed: 11/19/24  -KK Time first assessed: 0814  -KK Present on Original Admission: N  -KK Side: Bilateral  -KK Location: groin  -KK Primary Wound Type: Incision  -KK      Row Name             Wound 11/19/24 0814 Right axilla    Wound - Properties Group Placement Date: 11/19/24  -KK Placement Time: 0814  -KK Side: Right  -KK Location: axilla  -KK Primary Wound Type: Incision  -KK Present on Original Admission: N  -KK    Retired Wound - Properties Group Placement Date: 11/19/24  -KK Placement Time: 0814  -KK Present on Original Admission: N  -KK Side: Right  -KK Location: axilla  -KK Primary Wound Type: Incision  -KK    Retired Wound - Properties Group Placement Date: 11/19/24   -KK Placement Time: 0814 -KK Present on Original Admission: N  -KK Side: Right  -KK Location: axilla  -KK Primary Wound Type: Incision  -KK    Retired Wound - Properties Group Date first assessed: 11/19/24  -KK Time first assessed: 0814 -KK Present on Original Admission: N  -KK Side: Right  -KK Location: axilla  -KK Primary Wound Type: Incision  -KK      Row Name             NPWT (Negative Pressure Wound Therapy) 11/19/24 1440 Bilateral groin    NPWT (Negative Pressure Wound Therapy) - Properties Group Placement Date: 11/19/24  -ОЛЕГ Placement Time: 1440  -ОЛЕГ Location: Bilateral groin  -ОЛЕГ    Retired NPWT (Negative Pressure Wound Therapy) - Properties Group Placement Date: 11/19/24  -ОЛЕГ Placement Time: 1440  -ОЛЕГ Location: Bilateral groin  -ОЛЕГ    Retired NPWT (Negative Pressure Wound Therapy) - Properties Group Placement Date: 11/19/24  -ОЛЕГ Placement Time: 1440  -ОЛЕГ Location: Bilateral groin  -ОЛЕГ    Retired NPWT (Negative Pressure Wound Therapy) - Properties Group Placement Date: 11/19/24  -ОЛЕГ Placement Time: 1440  -ОЛЕГ Location: Bilateral groin  -ОЛЕГ      Row Name 11/23/24 1300          Vital Signs    Intra SpO2 (%) 89  -CS     O2 Delivery Intra Treatment room air  increased to 91% with a few deep breaths  -CS       Row Name 11/23/24 1300          Positioning and Restraints    Pre-Treatment Position in bed  -CS     Post Treatment Position chair  -CS     In Chair sitting;call light within reach;encouraged to call for assist  no alarms active upon entering room  -CS       Row Name 11/23/24 1300          Progress Summary (PT)    Progress Toward Functional Goals (PT) progress toward functional goals is good  -CS               User Key  (r) = Recorded By, (t) = Taken By, (c) = Cosigned By      Initials Name Provider Type    Melisa Cuevas, RN Registered Nurse    Vince Hinton PTA Physical Therapist Assistant    Carrie Cartwright RN Registered Nurse                    Physical Therapy Education        Title: PT OT SLP Therapies (In Progress)       Topic: Physical Therapy (In Progress)       Point: Mobility training (Done)       Learning Progress Summary            Patient Acceptance, E,TB, VU by AV at 11/21/2024 1526                      Point: Home exercise program (Not Started)       Learner Progress:  Not documented in this visit.              Point: Body mechanics (Done)       Learning Progress Summary            Patient Acceptance, E,TB, VU by AV at 11/21/2024 1526                      Point: Precautions (Done)       Learning Progress Summary            Patient Acceptance, E,TB, VU by AV at 11/21/2024 1526                                      User Key       Initials Effective Dates Name Provider Type Discipline    AV 06/11/21 -  Roberto Guajardo, PT Physical Therapist PT                  PT Recommendation and Plan     Progress Summary (PT)  Progress Toward Functional Goals (PT): progress toward functional goals is good   Outcome Measures       Row Name 11/23/24 1300 11/22/24 1700 11/21/24 1525       How much help from another person do you currently need...    Turning from your back to your side while in flat bed without using bedrails? 3  -CS 3  -CS 3  -AV    Moving from lying on back to sitting on the side of a flat bed without bedrails? 3  -CS 3  -CS 3  -AV    Moving to and from a bed to a chair (including a wheelchair)? 3  -CS 3  -CS 3  -AV    Standing up from a chair using your arms (e.g., wheelchair, bedside chair)? 3  -CS 3  -CS 3  -AV    Climbing 3-5 steps with a railing? 3  -CS 3  -CS 2  -AV    To walk in hospital room? 3  -CS 3  -CS 3  -AV    AM-PAC 6 Clicks Score (PT) 18  -CS 18  -CS 17  -AV       Functional Assessment    Outcome Measure Options AM-PAC 6 Clicks Basic Mobility (PT)  -CS AM-PAC 6 Clicks Basic Mobility (PT)  -CS AM-PAC 6 Clicks Basic Mobility (PT)  -AV              User Key  (r) = Recorded By, (t) = Taken By, (c) = Cosigned By      Initials Name Provider Type    AV Jeffy Millan  Roberto PT Physical Therapist    CS Vince Lopez PTA Physical Therapist Assistant                     Time Calculation:    PT Charges       Row Name 11/23/24 1306             Time Calculation    Start Time 1205  -CS      PT Received On 11/23/24  -CS         Timed Charges    49934 - Gait Training Minutes  15  -CS      91799 - PT Therapeutic Activity Minutes 10  -CS         Total Minutes    Timed Charges Total Minutes 25  -CS       Total Minutes 25  -CS                User Key  (r) = Recorded By, (t) = Taken By, (c) = Cosigned By      Initials Name Provider Type    CS Vince Lopez PTA Physical Therapist Assistant                  Therapy Charges for Today       Code Description Service Date Service Provider Modifiers Qty    15950578273 HC GAIT TRAINING EA 15 MIN 11/22/2024 Vince Lopez, DORY GP 1    70564136862 HC PT THERAPEUTIC ACT EA 15 MIN 11/22/2024 Vince Lopez, DORY GP 1    42617133212 HC GAIT TRAINING EA 15 MIN 11/23/2024 Vince Lopez, DORY GP 1    49921810552 HC PT THERAPEUTIC ACT EA 15 MIN 11/23/2024 Vince Lopez, DORY GP 1            PT G-Codes  Outcome Measure Options: AM-PAC 6 Clicks Basic Mobility (PT)  AM-PAC 6 Clicks Score (PT): 18  AM-PAC 6 Clicks Score (OT): 16    Vince Lopez PTA  11/23/2024

## 2024-11-23 NOTE — PROGRESS NOTES
[facility]     Progress Note    Patient Name: Nilda Julien  : 1945  MRN: [id number]  Primary Care Physician:  Ralph Marcus APRN  Date of admission: 2024    Subjective   Subjective     POD #4 status post axillobifemoral bypass graft    Some SOA and non-productive cough with decreased O2 sats this AM.   Resting in bed currently.  States legs feel improved.    Objective   Objective     Vitals:   Temp:  [97.2 °F (36.2 °C)-100.2 °F (37.9 °C)] 98.1 °F (36.7 °C)  Heart Rate:  [] 101  Resp:  [16-18] 18  BP: ()/(52-72) 122/70  Flow (L/min) (Oxygen Therapy):  [2] 2    Physical Exam   General: Alert, no acute distress  Wounds: Healing well, no signs of infection, no bleeding, no hematoma.  Extremities: Warm, well-perfused.    Assessment & Plan   Assessment / Plan     Assessment/Plan:    Satisfactory progress following axillobifemoral bypass graft.  Continue PT/OT.  CXR and mucinex ordered.  Out of bed to chair most of day and will order breathing treatments BID and PRN.  Ready for rehab when bed available.    Active Hospital Problems:  Active Hospital Problems    Diagnosis     **Ischemic rest pain of lower extremity     Rest pain of both lower extremities due to atherosclerosis

## 2024-11-24 PROCEDURE — 94799 UNLISTED PULMONARY SVC/PX: CPT

## 2024-11-24 PROCEDURE — 97530 THERAPEUTIC ACTIVITIES: CPT

## 2024-11-24 PROCEDURE — 97116 GAIT TRAINING THERAPY: CPT

## 2024-11-24 PROCEDURE — 94664 DEMO&/EVAL PT USE INHALER: CPT

## 2024-11-24 PROCEDURE — 94760 N-INVAS EAR/PLS OXIMETRY 1: CPT

## 2024-11-24 PROCEDURE — 25010000002 ENOXAPARIN PER 10 MG: Performed by: SURGERY

## 2024-11-24 RX ADMIN — ALBUTEROL SULFATE 2.5 MG: 2.5 SOLUTION RESPIRATORY (INHALATION) at 16:52

## 2024-11-24 RX ADMIN — TRAZODONE HYDROCHLORIDE 50 MG: 50 TABLET ORAL at 21:17

## 2024-11-24 RX ADMIN — CARVEDILOL 6.25 MG: 6.25 TABLET, FILM COATED ORAL at 09:18

## 2024-11-24 RX ADMIN — OXYCODONE AND ACETAMINOPHEN 1 TABLET: 5; 325 TABLET ORAL at 17:51

## 2024-11-24 RX ADMIN — CETIRIZINE HYDROCHLORIDE 10 MG: 10 TABLET, FILM COATED ORAL at 09:18

## 2024-11-24 RX ADMIN — GUAIFENESIN AND DEXTROMETHORPHAN HYDROBROMIDE 1 TABLET: 600; 30 TABLET, EXTENDED RELEASE ORAL at 09:18

## 2024-11-24 RX ADMIN — ATORVASTATIN CALCIUM 80 MG: 40 TABLET, FILM COATED ORAL at 21:17

## 2024-11-24 RX ADMIN — ASPIRIN 81 MG: 81 TABLET, COATED ORAL at 09:18

## 2024-11-24 RX ADMIN — PANTOPRAZOLE SODIUM 40 MG: 40 TABLET, DELAYED RELEASE ORAL at 05:36

## 2024-11-24 RX ADMIN — DOCUSATE SODIUM 100 MG: 100 CAPSULE, LIQUID FILLED ORAL at 09:18

## 2024-11-24 RX ADMIN — CILOSTAZOL 100 MG: 100 TABLET ORAL at 09:18

## 2024-11-24 RX ADMIN — ENOXAPARIN SODIUM 30 MG: 100 INJECTION SUBCUTANEOUS at 09:18

## 2024-11-24 RX ADMIN — CARVEDILOL 6.25 MG: 6.25 TABLET, FILM COATED ORAL at 17:51

## 2024-11-24 RX ADMIN — CILOSTAZOL 100 MG: 100 TABLET ORAL at 21:17

## 2024-11-24 RX ADMIN — SERTRALINE HYDROCHLORIDE 50 MG: 50 TABLET ORAL at 09:18

## 2024-11-24 NOTE — PLAN OF CARE
Goal Outcome Evaluation:  Plan of Care Reviewed With: patient        Progress: no change  Outcome Evaluation: shift uneventful. pt up to toilet multiple times.Janie Araujo RN

## 2024-11-24 NOTE — PLAN OF CARE
Goal Outcome Evaluation:  Plan of Care Reviewed With: patient           Outcome Evaluation: Pt VSS, pt had decrease in O2 sats this am, chest xray and nebs ordered and given PRN. pt doing better this afternoon with O2 sats in lower 90's without oxygen. pt has been up to chair and walked several times in the [place]is. resting in bed with call light within reach.                              Sanjuana Jha

## 2024-11-24 NOTE — THERAPY TREATMENT NOTE
Acute Care - Physical Therapy Progress Note  [facility]     Patient Name: Nilda Julien  : 1945  MRN: [id number]  Today's Date: 2024      Visit Dx:     ICD-10-CM ICD-9-CM   1. Decreased activities of daily living (ADL)  Z78.9 V49.89   2. Ischemic rest pain of lower extremity  M79.606 729.5    I99.8 459.9   3. Difficulty walking  R26.2 719.7     Patient Active Problem List   Diagnosis    Hyperlipidemia LDL goal <70    Essential hypertension    Gastroesophageal reflux disease without esophagitis    Acquired hypothyroidism    Allergic rhinitis    Aortic stenosis, mild    Arthritis    CAD s/p CABG    Diverticulitis    Fatigue    Hemorrhoids    Irritable bowel syndrome with diarrhea    Microhematuria    Nephrolithiasis    Smoker    Chronic heart failure with preserved ejection fraction (HFpEF)    Age-related osteoporosis without current pathological fracture    Encounter for screening for malignant neoplasm of colon    History of colon polyps    Anxiety    Rest pain of both lower extremities due to atherosclerosis    Ischemic rest pain of lower extremity    Severe protein-calorie malnutrition     Past Medical History:   Diagnosis Date    AAA (abdominal aortic aneurysm)     INFRA RENAL, 3 CM LAST CT ABDOMEN    Allergic rhinitis     Seasonal allergies    Anxiety 2024    Aortic stenosis, mild 10/18/2021    Arthritis     CAD s/p CABG 10/18/2021    CHF (congestive heart failure)     Chronic heart failure with preserved ejection fraction (HFpEF) 2021    Claudication of both lower extremities     COPD (chronic obstructive pulmonary disease)     Diverticulitis     Elevated cholesterol     Essential hypertension 2021    Fatigue     GERD (gastroesophageal reflux disease)     Heart attack 10/18/2021    Hemorrhoids 2013    Hyperlipidemia LDL goal <70 2021    Irritable bowel syndrome with diarrhea     Memory loss     forgetfulness    Microhematuria 2019    Nephrolithiasis 2019     Smoker 12/30/2021     Past Surgical History:   Procedure Laterality Date    AXILLARY BIFEMORAL BYPASS Bilateral 11/19/2024    Procedure: AXILLO-BIFEMORAL BYPASS GRAFT;  Surgeon: Selvin Vásquez MD;  Location: [facility] MAIN OR;  Service: Vascular;  Laterality: Bilateral;    CARDIAC SURGERY  04/19/2013    4 way bypass    COLONOSCOPY  05/23/2016, 2019    COLONOSCOPY N/A 2/22/2024    Procedure: COLONOSCOPY WITH HOT SNARE POLYPECTOMIES;  Surgeon: Can Pearce MD;  Location: [facility] ENDOSCOPY;  Service: Gastroenterology;  Laterality: N/A;  COLON POLYPS, DIVERTICULOSIS    CORONARY ARTERY BYPASS GRAFT  2013    ENDOSCOPY  2019    HERNIA REPAIR      OTHER SURGICAL HISTORY  2001    cardiac stents, 2 stents placed    VERTEBROPLASTY N/A 4/1/2022    Procedure: VERTEBROPLASTY, THORACIC 11;  Surgeon: Redd Cisneros MD;  Location: [facility] MAIN OR;  Service: Neurosurgery;  Laterality: N/A;     PT Assessment (Last 12 Hours)       PT Evaluation and Treatment       Row Name 11/24/24 1300          Physical Therapy Time and Intention    Subjective Information no complaints  -CS     Document Type therapy note (daily note)  -CS     Mode of Treatment individual therapy;physical therapy  -CS     Patient Effort good  -CS     Symptoms Noted During/After Treatment fatigue  -CS       Row Name 11/24/24 1300          Bed Mobility    Supine-Sit [place] (Bed Mobility) contact guard  -CS     Bed Mobility, Safety Issues decreased use of legs for bridging/pushing  -CS     Assistive Device (Bed Mobility) head of bed elevated;bed rails  -CS       Row Name 11/24/24 1300          Sit-Stand Transfer    Sit-Stand [place] (Transfers) verbal cues;contact guard;1 person assist  -CS     Assistive Device (Sit-Stand Transfers) walker, front-wheeled  -CS       Row Name 11/24/24 1300          Stand-Sit Transfer    Stand-Sit [place] (Transfers) verbal cues;contact guard;1 person assist  -CS     Assistive Device (Stand-Sit Transfers) walker,  front-wheeled  -CS       Row Name 11/24/24 1300          Gait/Stairs (Locomotion)    [place] Level (Gait) verbal cues;contact guard;1 person assist  -CS     Assistive Device (Gait) walker, front-wheeled  -CS     Distance in Feet (Gait) 146  +130+ 120 with standing rest breaks between distances  -CS     Pattern (Gait) 4-point;step-through  -CS     Deviations/Abnormal Patterns (Gait) gait speed decreased;stride length decreased  -CS     Bilateral Gait Deviations forward flexed posture  -CS       Row Name             Wound 11/19/24 0814 Bilateral groin    Wound - Properties Group Placement Date: 11/19/24  -KK Placement Time: 0814 -KK Side: Bilateral  -KK Location: groin  -KK Primary Wound Type: Incision  -KK Present on Original Admission: N  -KK    Retired Wound - Properties Group Placement Date: 11/19/24  -KK Placement Time: 0814 -KK Present on Original Admission: N  -KK Side: Bilateral  -KK Location: groin  -KK Primary Wound Type: Incision  -KK    Retired Wound - Properties Group Placement Date: 11/19/24  -KK Placement Time: 0814  -KK Present on Original Admission: N  -KK Side: Bilateral  -KK Location: groin  -KK Primary Wound Type: Incision  -KK    Retired Wound - Properties Group Date first assessed: 11/19/24  -KK Time first assessed: 0814  -KK Present on Original Admission: N  -KK Side: Bilateral  -KK Location: groin  -KK Primary Wound Type: Incision  -KK      Row Name             Wound 11/19/24 0814 Right axilla    Wound - Properties Group Placement Date: 11/19/24  -KK Placement Time: 0814 -KK Side: Right  -KK Location: axilla  -KK Primary Wound Type: Incision  -KK Present on Original Admission: N  -KK    Retired Wound - Properties Group Placement Date: 11/19/24  -KK Placement Time: 0814 -KK Present on Original Admission: N  -KK Side: Right  -KK Location: axilla  -KK Primary Wound Type: Incision  -KK    Retired Wound - Properties Group Placement Date: 11/19/24  -KK Placement Time: 0814 -KK Present on  Original Admission: N  -KK Side: Right  -KK Location: axilla  -KK Primary Wound Type: Incision  -KK    Retired Wound - Properties Group Date first assessed: 11/19/24  -KK Time first assessed: 0814 -KK Present on Original Admission: N  -KK Side: Right  -KK Location: axilla  -KK Primary Wound Type: Incision  -KK      Row Name             NPWT (Negative Pressure Wound Therapy) 11/19/24 1440 Bilateral groin    NPWT (Negative Pressure Wound Therapy) - Properties Group Placement Date: 11/19/24  -ОЛЕГ Placement Time: 1440  -ОЛЕГ Location: Bilateral groin  -ОЛЕГ    Retired NPWT (Negative Pressure Wound Therapy) - Properties Group Placement Date: 11/19/24  -ОЛЕГ Placement Time: 1440  -ОЛЕГ Location: Bilateral groin  -ОЛЕГ    Retired NPWT (Negative Pressure Wound Therapy) - Properties Group Placement Date: 11/19/24  -ОЛЕГ Placement Time: 1440  -ОЛЕГ Location: Bilateral groin  -ОЛЕГ    Retired NPWT (Negative Pressure Wound Therapy) - Properties Group Placement Date: 11/19/24  -ОЛЕГ Placement Time: 1440  -ОЛЕГ Location: Bilateral groin  -ОЛЕГ      Row Name 11/24/24 1300          Positioning and Restraints    Pre-Treatment Position in bed  -CS     Post Treatment Position chair  -CS     In Chair sitting;call light within reach;encouraged to call for assist  no alarms active upon entering room  -CS       Row Name 11/24/24 1300          Progress Summary (PT)    Progress Toward Functional Goals (PT) progress toward functional goals is good  -CS               User Key  (r) = Recorded By, (t) = Taken By, (c) = Cosigned By      Initials Name Provider Type    Melisa Cuevas, RN Registered Nurse    Vince Hinton PTA Physical Therapist Assistant    Carrie Cartwright RN Registered Nurse                    Physical Therapy Education       Title: PT OT SLP Therapies (In Progress)       Topic: Physical Therapy (In Progress)       Point: Mobility training (Done)       Learning Progress Summary            Patient Acceptance, E,TB, VU by AV at  11/21/2024 1526                      Point: Home exercise program (Not Started)       Learner Progress:  Not documented in this visit.              Point: Body mechanics (Done)       Learning Progress Summary            Patient Acceptance, E,TB, VU by AV at 11/21/2024 1526                      Point: Precautions (Done)       Learning Progress Summary            Patient Acceptance, E,TB, VU by AV at 11/21/2024 1526                                      User Key       Initials Effective Dates Name Provider Type Discipline    AV 06/11/21 -  Roberto Guajardo, PT Physical Therapist PT                  PT Recommendation and Plan     Progress Summary (PT)  Progress Toward Functional Goals (PT): progress toward functional goals is good   Outcome Measures       Row Name 11/24/24 1300 11/23/24 1300 11/22/24 1700       How much help from another person do you currently need...    Turning from your back to your side while in flat bed without using bedrails? 3  -CS 3  -CS 3  -CS    Moving from lying on back to sitting on the side of a flat bed without bedrails? 3  -CS 3  -CS 3  -CS    Moving to and from a bed to a chair (including a wheelchair)? 3  -CS 3  -CS 3  -CS    Standing up from a chair using your arms (e.g., wheelchair, bedside chair)? 4  -CS 3  -CS 3  -CS    Climbing 3-5 steps with a railing? 3  -CS 3  -CS 3  -CS    To walk in hospital room? 3  -CS 3  -CS 3  -CS    AM-PAC 6 Clicks Score (PT) 19  -CS 18  -CS 18  -CS       Functional Assessment    Outcome Measure Options AM-PAC 6 Clicks Basic Mobility (PT)  -CS AM-PAC 6 Clicks Basic Mobility (PT)  -CS AM-PAC 6 Clicks Basic Mobility (PT)  -CS      Row Name 11/21/24 1525             How much help from another person do you currently need...    Turning from your back to your side while in flat bed without using bedrails? 3  -AV      Moving from lying on back to sitting on the side of a flat bed without bedrails? 3  -AV      Moving to and from a bed to a chair (including a  wheelchair)? 3  -AV      Standing up from a chair using your arms (e.g., wheelchair, bedside chair)? 3  -AV      Climbing 3-5 steps with a railing? 2  -AV      To walk in hospital room? 3  -AV      AM-PAC 6 Clicks Score (PT) 17  -AV         Functional Assessment    Outcome Measure Options AM-PAC 6 Clicks Basic Mobility (PT)  -AV                User Key  (r) = Recorded By, (t) = Taken By, (c) = Cosigned By      Initials Name Provider Type    AV Roberto Guajardo, PT Physical Therapist    CS Vince Lopez PTA Physical Therapist Assistant                     Time Calculation:    PT Charges       Row Name 11/24/24 1308             Time Calculation    Start Time 1148  -CS      PT Received On 11/24/24  -CS         Timed Charges    98239 - Gait Training Minutes  17  -CS      26851 - PT Therapeutic Activity Minutes 7  -CS         Total Minutes    Timed Charges Total Minutes 24  -CS       Total Minutes 24  -CS                User Key  (r) = Recorded By, (t) = Taken By, (c) = Cosigned By      Initials Name Provider Type    CS Vince Lopez PTA Physical Therapist Assistant                  Therapy Charges for Today       Code Description Service Date Service Provider Modifiers Qty    57099448334 HC GAIT TRAINING EA 15 MIN 11/23/2024 Vince Lopez, DORY GP 1    66042619194 HC PT THERAPEUTIC ACT EA 15 MIN 11/23/2024 Vince Lopez, DORY GP 1    64072637261 HC GAIT TRAINING EA 15 MIN 11/24/2024 Vince Lopez PTA GP 1    66581115195 HC PT THERAPEUTIC ACT EA 15 MIN 11/24/2024 Vince Lopez PTA GP 1            PT G-Codes  Outcome Measure Options: AM-PAC 6 Clicks Basic Mobility (PT)  AM-PAC 6 Clicks Score (PT): 19  AM-PAC 6 Clicks Score (OT): 16    Vince Lopez PTA  11/24/2024

## 2024-11-24 NOTE — PROGRESS NOTES
[facility]     Progress Note    Patient Name: Nilda Julien  : 1945  MRN: [id number]  Primary Care Physician:  Ralph Marcus APRN  Date of admission: 2024    Subjective   Subjective     POD #5 status post axillobifemoral bypass graft    Some SOA and non-productive cough with decreased O2 sats yesterday and now improved with aggressive pulmonary toilet.  Up to chair for lunch today.  States legs feel improved.    Objective   Objective     Vitals:   Temp:  [97.3 °F (36.3 °C)-99 °F (37.2 °C)] 98.2 °F (36.8 °C)  Heart Rate:  [] 85  Resp:  [16-18] 16  BP: (104-146)/(54-88) 146/88    Physical Exam   General: Alert, no acute distress  Wounds: Healing well, no signs of infection, no bleeding, no hematoma.  Extremities: Warm, well-perfused.    Assessment & Plan   Assessment / Plan     Assessment/Plan:    Satisfactory progress following axillobifemoral bypass graft.  Continue PT/OT.  CXR and mucinex ordered with breathing treatments and SOA improved.  Out of bed to chair most of day yesterday and this AM and looks much better this AM.    Ready for rehab when bed available.    Active Hospital Problems:  Active Hospital Problems    Diagnosis     **Ischemic rest pain of lower extremity     Severe protein-calorie malnutrition     Rest pain of both lower extremities due to atherosclerosis

## 2024-11-25 ENCOUNTER — HOSPITAL ENCOUNTER (INPATIENT)
Facility: HOSPITAL | Age: 79
LOS: 4 days | Discharge: HOME | DRG: 948 | End: 2024-11-29
Attending: SURGERY | Admitting: FAMILY MEDICINE
Payer: MEDICARE

## 2024-11-25 ENCOUNTER — APPOINTMENT (OUTPATIENT)
Dept: GENERAL RADIOLOGY | Facility: HOSPITAL | Age: 79
End: 2024-11-25
Payer: MEDICARE

## 2024-11-25 ENCOUNTER — APPOINTMENT (OUTPATIENT)
Dept: CT IMAGING | Facility: HOSPITAL | Age: 79
End: 2024-11-25
Payer: MEDICARE

## 2024-11-25 VITALS
TEMPERATURE: 98.8 F | WEIGHT: 78.04 LBS | OXYGEN SATURATION: 92 % | DIASTOLIC BLOOD PRESSURE: 58 MMHG | HEART RATE: 86 BPM | HEIGHT: 56 IN | BODY MASS INDEX: 17.56 KG/M2 | SYSTOLIC BLOOD PRESSURE: 112 MMHG | RESPIRATION RATE: 16 BRPM

## 2024-11-25 DIAGNOSIS — R26.2 DIFFICULTY WALKING: Primary | ICD-10-CM

## 2024-11-25 PROBLEM — J98.11 ATELECTASIS: Status: ACTIVE | Noted: 2024-11-25

## 2024-11-25 PROBLEM — R93.89 ABNORMAL CHEST X-RAY: Status: ACTIVE | Noted: 2024-11-25

## 2024-11-25 LAB
ANION GAP SERPL CALCULATED.3IONS-SCNC: 10.6 MMOL/L (ref 5–15)
BASOPHILS # BLD AUTO: 0.02 10*3/MM3 (ref 0–0.2)
BASOPHILS NFR BLD AUTO: 0.4 % (ref 0–1.5)
BUN SERPL-MCNC: 14 MG/DL (ref 8–23)
BUN/CREAT SERPL: 20 (ref 7–25)
CALCIUM SPEC-SCNC: 7.7 MG/DL (ref 8.6–10.5)
CHLORIDE SERPL-SCNC: 106 MMOL/L (ref 98–107)
CO2 SERPL-SCNC: 21.4 MMOL/L (ref 22–29)
CREAT SERPL-MCNC: 0.7 MG/DL (ref 0.57–1)
DEPRECATED RDW RBC AUTO: 51.2 FL (ref 37–54)
EGFRCR SERPLBLD CKD-EPI 2021: 88.1 ML/MIN/1.73
EOSINOPHIL # BLD AUTO: 0 10*3/MM3 (ref 0–0.4)
EOSINOPHIL NFR BLD AUTO: 0 % (ref 0.3–6.2)
ERYTHROCYTE [DISTWIDTH] IN BLOOD BY AUTOMATED COUNT: 16.4 % (ref 12.3–15.4)
GLUCOSE SERPL-MCNC: 97 MG/DL (ref 65–99)
HCT VFR BLD AUTO: 27.6 % (ref 34–46.6)
HGB BLD-MCNC: 9 G/DL (ref 12–15.9)
IMM GRANULOCYTES # BLD AUTO: 0.01 10*3/MM3 (ref 0–0.05)
IMM GRANULOCYTES NFR BLD AUTO: 0.2 % (ref 0–0.5)
LYMPHOCYTES # BLD AUTO: 0.72 10*3/MM3 (ref 0.7–3.1)
LYMPHOCYTES NFR BLD AUTO: 15.4 % (ref 19.6–45.3)
MCH RBC QN AUTO: 28.3 PG (ref 26.6–33)
MCHC RBC AUTO-ENTMCNC: 32.6 G/DL (ref 31.5–35.7)
MCV RBC AUTO: 86.8 FL (ref 79–97)
MONOCYTES # BLD AUTO: 0.24 10*3/MM3 (ref 0.1–0.9)
MONOCYTES NFR BLD AUTO: 5.1 % (ref 5–12)
NEUTROPHILS NFR BLD AUTO: 3.7 10*3/MM3 (ref 1.7–7)
NEUTROPHILS NFR BLD AUTO: 78.9 % (ref 42.7–76)
NRBC BLD AUTO-RTO: 0 /100 WBC (ref 0–0.2)
PLATELET # BLD AUTO: 128 10*3/MM3 (ref 140–450)
PMV BLD AUTO: 10.9 FL (ref 6–12)
POTASSIUM SERPL-SCNC: 3.3 MMOL/L (ref 3.5–5.2)
PROCALCITONIN SERPL-MCNC: 0.07 NG/ML (ref 0–0.25)
RBC # BLD AUTO: 3.18 10*6/MM3 (ref 3.77–5.28)
SARS-COV-2 RNA RESP QL NAA+PROBE: NOT DETECTED
SODIUM SERPL-SCNC: 138 MMOL/L (ref 136–145)
WBC NRBC COR # BLD AUTO: 4.69 10*3/MM3 (ref 3.4–10.8)

## 2024-11-25 PROCEDURE — 25010000002 ENOXAPARIN PER 10 MG: Performed by: SURGERY

## 2024-11-25 PROCEDURE — 99024 POSTOP FOLLOW-UP VISIT: CPT | Performed by: SURGERY

## 2024-11-25 PROCEDURE — 97530 THERAPEUTIC ACTIVITIES: CPT

## 2024-11-25 PROCEDURE — 87635 SARS-COV-2 COVID-19 AMP PRB: CPT | Performed by: SURGERY

## 2024-11-25 PROCEDURE — 84145 PROCALCITONIN (PCT): CPT | Performed by: STUDENT IN AN ORGANIZED HEALTH CARE EDUCATION/TRAINING PROGRAM

## 2024-11-25 PROCEDURE — 85025 COMPLETE CBC W/AUTO DIFF WBC: CPT | Performed by: STUDENT IN AN ORGANIZED HEALTH CARE EDUCATION/TRAINING PROGRAM

## 2024-11-25 PROCEDURE — 71250 CT THORAX DX C-: CPT

## 2024-11-25 PROCEDURE — 97110 THERAPEUTIC EXERCISES: CPT

## 2024-11-25 PROCEDURE — 71045 X-RAY EXAM CHEST 1 VIEW: CPT

## 2024-11-25 PROCEDURE — 80048 BASIC METABOLIC PNL TOTAL CA: CPT | Performed by: STUDENT IN AN ORGANIZED HEALTH CARE EDUCATION/TRAINING PROGRAM

## 2024-11-25 PROCEDURE — 63710000001 ONDANSETRON ODT 4 MG TABLET DISPERSIBLE: Performed by: SURGERY

## 2024-11-25 PROCEDURE — 99222 1ST HOSP IP/OBS MODERATE 55: CPT | Performed by: STUDENT IN AN ORGANIZED HEALTH CARE EDUCATION/TRAINING PROGRAM

## 2024-11-25 RX ORDER — ASPIRIN 81 MG/1
81 TABLET ORAL DAILY
Status: DISCONTINUED | OUTPATIENT
Start: 2024-11-26 | End: 2024-11-29 | Stop reason: HOSPADM

## 2024-11-25 RX ORDER — CETIRIZINE HYDROCHLORIDE 10 MG/1
10 TABLET ORAL DAILY
Status: CANCELLED | OUTPATIENT
Start: 2024-11-26

## 2024-11-25 RX ORDER — PANTOPRAZOLE SODIUM 40 MG/1
40 TABLET, DELAYED RELEASE ORAL
Status: DISCONTINUED | OUTPATIENT
Start: 2024-11-26 | End: 2024-11-29 | Stop reason: HOSPADM

## 2024-11-25 RX ORDER — CILOSTAZOL 100 MG/1
100 TABLET ORAL 2 TIMES DAILY
Status: CANCELLED | OUTPATIENT
Start: 2024-11-25

## 2024-11-25 RX ORDER — ONDANSETRON 4 MG/1
4 TABLET, ORALLY DISINTEGRATING ORAL EVERY 6 HOURS PRN
Status: DISCONTINUED | OUTPATIENT
Start: 2024-11-25 | End: 2024-11-29 | Stop reason: HOSPADM

## 2024-11-25 RX ORDER — ATORVASTATIN CALCIUM 40 MG/1
80 TABLET, FILM COATED ORAL NIGHTLY
Status: CANCELLED | OUTPATIENT
Start: 2024-11-25

## 2024-11-25 RX ORDER — GUAIFENESIN AND DEXTROMETHORPHAN HYDROBROMIDE 600; 30 MG/1; MG/1
1 TABLET, EXTENDED RELEASE ORAL 2 TIMES DAILY PRN
Status: DISCONTINUED | OUTPATIENT
Start: 2024-11-25 | End: 2024-11-29 | Stop reason: HOSPADM

## 2024-11-25 RX ORDER — ALBUTEROL SULFATE 0.83 MG/ML
2.5 SOLUTION RESPIRATORY (INHALATION) EVERY 6 HOURS PRN
Status: CANCELLED | OUTPATIENT
Start: 2024-11-25

## 2024-11-25 RX ORDER — FLUTICASONE PROPIONATE 50 MCG
1 SPRAY, SUSPENSION (ML) NASAL DAILY PRN
Status: DISCONTINUED | OUTPATIENT
Start: 2024-11-25 | End: 2024-11-29 | Stop reason: HOSPADM

## 2024-11-25 RX ORDER — ONDANSETRON 4 MG/1
4 TABLET, ORALLY DISINTEGRATING ORAL EVERY 6 HOURS PRN
Status: CANCELLED | OUTPATIENT
Start: 2024-11-25

## 2024-11-25 RX ORDER — OXYCODONE AND ACETAMINOPHEN 5; 325 MG/1; MG/1
1 TABLET ORAL EVERY 6 HOURS PRN
Status: CANCELLED | OUTPATIENT
Start: 2024-11-25 | End: 2024-11-26

## 2024-11-25 RX ORDER — CARVEDILOL 6.25 MG/1
6.25 TABLET ORAL 2 TIMES DAILY WITH MEALS
Status: DISCONTINUED | OUTPATIENT
Start: 2024-11-26 | End: 2024-11-29 | Stop reason: HOSPADM

## 2024-11-25 RX ORDER — DIPHENOXYLATE HYDROCHLORIDE AND ATROPINE SULFATE 2.5; .025 MG/1; MG/1
2 TABLET ORAL DAILY PRN
Status: CANCELLED | OUTPATIENT
Start: 2024-11-25

## 2024-11-25 RX ORDER — ENOXAPARIN SODIUM 100 MG/ML
30 INJECTION SUBCUTANEOUS DAILY
Status: CANCELLED | OUTPATIENT
Start: 2024-11-26

## 2024-11-25 RX ORDER — ATORVASTATIN CALCIUM 40 MG/1
80 TABLET, FILM COATED ORAL NIGHTLY
Status: DISCONTINUED | OUTPATIENT
Start: 2024-11-26 | End: 2024-11-29 | Stop reason: HOSPADM

## 2024-11-25 RX ORDER — DIPHENOXYLATE HYDROCHLORIDE AND ATROPINE SULFATE 2.5; .025 MG/1; MG/1
2 TABLET ORAL DAILY PRN
Status: DISCONTINUED | OUTPATIENT
Start: 2024-11-25 | End: 2024-11-27

## 2024-11-25 RX ORDER — TRAZODONE HYDROCHLORIDE 50 MG/1
50 TABLET, FILM COATED ORAL NIGHTLY
Status: DISCONTINUED | OUTPATIENT
Start: 2024-11-26 | End: 2024-11-29 | Stop reason: HOSPADM

## 2024-11-25 RX ORDER — FLUTICASONE PROPIONATE 50 MCG
1 SPRAY, SUSPENSION (ML) NASAL DAILY PRN
Status: CANCELLED | OUTPATIENT
Start: 2024-11-25

## 2024-11-25 RX ORDER — ENOXAPARIN SODIUM 100 MG/ML
30 INJECTION SUBCUTANEOUS DAILY
Status: DISCONTINUED | OUTPATIENT
Start: 2024-11-26 | End: 2024-11-29 | Stop reason: HOSPADM

## 2024-11-25 RX ORDER — ALBUTEROL SULFATE 0.83 MG/ML
2.5 SOLUTION RESPIRATORY (INHALATION) EVERY 6 HOURS PRN
Status: DISCONTINUED | OUTPATIENT
Start: 2024-11-25 | End: 2024-11-29 | Stop reason: HOSPADM

## 2024-11-25 RX ORDER — CILOSTAZOL 100 MG/1
100 TABLET ORAL 2 TIMES DAILY
Status: DISCONTINUED | OUTPATIENT
Start: 2024-11-26 | End: 2024-11-29 | Stop reason: HOSPADM

## 2024-11-25 RX ORDER — ERGOCALCIFEROL 1.25 MG/1
50000 CAPSULE, LIQUID FILLED ORAL WEEKLY
Qty: 13 CAPSULE | Refills: 0 | Status: SHIPPED | OUTPATIENT
Start: 2024-11-25

## 2024-11-25 RX ORDER — CETIRIZINE HYDROCHLORIDE 10 MG/1
10 TABLET ORAL DAILY
Status: DISCONTINUED | OUTPATIENT
Start: 2024-11-26 | End: 2024-11-29 | Stop reason: HOSPADM

## 2024-11-25 RX ORDER — CARVEDILOL 6.25 MG/1
6.25 TABLET ORAL 2 TIMES DAILY WITH MEALS
Status: CANCELLED | OUTPATIENT
Start: 2024-11-26

## 2024-11-25 RX ORDER — ONDANSETRON 2 MG/ML
4 INJECTION INTRAMUSCULAR; INTRAVENOUS EVERY 6 HOURS PRN
Status: DISCONTINUED | OUTPATIENT
Start: 2024-11-25 | End: 2024-11-29 | Stop reason: HOSPADM

## 2024-11-25 RX ORDER — TRAZODONE HYDROCHLORIDE 50 MG/1
50 TABLET, FILM COATED ORAL NIGHTLY
Status: CANCELLED | OUTPATIENT
Start: 2024-11-25

## 2024-11-25 RX ORDER — GUAIFENESIN AND DEXTROMETHORPHAN HYDROBROMIDE 600; 30 MG/1; MG/1
1 TABLET, EXTENDED RELEASE ORAL 2 TIMES DAILY PRN
Status: CANCELLED | OUTPATIENT
Start: 2024-11-25

## 2024-11-25 RX ORDER — OXYCODONE AND ACETAMINOPHEN 5; 325 MG/1; MG/1
1 TABLET ORAL EVERY 6 HOURS PRN
Status: ACTIVE | OUTPATIENT
Start: 2024-11-25 | End: 2024-11-26

## 2024-11-25 RX ORDER — AMOXICILLIN 250 MG
2 CAPSULE ORAL 2 TIMES DAILY PRN
Status: CANCELLED | OUTPATIENT
Start: 2024-11-25

## 2024-11-25 RX ORDER — DIPHENOXYLATE HYDROCHLORIDE AND ATROPINE SULFATE 2.5; .025 MG/1; MG/1
2 TABLET ORAL DAILY PRN
Status: DISCONTINUED | OUTPATIENT
Start: 2024-11-25 | End: 2024-11-25 | Stop reason: HOSPADM

## 2024-11-25 RX ORDER — ASPIRIN 81 MG/1
81 TABLET ORAL DAILY
Status: CANCELLED | OUTPATIENT
Start: 2024-11-26

## 2024-11-25 RX ORDER — ONDANSETRON 2 MG/ML
4 INJECTION INTRAMUSCULAR; INTRAVENOUS EVERY 6 HOURS PRN
Status: CANCELLED | OUTPATIENT
Start: 2024-11-25

## 2024-11-25 RX ORDER — AMOXICILLIN 250 MG
2 CAPSULE ORAL 2 TIMES DAILY PRN
Status: DISCONTINUED | OUTPATIENT
Start: 2024-11-25 | End: 2024-11-29 | Stop reason: HOSPADM

## 2024-11-25 RX ORDER — ENOXAPARIN SODIUM 100 MG/ML
30 INJECTION SUBCUTANEOUS DAILY
Qty: 3 ML | Refills: 2 | Status: SHIPPED | OUTPATIENT
Start: 2024-11-26 | End: 2024-11-29 | Stop reason: HOSPADM

## 2024-11-25 RX ORDER — PANTOPRAZOLE SODIUM 40 MG/1
40 TABLET, DELAYED RELEASE ORAL
Status: CANCELLED | OUTPATIENT
Start: 2024-11-26

## 2024-11-25 RX ORDER — OXYCODONE AND ACETAMINOPHEN 5; 325 MG/1; MG/1
1 TABLET ORAL EVERY 6 HOURS PRN
Qty: 20 TABLET | Refills: 0 | Status: SHIPPED | OUTPATIENT
Start: 2024-11-25 | End: 2024-11-30

## 2024-11-25 RX ADMIN — PANTOPRAZOLE SODIUM 40 MG: 40 TABLET, DELAYED RELEASE ORAL at 06:44

## 2024-11-25 RX ADMIN — CARVEDILOL 6.25 MG: 6.25 TABLET, FILM COATED ORAL at 08:20

## 2024-11-25 RX ADMIN — ASPIRIN 81 MG: 81 TABLET, COATED ORAL at 08:20

## 2024-11-25 RX ADMIN — CETIRIZINE HYDROCHLORIDE 10 MG: 10 TABLET, FILM COATED ORAL at 08:19

## 2024-11-25 RX ADMIN — ENOXAPARIN SODIUM 30 MG: 100 INJECTION SUBCUTANEOUS at 08:19

## 2024-11-25 RX ADMIN — ONDANSETRON 4 MG: 4 TABLET, ORALLY DISINTEGRATING ORAL at 12:06

## 2024-11-25 RX ADMIN — CARVEDILOL 6.25 MG: 6.25 TABLET, FILM COATED ORAL at 17:55

## 2024-11-25 RX ADMIN — SERTRALINE HYDROCHLORIDE 50 MG: 50 TABLET ORAL at 08:20

## 2024-11-25 RX ADMIN — CILOSTAZOL 100 MG: 100 TABLET ORAL at 08:20

## 2024-11-25 NOTE — DISCHARGE INSTRUCTIONS
May shower.  Follow-up in the office in 2 weeks, call for appointment.  Resume home diet.  Resume home medications.  No restrictions, activities as tolerated.

## 2024-11-25 NOTE — CONSULTS
Patient Care Team:  Ralph Marcus APRN as PCP - General (Nurse Practitioner)  Dannie Obando MD as Consulting Physician (Cardiology)  Ann Scott APRN as Nurse Practitioner (Nurse Practitioner)  Woodrow Samaniego MD as Consulting Physician (Pulmonary Disease)    Reason for Consult: Pneumonia    Subjective     Patient is a 79 y.o. female who presented to the hospital for axillobifemoral bypass graft.  We are being consulted at this time because the patient had a new chest x-ray that showed a small pleural effusion with overlying consolidation in the base.  Patient had some shortness of breath episodes over the weekend however otherwise has no other complaints.  Has occasional dry productive cough. She has been ambulating to the bathroom. Denies subjective fevers, chills. Feels well and has no complaints.         Review of Systems   Pertinent items are noted in HPI    History  Past Medical History:   Diagnosis Date    AAA (abdominal aortic aneurysm)     INFRA RENAL, 3 CM LAST CT ABDOMEN    Allergic rhinitis     Seasonal allergies    Anxiety 04/22/2024    Aortic stenosis, mild 10/18/2021    Arthritis     CAD s/p CABG 10/18/2021    CHF (congestive heart failure)     Chronic heart failure with preserved ejection fraction (HFpEF) 12/30/2021    Claudication of both lower extremities     COPD (chronic obstructive pulmonary disease)     Diverticulitis     Elevated cholesterol     Essential hypertension 06/07/2021    Fatigue     GERD (gastroesophageal reflux disease)     Heart attack 10/18/2021    Hemorrhoids 2013    Hyperlipidemia LDL goal <70 06/07/2021    Irritable bowel syndrome with diarrhea     Memory loss     forgetfulness    Microhematuria 01/13/2019    Nephrolithiasis 01/13/2019    Smoker 12/30/2021     Past Surgical History:   Procedure Laterality Date    AXILLARY BIFEMORAL BYPASS Bilateral 11/19/2024    Procedure: AXILLO-BIFEMORAL BYPASS GRAFT;  Surgeon: Selvin Vásquez MD;   Location: [facility] MAIN OR;  Service: Vascular;  Laterality: Bilateral;    CARDIAC SURGERY  2013    4 way bypass    COLONOSCOPY  2016, 2019    COLONOSCOPY N/A 2024    Procedure: COLONOSCOPY WITH HOT SNARE POLYPECTOMIES;  Surgeon: Can Pearce MD;  Location: [facility] ENDOSCOPY;  Service: Gastroenterology;  Laterality: N/A;  COLON POLYPS, DIVERTICULOSIS    CORONARY ARTERY BYPASS GRAFT      ENDOSCOPY  2019    HERNIA REPAIR      OTHER SURGICAL HISTORY      cardiac stents, 2 stents placed    VERTEBROPLASTY N/A 2022    Procedure: VERTEBROPLASTY, THORACIC 11;  Surgeon: Redd Cisneros MD;  Location: [facility] MAIN OR;  Service: Neurosurgery;  Laterality: N/A;     Family History   Problem Relation Age of Onset    Hypertension Mother     Heart attack Mother         MI    Skin cancer Mother     Stroke Father         MINI    Hypertension Father     COPD Father         Black lung  age 88    Skin cancer Father     Breast cancer Neg Hx     Ovarian cancer Neg Hx     Uterine cancer Neg Hx     Cervical cancer Neg Hx     Colon cancer Neg Hx     Stomach cancer Neg Hx     Malig Hyperthermia Neg Hx      Social History     Tobacco Use    Smoking status: Every Day     Average packs/day: 0.5 packs/day for 40.0 years (20.0 ttl pk-yrs)     Types: Cigarettes     Start date:      Passive exposure: Current    Smokeless tobacco: Never    Tobacco comments:     Current every day smoker, 0.5 PPD, smoked for 31 or more years     INST TO NOT SMOKE 24 HOURS PRIOR TO ANESTHESIA PER PROTOCOL   Vaping Use    Vaping status: Never Used   Substance Use Topics    Alcohol use: Never    Drug use: Never     Medications Prior to Admission   Medication Sig Dispense Refill Last Dose/Taking    albuterol sulfate HFA (Ventolin HFA) 108 (90 Base) MCG/ACT inhaler Inhale 2 puffs Every 4 (Four) Hours As Needed for Wheezing or Shortness of Air. 1 g 3 Past Week    aspirin 81 MG EC tablet Take 1 tablet by mouth Daily. Last dose  03/16/22 per Dr. Obando instructed   11/19/2024 Morning    atorvastatin (LIPITOR) 80 MG tablet TAKE 1 TABLET BY MOUTH EVERY NIGHT AT BEDTIME 90 tablet 1 Past Week    carvedilol (COREG) 6.25 MG tablet TAKE 1 TABLET BY MOUTH TWICE A DAY WITH A MEAL 180 tablet 3 11/19/2024 Morning    cetirizine (zyrTEC) 10 MG tablet TAKE 1 TABLET BY MOUTH DAILY 90 tablet 1 11/19/2024 Morning    cilostazol (PLETAL) 100 MG tablet TAKE 1 TABLET BY MOUTH TWICE A  tablet 3 11/19/2024 Morning    diphenoxylate-atropine (LOMOTIL) 2.5-0.025 MG per tablet TAKE 2 TABLETS BY MOUTH DAILY AS NEEDED FOR DIARRHEA (Patient taking differently: Take 2 tablets by mouth Daily As Needed.) 60 tablet 9 11/18/2024    fluticasone (FLONASE) 50 MCG/ACT nasal spray SPRAY 2 SPRAYS IN EACH NOSTRIL ONCE DAILY (Patient taking differently: Administer 1 spray into the nostril(s) as directed by provider Daily As Needed.) 16 mL 1 Past Week    omeprazole (priLOSEC) 20 MG capsule Take 1 capsule by mouth Daily.   11/19/2024 Morning    ondansetron (ZOFRAN) 4 MG tablet TAKE 1 TABLET BY MOUTH EVERY 8 HOURS AS NEEDED FOR NAUSEA AND/OR VOMITING (Patient taking differently: Take 1 tablet by mouth Every 8 (Eight) Hours As Needed.) 90 tablet 1 11/18/2024    sertraline (ZOLOFT) 50 MG tablet TAKE 1 TABLET BY MOUTH DAILY 90 tablet 1 11/19/2024 Morning    traZODone (DESYREL) 50 MG tablet Take 1 tablet by mouth Every Night. 30 tablet 1 11/18/2024    triamcinolone (KENALOG) 0.025 % ointment APPLY TO THE AFFECTED AREA(S) 2 TIMES A DAY AS DIRECTED (Patient taking differently: Apply 1 Application topically to the appropriate area as directed 2 (Two) Times a Day.) 15 g 1 Past Week    vitamin E 400 UNIT capsule Take 1 capsule by mouth 2 (Two) Times a Day.   Past Week     Allergies:  Patient has no known allergies.    Objective     Vital Signs  Temp:  [97.2 °F (36.2 °C)-98.8 °F (37.1 °C)] 98.8 °F (37.1 °C)  Heart Rate:  [92-99] 92  Resp:  [14-16] 14  BP: ()/(62-80)  117/73    Physical Exam:      General Appearance:  Alert, cooperative, in no acute distress   Head:  Normocephalic, without obvious abnormality, atraumatic   Eyes:  Lids and lashes normal, conjunctivae and sclerae normal, no icterus, no pallor, corneas clear, PERRLA   Ears:  Ears appear intact with no abnormalities noted   Throat:  No oral lesions, no thrush, oral mucosa moist   Neck:  No adenopathy, supple, trachea midline, no thyromegaly, no carotid bruit, no JVD   Back:  No kyphosis present, no scoliosis present, no skin lesions, erythema or scars, no tenderness to percussion, or palpation, range of motion normal   Lungs:  Clear to auscultation,respirations regular, even and unlabored    Heart:  Regular rhythm and normal rate, normal S1 and S2, no murmur, no gallop, no rub, no click   Breast Exam:  Deferred   Abdomen:  Normal bowel sounds, no masses, no organomegaly, soft non-tender, non-distended, no guarding, no rebound tenderness   Genitalia:  Deferred   Extremities:  Moves all extremities well, no edema, no cyanosis, no redness   Pulses:  Pulses palpable and equal bilaterally   Skin:  No bleeding, bruising or rash   Lymph nodes:  No palpable adenopathy   Neurologic:  Cranial nerves 2 - 12 grossly intact, sensation intact, DTR present and equal bilaterally       Results Review:    I reviewed the patient's new clinical results.  I reviewed the patient's new imaging results and agree with the interpretation.  I reviewed the patient's other test results and agree with the interpretation  I personally viewed and interpreted the patient's EKG/Telemetry data    Assessment & Plan       Ischemic rest pain of lower extremity    Rest pain of both lower extremities due to atherosclerosis    Severe protein-calorie malnutrition    Abnormal chest x-ray    Atelectasis      CT of the chest  Stat CBC, BMP  COVID negative  Pro-Star  Hold antibiotics pending CT and follow-up labs.  Patient currently not febrile or showing signs  or symptoms of HAP  Incentive spirometry  Ambulate         David Brown MD  11/25/24  15:59 EST      Patient had after mentioned workup done.  CT scan showed possible atelectasis versus consolidation.  I am more favor of atelectasis as the patient is not having any fevers, chills, leukocytosis.  Pro-Star is negative.  Patient has no systemic signs or symptoms of infection.  She also only has a mild nonproductive cough which has not had any change in intensity since being in the hospital.  Discussed with Dr. Vásquez patient can be discharged to SNF and we will follow her over there

## 2024-11-25 NOTE — SIGNIFICANT NOTE
11/25/24 1115   OTHER   Discipline occupational therapist   Rehab Time/Intention   Session Not Performed patient unavailable for treatment

## 2024-11-25 NOTE — CONSULTS
"Nutrition Services    Patient Name: Nilda Julien  YOB: 1945  MRN: [id number]  Admission date: 11/19/2024      CLINICAL NUTRITION ASSESSMENT      Reason for Assessment  Follow Up   H&P:  Past Medical History:   Diagnosis Date    AAA (abdominal aortic aneurysm)     INFRA RENAL, 3 CM LAST CT ABDOMEN    Allergic rhinitis     Seasonal allergies    Anxiety 04/22/2024    Aortic stenosis, mild 10/18/2021    Arthritis     CAD s/p CABG 10/18/2021    CHF (congestive heart failure)     Chronic heart failure with preserved ejection fraction (HFpEF) 12/30/2021    Claudication of both lower extremities     COPD (chronic obstructive pulmonary disease)     Diverticulitis     Elevated cholesterol     Essential hypertension 06/07/2021    Fatigue     GERD (gastroesophageal reflux disease)     Heart attack 10/18/2021    Hemorrhoids 2013    Hyperlipidemia LDL goal <70 06/07/2021    Irritable bowel syndrome with diarrhea     Memory loss     forgetfulness    Microhematuria 01/13/2019    Nephrolithiasis 01/13/2019    Smoker 12/30/2021        Current Problems:   Active Hospital Problems    Diagnosis     **Ischemic rest pain of lower extremity     Severe protein-calorie malnutrition     Rest pain of both lower extremities due to atherosclerosis         Nutrition/Diet History         Narrative   Pt reports fair appetite (25-50%). Tolerating diet with no GI complaints offered. Staff encouragement of po intake ongoing. Boost Glucose control TID is in place for nutrition support. Continue nutrition interventions and RD to follow per protocol.      Anthropometrics        Current Height, Weight Height: 142.2 cm (56\")  Weight: 35.4 kg (78 lb 0.7 oz)   Current BMI Body mass index is 17.5 kg/m².   BMI Classification Underweight   % IBW 81%   Adjusted Body Weight (ABW) N/A   Weight Hx  Wt Readings from Last 30 Encounters:   11/19/24 0638 35.4 kg (78 lb 0.7 oz)   11/12/24 1423 34.8 kg (76 lb 11.5 oz)   10/21/24 1514 35.7 kg (78 lb 9.6 " oz)   09/30/24 1342 34.7 kg (76 lb 6.4 oz)   08/27/24 1401 33.6 kg (74 lb)   04/22/24 1450 37.7 kg (83 lb 3.2 oz)   02/22/24 0807 35.6 kg (78 lb 7.7 oz)   01/31/24 1339 37.2 kg (82 lb)   10/20/23 1408 35.3 kg (77 lb 12.8 oz)   07/18/23 1533 35.7 kg (78 lb 11.2 oz)   05/31/23 1323 34.2 kg (75 lb 6.4 oz)   05/18/23 1510 34.5 kg (76 lb)   05/03/23 1314 35.5 kg (78 lb 3.2 oz)   03/16/23 1414 34.3 kg (75 lb 9.6 oz)   03/09/23 1537 34.3 kg (75 lb 9.9 oz)   02/24/23 1257 34.2 kg (75 lb 6.4 oz)   02/09/23 1525 34.9 kg (77 lb)   10/19/22 1507 34.9 kg (77 lb)   08/24/22 1257 32.9 kg (72 lb 9.6 oz)   08/16/22 1704 32.5 kg (71 lb 10.4 oz)   08/09/22 1336 33.1 kg (73 lb)   07/19/22 1528 35.4 kg (78 lb)   06/20/22 1417 33.1 kg (73 lb)   05/19/22 1327 33.6 kg (74 lb)   04/21/22 1328 34 kg (75 lb)   04/01/22 0731 34.9 kg (76 lb 15.1 oz)   02/24/22 1336 34 kg (75 lb)   02/21/22 1418 34 kg (75 lb)   01/24/22 1609 34.5 kg (76 lb)   12/30/21 1242 35.8 kg (79 lb)          Wt Change Observation stable     Estimated/Assessed Needs  Estimated Needs based on: Current Body Weight       Energy Requirements 30-35 kcal/kg    EST Needs (kcal/day) 8533-1206       Protein Requirements 1.0-1.2 g/kg   EST Daily Needs (g/day) 35-42       Fluid Requirements 1 ml/kcal    Estimated Needs (mL/day) 1938-6971     Labs/Medications         Pertinent Labs Reviewed.   Results from last 7 days   Lab Units 11/20/24  0459   SODIUM mmol/L 136   POTASSIUM mmol/L 4.2   CHLORIDE mmol/L 107   CO2 mmol/L 19.0*   BUN mg/dL 14   CREATININE mg/dL 0.73   CALCIUM mg/dL 7.7*   GLUCOSE mg/dL 96     Results from last 7 days   Lab Units 11/20/24  1337 11/20/24  0459   MAGNESIUM mg/dL  --  1.7   PHOSPHORUS mg/dL  --  3.5   HEMOGLOBIN g/dL 9.2* 8.0*   HEMATOCRIT % 28.4* 24.1*     Coronavirus (COVID-19)   Date Value Ref Range Status   01/18/2021 NOT DETECTED NA Final     Comment:     The SARS-CoV-2 assay is a real-time, RT-PCR test intended  for the qualitative detection of  nucleic acid from the  SARS-CoV-2 in respiratory specimens from individuals,  testing performed at [facility] reference lab.       Lab Results   Component Value Date    HGBA1C 5.90 (H) 04/11/2024         Pertinent Medications Reviewed.     Malnutrition Severity Assessment      Patient meets criteria for : Severe Malnutrition         Nutrition Diagnosis         Nutrition Dx Problem 1 Severe malnutrition related to Inability to consume sufficient energy as evidenced by body composition changes. BMI 17.50.     Nutrition Intervention           Current Nutrition Orders & Evaluation of Intake       Current PO Diet Diet: Regular/House; Texture: Soft to Chew (NDD 3); Soft to Chew: Chopped Meat; Fluid Consistency: Thin (IDDSI 0)   Supplement Orders Placed This Encounter      Dietary Nutrition Supplements Boost Glucose Control (Glucerna Shake); chocolate           Nutrition Intervention/Prescription        Continue Boost Glucose control TID (Chocolate) (190 kcal, 16 g protein each)  Continue Regular, Soft to Chew diet         Medical Nutrition Therapy/Nutrition Education          Learner     Readiness Patient  Acceptance     Method     Response Explanation  Verbalizes understanding     Monitor/Evaluation        Monitor Per protocol, PO intake, Supplement intake, Pertinent labs, Weight, POC/GOC     Nutrition Discharge Plan         Recommend to continue oral nutrition supplements on discharge.      Electronically signed by:  Forrest Stockton RD  11/25/24 13:16 EST

## 2024-11-25 NOTE — THERAPY TREATMENT NOTE
Acute Care - Physical Therapy Treatment Note  [facility]     Patient Name: Nilda Julien  : 1945  MRN: [id number]  Today's Date: 2024      Visit Dx:     ICD-10-CM ICD-9-CM   1. Decreased activities of daily living (ADL)  Z78.9 V49.89   2. Ischemic rest pain of lower extremity  M79.606 729.5    I99.8 459.9   3. Difficulty walking  R26.2 719.7     Patient Active Problem List   Diagnosis    Hyperlipidemia LDL goal <70    Essential hypertension    Gastroesophageal reflux disease without esophagitis    Acquired hypothyroidism    Allergic rhinitis    Aortic stenosis, mild    Arthritis    CAD s/p CABG    Diverticulitis    Fatigue    Hemorrhoids    Irritable bowel syndrome with diarrhea    Microhematuria    Nephrolithiasis    Smoker    Chronic heart failure with preserved ejection fraction (HFpEF)    Age-related osteoporosis without current pathological fracture    Encounter for screening for malignant neoplasm of colon    History of colon polyps    Anxiety    Rest pain of both lower extremities due to atherosclerosis    Ischemic rest pain of lower extremity    Severe protein-calorie malnutrition     Past Medical History:   Diagnosis Date    AAA (abdominal aortic aneurysm)     INFRA RENAL, 3 CM LAST CT ABDOMEN    Allergic rhinitis     Seasonal allergies    Anxiety 2024    Aortic stenosis, mild 10/18/2021    Arthritis     CAD s/p CABG 10/18/2021    CHF (congestive heart failure)     Chronic heart failure with preserved ejection fraction (HFpEF) 2021    Claudication of both lower extremities     COPD (chronic obstructive pulmonary disease)     Diverticulitis     Elevated cholesterol     Essential hypertension 2021    Fatigue     GERD (gastroesophageal reflux disease)     Heart attack 10/18/2021    Hemorrhoids 2013    Hyperlipidemia LDL goal <70 2021    Irritable bowel syndrome with diarrhea     Memory loss     forgetfulness    Microhematuria 2019    Nephrolithiasis 2019     Smoker 12/30/2021     Past Surgical History:   Procedure Laterality Date    AXILLARY BIFEMORAL BYPASS Bilateral 11/19/2024    Procedure: AXILLO-BIFEMORAL BYPASS GRAFT;  Surgeon: Selvin Vásquez MD;  Location: [facility] MAIN OR;  Service: Vascular;  Laterality: Bilateral;    CARDIAC SURGERY  04/19/2013    4 way bypass    COLONOSCOPY  05/23/2016, 2019    COLONOSCOPY N/A 2/22/2024    Procedure: COLONOSCOPY WITH HOT SNARE POLYPECTOMIES;  Surgeon: Can Pearce MD;  Location: [facility] ENDOSCOPY;  Service: Gastroenterology;  Laterality: N/A;  COLON POLYPS, DIVERTICULOSIS    CORONARY ARTERY BYPASS GRAFT  2013    ENDOSCOPY  2019    HERNIA REPAIR      OTHER SURGICAL HISTORY  2001    cardiac stents, 2 stents placed    VERTEBROPLASTY N/A 4/1/2022    Procedure: VERTEBROPLASTY, THORACIC 11;  Surgeon: Redd Cisneros MD;  Location: [facility] MAIN OR;  Service: Neurosurgery;  Laterality: N/A;     PT Assessment (Last 12 Hours)       PT Evaluation and Treatment       Row Name 11/25/24 1429          Physical Therapy Time and Intention    Subjective Information complains of;weakness;fatigue  -DK     Document Type therapy note (daily note)  -DK     Mode of Treatment individual therapy;physical therapy  -DK     Patient Effort good  -DK     Symptoms Noted During/After Treatment fatigue  -DK     Comment Pt required minor encouragement due to much company in the room, but was able to participate in exercises, transfers and gait this session.  Wound vac in place.  -DK       Row Name 11/25/24 1429          Pain    Pretreatment Pain Rating 0/10 - no pain  -DK     Posttreatment Pain Rating 0/10 - no pain  -DK       Row Name 11/25/24 1429          Cognition    Affect/Mental Status (Cognition) WFL  -DK     Behavioral Issues (Cognition) overwhelmed easily  -DK     Orientation Status (Cognition) oriented x 4  -DK     Follows Commands (Cognition) WFL  -DK     Cognitive Function (Cognition) WFL  -DK     Personal Safety Interventions gait  belt;nonskid shoes/slippers when out of bed;supervised activity  -DK       Row Name 11/25/24 1429          Bed Mobility    Bed Mobility supine-sit-supine  -DK     Supine-Sit [place] (Bed Mobility) standby assist  -DK     Supine-Sit-Supine [place] (Bed Mobility) standby assist  -DK     Assistive Device (Bed Mobility) bed rails  -DK       Row Name 11/25/24 1429          Transfers    Transfers sit-stand transfer;stand-sit transfer  -DK       Row Name 11/25/24 1429          Sit-Stand Transfer    Sit-Stand [place] (Transfers) standby assist;contact guard;1 person assist  -DK     Assistive Device (Sit-Stand Transfers) walker, front-wheeled  -DK       Row Name 11/25/24 1429          Stand-Sit Transfer    Stand-Sit [place] (Transfers) standby assist;contact guard;1 person assist  -DK     Assistive Device (Stand-Sit Transfers) walker, front-wheeled  -DK       Row Name 11/25/24 1429          Gait/Stairs (Locomotion)    Gait/Stairs Locomotion gait/ambulation independence;gait/ambulation assistive device;distance ambulated;gait pattern  -DK     [place] Level (Gait) standby assist;contact guard;1 person assist  -DK     Assistive Device (Gait) walker, front-wheeled  -DK     Distance in Feet (Gait) 180  -DK     Pattern (Gait) step-through  -DK     Deviations/Abnormal Patterns (Gait) jhonatan decreased;festinating/shuffling;gait speed decreased;stride length decreased  -DK     Bilateral Gait Deviations forward flexed posture  -DK     Comment, (Gait/Stairs) Pt ambulated on room air with a rolling walker and a wound vac.  She returned to bed post treatment.  -DK       Row Name 11/25/24 1429          Safety Issues/Impairments Affecting Functional Mobility    Safety Issues Affecting Function (Mobility) judgment;safety precaution awareness  -DK     Impairments Affecting Function (Mobility) balance;endurance/activity tolerance;strength  -DK       Row Name 11/25/24 1429          Balance    Balance Assessment  sitting static balance;sitting dynamic balance;standing static balance;standing dynamic balance  -DK     Static Sitting Balance standby assist  -DK     Dynamic Sitting Balance standby assist  -DK     Position, Sitting Balance unsupported;sitting edge of bed  -DK     Static Standing Balance standby assist;contact guard;1-person assist  -DK     Dynamic Standing Balance standby assist;contact guard;1-person assist  -DK     Position/Device Used, Standing Balance walker, front-wheeled  -DK     Balance Interventions standing;dynamic;tandem gait  -       Row Name 11/25/24 1429          Motor Skills    Motor Skills --  therapeutic exercises  -     Coordination WFL  -     Therapeutic Exercise hip;knee;ankle  -       Row Name 11/25/24 1429          Hip (Therapeutic Exercise)    Hip (Therapeutic Exercise) AROM (active range of motion)  -     Hip AROM (Therapeutic Exercise) bilateral;flexion;extension;aBduction;aDduction;sitting;15 repititions  -       Row Name 11/25/24 1429          Knee (Therapeutic Exercise)    Knee (Therapeutic Exercise) AROM (active range of motion)  -     Knee AROM (Therapeutic Exercise) bilateral;flexion;extension;LAQ (long arc quad);sitting;15 repititions  -       Row Name 11/25/24 1429          Ankle (Therapeutic Exercise)    Ankle (Therapeutic Exercise) AROM (active range of motion)  -     Ankle AROM (Therapeutic Exercise) bilateral;dorsiflexion;plantarflexion;sitting;15 repititions  -       Row Name             Wound 11/19/24 0814 Bilateral groin    Wound - Properties Group Placement Date: 11/19/24  -KK Placement Time: 0814 -KK Side: Bilateral  -KK Location: groin  -KK Primary Wound Type: Incision  -KK Present on Original Admission: N  -KK    Retired Wound - Properties Group Placement Date: 11/19/24  -KK Placement Time: 0814 -KK Present on Original Admission: N  -KK Side: Bilateral  -KK Location: groin  -KK Primary Wound Type: Incision  -KK    Retired Wound - Properties Group  Placement Date: 11/19/24  -KK Placement Time: 0814  -KK Present on Original Admission: N  -KK Side: Bilateral  -KK Location: groin  -KK Primary Wound Type: Incision  -KK    Retired Wound - Properties Group Date first assessed: 11/19/24  -KK Time first assessed: 0814  -KK Present on Original Admission: N  -KK Side: Bilateral  -KK Location: groin  -KK Primary Wound Type: Incision  -KK      Row Name             Wound 11/19/24 0814 Right axilla    Wound - Properties Group Placement Date: 11/19/24  -KK Placement Time: 0814  -KK Side: Right  -KK Location: axilla  -KK Primary Wound Type: Incision  -KK Present on Original Admission: N  -KK    Retired Wound - Properties Group Placement Date: 11/19/24  -KK Placement Time: 0814  -KK Present on Original Admission: N  -KK Side: Right  -KK Location: axilla  -KK Primary Wound Type: Incision  -KK    Retired Wound - Properties Group Placement Date: 11/19/24  -KK Placement Time: 0814  -KK Present on Original Admission: N  -KK Side: Right  -KK Location: axilla  -KK Primary Wound Type: Incision  -KK    Retired Wound - Properties Group Date first assessed: 11/19/24  -KK Time first assessed: 0814  -KK Present on Original Admission: N  -KK Side: Right  -KK Location: axilla  -KK Primary Wound Type: Incision  -KK      Row Name             NPWT (Negative Pressure Wound Therapy) 11/19/24 1440 Bilateral groin    NPWT (Negative Pressure Wound Therapy) - Properties Group Placement Date: 11/19/24  -ОЛЕГ Placement Time: 1440  -ОЛЕГ Location: Bilateral groin  -ОЛЕГ    Retired NPWT (Negative Pressure Wound Therapy) - Properties Group Placement Date: 11/19/24  -ОЛЕГ Placement Time: 1440  -ОЛЕГ Location: Bilateral groin  -ОЛЕГ    Retired NPWT (Negative Pressure Wound Therapy) - Properties Group Placement Date: 11/19/24  -ОЛЕГ Placement Time: 1440  -ОЛЕГ Location: Bilateral groin  -ОЛЕГ    Retired NPWT (Negative Pressure Wound Therapy) - Properties Group Placement Date: 11/19/24  -ОЛЕГ Placement Time: 1440  -ОЛЕГ Location:  Bilateral groin  -ОЛЕГ      Row Name 11/25/24 1429          Plan of Care Review    Plan of Care Reviewed With patient  -DK     Progress improving  -DK       Row Name 11/25/24 1429          Positioning and Restraints    Pre-Treatment Position in bed  -DK     Post Treatment Position bed  -DK     In Bed supine;call light within reach;encouraged to call for assist;with family/caregiver;side rails up x2;legs elevated;sitting EOB  -DK       Row Name 11/25/24 1429          Therapy Assessment/Plan (PT)    Rehab Potential (PT) good  -DK     Criteria for Skilled Interventions Met (PT) skilled treatment is necessary  -DK     Therapy Frequency (PT) daily  -DK     Problem List (PT) problems related to;balance;mobility;range of motion (ROM);strength  -DK     Activity Limitations Related to Problem List (PT) unable to ambulate safely;unable to transfer safely  -DK       Row Name 11/25/24 1429          Progress Summary (PT)    Progress Toward Functional Goals (PT) progress toward functional goals is good  -DK               User Key  (r) = Recorded By, (t) = Taken By, (c) = Cosigned By      Initials Name Provider Type    Melisa Cuevas, RN Registered Nurse    Roberta Justice, DORY Physical Therapist Assistant    Carrie Cartwright RN Registered Nurse                    Physical Therapy Education       Title: PT OT SLP Therapies (In Progress)       Topic: Physical Therapy (In Progress)       Point: Mobility training (Done)       Learning Progress Summary            Patient Acceptance, E,TB, VU by AV at 11/21/2024 1526                      Point: Home exercise program (Not Started)       Learner Progress:  Not documented in this visit.              Point: Body mechanics (Done)       Learning Progress Summary            Patient Acceptance, E,TB, VU by AV at 11/21/2024 1526                      Point: Precautions (Done)       Learning Progress Summary            Patient Acceptance, E,TB, VU by AV at 11/21/2024 1526                                       User Key       Initials Effective Dates Name Provider Type Discipline    AV 06/11/21 -  Roberto Guajardo, PT Physical Therapist PT                  PT Recommendation and Plan  Planned Therapy Interventions (PT): balance training, bed mobility training, gait training, home exercise program, strengthening, transfer training  Therapy Frequency (PT): daily  Progress Summary (PT)  Progress Toward Functional Goals (PT): progress toward functional goals is good  Plan of Care Reviewed With: patient  Progress: improving   Outcome Measures       Row Name 11/25/24 1429 11/24/24 1300 11/23/24 1300       How much help from another person do you currently need...    Turning from your back to your side while in flat bed without using bedrails? 4  -DK 3  -CS 3  -CS    Moving from lying on back to sitting on the side of a flat bed without bedrails? 4  -DK 3  -CS 3  -CS    Moving to and from a bed to a chair (including a wheelchair)? 3  -DK 3  -CS 3  -CS    Standing up from a chair using your arms (e.g., wheelchair, bedside chair)? 3  -DK 4  -CS 3  -CS    Climbing 3-5 steps with a railing? 2  -DK 3  -CS 3  -CS    To walk in hospital room? 3  -DK 3  -CS 3  -CS    AM-PAC 6 Clicks Score (PT) 19  -DK 19  -CS 18  -CS       Functional Assessment    Outcome Measure Options AM-PAC 6 Clicks Basic Mobility (PT)  -DK AM-PAC 6 Clicks Basic Mobility (PT)  -CS AM-PAC 6 Clicks Basic Mobility (PT)  -CS      Row Name 11/22/24 1700             How much help from another person do you currently need...    Turning from your back to your side while in flat bed without using bedrails? 3  -CS      Moving from lying on back to sitting on the side of a flat bed without bedrails? 3  -CS      Moving to and from a bed to a chair (including a wheelchair)? 3  -CS      Standing up from a chair using your arms (e.g., wheelchair, bedside chair)? 3  -CS      Climbing 3-5 steps with a railing? 3  -CS      To walk in hospital room? 3  -CS       AM-PAC 6 Clicks Score (PT) 18  -CS         Functional Assessment    Outcome Measure Options AM-PAC 6 Clicks Basic Mobility (PT)  -CS                User Key  (r) = Recorded By, (t) = Taken By, (c) = Cosigned By      Initials Name Provider Type    Roberta Justice PTA Physical Therapist Assistant    Vince Hinton PTA Physical Therapist Assistant                     Time Calculation:    PT Charges       Row Name 11/25/24 1434             Time Calculation    PT Received On 11/25/24  -DK      PT Goal Re-Cert Due Date 11/30/24  -DK         Timed Charges    50772 - PT Therapeutic Exercise Minutes 12  -DK      10349 - Gait Training Minutes  6  -DK      04395 - PT Therapeutic Activity Minutes 8  -DK         Total Minutes    Timed Charges Total Minutes 26  -DK       Total Minutes 26  -DK                User Key  (r) = Recorded By, (t) = Taken By, (c) = Cosigned By      Initials Name Provider Type    Roberta Justice PTA Physical Therapist Assistant                  Therapy Charges for Today       Code Description Service Date Service Provider Modifiers Qty    00535145936 HC PT THER PROC EA 15 MIN 11/25/2024 Roberta Nraayanan PTA GP 1    60794142177 HC PT THERAPEUTIC ACT EA 15 MIN 11/25/2024 Roberta Narayanan, DORY GP 1            PT G-Codes  Outcome Measure Options: AM-PAC 6 Clicks Basic Mobility (PT)  AM-PAC 6 Clicks Score (PT): 19  AM-PAC 6 Clicks Score (OT): 16    Roberta Narayanan PTA  11/25/2024

## 2024-11-25 NOTE — PROGRESS NOTES
Patient evaluated by Internal Medicine and felt that findings likely atelectasis.  Will proceed with transfer to SNF.

## 2024-11-25 NOTE — PLAN OF CARE
Goal Outcome Evaluation:  Plan of Care Reviewed With: patient        Progress: improving  Outcome Evaluation: VSS, room air. up with stand by assist to chair and bathroom today. no c/o pain. wound vac to bilateral groin sites intact. right chest incision clean and dry with bruising noted. CT scan this evening to evaluate possible pneumonia vs atelectasis. Plan to discharge to SNF tonight following CT scan results. Will CTM.

## 2024-11-25 NOTE — PROGRESS NOTES
[facility]     Progress Note    Patient Name: Nilda Julien  : 1945  MRN: [id number]  Primary Care Physician:  Ralph Marcus APRN  Date of admission: 2024    Subjective   Subjective     POD #6 status post axillobifemoral bypass graft    Doing well.  No significant events overnight.  Had some shortness of breath over the weekend but feels okay.  Had some diarrhea.  She normally takes Lomotil at home due to IBS.  Would like to get back on it.  Colace held today.    A bed is available in the inpatient rehab pending a negative COVID test.    Objective   Objective     Vitals:   Temp:  [97.2 °F (36.2 °C)-98.8 °F (37.1 °C)] 98.8 °F (37.1 °C)  Heart Rate:  [84-99] 92  Resp:  [14-16] 14  BP: ()/(59-80) 117/73  Flow (L/min) (Oxygen Therapy):  [0] 0    Physical Exam   General: Alert, no acute distress  Wounds: Healing well, no signs of infection, no bleeding, no hematoma.  Extremities: Warm, well-perfused.        Assessment & Plan   Assessment / Plan     Assessment/Plan:    Satisfactory progress following axillobifemoral bypass graft.  Continue PT/OT.  COVID test for possible rehab.  Will obtain a repeat chest x-ray.  DC Colace  Resume Lomotil      Active Hospital Problems:  Active Hospital Problems    Diagnosis     **Ischemic rest pain of lower extremity     Severe protein-calorie malnutrition     Rest pain of both lower extremities due to atherosclerosis          Electronically signed by Selvin Vásquez MD, 24, 12:47 PM EST.      Initial chest x-ray included the possibility of a pneumonia.  For this reason a repeat chest x-ray was obtained.  Today's chest x-ray indicates the presence of an overlying consolidation in the left base suggesting atelectasis or infiltrate.  Will hold off on transferring to SNF.  Will consult internal medicine to assist with management of possible pneumonia.      Electronically signed by Selvin Vásquez MD, 24, 4:01 PM EST.

## 2024-11-25 NOTE — DISCHARGE SUMMARY
[facility]         DISCHARGE SUMMARY    Patient Name: Nilda Julien  : 1945  MRN: [id number]    Date of Admission: 2024  Date of Discharge:  2024  Primary Care Physician: Ralph Marcus APRN    Consults       Date and Time Order Name Status Description    2024  3:57 PM Inpatient Internal Medicine Consult              Presenting Problem:   Ischemic rest pain of lower extremity [M79.606, I99.8]  Rest pain of both lower extremities due to atherosclerosis [I70.223]    Active and Resolved Hospital Problems:  Active Hospital Problems    Diagnosis POA    **Ischemic rest pain of lower extremity [M79.606, I99.8] Unknown    Abnormal chest x-ray [R93.89] Unknown    Atelectasis [J98.11] Unknown    Severe protein-calorie malnutrition [E43] Yes    Rest pain of both lower extremities due to atherosclerosis [I70.223] Yes      Resolved Hospital Problems   No resolved problems to display.         Hospital Course     Hospital Course:  Nilda Julien is a 79 y.o. female electively admitted for surgical revascularization of the bilateral lower extremities due to aortic occlusion resulting in ischemic rest pain.  Surgery was uneventful and she was transferred to recovery room, then ICU overnight.  The following day she was transferred to a   surgical floor and initiated physical therapy.  Continued PT without incident until POD#4 when she felt somewhat short of breath with a decrease in her saturations.  Aggressive pulmonary toilette was pursued with significant improvement.  A chest xray was obtained which suggested the possibility of pneumonia.  A repeat chest xray was obtained prior to transfer to rehab and the question of pneumonia remained, for which reason an Internal Medicine consult was obtained.  The patient was evaluated by Internal Medicine and was felt that the findings were consistent with atelectasis.  At this time, otherwise doing well and ready to transfer to  rehab.          Day of Discharge     Vital Signs:  Temp:  [97.2 °F (36.2 °C)-98.8 °F (37.1 °C)] 98.8 °F (37.1 °C)  Heart Rate:  [92-99] 92  Resp:  [14-16] 14  BP: ()/(62-80) 117/73  Flow (L/min) (Oxygen Therapy):  [0] 0    Physical Exam:  General: Alert, no acute distress  Wounds: Healing well, no signs of infection, no bleeding, no hematoma.  Extremities: Warm, well-perfused.    Pertinent  and/or Most Recent Results     LAB RESULTS:      Lab 11/25/24  1605 11/20/24  1337 11/20/24  0459 11/20/24  0015 11/19/24  1236   WBC 4.69  --  6.66  --  10.44   HEMOGLOBIN 9.0* 9.2* 8.0* 8.0* 9.9*   HEMATOCRIT 27.6* 28.4* 24.1* 24.4* 30.2*   PLATELETS 128*  --  123*  --  126*   NEUTROS ABS 3.70  --  4.97  --   --    IMMATURE GRANS (ABS) 0.01  --  0.04  --   --    LYMPHS ABS 0.72  --  1.19  --   --    MONOS ABS 0.24  --  0.43  --   --    EOS ABS 0.00  --  0.00  --   --    MCV 86.8  --  85.5  --  86.3   PROCALCITONIN 0.07  --   --   --   --          Lab 11/25/24  1605 11/20/24  0459   SODIUM 138 136   POTASSIUM 3.3* 4.2   CHLORIDE 106 107   CO2 21.4* 19.0*   ANION GAP 10.6 10.0   BUN 14 14   CREATININE 0.70 0.73   EGFR 88.1 83.8   GLUCOSE 97 96   CALCIUM 7.7* 7.7*   MAGNESIUM  --  1.7   PHOSPHORUS  --  3.5         Lab 11/20/24  0459   ALBUMIN 3.0*                 Lab 11/19/24  0647   ABO TYPING A   RH TYPING Positive   ANTIBODY SCREEN Negative         Brief Urine Lab Results  (Last result in the past 365 days)        Color   Clarity   Blood   Leuk Est   Nitrite   Protein   CREAT   Urine HCG        04/11/24 1457 Yellow   Cloudy   Negative   Trace   Negative   30 mg/dL (1+)                 Microbiology Results (last 10 days)       Procedure Component Value - Date/Time    COVID PRE-OP / PRE-PROCEDURE SCREENING ORDER (NO ISOLATION) - Swab, Nasopharynx [[id number]]  (Normal) Collected: 11/25/24 1158    Lab Status: Final result Specimen: Swab from Nasopharynx Updated: 11/25/24 3410    Narrative:      The following orders were  created for panel order COVID PRE-OP / PRE-PROCEDURE SCREENING ORDER (NO ISOLATION) - Swab, Nasopharynx.  Procedure                               Abnormality         Status                     ---------                               -----------         ------                     COVID-19,CEPHEID/TERESITA,CO...[[id number]]  Normal              Final result                 Please view results for these tests on the individual orders.    COVID-19,CEPHEID/TERESITA,COR/ELENITA/PAD/UMANG/LAG/NAIMA IN-HOUSE,NP SWAB IN TRANSPORT MEDIA 1 HR TAT, RT-PCR - Swab, Nasopharynx [[id number]]  (Normal) Collected: 11/25/24 1158    Lab Status: Final result Specimen: Swab from Nasopharynx Updated: 11/25/24 1456     COVID19 Not Detected    Narrative:      Fact sheet for providers: https://www.fda.gov/media/075757/download     Fact sheet for patients: https://www.fda.gov/media/191863/download  Fact sheet for providers: https://www.fda.gov/media/165619/download     Fact sheet for patients: https://www.fda.gov/media/973737/download            XR Chest 1 View    Result Date: 11/25/2024  Impression: Impression: 1. Small left pleural effusion with overlying consolidation in the left base suggesting atelectasis or infiltrate. 2. Mild cardiomegaly. Electronically Signed: Can Schmidt MD  11/25/2024 3:46 PM EST  Workstation ID: [id number]    XR Chest 1 View    Result Date: 11/23/2024  Impression: Impression: 1.Left basilar opacities and probable small left effusion. Findings are nonspecific but may indicate pneumonia. 2.Mild cardiomegaly. Electronically Signed: Cheko Mccormick  11/23/2024 10:07 AM EST  Workstation ID: [id number]     Results for orders placed during the hospital encounter of 08/01/24    Doppler Ankle Brachial Index Single Level CAR    Interpretation Summary    Right Conclusion: The right CHRISTINA is severely reduced. Severe digital ischemia.    Left Conclusion: The left CHRISTINA is severely reduced. Severe digital ischemia.    Clinical and/or angiographic  correlation is advised regarding these findings.      Results for orders placed during the hospital encounter of 08/01/24    Doppler Ankle Brachial Index Single Level CAR    Interpretation Summary    Right Conclusion: The right CHRISTINA is severely reduced. Severe digital ischemia.    Left Conclusion: The left CHRISTINA is severely reduced. Severe digital ischemia.    Clinical and/or angiographic correlation is advised regarding these findings.      Results for orders placed in visit on 06/20/22    Adult Transthoracic Echo Complete W/ Cont if Necessary Per Protocol    Interpretation Summary  Mild diffuse hypokinesis with adequate left ventricular systolic function .  Fibrocalcific mitral and aortic valves.  Mild MR and mild TR.  Mild AI.      Labs Pending at Discharge:        Discharge Details        Discharge Medications        New Medications        Instructions Start Date   oxyCODONE-acetaminophen 5-325 MG per tablet  Commonly known as: PERCOCET   1 tablet, Oral, Every 6 Hours PRN             Changes to Medications        Instructions Start Date   diphenoxylate-atropine 2.5-0.025 MG per tablet  Commonly known as: LOMOTIL  What changed: See the new instructions.   TAKE 2 TABLETS BY MOUTH DAILY AS NEEDED FOR DIARRHEA      fluticasone 50 MCG/ACT nasal spray  Commonly known as: FLONASE  What changed: See the new instructions.   SPRAY 2 SPRAYS IN EACH NOSTRIL ONCE DAILY      ondansetron 4 MG tablet  Commonly known as: ZOFRAN  What changed: See the new instructions.   TAKE 1 TABLET BY MOUTH EVERY 8 HOURS AS NEEDED FOR NAUSEA AND/OR VOMITING      triamcinolone 0.025 % ointment  Commonly known as: KENALOG  What changed: See the new instructions.   APPLY TO THE AFFECTED AREA(S) 2 TIMES A DAY AS DIRECTED             Continue These Medications        Instructions Start Date   albuterol sulfate  (90 Base) MCG/ACT inhaler  Commonly known as: Ventolin HFA   2 puffs, Inhalation, Every 4 Hours PRN      aspirin 81 MG EC tablet   81  mg, Daily      atorvastatin 80 MG tablet  Commonly known as: LIPITOR   80 mg, Oral, Every Night at Bedtime      carvedilol 6.25 MG tablet  Commonly known as: COREG   TAKE 1 TABLET BY MOUTH TWICE A DAY WITH A MEAL      cetirizine 10 MG tablet  Commonly known as: zyrTEC   10 mg, Oral, Daily      cilostazol 100 MG tablet  Commonly known as: PLETAL   100 mg, Oral, 2 Times Daily      omeprazole 20 MG capsule  Commonly known as: priLOSEC   20 mg, Daily      sertraline 50 MG tablet  Commonly known as: ZOLOFT   50 mg, Oral, Daily      traZODone 50 MG tablet  Commonly known as: DESYREL   50 mg, Oral, Nightly      vitamin D 1.25 MG (80095 UT) capsule capsule  Commonly known as: ERGOCALCIFEROL   50,000 Units, Oral, Weekly      vitamin E 400 UNIT capsule   400 Units, 2 Times Daily               No Known Allergies      Discharge Disposition:  Skilled Nursing Facility ([facility], [place])    Diet:  Diet Instructions       Diet: Regular/House Diet; Thin (IDDSI 0)      Discharge Diet: Regular/House Diet    Fluid Consistency: Thin (IDDSI 0)          Resume home diet.    Condition:  Satisfactory      Discharge Activity:     As tolerated    CODE STATUS:  Code Status and Medical Interventions: CPR (Attempt to Resuscitate); Full   Ordered at: 11/19/24 1329     Level Of Support Discussed With:    Patient     Code Status (Patient has no pulse and is not breathing):    CPR (Attempt to Resuscitate)     Medical Interventions (Patient has pulse or is breathing):    Full         Future Appointments   Date Time Provider Department Center   11/27/2024  2:30 PM [facility] PULM LAB ROOM 2 [facility] PFT [facility]   11/27/2024  4:00 PM [facility] GABE CT 1 [facility] ETWCT [facility]   1/13/2025  2:30 PM Woodrow Samaniego MD [facility] PCC ETW [facility]   3/4/2025  1:45 PM Dannie Obando MD [facility] CD [facility]   4/23/2025  3:00 PM Ralph Marcus APRN [facility] PC [facility]             Electronically signed by Selvin Vásquez MD, 11/25/24, 5:37 PM EST.

## 2024-11-25 NOTE — PLAN OF CARE
Goal Outcome Evaluation:              Outcome Evaluation: No acute events overnight, VSS, no complaints of pain, wound vac to bilateral groins clean dry and intact, plan to DC to rehab today.Gloria Kramer RN

## 2024-11-26 PROCEDURE — 97161 PT EVAL LOW COMPLEX 20 MIN: CPT

## 2024-11-26 PROCEDURE — 25010000002 ENOXAPARIN PER 10 MG: Performed by: STUDENT IN AN ORGANIZED HEALTH CARE EDUCATION/TRAINING PROGRAM

## 2024-11-26 PROCEDURE — 97165 OT EVAL LOW COMPLEX 30 MIN: CPT

## 2024-11-26 PROCEDURE — 94760 N-INVAS EAR/PLS OXIMETRY 1: CPT

## 2024-11-26 PROCEDURE — 97116 GAIT TRAINING THERAPY: CPT

## 2024-11-26 PROCEDURE — 99306 1ST NF CARE HIGH MDM 50: CPT | Performed by: PHYSICIAN ASSISTANT

## 2024-11-26 PROCEDURE — 97110 THERAPEUTIC EXERCISES: CPT

## 2024-11-26 PROCEDURE — 94799 UNLISTED PULMONARY SVC/PX: CPT

## 2024-11-26 PROCEDURE — 97535 SELF CARE MNGMENT TRAINING: CPT

## 2024-11-26 RX ORDER — OXYCODONE AND ACETAMINOPHEN 5; 325 MG/1; MG/1
1 TABLET ORAL EVERY 6 HOURS PRN
Status: DISCONTINUED | OUTPATIENT
Start: 2024-11-26 | End: 2024-11-29 | Stop reason: HOSPADM

## 2024-11-26 RX ORDER — ARFORMOTEROL TARTRATE 15 UG/2ML
15 SOLUTION RESPIRATORY (INHALATION)
Status: DISCONTINUED | OUTPATIENT
Start: 2024-11-26 | End: 2024-11-29 | Stop reason: HOSPADM

## 2024-11-26 RX ORDER — BUDESONIDE 0.5 MG/2ML
0.5 INHALANT ORAL
Status: DISCONTINUED | OUTPATIENT
Start: 2024-11-26 | End: 2024-11-29 | Stop reason: HOSPADM

## 2024-11-26 RX ADMIN — ENOXAPARIN SODIUM 30 MG: 100 INJECTION SUBCUTANEOUS at 09:08

## 2024-11-26 RX ADMIN — ASPIRIN 81 MG: 81 TABLET, COATED ORAL at 09:07

## 2024-11-26 RX ADMIN — CETIRIZINE HYDROCHLORIDE 10 MG: 10 TABLET, FILM COATED ORAL at 09:06

## 2024-11-26 RX ADMIN — TUBERCULIN PURIFIED PROTEIN DERIVATIVE 5 UNITS: 5 INJECTION, SOLUTION INTRADERMAL at 00:51

## 2024-11-26 RX ADMIN — ARFORMOTEROL TARTRATE 15 MCG: 15 SOLUTION RESPIRATORY (INHALATION) at 20:01

## 2024-11-26 RX ADMIN — PANTOPRAZOLE SODIUM 40 MG: 40 TABLET, DELAYED RELEASE ORAL at 05:47

## 2024-11-26 RX ADMIN — CARVEDILOL 6.25 MG: 6.25 TABLET, FILM COATED ORAL at 18:32

## 2024-11-26 RX ADMIN — CILOSTAZOL 100 MG: 100 TABLET ORAL at 09:06

## 2024-11-26 RX ADMIN — ATORVASTATIN CALCIUM 80 MG: 40 TABLET, FILM COATED ORAL at 21:34

## 2024-11-26 RX ADMIN — BUDESONIDE 0.5 MG: 0.5 INHALANT RESPIRATORY (INHALATION) at 20:01

## 2024-11-26 RX ADMIN — SERTRALINE HYDROCHLORIDE 50 MG: 50 TABLET ORAL at 09:06

## 2024-11-26 RX ADMIN — CARVEDILOL 6.25 MG: 6.25 TABLET, FILM COATED ORAL at 09:06

## 2024-11-26 RX ADMIN — CILOSTAZOL 100 MG: 100 TABLET ORAL at 21:34

## 2024-11-26 RX ADMIN — TRAZODONE HYDROCHLORIDE 50 MG: 50 TABLET ORAL at 21:34

## 2024-11-26 NOTE — PLAN OF CARE
Goal Outcome Evaluation:  Plan of Care Reviewed With: patient        Progress: no change (Evaluation)  Outcome Evaluation: Patient presents with limitations of strength, balance, and activity tolerance which impede herability to perform ADLs/transfers as prior.  The skills of a therapist will be required to safely and effectively implement treatment plan to restore maximum level function.    Anticipated Discharge Disposition (OT): home with home health

## 2024-11-26 NOTE — THERAPY EVALUATION
SNF - Occupational Therapy Initial Evaluation and Treatment Note  [facility]    Patient Name: Nilda Julien  : 1945    MRN: [id number]                              Today's Date: 2024       Admit Date: 2024    Visit Dx: No diagnosis found.  Patient Active Problem List   Diagnosis    Hyperlipidemia LDL goal <70    Essential hypertension    Gastroesophageal reflux disease without esophagitis    Acquired hypothyroidism    Allergic rhinitis    Aortic stenosis, mild    Arthritis    CAD s/p CABG    Diverticulitis    Fatigue    Hemorrhoids    Irritable bowel syndrome with diarrhea    Microhematuria    Nephrolithiasis    Smoker    Chronic heart failure with preserved ejection fraction (HFpEF)    Age-related osteoporosis without current pathological fracture    Encounter for screening for malignant neoplasm of colon    History of colon polyps    Anxiety    Rest pain of both lower extremities due to atherosclerosis    Ischemic rest pain of lower extremity    Severe protein-calorie malnutrition    Abnormal chest x-ray    Atelectasis     Past Medical History:   Diagnosis Date    AAA (abdominal aortic aneurysm)     INFRA RENAL, 3 CM LAST CT ABDOMEN    Allergic rhinitis     Seasonal allergies    Anxiety 2024    Aortic stenosis, mild 10/18/2021    Arthritis     CAD s/p CABG 10/18/2021    CHF (congestive heart failure)     Chronic heart failure with preserved ejection fraction (HFpEF) 2021    Claudication of both lower extremities     COPD (chronic obstructive pulmonary disease)     Diverticulitis     Elevated cholesterol     Essential hypertension 2021    Fatigue     GERD (gastroesophageal reflux disease)     Heart attack 10/18/2021    Hemorrhoids     Hyperlipidemia LDL goal <70 2021    Irritable bowel syndrome with diarrhea     Memory loss     forgetfulness    Microhematuria 2019    Nephrolithiasis 2019    Smoker 2021     Past Surgical History:   Procedure Laterality  Date    AXILLARY BIFEMORAL BYPASS Bilateral 11/19/2024    Procedure: AXILLO-BIFEMORAL BYPASS GRAFT;  Surgeon: Selvin Vásquez MD;  Location: [facility] MAIN OR;  Service: Vascular;  Laterality: Bilateral;    CARDIAC SURGERY  04/19/2013    4 way bypass    COLONOSCOPY  05/23/2016, 2019    COLONOSCOPY N/A 2/22/2024    Procedure: COLONOSCOPY WITH HOT SNARE POLYPECTOMIES;  Surgeon: Can Pearce MD;  Location: [facility] ENDOSCOPY;  Service: Gastroenterology;  Laterality: N/A;  COLON POLYPS, DIVERTICULOSIS    CORONARY ARTERY BYPASS GRAFT  2013    ENDOSCOPY  2019    HERNIA REPAIR      OTHER SURGICAL HISTORY  2001    cardiac stents, 2 stents placed    VERTEBROPLASTY N/A 4/1/2022    Procedure: VERTEBROPLASTY, THORACIC 11;  Surgeon: Redd Cisneros MD;  Location: [facility] MAIN OR;  Service: Neurosurgery;  Laterality: N/A;      General Information       Row Name 11/26/24 1536 11/26/24 1519       OT Time and Intention    Document Type therapy note (daily note)  -SC evaluation  -SC    Mode of Treatment individual therapy;occupational therapy  -SC individual therapy;occupational therapy  -SC    Patient Effort good  -SC good  -SC      Row Name 11/26/24 1536 11/26/24 1519       General Information    Patient Profile Reviewed yes  -SC yes  -SC    Prior Level of Function -- independent:  PLOF is independence in the community to include laundry, cooking, groceries, and driving. She did not use an AD for mobility but did only walk short distances. She has a tub/shower combo and likes to sit down in the tub for bathing.  -SC    Existing Precautions/Restrictions -- fall;other (see comments)  wound vac  -SC    Barriers to Rehab -- none identified  -SC      Row Name 11/26/24 1519          Occupational Profile    Reason for Services/Referral (Occupational Profile) Patient is a 79 year-old female initially admitted to [facility] on 11/19/24 s/p axillo-bifemoral bypass graft and bilateral femoral endarterectomy. She is now admitted to [facility] on SNF for  "skilled rehabilitation.  OT consulted due to a decline in function/mobility.  No previous OT services for current condition.  -SC     Patient Goals (Occupational Profile) \"I want to go home \", \" I want to take care of myself again.\"  -SC       Row Name 11/26/24 1519          Living Environment    People in Home alone  -SC       Row Name 11/26/24 1519          Home Main Entrance    Number of Stairs, Main Entrance three  -SC     Stair Railings, Main Entrance none  -SC       Row Name 11/26/24 1519          Stairs Within Home, Primary    Number of Stairs, Within Home, Primary none  -SC     Stair Railings, Within Home, Primary none  -SC       Row Name 11/26/24 1536 11/26/24 1519       Cognition    Orientation Status (Cognition) oriented x 4  Patient is plesant, cooperative and highly motivated for return to [facility].  -SC oriented x 4  -SC      Row Name 11/26/24 1536 11/26/24 1519       Safety Issues/Impairments Affecting Functional Mobility    Impairments Affecting Function (Mobility) balance;endurance/activity tolerance;strength;grasp;shortness of breath  -SC balance;endurance/activity tolerance;strength;grasp;shortness of breath  -SC              User Key  (r) = Recorded By, (t) = Taken By, (c) = Cosigned By      Initials Name Provider Type    Jeanette Lima OT Occupational Therapist                     Mobility/ADL's       Row Name 11/26/24 1537 11/26/24 1523       Bed Mobility    Bed Mobility supine-sit-supine  -SC supine-sit-supine  -SC    Supine-Sit-Supine [place] (Bed Mobility) standby assist  -SC --    Bed Mobility, Safety Issues decreased use of legs for bridging/pushing  -SC --    Assistive Device (Bed Mobility) bed rails  -SC --      Row Name 11/26/24 1537 11/26/24 1523       Transfers    Transfers sit-stand transfer;stand-sit transfer;toilet transfer;bed-chair transfer  -SC --    Comment, (Transfers) -- CGA  -SC      Row Name 11/26/24 1537          Bed-Chair Transfer    Bed-Chair [place] (Transfers) " contact guard  -SC     Assistive Device (Bed-Chair Transfers) walker, front-wheeled  -SC       Row Name 11/26/24 1537          Sit-Stand Transfer    Sit-Stand Cross (Transfers) contact guard  -SC     Assistive Device (Sit-Stand Transfers) walker, front-wheeled  -SC     Comment, (Sit-Stand Transfer) STS x 6 reps; Instructed on push up from seated surface with arms.  -SC       Row Name 11/26/24 1537          Stand-Sit Transfer    Stand-Sit Cross (Transfers) contact guard  -SC     Assistive Device (Stand-Sit Transfers) walker, front-wheeled  -SC       Row Name 11/26/24 1537          Toilet Transfer    Type (Toilet Transfer) sit-stand;stand-sit  -SC     Cross Level (Toilet Transfer) contact guard  -SC     Assistive Device (Toilet Transfer) commode, 3-in-1  -SC     Comment, (Toilet Transfer) Adjusted 3:1 height  over toilet for increased safety during transfer  -[facility] Name 11/26/24 1537 11/26/24 1523       Functional Mobility    Functional Mobility- Ind. Level contact guard assist;1 person + 1 person to manage equipment  -SC --    Functional Mobility- Comment CGA; Pt anxious about wound vac and is new to using a walker. She required max   for hand placement and guidance of walker.  -SC CGA  -[facility] Name 11/26/24 1537 11/26/24 1523       Activities of Daily Living    BADL Assessment/Intervention bathing;upper body dressing;lower body dressing;grooming;toileting  -SC bathing;upper body dressing;lower body dressing;grooming;toileting  -SC      Row Name 11/26/24 1537 11/26/24 1523       Bathing Assessment/Intervention    Cross Level (Bathing) bathing skills;minimum assist (75% patient effort)  -SC bathing skills;minimum assist (75% patient effort)  -SC    Comment, (Bathing) seated sponge bath (unable to shower due to wound vac)  -SC --      Row Name 11/26/24 1537 11/26/24 1523       Upper Body Dressing Assessment/Training    [place] Level (Upper Body Dressing) upper body  dressing skills;standby assist  -SC upper body dressing skills;standby assist  -[facility] Name 11/26/24 1537 11/26/24 1523       Lower Body Dressing Assessment/Training    [place] Level (Lower Body Dressing) lower body dressing skills;minimum assist (75% patient effort);pants/bottoms;shoes/slippers;socks;don;doff  -SC lower body dressing skills;minimum assist (75% patient effort);pants/bottoms;shoes/slippers;socks;don;doff  -[facility] Name 11/26/24 1537 11/26/24 1523       Grooming Assessment/Training    [place] Level (Grooming) -- grooming skills;contact guard assist  -SC    Comment, (Grooming) standing; sink side for oral care; CGA ; max vc for position of walker with stabilization on sink vs walker for increased safety  -SC --      Row Name 11/26/24 1523          Self-Feeding Assessment/Training    [place] Level (Feeding) feeding skills;set up  -SC       Row Name 11/26/24 1523          Toileting Assessment/Training    [place] Level (Toileting) toileting skills;minimum assist (75% patient effort)  -SC               User Key  (r) = Recorded By, (t) = Taken By, (c) = Cosigned By      Initials Name Provider Type    SC Jeanette Lopez OT Occupational Therapist                   Obj/Interventions       [facility] Name 11/26/24 1524          Sensory Assessment (Somatosensory)    Sensory Assessment (Somatosensory) sensation intact  -[facility] 11/26/24 1524          Vision Assessment/Intervention    Visual Impairment/Limitations corrective lenses full-time  -SC       Row Name 11/26/24 1524          Range of Motion Comprehensive    General Range of Motion bilateral upper extremity ROM WFL  -SC       Row Name 11/26/24 1524          Strength Comprehensive (MMT)    Comment, General Manual Muscle Testing (MMT) Assessment Gross UE strength 4/5  -[facility] Name 11/26/24 1524          Motor Skills    Motor Skills functional endurance;coordination  -SC     Coordination WFL  -SC     Functional Endurance  fair-  -SC       Row Name 11/26/24 1524          Balance    Static Standing Balance contact guard  -SC     Dynamic Standing Balance contact guard;minimal assist  -SC               User Key  (r) = Recorded By, (t) = Taken By, (c) = Cosigned By      Initials Name Provider Type    SC Jeanette Lopez, OT Occupational Therapist                   Goals/Plan       Row Name 11/26/24 1526          Bed Mobility Goal 1 (OT)    Activity/Assistive Device (Bed Mobility Goal 1, OT) bed mobility activities, all  -SC     [place] Level/Cues Needed (Bed Mobility Goal 1, OT) modified independence  -SC     Time Frame (Bed Mobility Goal 1, OT) long term goal (LTG);30 days  -SC       Row Name 11/26/24 1526          Transfer Goal 1 (OT)    Activity/Assistive Device (Transfer Goal 1, OT) transfers, all  -SC     [place] Level/Cues Needed (Transfer Goal 1, OT) modified independence  -SC     Time Frame (Transfer Goal 1, OT) long term goal (LTG);30 days  -SC       Row Name 11/26/24 1526          Bathing Goal 1 (OT)    Activity/Device (Bathing Goal 1, OT) bathing skills, all  -SC     [place] Level/Cues Needed (Bathing Goal 1, OT) modified independence  -SC     Time Frame (Bathing Goal 1, OT) long term goal (LTG);30 days  -SC       Row Name 11/26/24 1526          Dressing Goal 1 (OT)    Activity/Device (Dressing Goal 1, OT) dressing skills, all  -SC     [place]/Cues Needed (Dressing Goal 1, OT) modified independence  -SC     Time Frame (Dressing Goal 1, OT) long term goal (LTG);30 days  -SC       Row Name 11/26/24 1526          Toileting Goal 1 (OT)    Activity/Device (Toileting Goal 1, OT) toileting skills, all  -SC     [place] Level/Cues Needed (Toileting Goal 1, OT) modified independence  -SC     Time Frame (Toileting Goal 1, OT) long term goal (LTG);30 days  -SC       Row Name 11/26/24 1526          Grooming Goal 1 (OT)    Activity/Device (Grooming Goal 1, OT) grooming skills, all  -SC     [place] (Grooming Goal 1,  OT) modified independence  -SC     Time Frame (Grooming Goal 1, OT) long term goal (LTG);30 days  -[facility] Name 11/26/24 1526          Strength Goal 1 (OT)    Strength Goal 1 (OT) Patient will improve upper body strength to 4+/5 to support improved ADL function and mobility.  -SC     Time Frame (Strength Goal 1, OT) long term goal (LTG);30 days  -[facility] Name 11/26/24 1526          Problem Specific Goal 1 (OT)    Problem Specific Goal 1 (OT) Patient will demonstrate good endurance to support ADLs/functional transfers.  -SC     Time Frame (Problem Specific Goal 1, OT) long term goal (LTG);30 days  -[facility] Name 11/26/24 1526          Therapy Assessment/Plan (OT)    Planned Therapy Interventions (OT) activity tolerance training;BADL retraining;occupation/activity based interventions;strengthening exercise;transfer/mobility retraining;patient/caregiver education/training;functional balance retraining;IADL retraining  -SC               User Key  (r) = Recorded By, (t) = Taken By, (c) = Cosigned By      Initials Name Provider Type    Jeanette Lima OT Occupational Therapist                   Clinical Impression       Row Name 11/26/24 1526          Pain Assessment    Pretreatment Pain Rating 0/10 - no pain  -SC     Posttreatment Pain Rating 0/10 - no pain  -SC       Row Name 11/26/24 1526          Plan of Care Review    Plan of Care Reviewed With patient  -SC     Progress no change  Evaluation  -SC     Outcome Evaluation Patient presents with limitations of strength, balance, and activity tolerance which impede herability to perform ADLs/transfers as prior.  The skills of a therapist will be required to safely and effectively implement treatment plan to restore maximum level function.  -[facility] Name 11/26/24 1526          Therapy Assessment/Plan (OT)    Rehab Potential (OT) good  -SC     Criteria for Skilled Therapeutic Interventions Met (OT) yes;meets criteria;skilled treatment is necessary  -SC      Therapy Frequency (OT) 5 times/wk  -[facility] Name 11/26/24 1526          Therapy Plan Review/Discharge Plan (OT)    Anticipated Discharge Disposition (OT) home with home health  -[facility] Name 11/26/24 1526          Positioning and Restraints    Pre-Treatment Position in bed  -SC     Post Treatment Position chair  -SC     In Chair call light within reach;encouraged to call for assist;exit alarm on  -SC               User Key  (r) = Recorded By, (t) = Taken By, (c) = Cosigned By      Initials Name Provider Type    Jeanette Lima OT Occupational Therapist                   Outcome Measures       [facility] Name 11/26/24 1528          How much help from another is currently needed...    Putting on and taking off regular lower body clothing? 3  -SC     Bathing (including washing, rinsing, and drying) 3  -SC     Toileting (which includes using toilet bed pan or urinal) 3  -SC     Putting on and taking off regular upper body clothing 4  -SC     Taking care of personal grooming (such as brushing teeth) 4  -SC     Eating meals 4  -SC     AM-PAC 6 Clicks Score (OT) 21  -[facility] Name 11/26/24 1528          Functional Assessment    Outcome Measure Options AM-PAC 6 Clicks Daily Activity (OT);Optimal Instrument  -[facility] Name 11/26/24 1528          Optimal Instrument    Optimal Instrument Optimal - 3  -SC     Bending/Stooping 3  -SC     Standing 2  -SC     Reaching 1  -SC               User Key  (r) = Recorded By, (t) = Taken By, (c) = Cosigned By      Initials Name Provider Type    Jeanette Lima OT Occupational Therapist                  Section G  Mobility  Dressing - self performance: limited assistance (staff provide guided maneuvering of limbs or other non-weight bearing assistance)  Dressing support/assistance: One person assist  Eating - self performance: independent  Eating support/assistance: Setup help only  Toileting - self performance: limited assistance (staff provide guided maneuvering of limbs  or other non-weight bearing assistance)  Toileting support/assistance: One person assist  Personal hygiene - self performance: limited assistance (staff provide guided maneuvering of limbs or other non-weight bearing assistance)  Personal hygiene support/assistance: One person assist  Bathing  Bathing - self performance: Physical help with bathing (exclude washing back and hair for patient)  Balance  Moving on and off toilet: Not steady, requires assist to steady  Range of Motion  Upper Extremity: No impairment  Section GG  Functional Ability/Goals, Adm (Section GG)  Self Care, Prior Functioning (AW8857G): 3. Independent  Functional Cognition, Prior Functioning (DI0546H): 3. Independent  Upper Extremity Range of Motion (DG1338G): No impairment  Self Care, Admission (Section GG)  Eating: Self-Care Admission Performance ([id number]): setup or clean-up assistance (05)  Oral Hygiene: Self-Care Admission Performance ([id number]): supervision or touching assistance (04)  Toileting Hygiene: Self-Care Admission Performance ([id number]): partial/moderate assistance (03)  Shower/Bathe Self: Self-Care Admission Performance ([id number]): partial/moderate assistance (03)  Upper Body Dressing: Self-Care Admission Performance ([id number]): supervision or touching assistance (04)  Lower Body Dressing: Self-Care Admission Performance ([id number]): partial/moderate assistance (03)  Putting On/Taking Off Footwear: Self-Care Admission Performance ([id number]): partial/moderate assistance (03)  Personal Hygiene: Self-Care Admission Performance (AF1851V6): partial/moderate assistance (03)  Mobility, Admission Performance (RG4110)  Toilet Transfer: Mobility Admission Performance ([id number]): partial/moderate assistance (03)  Tub/shower Transfer: Mobility Admission Performance ([id number]): partial/moderate assistance (03)                Occupational Therapy Education       Title: PT OT SLP Therapies (Done)       Topic: Occupational Therapy (Done)        Point: ADL training (Done)       Description:   Instruct learner(s) on proper safety adaptation and remediation techniques during self care or transfers.   Instruct in proper use of assistive devices.                  Learning Progress Summary            Patient Acceptance, E, VU by SC at 11/26/2024 1529                      Point: Home exercise program (Done)       Description:   Instruct learner(s) on appropriate technique for monitoring, assisting and/or progressing therapeutic exercises/activities.                  Learning Progress Summary            Patient Acceptance, E, VU by SC at 11/26/2024 1529                      Point: Precautions (Done)       Description:   Instruct learner(s) on prescribed precautions during self-care and functional transfers.                  Learning Progress Summary            Patient Acceptance, E, VU by SC at 11/26/2024 1529                      Point: Body mechanics (Done)       Description:   Instruct learner(s) on proper positioning and spine alignment during self-care, functional mobility activities and/or exercises.                  Learning Progress Summary            Patient Acceptance, E, VU by SC at 11/26/2024 1529                                      User Key       Initials Effective Dates Name Provider Type Discipline    SC 02/05/24 -  Jeanette Lopez OT Occupational Therapist OT                  OT Recommendation and Plan  Planned Therapy Interventions (OT): activity tolerance training, BADL retraining, occupation/activity based interventions, strengthening exercise, transfer/mobility retraining, patient/caregiver education/training, functional balance retraining, IADL retraining  Therapy Frequency (OT): 5 times/wk  Plan of Care Review  Plan of Care Reviewed With: patient  Progress: no change (Evaluation)  Outcome Evaluation: Patient presents with limitations of strength, balance, and activity tolerance which impede herability to perform ADLs/transfers as prior.  The  skills of a therapist will be required to safely and effectively implement treatment plan to restore maximum level function.     Time Calculation:   Evaluation Complexity (OT)  Review Occupational Profile/Medical/Therapy History Complexity: expanded/moderate complexity  Assessment, Occupational Performance/Identification of Deficit Complexity: 1-3 performance deficits  Clinical Decision Making Complexity (OT): problem focused assessment/low complexity  Overall Complexity of Evaluation (OT): low complexity     Time Calculation- OT       Row Name 11/26/24 1535             Time Calculation- OT    OT Received On 11/26/24  -SC      OT Goal Re-Cert Due Date 12/26/24  -SC         Timed Charges    94717 - OT Self Care/Mgmt Minutes 40  -SC         Untimed Charges    OT Eval/Re-eval Minutes 37  -SC         SNF Occupational Therapy Minutes    Skilled Minutes- OT 40 min  -SC         Total Minutes    Timed Charges Total Minutes 40  -SC      Untimed Charges Total Minutes 37  -SC       Total Minutes 77  -SC                User Key  (r) = Recorded By, (t) = Taken By, (c) = Cosigned By      Initials Name Provider Type    SC Jeanette Lopez OT Occupational Therapist                  Therapy Charges for Today       Code Description Service Date Service Provider Modifiers Qty    [id number]  OT SELF CARE/MGMT/TRAIN EA 15 MIN 11/26/2024 Jeanette Lopez OT GO 3    [id number]  OT EVAL LOW COMPLEXITY 3 11/26/2024 Jeanette Lopez OT GO 1                 Jeanette Lopez OT  11/26/2024

## 2024-11-26 NOTE — CONSULTS
"Nutrition Services    Patient Name: Nilda Julien  YOB: 1945  MRN: [id number]  Admission date: 11/25/2024      CLINICAL NUTRITION ASSESSMENT      Reason for Assessment  Nursing Facility Admission     H&P:  Past Medical History:   Diagnosis Date    AAA (abdominal aortic aneurysm)     INFRA RENAL, 3 CM LAST CT ABDOMEN    Allergic rhinitis     Seasonal allergies    Anxiety 04/22/2024    Aortic stenosis, mild 10/18/2021    Arthritis     CAD s/p CABG 10/18/2021    CHF (congestive heart failure)     Chronic heart failure with preserved ejection fraction (HFpEF) 12/30/2021    Claudication of both lower extremities     COPD (chronic obstructive pulmonary disease)     Diverticulitis     Elevated cholesterol     Essential hypertension 06/07/2021    Fatigue     GERD (gastroesophageal reflux disease)     Heart attack 10/18/2021    Hemorrhoids 2013    Hyperlipidemia LDL goal <70 06/07/2021    Irritable bowel syndrome with diarrhea     Memory loss     forgetfulness    Microhematuria 01/13/2019    Nephrolithiasis 01/13/2019    Smoker 12/30/2021        Current Problems:   Active Hospital Problems    Diagnosis     **Ischemic rest pain of lower extremity         Nutrition/Diet History         Narrative   Pt visited this AM, was admitted to SNF 11/25. Reported \"decent\" appetite, >25-50%. Was receiving ETC diet with chopped meat ( inpatient); would like to be upgraded to Regular consistency. No difficulty chewing or swallowing. Tolerating Boost GC, drinking 2 supplements to completion; decreased to BID. No N/V, +chronic C/D, IBS and takes appropriate meds as needed. No food preferences.   RD to follow per protocol. Section K completed.     Anthropometrics        Current Height, Weight Height: 147.3 cm (58\")  Weight: 36.9 kg (81 lb 5.6 oz)   Current BMI Body mass index is 17 kg/m².   BMI Classification Underweight   % IBW 90%, IBW 40.9 kg   Adjusted Body Weight (ABW)    Weight Hx  Wt Readings from Last 30 Encounters: "   11/26/24 0010 36.9 kg (81 lb 5.6 oz)   11/25/24 2330 36.9 kg (81 lb 5.6 oz)   11/19/24 0638 35.4 kg (78 lb 0.7 oz)   11/12/24 1423 34.8 kg (76 lb 11.5 oz)   10/21/24 1514 35.7 kg (78 lb 9.6 oz)   09/30/24 1342 34.7 kg (76 lb 6.4 oz)   08/27/24 1401 33.6 kg (74 lb)   04/22/24 1450 37.7 kg (83 lb 3.2 oz)   02/22/24 0807 35.6 kg (78 lb 7.7 oz)   01/31/24 1339 37.2 kg (82 lb)   10/20/23 1408 35.3 kg (77 lb 12.8 oz)   07/18/23 1533 35.7 kg (78 lb 11.2 oz)   05/31/23 1323 34.2 kg (75 lb 6.4 oz)   05/18/23 1510 34.5 kg (76 lb)   05/03/23 1314 35.5 kg (78 lb 3.2 oz)   03/16/23 1414 34.3 kg (75 lb 9.6 oz)   03/09/23 1537 34.3 kg (75 lb 9.9 oz)   02/24/23 1257 34.2 kg (75 lb 6.4 oz)   02/09/23 1525 34.9 kg (77 lb)   10/19/22 1507 34.9 kg (77 lb)   08/24/22 1257 32.9 kg (72 lb 9.6 oz)   08/16/22 1704 32.5 kg (71 lb 10.4 oz)   08/09/22 1336 33.1 kg (73 lb)   07/19/22 1528 35.4 kg (78 lb)   06/20/22 1417 33.1 kg (73 lb)   05/19/22 1327 33.6 kg (74 lb)   04/21/22 1328 34 kg (75 lb)   04/01/22 0731 34.9 kg (76 lb 15.1 oz)   02/24/22 1336 34 kg (75 lb)   02/21/22 1418 34 kg (75 lb)   01/24/22 1609 34.5 kg (76 lb)          Wt Change Observation Wt stable, up from 11/25 follow up     Estimated/Assessed Needs  Estimated Needs based on: Current Body Weight 36.9 kg       Energy Requirements 30-35 kcal/kg    EST Needs (kcal/day) 2305-8013 kcal       Protein Requirements 1.0-1.2 g/kg   EST Daily Needs (g/day) 37-44 g protein       Fluid Requirements 30-40 mL/kg    Estimated Needs (mL/day) 2716-1920 mL     Labs/Medications         Pertinent Labs Reviewed.   Results from last 7 days   Lab Units 11/25/24  1605 11/20/24  0459   SODIUM mmol/L 138 136   POTASSIUM mmol/L 3.3* 4.2   CHLORIDE mmol/L 106 107   CO2 mmol/L 21.4* 19.0*   BUN mg/dL 14 14   CREATININE mg/dL 0.70 0.73   CALCIUM mg/dL 7.7* 7.7*   GLUCOSE mg/dL 97 96     Results from last 7 days   Lab Units 11/25/24  1605 11/20/24  1337 11/20/24  0459   MAGNESIUM mg/dL  --   --  1.7    PHOSPHORUS mg/dL  --   --  3.5   HEMOGLOBIN g/dL 9.0*   < > 8.0*   HEMATOCRIT % 27.6*   < > 24.1*    < > = values in this interval not displayed.     COVID19   Date Value Ref Range Status   11/25/2024 Not Detected Not Detected - Ref. Range Final     Lab Results   Component Value Date    HGBA1C 5.90 (H) 04/11/2024         Pertinent Medications Reviewed.     Malnutrition Severity Assessment      Patient meets criteria for : Severe Malnutrition  Malnutrition Type (Last 8 Hours)       Malnutrition Severity Assessment       Row Name 11/26/24 1334       Malnutrition Severity Assessment    Malnutrition Type Chronic Disease - Related Malnutrition      Row Name 11/26/24 0749       Malnutrition Severity Assessment    Malnutrition Type Chronic Disease - Related Malnutrition      Row Name 11/26/24 1334       Muscle Loss    Loss of Muscle Mass Findings Severe    Spiritism Region Severe - deep hollowing/scooping, lack of muscle to touch, facial bones well defined    Clavicle Bone Region Severe - protruding prominent bone    Acromion Bone Region Severe - squared shoulders, bones, and acromion process protrusion prominent    Scapular Bone Region Severe - prominent bones, depressions easily visible between ribs, scapula, spine, shoulders    Dorsal Hand Region Severe - prominent depression    Anterior Thigh Region Severe - line/depression along thigh, obviously thin    Posterior Calf Region Severe - thin with very little definition/firmness      Row Name 11/26/24 0749       Muscle Loss    Loss of Muscle Mass Findings Severe    Spiritism Region Severe - deep hollowing/scooping, lack of muscle to touch, facial bones well defined    Clavicle Bone Region Severe - protruding prominent bone    Acromion Bone Region Severe - squared shoulders, bones, and acromion process protrusion prominent    Scapular Bone Region Severe - prominent bones, depressions easily visible between ribs, scapula, spine, shoulders    Dorsal Hand Region Severe -  prominent depression    Anterior Thigh Region Severe - line/depression along thigh, obviously thin    Posterior Calf Region Severe - thin with very little definition/firmness      Row Name 11/26/24 1334       Fat Loss    Subcutaneous Fat Loss Findings Severe    Orbital Region  Severe - pronounced hollowness/depression, dark circles, loose saggy skin    Upper Arm Region Severe - mostly skin, very little space between folds, fingers touch      Row Name 11/26/24 0749       Fat Loss    Subcutaneous Fat Loss Findings Severe    Orbital Region  Severe - pronounced hollowness/depression, dark circles, loose saggy skin    Upper Arm Region Severe - mostly skin, very little space between folds, fingers touch      Row Name 11/26/24 1334       Criteria Met (Must meet criteria for severity in at least 2 of these categories: M Wasting, Fat Loss, Fluid, Secondary Signs, Wt. Status, Intake)    Patient meets criteria for  Severe Malnutrition      Row Name 11/26/24 0749       Criteria Met (Must meet criteria for severity in at least 2 of these categories: M Wasting, Fat Loss, Fluid, Secondary Signs, Wt. Status, Intake)    Patient meets criteria for  Severe Malnutrition                     Nutrition Diagnosis         Nutrition Dx Problem 1 Severe malnutrition related to Inability to consume sufficient energy as evidenced by body composition changes. BMI 17.     Nutrition Intervention           Current Nutrition Orders & Evaluation of Intake       Current PO Diet Diet: Cardiac; Healthy Heart (2-3 Na+); Texture: Soft to Chew (NDD 3); Soft to Chew: Chopped Meat; Fluid Consistency: Thin (IDDSI 0)   Supplement Orders Placed This Encounter      Dietary Nutrition Supplements Boost Glucose Control (Glucerna Shake); chocolate           Nutrition Intervention/Prescription        Recommend liberalize Heart healthy restriction (malnutrition, age/QOL). Continue Regular diet.  Continue Boost Glucose control BID.        Medical Nutrition  Therapy/Nutrition Education          Learner     Readiness Patient  Acceptance     Method     Response Explanation  Verbalizes understanding     Monitor/Evaluation        Monitor Per protocol, PO intake, Supplement intake, Pertinent labs, Weight, POC/GOC     Nutrition Discharge Plan         Recommend to continue oral nutrition supplements on discharge.      Electronically signed by:  Telma Winchester RD  11/26/24 13:44 EST

## 2024-11-26 NOTE — PLAN OF CARE
Goal Outcome Evaluation:              Outcome Evaluation: RSD [id number] admitted to SNF this evening pleasant makes needs known to staff, Wound vac to linette groins. dressing intact without leaks. pt oriented to unit and call system. TB skin test given. No complaints of pain requiring pain medication. admission completed. call light and personal items within reach at bedside. Continue POC.

## 2024-11-26 NOTE — H&P
[facility]   HOSPITALIST HISTORY AND PHYSICAL  Date: 2024   Patient Name: Nilda Julien  : 1945  MRN: [id number]  Primary Care Physician:  Ralph Marcus APRN  Date of admission: 2024    Subjective   Subjective     Chief Complaint: Hospital-acquired weakness    HPI:    Nilda Julien is a 79 y.o. female past medical history significant for chronic and ongoing tobacco use with cigarettes, coronary artery disease status post CABG, diastolic congestive heart failure, COPD, peripheral arterial disease that underwent axillobifemoral bypass graft, bilateral femoral endarterectomy for ischemic rest pain of the lower extremities postoperatively patient was admitted to the ICU was stabilized postoperative course uneventful did have some shortness of air and decreasing oxygenation saturations postoperatively which was felt to be secondary to atelectasis patient remained hemodynamically and clinically stable was seen by PT and OT deemed a good candidate for inpatient rehab was transferred to the skilled nursing facility for ongoing therapy.      Personal History     Past Medical History:  Past Medical History:   Diagnosis Date    AAA (abdominal aortic aneurysm)     INFRA RENAL, 3 CM LAST CT ABDOMEN    Allergic rhinitis     Seasonal allergies    Anxiety 2024    Aortic stenosis, mild 10/18/2021    Arthritis     CAD s/p CABG 10/18/2021    CHF (congestive heart failure)     Chronic heart failure with preserved ejection fraction (HFpEF) 2021    Claudication of both lower extremities     COPD (chronic obstructive pulmonary disease)     Diverticulitis     Elevated cholesterol     Essential hypertension 2021    Fatigue     GERD (gastroesophageal reflux disease)     Heart attack 10/18/2021    Hemorrhoids 2013    Hyperlipidemia LDL goal <70 2021    Irritable bowel syndrome with diarrhea     Memory loss     forgetfulness    Microhematuria 2019    Nephrolithiasis 2019    Smoker  2021       Past Surgical History:  Past Surgical History:   Procedure Laterality Date    AXILLARY BIFEMORAL BYPASS Bilateral 2024    Procedure: AXILLO-BIFEMORAL BYPASS GRAFT;  Surgeon: Selvin Vásquez MD;  Location: [facility] MAIN OR;  Service: Vascular;  Laterality: Bilateral;    CARDIAC SURGERY  2013    4 way bypass    COLONOSCOPY  2016, 2019    COLONOSCOPY N/A 2024    Procedure: COLONOSCOPY WITH HOT SNARE POLYPECTOMIES;  Surgeon: Can Pearce MD;  Location: [facility] ENDOSCOPY;  Service: Gastroenterology;  Laterality: N/A;  COLON POLYPS, DIVERTICULOSIS    CORONARY ARTERY BYPASS GRAFT      ENDOSCOPY  2019    HERNIA REPAIR      OTHER SURGICAL HISTORY  2001    cardiac stents, 2 stents placed    VERTEBROPLASTY N/A 2022    Procedure: VERTEBROPLASTY, THORACIC 11;  Surgeon: Redd Cisneros MD;  Location: [facility] MAIN OR;  Service: Neurosurgery;  Laterality: N/A;        Family History:   Family History   Problem Relation Age of Onset    Hypertension Mother     Heart attack Mother         MI    Skin cancer Mother     Stroke Father         MINI    Hypertension Father     COPD Father         Black lung  age 88    Skin cancer Father     Breast cancer Neg Hx     Ovarian cancer Neg Hx     Uterine cancer Neg Hx     Cervical cancer Neg Hx     Colon cancer Neg Hx     Stomach cancer Neg Hx     Malig Hyperthermia Neg Hx         Social History:   Social History     Socioeconomic History    Marital status:     Number of children: 2   Tobacco Use    Smoking status: Every Day     Average packs/day: 0.5 packs/day for 40.0 years (20.0 ttl pk-yrs)     Types: Cigarettes     Start date:      Passive exposure: Current    Smokeless tobacco: Never    Tobacco comments:     Current every day smoker, 0.5 PPD, smoked for 31 or more years     INST TO NOT SMOKE 24 HOURS PRIOR TO ANESTHESIA PER PROTOCOL   Vaping Use    Vaping status: Never Used   Substance and Sexual Activity    Alcohol use:  Never    Drug use: Never    Sexual activity: Not Currently        Home Medications:  Enoxaparin Sodium, albuterol sulfate HFA, aspirin, atorvastatin, carvedilol, cetirizine, cilostazol, diphenoxylate-atropine, fluticasone, omeprazole, ondansetron, oxyCODONE-acetaminophen, sertraline, traZODone, triamcinolone, vitamin D, and vitamin E    Allergies:  No Known Allergies    Review of Systems   All systems were reviewed and negative except for: Weakness fatigue    Objective   Objective     Vitals:   Temp:  [97.9 °F (36.6 °C)-98.8 °F (37.1 °C)] 97.9 °F (36.6 °C)  Heart Rate:  [83-86] 83  Resp:  [16] 16  BP: (112-135)/(58-76) 135/76    Physical Exam   Constitutional: Frail chronically ill-appearing female no acute distress  Respiratory diminished  Cardiovascular: RRR systolic murmur    Result Review    Result Review:  I have personally reviewed the results from the time of this admission to 11/26/2024 17:17 EST and agree with these findings:  [x]  Laboratory  []  Microbiology  []  Radiology  []  EKG/Telemetry   []  Cardiology/Vascular   []  Pathology  []  Old records  []  Other:      Assessment & Plan   Assessment / Plan     Assessment:    Hospital-acquired weakness  Recent revascularization procedure for ischemic rest pain secondary to peripheral arterial disease  COPD  Diastolic heart failure  Valvular heart disease  Ongoing tobacco use cigarettes  Peripheral arterial disease  Hyperlipidemia  Hypertension  GERD    Plan:    Patient with history of COPD intermittent shortness of air will start Pulmicort Brovana and as needed DuoNebs  Encourage incentive spirometry  Will recheck CBC chemistries in a.m. additionally will also check BNP might benefit from Lasix  Consider 2D echocardiogram depending on BNP  Continue postoperative care per vascular surgery  Vascular surgery consulted recommendations appreciated  Daily PT OT  Further treatment contingent upon her hospital course      VTE Prophylaxis:  Pharmacologic VTE  prophylaxis orders are present.        CODE STATUS:    Code Status (Patient has no pulse and is not breathing): CPR (Attempt to Resuscitate)  Medical Interventions (Patient has pulse or is breathing): Full Support          Electronically signed by MIGUELANGEL Mcdonald, 11/26/24, 5:17 PM EST.

## 2024-11-26 NOTE — PROGRESS NOTES
Pt vaccination history reviewed for eligibility of Prevnar 20 and pt does not meet  criteria for Prevnar 20.    Pt vaccination history reviewed for eligibility of Covid Vaccine and pt meets  criteria for Covid vaccine.     Order for Prevnar placed if applicable; RN to place order COVID vaccine per standing order if criteria met and patient consents.       Immunization History   Administered Date(s) Administered    COVID-19 (PFIZER) BIVALENT 12+YRS 12/01/2022    COVID-19 (PFIZER) Purple Cap Monovalent 03/29/2021, 04/26/2021    Covid-19 (Pfizer) Gray Cap Monovalent 05/25/2022    Fluzone High-Dose 65+yrs 10/18/2021    Fluzone Quad >6mos (Multi-dose) 10/28/2013, 10/19/2015    PPD Test 11/26/2024    Pneumococcal Conjugate 13-Valent (PCV13) 04/17/2015, 04/17/2015    Pneumococcal Polysaccharide (PPSV23) 10/18/2021    TD Preservative Free (Tenivac) 04/17/2015    Td (TDVAX) 04/17/2015       Pneumococcal Vaccine Recommendations    https://www.cdc.gov/vaccines/vpd/pneumo/downloads/pneumo-vaccine-timing.pdf    Covid Vaccine Recommendations  Beginning September 30, 2024, individuals are considered up to date if they have received a single dose of the [id number] updated COVID-19 vaccine.   Administer the dose >=8 weeks after the last dose of previous formulation.

## 2024-11-26 NOTE — THERAPY EVALUATION
SNF - Physical Therapy Initial Evaluation and Treatment Note  [facility]     Patient Name: Nilda Julien  : 1945  MRN: [id number]  Today's Date: 2024      Visit Dx:    ICD-10-CM ICD-9-CM   1. Difficulty walking  R26.2 719.7     Patient Active Problem List   Diagnosis    Hyperlipidemia LDL goal <70    Essential hypertension    Gastroesophageal reflux disease without esophagitis    Acquired hypothyroidism    Allergic rhinitis    Aortic stenosis, mild    Arthritis    CAD s/p CABG    Diverticulitis    Fatigue    Hemorrhoids    Irritable bowel syndrome with diarrhea    Microhematuria    Nephrolithiasis    Smoker    Chronic heart failure with preserved ejection fraction (HFpEF)    Age-related osteoporosis without current pathological fracture    Encounter for screening for malignant neoplasm of colon    History of colon polyps    Anxiety    Rest pain of both lower extremities due to atherosclerosis    Ischemic rest pain of lower extremity    Severe protein-calorie malnutrition    Abnormal chest x-ray    Atelectasis     Past Medical History:   Diagnosis Date    AAA (abdominal aortic aneurysm)     INFRA RENAL, 3 CM LAST CT ABDOMEN    Allergic rhinitis     Seasonal allergies    Anxiety 2024    Aortic stenosis, mild 10/18/2021    Arthritis     CAD s/p CABG 10/18/2021    CHF (congestive heart failure)     Chronic heart failure with preserved ejection fraction (HFpEF) 2021    Claudication of both lower extremities     COPD (chronic obstructive pulmonary disease)     Diverticulitis     Elevated cholesterol     Essential hypertension 2021    Fatigue     GERD (gastroesophageal reflux disease)     Heart attack 10/18/2021    Hemorrhoids     Hyperlipidemia LDL goal <70 2021    Irritable bowel syndrome with diarrhea     Memory loss     forgetfulness    Microhematuria 2019    Nephrolithiasis 2019    Smoker 2021     Past Surgical History:   Procedure Laterality Date    AXILLARY  BIFEMORAL BYPASS Bilateral 11/19/2024    Procedure: AXILLO-BIFEMORAL BYPASS GRAFT;  Surgeon: Selvin Vásquez MD;  Location: [facility] MAIN OR;  Service: Vascular;  Laterality: Bilateral;    CARDIAC SURGERY  04/19/2013    4 way bypass    COLONOSCOPY  05/23/2016, 2019    COLONOSCOPY N/A 2/22/2024    Procedure: COLONOSCOPY WITH HOT SNARE POLYPECTOMIES;  Surgeon: Can Pearce MD;  Location: [facility] ENDOSCOPY;  Service: Gastroenterology;  Laterality: N/A;  COLON POLYPS, DIVERTICULOSIS    CORONARY ARTERY BYPASS GRAFT  2013    ENDOSCOPY  2019    HERNIA REPAIR      OTHER SURGICAL HISTORY  2001    cardiac stents, 2 stents placed    VERTEBROPLASTY N/A 4/1/2022    Procedure: VERTEBROPLASTY, THORACIC 11;  Surgeon: Redd Cisneros MD;  Location: [facility] MAIN OR;  Service: Neurosurgery;  Laterality: N/A;       PT Assessment (Last 12 Hours)       PT Evaluation and Treatment       Row Name 11/26/24 1600          Physical Therapy Time and Intention    Subjective Information complains of;weakness  -CS     Document Type evaluation;therapy note (daily note)  -CS     Mode of Treatment individual therapy;physical therapy  -CS     Patient Effort good  -CS     Symptoms Noted During/After Treatment fatigue  -CS       Row Name 11/26/24 1600          General Information    Patient Profile Reviewed yes  -CS     Patient Observations alert;cooperative;agree to therapy  -CS     Prior Level of Function independent:;all household mobility;gait;transfer;bed mobility;ADL's;cooking;home management;driving  Pt reports independence with all functional mobility and ADLs.  -CS     Equipment Currently Used at Home none  pt reports not using an AD for ambulation but has a rolling walker. she has a step over tub where she takes baths. No home O2.  -CS     Existing Precautions/Restrictions fall  wound vac (potentially being removed on 11/26/24); pt had acillo-bifemoral bypass graft and B femoral endarterectomy on 11/19/24  -CS     Barriers to Rehab none  identified  -CS       Row Name 11/26/24 1600          Living Environment    Current Living Arrangements home  -CS     Home Accessibility stairs to enter home;stairs within home  -CS     People in Home alone  -CS     Primary Care Provided by self  -CS       Row Name 11/26/24 1600          Home Main Entrance    Number of Stairs, Main Entrance three  from garage  -CS     Stair Railings, Main Entrance none  -CS       Row Name 11/26/24 1600          Stairs Within Home, Primary    Number of Stairs, Within Home, Primary none  -       Row Name 11/26/24 1600          Pain    Pretreatment Pain Rating 0/10 - no pain  -CS     Posttreatment Pain Rating 0/10 - no pain  -CS       Row Name 11/26/24 1600          Cognition    Orientation Status (Cognition) oriented x 3  -CS       Row Name 11/26/24 1600          Range of Motion Comprehensive    General Range of Motion bilateral lower extremity ROM WFL  -       Row Name 11/26/24 1600          Strength Comprehensive (MMT)    General Manual Muscle Testing (MMT) Assessment lower extremity strength deficits identified  -CS     Comment, General Manual Muscle Testing (MMT) Assessment BLEs assessed at 3+/5  -[facility] Name 11/26/24 1700          Mobility    Extremity Weight-bearing Status right lower extremity (P)   -CS     Right Lower Extremity (Weight-bearing Status) weight-bearing as tolerated (WBAT) (P)   -       Row Name 11/26/24 1600          Bed Mobility    Bed Mobility supine-sit  -CS     Supine-Sit-Supine [place] (Bed Mobility) verbal cues;standby assist;contact guard  -     Bed Mobility, Safety Issues decreased use of legs for bridging/pushing  -CS     Assistive Device (Bed Mobility) bed rails  -       Row Name 11/26/24 1600          Transfers    Transfers sit-stand transfer;stand-sit transfer;toilet transfer  -       Row Name 11/26/24 1600          Sit-Stand Transfer    Sit-Stand [place] (Transfers) verbal cues;contact guard  -CS     Assistive Device  (Sit-Stand Transfers) walker, front-wheeled  -CS       Row Name 11/26/24 1600          Stand-Sit Transfer    Stand-Sit [place] (Transfers) verbal cues;contact guard  -CS     Assistive Device (Stand-Sit Transfers) walker, front-wheeled  -EDI       Row Name 11/26/24 1600          Toilet Transfer    Type (Toilet Transfer) sit-stand;stand-sit  -CS     [place] Level (Toilet Transfer) verbal cues;contact guard  -CS     Assistive Device (Toilet Transfer) commode chair;raised toilet seat;walker, front-wheeled  -EDI       Row Name 11/26/24 1600          Gait/Stairs (Locomotion)    Gait/Stairs Locomotion gait/ambulation assistive device  -     [place] Level (Gait) verbal cues;contact guard  -CS     Assistive Device (Gait) walker, front-wheeled  -     Patient was able to Ambulate yes  -CS     Distance in Feet (Gait) 100  x2 plus 15'x2  -CS     Pattern (Gait) 4-point;step-through  -CS     Deviations/Abnormal Patterns (Gait) gait speed decreased;stride length decreased  -       Row Name 11/26/24 1600          Safety Issues/Impairments Affecting Functional Mobility    Impairments Affecting Function (Mobility) balance;endurance/activity tolerance;strength;shortness of breath  -       Row Name 11/26/24 1600          Balance    Balance Assessment standing dynamic balance  -     Dynamic Standing Balance verbal cues;contact guard;minimal assist  -CS     Position/Device Used, Standing Balance supported;walker, front-wheeled  -       Row Name 11/26/24 1600          Motor Skills    Therapeutic Exercise hip;knee;ankle  -       Row Name 11/26/24 1600          Hip (Therapeutic Exercise)    Hip (Therapeutic Exercise) AROM (active range of motion)  -     Hip AROM (Therapeutic Exercise) bilateral;flexion;extension;aBduction;aDduction;15 repititions  -       Row Name 11/26/24 1600          Knee (Therapeutic Exercise)    Knee (Therapeutic Exercise) AROM (active range of motion)  -     Knee AROM (Therapeutic  Exercise) bilateral;SLR (straight leg raise);heel slides;LAQ (long arc quad);15 repititions  -CS       Row Name 11/26/24 1600          Ankle (Therapeutic Exercise)    Ankle (Therapeutic Exercise) AROM (active range of motion)  -     Ankle AROM (Therapeutic Exercise) bilateral;dorsiflexion;plantarflexion  -       Row Name             Wound 11/19/24 0814 Bilateral groin    Wound - Properties Group Placement Date: 11/19/24 -KK Placement Time: 0814 -KK Side: Bilateral  -KK Location: groin  -KK Primary Wound Type: Incision  -KK Present on Original Admission: N  -KK    Retired Wound - Properties Group Placement Date: 11/19/24 -KK Placement Time: 0814  -KK Present on Original Admission: N  -KK Side: Bilateral  -KK Location: groin  -KK Primary Wound Type: Incision  -KK    Retired Wound - Properties Group Placement Date: 11/19/24 -KK Placement Time: 0814  -KK Present on Original Admission: N  -KK Side: Bilateral  -KK Location: groin  -KK Primary Wound Type: Incision  -KK    Retired Wound - Properties Group Date first assessed: 11/19/24 -KK Time first assessed: 0814  -KK Present on Original Admission: N  -KK Side: Bilateral  -KK Location: groin  -KK Primary Wound Type: Incision  -KK      Row Name             Wound 11/19/24 0814 Right axilla    Wound - Properties Group Placement Date: 11/19/24 -KK Placement Time: 0814  -KK Side: Right  -KK Location: axilla  -KK Primary Wound Type: Incision  -KK Present on Original Admission: N  -KK    Retired Wound - Properties Group Placement Date: 11/19/24 -KK Placement Time: 0814  -KK Present on Original Admission: N  -KK Side: Right  -KK Location: axilla  -KK Primary Wound Type: Incision  -KK    Retired Wound - Properties Group Placement Date: 11/19/24 -KK Placement Time: 0814  -KK Present on Original Admission: N  -KK Side: Right  -KK Location: axilla  -KK Primary Wound Type: Incision  -KK    Retired Wound - Properties Group Date first assessed: 11/19/24 -KK Time first  assessed: 0814  -KK Present on Original Admission: N  -KK Side: Right  -KK Location: axilla  -KK Primary Wound Type: Incision  -KK      Row Name             NPWT (Negative Pressure Wound Therapy) 11/19/24 1440 Bilateral groin    NPWT (Negative Pressure Wound Therapy) - Properties Group Placement Date: 11/19/24  -ОЛЕГ Placement Time: 1440  -ОЛЕГ Location: Bilateral groin  -ОЛЕГ    Retired NPWT (Negative Pressure Wound Therapy) - Properties Group Placement Date: 11/19/24  -ОЛЕГ Placement Time: 1440  -ОЛЕГ Location: Bilateral groin  -ОЛЕГ    Retired NPWT (Negative Pressure Wound Therapy) - Properties Group Placement Date: 11/19/24  -ОЛЕГ Placement Time: 1440  -ОЛЕГ Location: Bilateral groin  -ОЛЕГ    Retired NPWT (Negative Pressure Wound Therapy) - Properties Group Placement Date: 11/19/24  -ОЛЕГ Placement Time: 1440  -ОЛЕГ Location: Bilateral groin  -ОЛЕГ      Row Name 11/26/24 1600          Plan of Care Review    Plan of Care Reviewed With patient  -CS     Progress no change  -CS     Outcome Evaluation Pt presents with limitations that impede their ability to safely and independently transfer and ambulate. The skills of a therapist will be required to safely and effectively implement the following treatment plan to restore maximal level of function.  -CS       Row Name 11/26/24 1600          Positioning and Restraints    Pre-Treatment Position in bed  -CS     Post Treatment Position chair  -CS     In Chair sitting;call light within reach;encouraged to call for assist;exit alarm on  -CS       Row Name 11/26/24 1600          Therapy Assessment/Plan (PT)    Rehab Potential (PT) good  -CS     Criteria for Skilled Interventions Met (PT) yes;skilled treatment is necessary  -CS     Therapy Frequency (PT) 6 times/wk  -CS     Predicted Duration of Therapy Intervention (PT) 30 days  -CS     Problem List (PT) problems related to;balance;mobility;strength;pain  -CS     Activity Limitations Related to Problem List (PT) unable to ambulate safely;unable  to transfer safely  -CS       Row Name 11/26/24 1600          PT Evaluation Complexity    History, PT Evaluation Complexity 1-2 personal factors and/or comorbidities  -CS     Examination of Body Systems (PT Eval Complexity) total of 4 or more elements  -CS     Clinical Presentation (PT Evaluation Complexity) stable  -CS     Clinical Decision Making (PT Evaluation Complexity) low complexity  -CS     Overall Complexity (PT Evaluation Complexity) low complexity  -CS       Row Name 11/26/24 1600          Therapy Plan Review/Discharge Plan (PT)    Therapy Plan Review (PT) evaluation/treatment results reviewed;patient  -CS       Row Name 11/26/24 1600          Physical Therapy Goals    Bed Mobility Goal Selection (PT) bed mobility, PT goal 1  -CS     Transfer Goal Selection (PT) transfer, PT goal 1  -CS     Gait Training Goal Selection (PT) gait training, PT goal 1  -CS     Stairs Goal Selection (PT) stairs, PT goal 1  -       Row Name 11/26/24 1600          Bed Mobility Goal 1 (PT)    Activity/Assistive Device (Bed Mobility Goal 1, PT) bed mobility activities, all  -CS     [place] Level/Cues Needed (Bed Mobility Goal 1, PT) modified independence  -CS     Time Frame (Bed Mobility Goal 1, PT) long term goal (LTG);30 days  -CS       Row Name 11/26/24 1600          Transfer Goal 1 (PT)    Activity/Assistive Device (Transfer Goal 1, PT) sit-to-stand/stand-to-sit;bed-to-chair/chair-to-bed  no AD  -CS     [place] Level/Cues Needed (Transfer Goal 1, PT) independent  -CS     Time Frame (Transfer Goal 1, PT) long term goal (LTG);30 days  -CS       Row Name 11/26/24 1600          Gait Training Goal 1 (PT)    Activity/Assistive Device (Gait Training Goal 1, PT) gait (walking locomotion);assistive device use;improve balance and speed;increase endurance/gait distance  -CS     [place] Level (Gait Training Goal 1, PT) independent  -CS     Distance (Gait Training Goal 1, PT) 400  -CS     Time Frame (Gait Training Goal 1,  PT) long term goal (LTG);30 days  -CS       Row Name 11/26/24 1600          Stairs Goal 1 (PT)    Activity/Assistive Device (Stairs Goal 1, PT) ascending stairs;descending stairs;using handrail, right  -CS     [place] Level/Cues Needed (Stairs Goal 1, PT) modified independence  -CS     Number of Stairs (Stairs Goal 1, PT) 4  -CS     Time Frame (Stairs Goal 1, PT) long term goal (LTG);30 days  -CS               User Key  (r) = Recorded By, (t) = Taken By, (c) = Cosigned By      Initials Name Provider Type    Melisa Cuevas, RN Registered Nurse    CS Tori Huggins, PT Physical Therapist    Carrie Cartwright RN Registered Nurse                  Section G        Balance  Balance during transitions & walking: Not steady, requires assist to steady  Moving from seated to standing position: Not steady, requires assist to steady  Walking: Not steady, requires assist to steady  Turning around while walking: Not steady, requires assist to steady  Moving on and off toilet: Not steady, requires assist to steady  Surface-to-surface transfer: Not steady, requires assist to steady  Mobility devices: Walker  Range of Motion  Lower Extremity: No impairment  Section GG  Functional Ability/Goals, Adm (Section GG)  Indoor Mobility - Ambulation, Prior Function (HR4477V): 3. Independent  Stairs, Prior Function (WY3198K): 3. Independent  Prior Device Use (FV4523): none of the above (Z)  Lower Extremity Range of Motion (YY7169A): No impairment     Mobility, Admission Performance (YG1525)  Roll Left & Right: Mobility Admission Performance (RS6577P1): supervision or touching assistance (04)  Sit to Lying: Mobility Admission Performance (TB7978M5): partial/moderate assistance (03)  Lying to Sitting, Side of Bed: Mobility Admission Performance (JZ1554Z8): partial/moderate assistance (03)  Sit to Stand: Mobility Admission Performance (FH5750M3): partial/moderate assistance (03)  Chair/Bed-Chair Transfer: Mobility Admission  Performance (OO7548H0): partial/moderate assistance (03)  Car Transfer: Mobility Admission Performance (SI6904T9): not attempted due to environmental limitations (10)  Walk 10 Feet: Mobility Admission Performance (KA6050W8): supervision or touching assistance (04)  Walk 50 Feet With Two Turns: Mobility Admission Performance (KL6112U0): supervision or touching assistance (04)  Walk 150 Feet: Mobility Admission Performance (QE8826M4): not attempted, medical condition/safety concern ()  Walk 10 Ft, Uneven Surfaces: Mobility Admission Performance (LN5408L4): not applicable (09)  1 Step/Curb: Mobility Admission Performance (AS8048P8): not attempted, medical condition/safety concern ()  4 Steps: Mobility Admission Performance (PF0740Q3): not attempted, medical condition/safety concern ()  12 Steps: Mobility Admission Performance (BZ1169Z9): not applicable ()  Picking up object: Mobility Admission Performance (XT9461W7): not attempted, medical condition/safety concern ()  Use a Wheelchair and/or Scooter: Mobility (JV9438N2): no (0)     RETIRED Mobility (GG), Discharge Goal (FN7455)  Use a Wheelchair and/or Scooter: Mobility (NV0171Z0): no (0)     Mobility, Discharge Performance (PU5499)  Use a Wheelchair and/or Scooter: Mobility (WV3078W5): no (0)  Physical Therapy Education       Title: PT OT SLP Therapies (Done)       Topic: Physical Therapy (Done)       Point: Mobility training (Done)       Learning Progress Summary            Patient Acceptance, E, VU by SC at 2024 152                      Point: Home exercise program (Done)       Learning Progress Summary            Patient Acceptance, E, VU by SC at 2024 152                      Point: Body mechanics (Done)       Learning Progress Summary            Patient Acceptance, E, VU by SC at 2024 152                      Point: Precautions (Done)       Learning Progress Summary            Patient Acceptance, E, VU by SC at 2024 152                                       User Key       Initials Effective Dates Name Provider Type Discipline    SC 02/05/24 -  Jeanette Lopez OT Occupational Therapist OT                  PT Recommendation and Plan  Anticipated Discharge Disposition (PT): home with home health, home with assist  Planned Therapy Interventions (PT): balance training, bed mobility training, gait training, transfer training, strengthening  Therapy Frequency (PT): 6 times/wk  Plan of Care Reviewed With: patient  Progress: no change  Outcome Evaluation: Pt presents with limitations that impede their ability to safely and independently transfer and ambulate. The skills of a therapist will be required to safely and effectively implement the following treatment plan to restore maximal level of function.   Outcome Measures       Row Name 11/26/24 5425             How much help from another person do you currently need...    Turning from your back to your side while in flat bed without using bedrails? 3  -CS      Moving from lying on back to sitting on the side of a flat bed without bedrails? 3  -CS      Moving to and from a bed to a chair (including a wheelchair)? 3  -CS      Standing up from a chair using your arms (e.g., wheelchair, bedside chair)? 3  -CS      Climbing 3-5 steps with a railing? 2  -CS      To walk in hospital room? 3  -CS      AM-PAC 6 Clicks Score (PT) 17  -CS         Functional Assessment    Outcome Measure Options AM-PAC 6 Clicks Basic Mobility (PT)  -CS                User Key  (r) = Recorded By, (t) = Taken By, (c) = Cosigned By      Initials Name Provider Type    CS Tori Huggins, PT Physical Therapist                     Time Calculation:    PT Charges       Row Name 11/26/24 7915             Time Calculation    PT Received On 11/26/24  -      PT Goal Re-Cert Due Date 12/25/24  -         Timed Charges    11867 - PT Therapeutic Exercise Minutes 8  -CS      01685 - Gait Training Minutes  12  -CS      71058 - PT  Therapeutic Activity Minutes 5  -CS         Untimed Charges    PT Eval/Re-eval Minutes 35  -CS         SNF Physical Therapy Minutes    Skilled Minutes- PT 25 min  -CS         Total Minutes    Timed Charges Total Minutes 25  -CS      Untimed Charges Total Minutes 35  -CS       Total Minutes 60  -CS                User Key  (r) = Recorded By, (t) = Taken By, (c) = Cosigned By      Initials Name Provider Type    CS Tori Huggins, PT Physical Therapist                  Therapy Charges for Today       Code Description Service Date Service Provider Modifiers Qty    96835364326 HC PT THER PROC EA 15 MIN 11/26/2024 Tori Huggins, PT GP 1    56050176198 HC GAIT TRAINING EA 15 MIN 11/26/2024 Tori Huggins, PT GP 1    54436263793 HC PT EVAL LOW COMPLEXITY 3 11/26/2024 Tori Huggins, PT GP 1            PT G-Codes  Outcome Measure Options: AM-PAC 6 Clicks Basic Mobility (PT)  AM-PAC 6 Clicks Score (PT): 17  AM-PAC 6 Clicks Score (OT): 21    Tori Huggins PT  11/26/2024

## 2024-11-27 LAB
ALBUMIN SERPL-MCNC: 3 G/DL (ref 3.5–5.2)
ANION GAP SERPL CALCULATED.3IONS-SCNC: 10.8 MMOL/L (ref 5–15)
BASOPHILS # BLD AUTO: 0.02 10*3/MM3 (ref 0–0.2)
BASOPHILS NFR BLD AUTO: 0.5 % (ref 0–1.5)
BUN SERPL-MCNC: 11 MG/DL (ref 8–23)
BUN/CREAT SERPL: 19.3 (ref 7–25)
CALCIUM SPEC-SCNC: 8 MG/DL (ref 8.6–10.5)
CHLORIDE SERPL-SCNC: 105 MMOL/L (ref 98–107)
CO2 SERPL-SCNC: 22.2 MMOL/L (ref 22–29)
CREAT SERPL-MCNC: 0.57 MG/DL (ref 0.57–1)
DEPRECATED RDW RBC AUTO: 52.1 FL (ref 37–54)
EGFRCR SERPLBLD CKD-EPI 2021: 92.6 ML/MIN/1.73
EOSINOPHIL # BLD AUTO: 0.01 10*3/MM3 (ref 0–0.4)
EOSINOPHIL NFR BLD AUTO: 0.3 % (ref 0.3–6.2)
ERYTHROCYTE [DISTWIDTH] IN BLOOD BY AUTOMATED COUNT: 16.9 % (ref 12.3–15.4)
GLUCOSE SERPL-MCNC: 121 MG/DL (ref 65–99)
HCT VFR BLD AUTO: 29.4 % (ref 34–46.6)
HGB BLD-MCNC: 9.4 G/DL (ref 12–15.9)
IMM GRANULOCYTES # BLD AUTO: 0.01 10*3/MM3 (ref 0–0.05)
IMM GRANULOCYTES NFR BLD AUTO: 0.3 % (ref 0–0.5)
LYMPHOCYTES # BLD AUTO: 0.82 10*3/MM3 (ref 0.7–3.1)
LYMPHOCYTES NFR BLD AUTO: 20.6 % (ref 19.6–45.3)
MCH RBC QN AUTO: 27.8 PG (ref 26.6–33)
MCHC RBC AUTO-ENTMCNC: 32 G/DL (ref 31.5–35.7)
MCV RBC AUTO: 87 FL (ref 79–97)
MONOCYTES # BLD AUTO: 0.27 10*3/MM3 (ref 0.1–0.9)
MONOCYTES NFR BLD AUTO: 6.8 % (ref 5–12)
NEUTROPHILS NFR BLD AUTO: 2.86 10*3/MM3 (ref 1.7–7)
NEUTROPHILS NFR BLD AUTO: 71.5 % (ref 42.7–76)
NRBC BLD AUTO-RTO: 0 /100 WBC (ref 0–0.2)
NT-PROBNP SERPL-MCNC: 1664 PG/ML (ref 0–1800)
PHOSPHATE SERPL-MCNC: 3.3 MG/DL (ref 2.5–4.5)
PLATELET # BLD AUTO: 129 10*3/MM3 (ref 140–450)
PMV BLD AUTO: 10.6 FL (ref 6–12)
POTASSIUM SERPL-SCNC: 2.9 MMOL/L (ref 3.5–5.2)
RBC # BLD AUTO: 3.38 10*6/MM3 (ref 3.77–5.28)
SODIUM SERPL-SCNC: 138 MMOL/L (ref 136–145)
WBC NRBC COR # BLD AUTO: 3.99 10*3/MM3 (ref 3.4–10.8)

## 2024-11-27 PROCEDURE — 83880 ASSAY OF NATRIURETIC PEPTIDE: CPT | Performed by: PHYSICIAN ASSISTANT

## 2024-11-27 PROCEDURE — 94799 UNLISTED PULMONARY SVC/PX: CPT

## 2024-11-27 PROCEDURE — 97116 GAIT TRAINING THERAPY: CPT

## 2024-11-27 PROCEDURE — 97530 THERAPEUTIC ACTIVITIES: CPT

## 2024-11-27 PROCEDURE — 80069 RENAL FUNCTION PANEL: CPT | Performed by: PHYSICIAN ASSISTANT

## 2024-11-27 PROCEDURE — 97535 SELF CARE MNGMENT TRAINING: CPT

## 2024-11-27 PROCEDURE — 97110 THERAPEUTIC EXERCISES: CPT

## 2024-11-27 PROCEDURE — 94664 DEMO&/EVAL PT USE INHALER: CPT

## 2024-11-27 PROCEDURE — 25010000002 ENOXAPARIN PER 10 MG: Performed by: STUDENT IN AN ORGANIZED HEALTH CARE EDUCATION/TRAINING PROGRAM

## 2024-11-27 PROCEDURE — 85025 COMPLETE CBC W/AUTO DIFF WBC: CPT | Performed by: PHYSICIAN ASSISTANT

## 2024-11-27 PROCEDURE — 94760 N-INVAS EAR/PLS OXIMETRY 1: CPT

## 2024-11-27 RX ORDER — POTASSIUM CHLORIDE 750 MG/1
40 CAPSULE, EXTENDED RELEASE ORAL 2 TIMES DAILY
Status: DISCONTINUED | OUTPATIENT
Start: 2024-11-27 | End: 2024-11-29 | Stop reason: HOSPADM

## 2024-11-27 RX ORDER — LOPERAMIDE HYDROCHLORIDE 2 MG/1
2 CAPSULE ORAL 4 TIMES DAILY PRN
Status: DISCONTINUED | OUTPATIENT
Start: 2024-11-27 | End: 2024-11-29 | Stop reason: HOSPADM

## 2024-11-27 RX ADMIN — ENOXAPARIN SODIUM 30 MG: 100 INJECTION SUBCUTANEOUS at 10:06

## 2024-11-27 RX ADMIN — CARVEDILOL 6.25 MG: 6.25 TABLET, FILM COATED ORAL at 08:37

## 2024-11-27 RX ADMIN — ATORVASTATIN CALCIUM 80 MG: 40 TABLET, FILM COATED ORAL at 20:19

## 2024-11-27 RX ADMIN — ARFORMOTEROL TARTRATE 15 MCG: 15 SOLUTION RESPIRATORY (INHALATION) at 19:13

## 2024-11-27 RX ADMIN — CARVEDILOL 6.25 MG: 6.25 TABLET, FILM COATED ORAL at 17:40

## 2024-11-27 RX ADMIN — TRAZODONE HYDROCHLORIDE 50 MG: 50 TABLET ORAL at 20:19

## 2024-11-27 RX ADMIN — CETIRIZINE HYDROCHLORIDE 10 MG: 10 TABLET, FILM COATED ORAL at 10:07

## 2024-11-27 RX ADMIN — PANTOPRAZOLE SODIUM 40 MG: 40 TABLET, DELAYED RELEASE ORAL at 06:33

## 2024-11-27 RX ADMIN — SERTRALINE HYDROCHLORIDE 50 MG: 50 TABLET ORAL at 10:06

## 2024-11-27 RX ADMIN — ASPIRIN 81 MG: 81 TABLET, COATED ORAL at 10:07

## 2024-11-27 RX ADMIN — CILOSTAZOL 100 MG: 100 TABLET ORAL at 10:07

## 2024-11-27 RX ADMIN — POTASSIUM CHLORIDE 40 MEQ: 750 CAPSULE, EXTENDED RELEASE ORAL at 20:19

## 2024-11-27 RX ADMIN — BUDESONIDE 0.5 MG: 0.5 INHALANT RESPIRATORY (INHALATION) at 19:13

## 2024-11-27 RX ADMIN — CILOSTAZOL 100 MG: 100 TABLET ORAL at 20:19

## 2024-11-27 RX ADMIN — ARFORMOTEROL TARTRATE 15 MCG: 15 SOLUTION RESPIRATORY (INHALATION) at 08:35

## 2024-11-27 RX ADMIN — BUDESONIDE 0.5 MG: 0.5 INHALANT RESPIRATORY (INHALATION) at 08:35

## 2024-11-27 NOTE — THERAPY TREATMENT NOTE
SNF - Occupational Therapy Treatment Note  [facility]    Patient Name: Nilda Julien  : 1945    MRN: [id number]                              Today's Date: 2024       Admit Date: 2024    Visit Dx:     ICD-10-CM ICD-9-CM   1. Difficulty walking  R26.2 719.7     Patient Active Problem List   Diagnosis    Hyperlipidemia LDL goal <70    Essential hypertension    Gastroesophageal reflux disease without esophagitis    Acquired hypothyroidism    Allergic rhinitis    Aortic stenosis, mild    Arthritis    CAD s/p CABG    Diverticulitis    Fatigue    Hemorrhoids    Irritable bowel syndrome with diarrhea    Microhematuria    Nephrolithiasis    Smoker    Chronic heart failure with preserved ejection fraction (HFpEF)    Age-related osteoporosis without current pathological fracture    Encounter for screening for malignant neoplasm of colon    History of colon polyps    Anxiety    Rest pain of both lower extremities due to atherosclerosis    Ischemic rest pain of lower extremity    Severe protein-calorie malnutrition    Abnormal chest x-ray    Atelectasis     Past Medical History:   Diagnosis Date    AAA (abdominal aortic aneurysm)     INFRA RENAL, 3 CM LAST CT ABDOMEN    Allergic rhinitis     Seasonal allergies    Anxiety 2024    Aortic stenosis, mild 10/18/2021    Arthritis     CAD s/p CABG 10/18/2021    CHF (congestive heart failure)     Chronic heart failure with preserved ejection fraction (HFpEF) 2021    Claudication of both lower extremities     COPD (chronic obstructive pulmonary disease)     Diverticulitis     Elevated cholesterol     Essential hypertension 2021    Fatigue     GERD (gastroesophageal reflux disease)     Heart attack 10/18/2021    Hemorrhoids 2013    Hyperlipidemia LDL goal <70 2021    Irritable bowel syndrome with diarrhea     Memory loss     forgetfulness    Microhematuria 2019    Nephrolithiasis 2019    Smoker 2021     Past Surgical History:    Procedure Laterality Date    AXILLARY BIFEMORAL BYPASS Bilateral 11/19/2024    Procedure: AXILLO-BIFEMORAL BYPASS GRAFT;  Surgeon: Selvin Vásquez MD;  Location: [facility] MAIN OR;  Service: Vascular;  Laterality: Bilateral;    CARDIAC SURGERY  04/19/2013    4 way bypass    COLONOSCOPY  05/23/2016, 2019    COLONOSCOPY N/A 2/22/2024    Procedure: COLONOSCOPY WITH HOT SNARE POLYPECTOMIES;  Surgeon: Can Pearce MD;  Location: [facility] ENDOSCOPY;  Service: Gastroenterology;  Laterality: N/A;  COLON POLYPS, DIVERTICULOSIS    CORONARY ARTERY BYPASS GRAFT  2013    ENDOSCOPY  2019    HERNIA REPAIR      OTHER SURGICAL HISTORY  2001    cardiac stents, 2 stents placed    VERTEBROPLASTY N/A 4/1/2022    Procedure: VERTEBROPLASTY, THORACIC 11;  Surgeon: Redd Cisneros MD;  Location: [facility] MAIN OR;  Service: Neurosurgery;  Laterality: N/A;      General Information       Row Name 11/27/24 1034          OT Time and Intention    Document Type therapy note (daily note)  -EG     Mode of Treatment individual therapy;occupational therapy  -EG     Patient Effort excellent  -EG       Row Name 11/27/24 1034          General Information    Barriers to Rehab none identified  -EG       Row Name 11/27/24 1034          Living Environment    People in Home alone  -EG       Row Name 11/27/24 1034          Cognition    Orientation Status (Cognition) oriented x 3  -EG       Row Name 11/27/24 1034          Safety Issues/Impairments Affecting Functional Mobility    Impairments Affecting Function (Mobility) balance;endurance/activity tolerance;strength;shortness of breath  -EG               User Key  (r) = Recorded By, (t) = Taken By, (c) = Cosigned By      Initials Name Provider Type    EG Sabra Escamilla OT Occupational Therapist                     Mobility/ADL's       Row Name 11/27/24 1034          Bed Mobility    Bed Mobility bed mobility (all) activities  -EG     All Activities, [place] (Bed Mobility) modified independence  -EG      Assistive Device (Bed Mobility) bed rails  -EG       Row Name 11/27/24 1034          Transfers    Transfers sit-stand transfer;stand-sit transfer;bed-chair transfer;toilet transfer  -EG       Row Name 11/27/24 1034          Bed-Chair Transfer    Bed-Chair [place] (Transfers) independent  -EG     Comment, (Bed-Chair Transfer) no AD  -EG       Row Name 11/27/24 1034          Sit-Stand Transfer    Sit-Stand [place] (Transfers) independent  -EG     Comment, (Sit-Stand Transfer) no AD  -EG       Row Name 11/27/24 1034          Stand-Sit Transfer    Stand-Sit [place] (Transfers) independent  -EG     Comment, (Stand-Sit Transfer) no AD  -EG       Row Name 11/27/24 1034          Toilet Transfer    [place] Level (Toilet Transfer) modified independence  -EG     Assistive Device (Toilet Transfer) commode, 3-in-1;grab bars/safety frame  -EG     Comment, (Toilet Transfer) no AD but does use AE/DME in bathroom of facility  -EG       [facility] Name 11/27/24 1034          Functional Mobility    Functional Mobility- Ind. Level supervision required  -EG     Functional Mobility- Comment Patient able to perform functional mobility to and from shower room; to and from bathroom in room and around gym without the use of an AD no LOB; good activity pacing and safety noted  -EG       [facility] Name 11/27/24 1034          Activities of Daily Living    BADL Assessment/Intervention bathing;upper body dressing;lower body dressing;grooming;toileting  -EG       Row Name 11/27/24 1034          Mobility    Extremity Weight-bearing Status right lower extremity  -EG     Right Lower Extremity (Weight-bearing Status) weight-bearing as tolerated (WBAT)  -EG       Row Name 11/27/24 1034          Bathing Assessment/Intervention    [place] Level (Bathing) bathing skills;set up;supervision  -EG     Assistive Devices (Bathing) grab bar, tub/shower;hand-held shower spray hose  -EG     Comment, (Bathing) Patient declines use of bench; stands  entire time in the shower; no LOB and good safety noted  -EG       [facility] Name 11/27/24 1034          Upper Body Dressing Assessment/Training    [place] Level (Upper Body Dressing) upper body dressing skills;set up;pull-over garment  -EG       Row Name 11/27/24 1034          Lower Body Dressing Assessment/Training    [place] Level (Lower Body Dressing) lower body dressing skills;don;pants/bottoms;shoes/slippers;socks;set up  -EG       Row Name 11/27/24 1034          Grooming Assessment/Training    [place] Level (Grooming) grooming skills;set up;hair care, combing/brushing;wash face, hands  -EG       [facility] Name 11/27/24 1034          Toileting Assessment/Training    [place] Level (Toileting) toileting skills;independent  -EG     Assistive Devices (Toileting) grab bar/safety frame;commode chair  -EG               User Key  (r) = Recorded By, (t) = Taken By, (c) = Cosigned By      Initials Name Provider Type    EG Sabra Escamilla OT Occupational Therapist                   Obj/Interventions       Row Name 11/27/24 1037          Sensory Assessment (Somatosensory)    Sensory Assessment (Somatosensory) sensation intact  -EG       Row Name 11/27/24 1037          Vision Assessment/Intervention    Visual Impairment/Limitations corrective lenses full-time  -EG       Row Name 11/27/24 1037          Motor Skills    Motor Skills functional endurance  -EG     Functional Endurance fair  -EG     Therapeutic Exercise aerobic  -EG       Row Name 11/27/24 1037          Balance    Balance Interventions standing;sit to stand;supported;static;dynamic;weight shifting activity;occupation based/functional task  -EG       Row Name 11/27/24 1037          Aerobic Exercise    Type (Aerobic Exercise) arm bike  -EG     Comment, Aerobic Exercise (Therapeutic Exercise) 15:00 cardiac interval setting no rest break required  -EG               User Key  (r) = Recorded By, (t) = Taken By, (c) = Cosigned By      Initials Name Provider  Type    EG Sabra Escamilla OT Occupational Therapist                   Goals/Plan    No documentation.                  Clinical Impression       Row Name 11/27/24 1038          Pain Assessment    Pretreatment Pain Rating 0/10 - no pain  -EG     Posttreatment Pain Rating 0/10 - no pain  -EG       Row Name 11/27/24 1038          Plan of Care Review    Plan of Care Reviewed With patient  -EG     Progress improving  -EG     Outcome Evaluation Patient is pleasant and cooperative; no reports of pain only soreness when gait belt is donned on R rib cage; Patient demonstrates ability to safely perform/particiapte in all ADL tasks this date Set-up/SUP assist; No RW used for mobility this date; OT services will continue POC; Wanting to discharge home friday morning with family if able.  -EG       Row Name 11/27/24 1038          Therapy Assessment/Plan (OT)    Rehab Potential (OT) good  -EG     Criteria for Skilled Therapeutic Interventions Met (OT) yes;meets criteria;skilled treatment is necessary  -EG     Therapy Frequency (OT) 5 times/wk  -EG       Row Name 11/27/24 1038          Therapy Plan Review/Discharge Plan (OT)    Anticipated Discharge Disposition (OT) home with home health  -EG       Row Name 11/27/24 1038          Positioning and Restraints    Pre-Treatment Position in bed  -EG     Post Treatment Position bed  -EG     In Bed sitting EOB;encouraged to call for assist;call light within reach  -EG               User Key  (r) = Recorded By, (t) = Taken By, (c) = Cosigned By      Initials Name Provider Type    EG Sabra Escamilla OT Occupational Therapist                   Outcome Measures       Row Name 11/27/24 1039          How much help from another is currently needed...    Putting on and taking off regular lower body clothing? 4  -EG     Bathing (including washing, rinsing, and drying) 3  -EG     Toileting (which includes using toilet bed pan or urinal) 4  -EG     Taking care of personal grooming (such as  brushing teeth) 4  -EG     Eating meals 4  -EG       Row Name 11/27/24 1039          Functional Assessment    Outcome Measure Options AM-PAC 6 Clicks Daily Activity (OT);Optimal Instrument  -EG       Row Name 11/27/24 1039          Optimal Instrument    Optimal Instrument Optimal - 3  -EG     Bending/Stooping 1  -EG     Standing 1  -EG     Reaching 1  -EG               User Key  (r) = Recorded By, (t) = Taken By, (c) = Cosigned By      Initials Name Provider Type    EG Sabra Escamilla OT Occupational Therapist                  Section G              Section GG                         Occupational Therapy Education       Title: PT OT SLP Therapies (Done)       Topic: Occupational Therapy (Done)       Point: ADL training (Done)       Description:   Instruct learner(s) on proper safety adaptation and remediation techniques during self care or transfers.   Instruct in proper use of assistive devices.                  Learning Progress Summary            Patient Acceptance, E, VU by SC at 11/26/2024 1529                      Point: Home exercise program (Done)       Description:   Instruct learner(s) on appropriate technique for monitoring, assisting and/or progressing therapeutic exercises/activities.                  Learning Progress Summary            Patient Acceptance, E, VU by SC at 11/26/2024 1529                      Point: Precautions (Done)       Description:   Instruct learner(s) on prescribed precautions during self-care and functional transfers.                  Learning Progress Summary            Patient Acceptance, E, VU by SC at 11/26/2024 1529                      Point: Body mechanics (Done)       Description:   Instruct learner(s) on proper positioning and spine alignment during self-care, functional mobility activities and/or exercises.                  Learning Progress Summary            Patient Acceptance, E, VU by SC at 11/26/2024 1529                                      User Key        Initials Effective Dates Name Provider Type Discipline    SC 02/05/24 -  Jeanette Lopez OT Occupational Therapist OT                  OT Recommendation and Plan  Therapy Frequency (OT): 5 times/wk  Plan of Care Review  Plan of Care Reviewed With: patient  Progress: improving  Outcome Evaluation: Patient is pleasant and cooperative; no reports of pain only soreness when gait belt is donned on R rib cage; Patient demonstrates ability to safely perform/particiapte in all ADL tasks this date Set-up/SUP assist; No RW used for mobility this date; OT services will continue POC; Wanting to discharge home friday morning with family if able.     Time Calculation:         Time Calculation- OT       Row Name 11/27/24 1040             Time Calculation- OT    OT Received On 11/27/24  -EG      OT Goal Re-Cert Due Date 12/26/24  -EG         Timed Charges    95471 - OT Therapeutic Exercise Minutes 15  -EG      18657 - OT Therapeutic Activity Minutes 11  -EG      45333 - OT Self Care/Mgmt Minutes 28  -EG         SNF Occupational Therapy Minutes    Skilled Minutes- OT 54 min  -EG         Total Minutes    Timed Charges Total Minutes 54  -EG       Total Minutes 54  -EG                User Key  (r) = Recorded By, (t) = Taken By, (c) = Cosigned By      Initials Name Provider Type    EG Sabra Escamilla OT Occupational Therapist                  Therapy Charges for Today       Code Description Service Date Service Provider Modifiers Qty    65995560136 HC OT THER PROC EA 15 MIN 11/27/2024 Sabra Escamilla OT GO 1    25377856706 HC OT THERAPEUTIC ACT EA 15 MIN 11/27/2024 Sabra Escamilla OT GO 1    78004082885 HC OT SELF CARE/MGMT/TRAIN EA 15 MIN 11/27/2024 Sabra Escamilla OT GO 2                 Sabra Escamilla OT  11/27/2024

## 2024-11-27 NOTE — THERAPY TREATMENT NOTE
SNF - Physical Therapy Treatment Note   Asa     Patient Name: Nilda Julien  : 1945  MRN: [id number]  Today's Date: 2024      Visit Dx:    ICD-10-CM ICD-9-CM   1. Difficulty walking  R26.2 719.7     Patient Active Problem List   Diagnosis    Hyperlipidemia LDL goal <70    Essential hypertension    Gastroesophageal reflux disease without esophagitis    Acquired hypothyroidism    Allergic rhinitis    Aortic stenosis, mild    Arthritis    CAD s/p CABG    Diverticulitis    Fatigue    Hemorrhoids    Irritable bowel syndrome with diarrhea    Microhematuria    Nephrolithiasis    Smoker    Chronic heart failure with preserved ejection fraction (HFpEF)    Age-related osteoporosis without current pathological fracture    Encounter for screening for malignant neoplasm of colon    History of colon polyps    Anxiety    Rest pain of both lower extremities due to atherosclerosis    Ischemic rest pain of lower extremity    Severe protein-calorie malnutrition    Abnormal chest x-ray    Atelectasis     Past Medical History:   Diagnosis Date    AAA (abdominal aortic aneurysm)     INFRA RENAL, 3 CM LAST CT ABDOMEN    Allergic rhinitis     Seasonal allergies    Anxiety 2024    Aortic stenosis, mild 10/18/2021    Arthritis     CAD s/p CABG 10/18/2021    CHF (congestive heart failure)     Chronic heart failure with preserved ejection fraction (HFpEF) 2021    Claudication of both lower extremities     COPD (chronic obstructive pulmonary disease)     Diverticulitis     Elevated cholesterol     Essential hypertension 2021    Fatigue     GERD (gastroesophageal reflux disease)     Heart attack 10/18/2021    Hemorrhoids     Hyperlipidemia LDL goal <70 2021    Irritable bowel syndrome with diarrhea     Memory loss     forgetfulness    Microhematuria 2019    Nephrolithiasis 2019    Smoker 2021     Past Surgical History:   Procedure Laterality Date    AXILLARY BIFEMORAL BYPASS  Bilateral 11/19/2024    Procedure: AXILLO-BIFEMORAL BYPASS GRAFT;  Surgeon: Selvin Vásquez MD;  Location: [facility] MAIN OR;  Service: Vascular;  Laterality: Bilateral;    CARDIAC SURGERY  04/19/2013    4 way bypass    COLONOSCOPY  05/23/2016, 2019    COLONOSCOPY N/A 2/22/2024    Procedure: COLONOSCOPY WITH HOT SNARE POLYPECTOMIES;  Surgeon: Can Pearce MD;  Location: [facility] ENDOSCOPY;  Service: Gastroenterology;  Laterality: N/A;  COLON POLYPS, DIVERTICULOSIS    CORONARY ARTERY BYPASS GRAFT  2013    ENDOSCOPY  2019    HERNIA REPAIR      OTHER SURGICAL HISTORY  2001    cardiac stents, 2 stents placed    VERTEBROPLASTY N/A 4/1/2022    Procedure: VERTEBROPLASTY, THORACIC 11;  Surgeon: Redd Cisneros MD;  Location: [facility] MAIN OR;  Service: Neurosurgery;  Laterality: N/A;       PT Assessment (Last 12 Hours)       PT Evaluation and Treatment       Row Name 11/27/24 1600          Physical Therapy Time and Intention    Subjective Information no complaints  -CS     Document Type therapy note (daily note)  -CS     Mode of Treatment individual therapy;physical therapy  -CS     Patient Effort excellent  -CS     Symptoms Noted During/After Treatment none  -CS       Row Name 11/27/24 1600          Cognition    Orientation Status (Cognition) oriented x 3  -CS       Row Name 11/27/24 1600          Bed Mobility    Bed Mobility bed mobility (all) activities  -CS     All Activities, [place] (Bed Mobility) modified independence  -CS     Assistive Device (Bed Mobility) bed rails  -CS       Row Name 11/27/24 1600          Transfers    Transfers sit-stand transfer;stand-sit transfer  -CS       Row Name 11/27/24 1600          Sit-Stand Transfer    Sit-Stand [place] (Transfers) independent  -CS     Assistive Device (Sit-Stand Transfers) other (see comments)  no AD  -CS       Row Name 11/27/24 1600          Stand-Sit Transfer    Stand-Sit [place] (Transfers) independent  -CS     Comment, (Stand-Sit Transfer) no AD   -       Row Name 11/27/24 1600          Toilet Transfer    Type (Toilet Transfer) sit-stand;stand-sit  -CS     [place] Level (Toilet Transfer) modified independence  -CS     Assistive Device (Toilet Transfer) commode, 3-in-1;grab bars/safety frame  -       Row Name 11/27/24 1600          Gait/Stairs (Locomotion)    Gait/Stairs Locomotion gait/ambulation assistive device  -     [place] Level (Gait) supervision  -CS     Assistive Device (Gait) other (see comments)  no AD  -CS     Patient was able to Ambulate yes  -CS     Distance in Feet (Gait) 250  x2  -CS     Pattern (Gait) step-through  -CS     Comment, (Gait/Stairs) Pt able to ambulate independently in the room short distances up to ' without assistive device.  -       Row Name 11/27/24 1600          Safety Issues/Impairments Affecting Functional Mobility    Impairments Affecting Function (Mobility) endurance/activity tolerance;strength  -       Row Name 11/27/24 1600          Balance    Balance Interventions static;dynamic;sit to stand;dynamic reaching;single limb stance;tandem standing;weight shifting activity;UE activity with balance activity;combined head and eye movements  -       Row Name 11/27/24 1600          Motor Skills    Therapeutic Exercise hip;knee;ankle;aerobic  -       Row Name 11/27/24 1600          Hip (Therapeutic Exercise)    Hip (Therapeutic Exercise) strengthening exercise  -     Hip Strengthening (Therapeutic Exercise) bilateral;mini squats;marching while standing;marching while seated;aBduction;aDduction;step ups  -       Row Name 11/27/24 1600          Aerobic Exercise    Type (Aerobic Exercise) other (see comments)  NuStep  -     Time Performed (Aerobic Exercise) x12 minutes at workload 3  -CS       Row Name             Wound 11/19/24 0814 Bilateral groin    Wound - Properties Group Placement Date: 11/19/24  -KK Placement Time: 0814 -KK Side: Bilateral  -KK Location: groin  -KK Primary Wound Type:  Incision  -KK Present on Original Admission: N  -KK    Retired Wound - Properties Group Placement Date: 11/19/24 -KK Placement Time: 0814  -KK Present on Original Admission: N  -KK Side: Bilateral  -KK Location: groin  -KK Primary Wound Type: Incision  -KK    Retired Wound - Properties Group Placement Date: 11/19/24 -KK Placement Time: 0814  -KK Present on Original Admission: N  -KK Side: Bilateral  -KK Location: groin  -KK Primary Wound Type: Incision  -KK    Retired Wound - Properties Group Date first assessed: 11/19/24 -KK Time first assessed: 0814  -KK Present on Original Admission: N  -KK Side: Bilateral  -KK Location: groin  -KK Primary Wound Type: Incision  -KK      Row Name             Wound 11/19/24 0814 Right axilla    Wound - Properties Group Placement Date: 11/19/24 -KK Placement Time: 0814 -KK Side: Right  -KK Location: axilla  -KK Primary Wound Type: Incision  -KK Present on Original Admission: N  -KK    Retired Wound - Properties Group Placement Date: 11/19/24 -KK Placement Time: 0814  -KK Present on Original Admission: N  -KK Side: Right  -KK Location: axilla  -KK Primary Wound Type: Incision  -KK    Retired Wound - Properties Group Placement Date: 11/19/24 -KK Placement Time: 0814  -KK Present on Original Admission: N  -KK Side: Right  -KK Location: axilla  -KK Primary Wound Type: Incision  -KK    Retired Wound - Properties Group Date first assessed: 11/19/24 -KK Time first assessed: 0814  -KK Present on Original Admission: N  -KK Side: Right  -KK Location: axilla  -KK Primary Wound Type: Incision  -KK      Row Name 11/27/24 1600          Plan of Care Review    Plan of Care Reviewed With patient  -CS     Progress improving  -CS     Outcome Evaluation Pt made independent in her room today and planned discharge is for Friday. Patient demonstrated great improvement in mobility, balance and safety today. No loss of balance with ambulation using no AD  -CS       Row Name 11/27/24 1600           Positioning and Restraints    Pre-Treatment Position in bed  -CS     Post Treatment Position bed  -CS     In Bed sitting EOB;encouraged to call for assist;call light within reach  -CS       Row Name 11/27/24 1600          Progress Summary (PT)    Progress Toward Functional Goals (PT) progress toward functional goals is good  -CS               User Key  (r) = Recorded By, (t) = Taken By, (c) = Cosigned By      Initials Name Provider Type    CS Tori Huggins, PT Physical Therapist    Carrie Cartwright RN Registered Nurse                  Section G        Balance  Balance during transitions & walking: Not steady, requires assist to steady  Moving from seated to standing position: Not steady, requires assist to steady  Walking: Not steady, requires assist to steady  Turning around while walking: Not steady, requires assist to steady  Moving on and off toilet: Not steady, requires assist to steady  Surface-to-surface transfer: Not steady, requires assist to steady  Mobility devices: Walker  Range of Motion  Lower Extremity: No impairment  Section GG  Functional Ability/Goals, Adm (Section GG)  Indoor Mobility - Ambulation, Prior Function (DO6750S): 3. Independent  Stairs, Prior Function (YJ3134V): 3. Independent  Prior Device Use (RG4625): none of the above (Z)  Lower Extremity Range of Motion (PM8811C): No impairment     Mobility, Admission Performance (WU0632)  Roll Left & Right: Mobility Admission Performance (KE2769A3): supervision or touching assistance (04)  Sit to Lying: Mobility Admission Performance (TE8472G9): partial/moderate assistance (03)  Lying to Sitting, Side of Bed: Mobility Admission Performance (LA6750M3): partial/moderate assistance (03)  Sit to Stand: Mobility Admission Performance (OT5420T7): partial/moderate assistance (03)  Chair/Bed-Chair Transfer: Mobility Admission Performance (BD0653R6): partial/moderate assistance (03)  Car Transfer: Mobility Admission Performance (XY0363L6): not  attempted due to environmental limitations (10)  Walk 10 Feet: Mobility Admission Performance (YY5646W4): supervision or touching assistance (04)  Walk 50 Feet With Two Turns: Mobility Admission Performance (EX3770L4): supervision or touching assistance ()  Walk 150 Feet: Mobility Admission Performance (HV9292J7): not attempted, medical condition/safety concern ()  Walk 10 Ft, Uneven Surfaces: Mobility Admission Performance (VA5982X5): not applicable ()  1 Step/Curb: Mobility Admission Performance (EP9439S6): not attempted, medical condition/safety concern ()  4 Steps: Mobility Admission Performance (YC6891W8): not attempted, medical condition/safety concern ()  12 Steps: Mobility Admission Performance (XU0760M5): not applicable ()  Picking up object: Mobility Admission Performance (LN0527N9): not attempted, medical condition/safety concern ()  Use a Wheelchair and/or Scooter: Mobility (BT7189P3): no (0)     RETIRED Mobility (GG), Discharge Goal (TO2333)  Use a Wheelchair and/or Scooter: Mobility (UN7138T5): no (0)     Mobility, Discharge Performance (RE9857)  Use a Wheelchair and/or Scooter: Mobility (RL1635V5): no (0)  Physical Therapy Education       Title: PT OT SLP Therapies (Done)       Topic: Physical Therapy (Done)       Point: Mobility training (Done)       Learning Progress Summary            Patient Acceptance, E, VU by SC at 2024 152                      Point: Home exercise program (Done)       Learning Progress Summary            Patient Acceptance, E, VU by SC at 2024 152                      Point: Body mechanics (Done)       Learning Progress Summary            Patient Acceptance, E, VU by SC at 2024 152                      Point: Precautions (Done)       Learning Progress Summary            Patient Acceptance, E, VU by SC at 2024                                      User Key       Initials Effective Dates Name Provider Type Discipline    SC 24 -   Jeanette Lopez, OT Occupational Therapist OT                  PT Recommendation and Plan  Anticipated Discharge Disposition (PT): home with home health, home with assist  Planned Therapy Interventions (PT): balance training, bed mobility training, gait training, transfer training, strengthening  Therapy Frequency (PT): 6 times/wk  Progress Summary (PT)  Progress Toward Functional Goals (PT): progress toward functional goals is good  Plan of Care Reviewed With: patient  Progress: improving  Outcome Evaluation: Pt made independent in her room today and planned discharge is for Friday. Patient demonstrated great improvement in mobility, balance and safety today. No loss of balance with ambulation using no AD   Outcome Measures       Row Name 11/27/24 1600 11/26/24 6353          How much help from another person do you currently need...    Turning from your back to your side while in flat bed without using bedrails? 4 (P)   -CS 3  -CS     Moving from lying on back to sitting on the side of a flat bed without bedrails? 4 (P)   -CS 3  -CS     Moving to and from a bed to a chair (including a wheelchair)? 4 (P)   -CS 3  -CS     Standing up from a chair using your arms (e.g., wheelchair, bedside chair)? 4 (P)   -CS 3  -CS     Climbing 3-5 steps with a railing? 3 (P)   -CS 2  -CS     To walk in hospital room? 3 (P)   -CS 3  -CS     AM-PAC 6 Clicks Score (PT) 22 (P)   -CS 17  -CS        Functional Assessment    Outcome Measure Options AM-PAC 6 Clicks Basic Mobility (PT) (P)   -CS AM-PAC 6 Clicks Basic Mobility (PT)  -CS               User Key  (r) = Recorded By, (t) = Taken By, (c) = Cosigned By      Initials Name Provider Type    CS Tori Huggins, PT Physical Therapist                     Time Calculation:    PT Charges       Row Name 11/27/24 2794             Time Calculation    PT Received On 11/27/24  -CS         Timed Charges    52817 - PT Therapeutic Exercise Minutes 15  -CS      59085 - Gait Training Minutes  12  -CS       22350 - PT Therapeutic Activity Minutes 15  -CS         SNF Physical Therapy Minutes    Skilled Minutes- PT 42 min  -CS         Total Minutes    Timed Charges Total Minutes 42  -CS       Total Minutes 42  -CS                User Key  (r) = Recorded By, (t) = Taken By, (c) = Cosigned By      Initials Name Provider Type    CS Tori Huggins, PT Physical Therapist                  Therapy Charges for Today       Code Description Service Date Service Provider Modifiers Qty    50762173908 HC PT THER PROC EA 15 MIN 11/26/2024 Tori Huggins, PT GP 1    47772923644 HC GAIT TRAINING EA 15 MIN 11/26/2024 Tori Huggins, PT GP 1    05398896608 HC PT EVAL LOW COMPLEXITY 3 11/26/2024 Tori Huggins, PT GP 1    24736316451 HC PT THER PROC EA 15 MIN 11/27/2024 Tori Huggins, PT GP 1    27827746291 HC GAIT TRAINING EA 15 MIN 11/27/2024 Tori Huggins, PT GP 1    94092254560 HC PT THERAPEUTIC ACT EA 15 MIN 11/27/2024 Tori Huggins, PT GP 1            PT G-Codes  Outcome Measure Options: (P) AM-PAC 6 Clicks Basic Mobility (PT)  AM-PAC 6 Clicks Score (PT): (P) 22  AM-PAC 6 Clicks Score (OT): 21    Tori Huggins, PT  11/27/2024

## 2024-11-27 NOTE — PLAN OF CARE
Goal Outcome Evaluation:              Outcome Evaluation: RSD is AAOX4, makes needs known to staff, up to bathroom with walker and  standby assist  of one .  No complaints of pain. R groin incision dry intact , L groin insicion with gauze dressing intact.call light and personal items withn reach at bedside. Continue POC.

## 2024-11-27 NOTE — PLAN OF CARE
Goal Outcome Evaluation:  Plan of Care Reviewed With: patient        Progress: improving  Outcome Evaluation: Patient is alert and oriented x4. Denied pain this shift but stated lower abdomen is tender to palpation. Dr. Vásquez contacted concerning wound vac and order received to remove wound vac from bilateral lower abdominal sites, paint sites with betadine and leave BASHIR unless drainage present, in which case to cover site with sterila gauze. Wound vac removed. Staples on bilateral lower abdomen are intact with no sign of infection present. Left lower abdominal site had tiny amount of serosanguenous drainage so that side was covered with gauze and tegaderm. Patient tolerated well and stated was very glad to have that removed. Voices needs. Con't current POC.

## 2024-11-27 NOTE — PLAN OF CARE
Goal Outcome Evaluation:  Plan of Care Reviewed With: patient        Progress: improving  Outcome Evaluation: Alert and oriented and pleasant with staff. Independant  for transfers and ambulation. No c/o pain requiring prn Pain medication noted this shift. Pt is hard of hearing but does read lips. Sitting up on bed, call light in reach.

## 2024-11-28 LAB
ANION GAP SERPL CALCULATED.3IONS-SCNC: 7.1 MMOL/L (ref 5–15)
BASOPHILS # BLD AUTO: 0.02 10*3/MM3 (ref 0–0.2)
BASOPHILS NFR BLD AUTO: 0.5 % (ref 0–1.5)
BUN SERPL-MCNC: 11 MG/DL (ref 8–23)
BUN/CREAT SERPL: 18.3 (ref 7–25)
CALCIUM SPEC-SCNC: 8.4 MG/DL (ref 8.6–10.5)
CHLORIDE SERPL-SCNC: 108 MMOL/L (ref 98–107)
CO2 SERPL-SCNC: 23.9 MMOL/L (ref 22–29)
CREAT SERPL-MCNC: 0.6 MG/DL (ref 0.57–1)
DEPRECATED RDW RBC AUTO: 53.7 FL (ref 37–54)
EGFRCR SERPLBLD CKD-EPI 2021: 91.4 ML/MIN/1.73
EOSINOPHIL # BLD AUTO: 0.01 10*3/MM3 (ref 0–0.4)
EOSINOPHIL NFR BLD AUTO: 0.2 % (ref 0.3–6.2)
ERYTHROCYTE [DISTWIDTH] IN BLOOD BY AUTOMATED COUNT: 17.2 % (ref 12.3–15.4)
GLUCOSE SERPL-MCNC: 96 MG/DL (ref 65–99)
HCT VFR BLD AUTO: 29.1 % (ref 34–46.6)
HGB BLD-MCNC: 9.2 G/DL (ref 12–15.9)
IMM GRANULOCYTES # BLD AUTO: 0.01 10*3/MM3 (ref 0–0.05)
IMM GRANULOCYTES NFR BLD AUTO: 0.2 % (ref 0–0.5)
INDURATION: 0 MM (ref 0–10)
LYMPHOCYTES # BLD AUTO: 0.97 10*3/MM3 (ref 0.7–3.1)
LYMPHOCYTES NFR BLD AUTO: 23.7 % (ref 19.6–45.3)
Lab: NORMAL
Lab: NORMAL
MAGNESIUM SERPL-MCNC: 1.7 MG/DL (ref 1.6–2.4)
MCH RBC QN AUTO: 27.8 PG (ref 26.6–33)
MCHC RBC AUTO-ENTMCNC: 31.6 G/DL (ref 31.5–35.7)
MCV RBC AUTO: 87.9 FL (ref 79–97)
MONOCYTES # BLD AUTO: 0.24 10*3/MM3 (ref 0.1–0.9)
MONOCYTES NFR BLD AUTO: 5.9 % (ref 5–12)
NEUTROPHILS NFR BLD AUTO: 2.84 10*3/MM3 (ref 1.7–7)
NEUTROPHILS NFR BLD AUTO: 69.5 % (ref 42.7–76)
NRBC BLD AUTO-RTO: 0 /100 WBC (ref 0–0.2)
PLATELET # BLD AUTO: 150 10*3/MM3 (ref 140–450)
PMV BLD AUTO: 10.5 FL (ref 6–12)
POTASSIUM SERPL-SCNC: 4 MMOL/L (ref 3.5–5.2)
RBC # BLD AUTO: 3.31 10*6/MM3 (ref 3.77–5.28)
SARS-COV-2 RNA RESP QL NAA+PROBE: NOT DETECTED
SODIUM SERPL-SCNC: 139 MMOL/L (ref 136–145)
TB SKIN TEST: NEGATIVE
WBC NRBC COR # BLD AUTO: 4.09 10*3/MM3 (ref 3.4–10.8)

## 2024-11-28 PROCEDURE — 25010000002 ENOXAPARIN PER 10 MG: Performed by: STUDENT IN AN ORGANIZED HEALTH CARE EDUCATION/TRAINING PROGRAM

## 2024-11-28 PROCEDURE — 83735 ASSAY OF MAGNESIUM: CPT | Performed by: PHYSICIAN ASSISTANT

## 2024-11-28 PROCEDURE — 85025 COMPLETE CBC W/AUTO DIFF WBC: CPT | Performed by: PHYSICIAN ASSISTANT

## 2024-11-28 PROCEDURE — 94664 DEMO&/EVAL PT USE INHALER: CPT

## 2024-11-28 PROCEDURE — 94799 UNLISTED PULMONARY SVC/PX: CPT

## 2024-11-28 PROCEDURE — 80048 BASIC METABOLIC PNL TOTAL CA: CPT | Performed by: PHYSICIAN ASSISTANT

## 2024-11-28 PROCEDURE — 87635 SARS-COV-2 COVID-19 AMP PRB: CPT | Performed by: FAMILY MEDICINE

## 2024-11-28 PROCEDURE — 99309 SBSQ NF CARE MODERATE MDM 30: CPT | Performed by: PHYSICIAN ASSISTANT

## 2024-11-28 RX ADMIN — CARVEDILOL 6.25 MG: 6.25 TABLET, FILM COATED ORAL at 18:29

## 2024-11-28 RX ADMIN — CILOSTAZOL 100 MG: 100 TABLET ORAL at 08:48

## 2024-11-28 RX ADMIN — ARFORMOTEROL TARTRATE 15 MCG: 15 SOLUTION RESPIRATORY (INHALATION) at 18:43

## 2024-11-28 RX ADMIN — CILOSTAZOL 100 MG: 100 TABLET ORAL at 20:07

## 2024-11-28 RX ADMIN — TRAZODONE HYDROCHLORIDE 50 MG: 50 TABLET ORAL at 20:07

## 2024-11-28 RX ADMIN — BUDESONIDE 0.5 MG: 0.5 INHALANT RESPIRATORY (INHALATION) at 18:43

## 2024-11-28 RX ADMIN — ASPIRIN 81 MG: 81 TABLET, COATED ORAL at 08:49

## 2024-11-28 RX ADMIN — POTASSIUM CHLORIDE 40 MEQ: 750 CAPSULE, EXTENDED RELEASE ORAL at 08:48

## 2024-11-28 RX ADMIN — BUDESONIDE 0.5 MG: 0.5 INHALANT RESPIRATORY (INHALATION) at 06:41

## 2024-11-28 RX ADMIN — ENOXAPARIN SODIUM 30 MG: 100 INJECTION SUBCUTANEOUS at 08:49

## 2024-11-28 RX ADMIN — ARFORMOTEROL TARTRATE 15 MCG: 15 SOLUTION RESPIRATORY (INHALATION) at 06:41

## 2024-11-28 RX ADMIN — POTASSIUM CHLORIDE 40 MEQ: 750 CAPSULE, EXTENDED RELEASE ORAL at 20:08

## 2024-11-28 RX ADMIN — CETIRIZINE HYDROCHLORIDE 10 MG: 10 TABLET, FILM COATED ORAL at 08:48

## 2024-11-28 RX ADMIN — CARVEDILOL 6.25 MG: 6.25 TABLET, FILM COATED ORAL at 08:49

## 2024-11-28 RX ADMIN — LOPERAMIDE HYDROCHLORIDE 2 MG: 2 CAPSULE ORAL at 10:40

## 2024-11-28 RX ADMIN — SERTRALINE HYDROCHLORIDE 50 MG: 50 TABLET ORAL at 08:48

## 2024-11-28 RX ADMIN — PANTOPRAZOLE SODIUM 40 MG: 40 TABLET, DELAYED RELEASE ORAL at 05:22

## 2024-11-28 RX ADMIN — ATORVASTATIN CALCIUM 80 MG: 40 TABLET, FILM COATED ORAL at 20:07

## 2024-11-28 NOTE — PROGRESS NOTES
[facility]   Hospitalist Progress Note       Patient Name: Nilda Julien  : 1945  MRN: [id number]  Primary Care Physician: Ralph Marcus APRN  Date of admission: 2024  Today's Date: 2024  Room / Bed:   308/1  Subjective   Chief Complaint: Hospital-acquired weakness     HPI:     Nilda Julien is a 79 y.o. female past medical history significant for chronic and ongoing tobacco use with cigarettes, coronary artery disease status post CABG, diastolic congestive heart failure, COPD, peripheral arterial disease that underwent axillobifemoral bypass graft, bilateral femoral endarterectomy for ischemic rest pain of the lower extremities postoperatively patient was admitted to the ICU was stabilized postoperative course uneventful did have some shortness of air and decreasing oxygenation saturations postoperatively which was felt to be secondary to atelectasis patient remained hemodynamically and clinically stable was seen by PT and OT deemed a good candidate for inpatient rehab was transferred to the skilled nursing facility for ongoing therapy.    Interval Followup: 2024    Very pleasant lady resting in bed.  Awakens easily.  Conversational.  Resting well.  Tolerating physical therapy so far.  No setbacks.  Lower extremities warm to touch.  Had recent revascularization.  Denies any fevers or chills  Respiratory status at baseline.  On room air.  Blood pressure somewhat elevated 160/70.  Continue to monitor trend  Potassium improved: 2.9 ---> 4.0        REVIEW OF SYSTEMS:   Weakness in general  Objective   Temp:  [97.7 °F (36.5 °C)] 97.7 °F (36.5 °C)  Heart Rate:  [81-88] 81  Resp:  [16] 16  BP: (110-165)/(53-78) 162/78  PHYSICAL EXAM   CON: WN. WD. NAD.   NECK:  No thyromegaly. No stridor.   RESP:  CTA. No wheezes.  CV:  Rhythm regular. Rate WNL. No murmur noted.  No edema.  GI:  Soft and nontender. Nondistended.    EXT: Lower extremities warm to touch.  PSYCH:  Alert. Oriented. Normal  affect and mood.  NEURO:  No dysarthria or aphasia.  SKIN: No chronic venous stasis changes or varicosities.  No cellulitis  Results from last 7 days   Lab Units 11/28/24  0442 11/27/24  0530 11/25/24  1605   WBC 10*3/mm3 4.09 3.99 4.69   HEMOGLOBIN g/dL 9.2* 9.4* 9.0*   HEMATOCRIT % 29.1* 29.4* 27.6*   PLATELETS 10*3/mm3 150 129* 128*     Results from last 7 days   Lab Units 11/28/24  0442 11/27/24  0530 11/25/24  1605   SODIUM mmol/L 139 138 138   POTASSIUM mmol/L 4.0 2.9* 3.3*   CO2 mmol/L 23.9 22.2 21.4*   CHLORIDE mmol/L 108* 105 106   ANION GAP mmol/L 7.1 10.8 10.6   BUN mg/dL 11 11 14   CREATININE mg/dL 0.60 0.57 0.70   GLUCOSE mg/dL 96 121* 97         COMPLEXITY OF DATA / DECISION MAKING     []  Moderate: One acute illness or mild exacerbation of chronic or 2 stable chronic or tx side effects   []  High:  Severe acute illness or severe exacerbation of chronic - potential for major debility / life threatening         I have personally reviewed the results from the time of this admission to 11/28/2024 09:29 EST:  []  Laboratory:  []  Microbiology: []  Radiology:  []  Telemetry:   []  Cardiology/Vascular:  []  Pathology:  []  Prior external records:  []  Independent historian provided additional details:      []  Discussed case with specialists:    []  Independent interpretation of ECG/Imaging etc:             []  Moderate: Rx management, low risk surgery, suboptimal social situation   []  High:  Rx with close monitoring for toxicity, mod-high risk surgery, DNR decision, Comfort initiated, IV pain meds    Assessment / Plan   Assessment:    Hospital-acquired weakness  Recent revascularization procedure for ischemic rest pain secondary to peripheral arterial disease  COPD  Diastolic heart failure  Valvular heart disease  Ongoing tobacco use cigarettes  Peripheral arterial disease  Hyperlipidemia  Hypertension  GERD     Plan:    Daily PT and SNF  Continue scheduled bronchodilators and prn nebs  Monitor volume  status  Recent BNP only 1664.  Hold off diuretics.  Continue aspirin and statin.  Also Pletal.  Lovenox for DVT prophylaxis    Discussed plan with RN.  VTE Prophylaxis:  Pharmacologic VTE prophylaxis orders are present.      CODE STATUS:      Code Status (Patient has no pulse and is not breathing): CPR (Attempt to Resuscitate)  Medical Interventions (Patient has pulse or is breathing): Full Support       Electronically signed by MIGUELANGEL Esquivel, 11/28/24, 9:29 AM EST.

## 2024-11-28 NOTE — PLAN OF CARE
Goal Outcome Evaluation:  Plan of Care Reviewed With: patient        Progress: improving  Outcome Evaluation: Alert and oriented x4; [place] with bilateral hearing aids. Able to voice needs without difficulty. VSS. Independent in room with ambulation to BR. Denies pain. Rested well this shift. Possible discharge home on Friday. Call light within reach; care plan ongoing.

## 2024-11-29 ENCOUNTER — READMISSION MANAGEMENT (OUTPATIENT)
Dept: CALL CENTER | Facility: HOSPITAL | Age: 79
End: 2024-11-29
Payer: MEDICARE

## 2024-11-29 VITALS
TEMPERATURE: 97.7 F | OXYGEN SATURATION: 96 % | RESPIRATION RATE: 18 BRPM | DIASTOLIC BLOOD PRESSURE: 85 MMHG | BODY MASS INDEX: 16.94 KG/M2 | WEIGHT: 80.69 LBS | HEART RATE: 89 BPM | HEIGHT: 58 IN | SYSTOLIC BLOOD PRESSURE: 158 MMHG

## 2024-11-29 PROCEDURE — 94799 UNLISTED PULMONARY SVC/PX: CPT

## 2024-11-29 PROCEDURE — 99316 NF DSCHRG MGMT 30 MIN+: CPT | Performed by: PHYSICIAN ASSISTANT

## 2024-11-29 PROCEDURE — 97116 GAIT TRAINING THERAPY: CPT

## 2024-11-29 PROCEDURE — 25010000002 ENOXAPARIN PER 10 MG: Performed by: STUDENT IN AN ORGANIZED HEALTH CARE EDUCATION/TRAINING PROGRAM

## 2024-11-29 RX ADMIN — SERTRALINE HYDROCHLORIDE 50 MG: 50 TABLET ORAL at 08:57

## 2024-11-29 RX ADMIN — BUDESONIDE 0.5 MG: 0.5 INHALANT RESPIRATORY (INHALATION) at 09:16

## 2024-11-29 RX ADMIN — CILOSTAZOL 100 MG: 100 TABLET ORAL at 08:57

## 2024-11-29 RX ADMIN — POTASSIUM CHLORIDE 40 MEQ: 750 CAPSULE, EXTENDED RELEASE ORAL at 08:57

## 2024-11-29 RX ADMIN — CARVEDILOL 6.25 MG: 6.25 TABLET, FILM COATED ORAL at 08:57

## 2024-11-29 RX ADMIN — PANTOPRAZOLE SODIUM 40 MG: 40 TABLET, DELAYED RELEASE ORAL at 05:21

## 2024-11-29 RX ADMIN — ARFORMOTEROL TARTRATE 15 MCG: 15 SOLUTION RESPIRATORY (INHALATION) at 09:16

## 2024-11-29 RX ADMIN — CETIRIZINE HYDROCHLORIDE 10 MG: 10 TABLET, FILM COATED ORAL at 08:57

## 2024-11-29 RX ADMIN — ENOXAPARIN SODIUM 30 MG: 100 INJECTION SUBCUTANEOUS at 08:57

## 2024-11-29 RX ADMIN — ASPIRIN 81 MG: 81 TABLET, COATED ORAL at 08:57

## 2024-11-29 NOTE — THERAPY DISCHARGE NOTE
SNF - Physical Therapy Treatment Note/Discharge  [facility]     Patient Name: Nilda Julien  : 1945  MRN: [id number]  Today's Date: 2024                Admit Date: 2024    Visit Dx:    ICD-10-CM ICD-9-CM   1. Difficulty walking  R26.2 719.7     Patient Active Problem List   Diagnosis    Hyperlipidemia LDL goal <70    Essential hypertension    Gastroesophageal reflux disease without esophagitis    Acquired hypothyroidism    Allergic rhinitis    Aortic stenosis, mild    Arthritis    CAD s/p CABG    Diverticulitis    Fatigue    Hemorrhoids    Irritable bowel syndrome with diarrhea    Microhematuria    Nephrolithiasis    Smoker    Chronic heart failure with preserved ejection fraction (HFpEF)    Age-related osteoporosis without current pathological fracture    Encounter for screening for malignant neoplasm of colon    History of colon polyps    Anxiety    Rest pain of both lower extremities due to atherosclerosis    Ischemic rest pain of lower extremity    Severe protein-calorie malnutrition    Abnormal chest x-ray    Atelectasis     Past Medical History:   Diagnosis Date    AAA (abdominal aortic aneurysm)     INFRA RENAL, 3 CM LAST CT ABDOMEN    Allergic rhinitis     Seasonal allergies    Anxiety 2024    Aortic stenosis, mild 10/18/2021    Arthritis     CAD s/p CABG 10/18/2021    CHF (congestive heart failure)     Chronic heart failure with preserved ejection fraction (HFpEF) 2021    Claudication of both lower extremities     COPD (chronic obstructive pulmonary disease)     Diverticulitis     Elevated cholesterol     Essential hypertension 2021    Fatigue     GERD (gastroesophageal reflux disease)     Heart attack 10/18/2021    Hemorrhoids 2013    Hyperlipidemia LDL goal <70 2021    Irritable bowel syndrome with diarrhea     Memory loss     forgetfulness    Microhematuria 2019    Nephrolithiasis 2019    Smoker 2021     Past Surgical History:   Procedure  Laterality Date    AXILLARY BIFEMORAL BYPASS Bilateral 11/19/2024    Procedure: AXILLO-BIFEMORAL BYPASS GRAFT;  Surgeon: Selvin Vásquez MD;  Location: [facility] MAIN OR;  Service: Vascular;  Laterality: Bilateral;    CARDIAC SURGERY  04/19/2013    4 way bypass    COLONOSCOPY  05/23/2016, 2019    COLONOSCOPY N/A 2/22/2024    Procedure: COLONOSCOPY WITH HOT SNARE POLYPECTOMIES;  Surgeon: Can Pearce MD;  Location: [facility] ENDOSCOPY;  Service: Gastroenterology;  Laterality: N/A;  COLON POLYPS, DIVERTICULOSIS    CORONARY ARTERY BYPASS GRAFT  2013    ENDOSCOPY  2019    HERNIA REPAIR      OTHER SURGICAL HISTORY  2001    cardiac stents, 2 stents placed    VERTEBROPLASTY N/A 4/1/2022    Procedure: VERTEBROPLASTY, THORACIC 11;  Surgeon: Redd Cisneros MD;  Location: [facility] MAIN OR;  Service: Neurosurgery;  Laterality: N/A;       PT Assessment (Last 12 Hours)       PT Evaluation and Treatment       Row Name 11/29/24 1137          Physical Therapy Time and Intention    Document Type discharge evaluation/summary  -WM     Mode of Treatment individual therapy;physical therapy  -WM     Patient Effort good  -WM     Symptoms Noted During/After Treatment fatigue  -WM       Row Name 11/29/24 1137          Bed Mobility    Supine-Sit-Supine [place] (Bed Mobility) modified independence  -WM     Assistive Device (Bed Mobility) bed rails  -WM       Row Name 11/29/24 1137          Sit-Stand Transfer    Sit-Stand [place] (Transfers) independent  -WM     Assistive Device (Sit-Stand Transfers) --  No AD  -WM       Row Name 11/29/24 1137          Stand-Sit Transfer    Stand-Sit [place] (Transfers) independent  -WM     Assistive Device (Stand-Sit Transfers) --  No AD  -WM       Row Name 11/29/24 1137          Gait/Stairs (Locomotion)    [place] Level (Gait) independent  -WM     Assistive Device (Gait) --  No AD  -WM     Distance in Feet (Gait) 450  -WM     Pattern (Gait) 2-point;step-through  -WM     [place]  Level (Stairs) modified independence  -WM     Handrail Location (Stairs) both sides  -WM     Number of Steps (Stairs) 2 x 2  -WM     Ascending Technique (Stairs) step-to-step  -WM     Descending Technique (Stairs) step-to-step  -WM     Stairs, Impairments impaired balance  -WM       Row Name             Wound 11/19/24 0814 Bilateral groin    Wound - Properties Group Placement Date: 11/19/24 -KK Placement Time: 0814 -KK Side: Bilateral  -KK Location: groin  -KK Primary Wound Type: Incision  -KK Present on Original Admission: N  -KK    Retired Wound - Properties Group Placement Date: 11/19/24 -KK Placement Time: 0814  -KK Present on Original Admission: N  -KK Side: Bilateral  -KK Location: groin  -KK Primary Wound Type: Incision  -KK    Retired Wound - Properties Group Placement Date: 11/19/24 -KK Placement Time: 0814  -KK Present on Original Admission: N  -KK Side: Bilateral  -KK Location: groin  -KK Primary Wound Type: Incision  -KK    Retired Wound - Properties Group Date first assessed: 11/19/24 -KK Time first assessed: 0814  -KK Present on Original Admission: N  -KK Side: Bilateral  -KK Location: groin  -KK Primary Wound Type: Incision  -KK      Row Name             Wound 11/19/24 0814 Right axilla    Wound - Properties Group Placement Date: 11/19/24 -KK Placement Time: 0814  -KK Side: Right  -KK Location: axilla  -KK Primary Wound Type: Incision  -KK Present on Original Admission: N  -KK    Retired Wound - Properties Group Placement Date: 11/19/24 -KK Placement Time: 0814  -KK Present on Original Admission: N  -KK Side: Right  -KK Location: axilla  -KK Primary Wound Type: Incision  -KK    Retired Wound - Properties Group Placement Date: 11/19/24 -KK Placement Time: 0814  -KK Present on Original Admission: N  -KK Side: Right  -KK Location: axilla  -KK Primary Wound Type: Incision  -KK    Retired Wound - Properties Group Date first assessed: 11/19/24  -KK Time first assessed: 0814  -KK Present on Original  Admission: N  -KK Side: Right  -KK Location: axilla  -KK Primary Wound Type: Incision  -KK      Row Name 11/29/24 1137          Positioning and Restraints    Pre-Treatment Position sitting in chair/recliner  -WM     Post Treatment Position chair  -WM     In Chair call light within reach;encouraged to call for assist;sitting  -WM       Row Name 11/29/24 1137          Bed Mobility Goal 1 (PT)    Progress/Outcomes (Bed Mobility Goal 1, PT) goal met  -WM       Row Name 11/29/24 1137          Transfer Goal 1 (PT)    Progress/Outcome (Transfer Goal 1, PT) goal met  -WM       Row Name 11/29/24 1137          Gait Training Goal 1 (PT)    Progress/Outcome (Gait Training Goal 1, PT) goal met  -WM       Row Name 11/29/24 1137          Stairs Goal 1 (PT)    Progress/Outcome (Stairs Goal 1, PT) goal met  -WM       Row Name 11/29/24 1137          Patient Education Goal (PT)    Progress/Outcome (Patient Education Goal, PT) goal met  -WM       Row Name 11/29/24 1137          Discharge Summary (PT)    Additional Documentation Discharge Summary (PT) (Group)  -WM       Row Name 11/29/24 1137          Discharge Summary (PT)    Reason for Discharge (PT Discharge Summary) patient discharged from this facility  -WM     Outcomes Achieved Upon Discharge (PT Discharge Summary) all goals met within established timeframes  -WM     Transfer to Another Level of Care or Facility (PT Discharge Summary) home  -WM               User Key  (r) = Recorded By, (t) = Taken By, (c) = Cosigned By      Initials Name Provider Type    WM Alvarez Lewis PTA Physical Therapist Assistant    Carrie Cartwright RN Registered Nurse                  Section G              Section GG                    Mobility, Discharge Performance (HB9390)  Roll Left & Right: Mobility Discharge (II1238D2): independent (06)  Sit to Lying: Mobility Discharge Performance (LA5925X3): independent (06)  Lying to Sitting, Side of Bed: Mobility Discharge Performance (XC4612J3):  independent (06)  Sit to Stand: Mobility Discharge Performance (FQ5977E7): independent (06)  Chair/Bed-Chair Transfer: Mobility Discharge Performance (SP2240X1): independent (06)  Toilet Transfer: Mobility Discharge Performance (AQ9089M8): independent (06)  Tub/shower Transfer: Mobility Discharge Performance (GI6353SJ3): independent (06)  Car Transfer: Mobility Discharge Performance (UP1482Z8): not attempted due to environmental limitations (10)  Walk 10 Feet: Mobility Discharge Performance (GS1049B3): independent (06)  Walk 50 Feet With Two Turns: Mobility Discharge Performance (BL9502A4): independent (06)  Walk 150 Feet: Mobility Discharge Performance (PP4729H4): independent (06)  Walk 10 Ft, Uneven Surfaces: Mobility Discharge Performance (PZ4524B3): not applicable (09)  1 Step/Curb: Mobility Discharge Performance (UI6663L7): independent (06)  4 Steps: Mobility Discharge Performance (HM4976P4): independent (06)  12 Steps: Mobility Discharge Performance (JD7189G3): not applicable (09)  Picking up object: Mobility Discharge Performance (VB3639T5): independent (06)  Wheel 50 Ft Two Turns: Mobility Discharge Performance (PJ6152H9): not applicable (09)  Wheel 150 Feet: Mobility Discharge Performance (YC2393V9): not applicable (09)    Physical Therapy Education       Title: PT OT SLP Therapies (Resolved)       Topic: Physical Therapy (Resolved)       Point: Mobility training (Resolved)       Learning Progress Summary            Patient Acceptance, E, VU by SC at 2024                      Point: Home exercise program (Resolved)       Learning Progress Summary            Patient Acceptance, E, VU by SC at 2024 152                      Point: Body mechanics (Resolved)       Learning Progress Summary            Patient Acceptance, E, VU by SC at 2024 152                      Point: Precautions (Resolved)       Learning Progress Summary            Patient Acceptance, E, VU by SC at 2024 152                                       User Key       Initials Effective Dates Name Provider Type Discipline    SC 02/05/24 -  Jeanette Lopez OT Occupational Therapist OT                    PT Recommendation and Plan           Outcome Measures       Row Name 11/27/24 1600 11/26/24 2344          How much help from another person do you currently need...    Turning from your back to your side while in flat bed without using bedrails? 4  -CS 3  -CS     Moving from lying on back to sitting on the side of a flat bed without bedrails? 4  -CS 3  -CS     Moving to and from a bed to a chair (including a wheelchair)? 4  -CS 3  -CS     Standing up from a chair using your arms (e.g., wheelchair, bedside chair)? 4  -CS 3  -CS     Climbing 3-5 steps with a railing? 3  -CS 2  -CS     To walk in hospital room? 3  -CS 3  -CS     AM-PAC 6 Clicks Score (PT) 22  -CS 17  -CS        Functional Assessment    Outcome Measure Options AM-PAC 6 Clicks Basic Mobility (PT)  -CS AM-PAC 6 Clicks Basic Mobility (PT)  -CS               User Key  (r) = Recorded By, (t) = Taken By, (c) = Cosigned By      Initials Name Provider Type    CS Tori Huggins, PT Physical Therapist                     Time Calculation:    PT Charges       Row Name 11/29/24 1136             Time Calculation    PT Received On 11/29/24  -WM         Timed Charges    00095 - Gait Training Minutes  14  -WM      50547 - PT Therapeutic Activity Minutes 3  -WM         SNF Physical Therapy Minutes    Skilled Minutes- PT 17 min  -WM         Total Minutes    Timed Charges Total Minutes 17  -WM       Total Minutes 17  -WM                User Key  (r) = Recorded By, (t) = Taken By, (c) = Cosigned By      Initials Name Provider Type    Alvarez Caban PTA Physical Therapist Assistant                      PT G-Codes  Outcome Measure Options: AM-PAC 6 Clicks Basic Mobility (PT)  AM-PAC 6 Clicks Score (PT): 23  AM-PAC 6 Clicks Score (OT): 21         Alvarez Lewis PTA  11/29/2024

## 2024-11-29 NOTE — DISCHARGE INSTR - APPOINTMENTS
Follow up with Dr. Vásquez for post-op. Follow up on the week of 12/2 to 12/6 for staple removal.     Follow up with PCP in 1-2 weeks

## 2024-11-29 NOTE — THERAPY DISCHARGE NOTE
SNF - Occupational Therapy Discharge   [facility]    Patient Name: Nilda Julien  : 1945    MRN: [id number]                              Today's Date: 2024       Admit Date: 2024    Visit Dx:     ICD-10-CM ICD-9-CM   1. Difficulty walking  R26.2 719.7     Patient Active Problem List   Diagnosis    Hyperlipidemia LDL goal <70    Essential hypertension    Gastroesophageal reflux disease without esophagitis    Acquired hypothyroidism    Allergic rhinitis    Aortic stenosis, mild    Arthritis    CAD s/p CABG    Diverticulitis    Fatigue    Hemorrhoids    Irritable bowel syndrome with diarrhea    Microhematuria    Nephrolithiasis    Smoker    Chronic heart failure with preserved ejection fraction (HFpEF)    Age-related osteoporosis without current pathological fracture    Encounter for screening for malignant neoplasm of colon    History of colon polyps    Anxiety    Rest pain of both lower extremities due to atherosclerosis    Ischemic rest pain of lower extremity    Severe protein-calorie malnutrition    Abnormal chest x-ray    Atelectasis     Past Medical History:   Diagnosis Date    AAA (abdominal aortic aneurysm)     INFRA RENAL, 3 CM LAST CT ABDOMEN    Allergic rhinitis     Seasonal allergies    Anxiety 2024    Aortic stenosis, mild 10/18/2021    Arthritis     CAD s/p CABG 10/18/2021    CHF (congestive heart failure)     Chronic heart failure with preserved ejection fraction (HFpEF) 2021    Claudication of both lower extremities     COPD (chronic obstructive pulmonary disease)     Diverticulitis     Elevated cholesterol     Essential hypertension 2021    Fatigue     GERD (gastroesophageal reflux disease)     Heart attack 10/18/2021    Hemorrhoids 2013    Hyperlipidemia LDL goal <70 2021    Irritable bowel syndrome with diarrhea     Memory loss     forgetfulness    Microhematuria 2019    Nephrolithiasis 2019    Smoker 2021     Past Surgical History:    Procedure Laterality Date    AXILLARY BIFEMORAL BYPASS Bilateral 11/19/2024    Procedure: AXILLO-BIFEMORAL BYPASS GRAFT;  Surgeon: Selvin Vásquez MD;  Location: [facility] MAIN OR;  Service: Vascular;  Laterality: Bilateral;    CARDIAC SURGERY  04/19/2013    4 way bypass    COLONOSCOPY  05/23/2016, 2019    COLONOSCOPY N/A 2/22/2024    Procedure: COLONOSCOPY WITH HOT SNARE POLYPECTOMIES;  Surgeon: Can Pearce MD;  Location: [facility] ENDOSCOPY;  Service: Gastroenterology;  Laterality: N/A;  COLON POLYPS, DIVERTICULOSIS    CORONARY ARTERY BYPASS GRAFT  2013    ENDOSCOPY  2019    HERNIA REPAIR      OTHER SURGICAL HISTORY  2001    cardiac stents, 2 stents placed    VERTEBROPLASTY N/A 4/1/2022    Procedure: VERTEBROPLASTY, THORACIC 11;  Surgeon: Redd Cisneros MD;  Location: [facility] MAIN OR;  Service: Neurosurgery;  Laterality: N/A;      General Information    No documentation.                  Mobility/ADL's    No documentation.                  Obj/Interventions    No documentation.                  Goals/Plan       Row Name 11/29/24 0536          Bed Mobility Goal 1 (OT)    Activity/Assistive Device (Bed Mobility Goal 1, OT) bed mobility activities, all  -EG     [place] Level/Cues Needed (Bed Mobility Goal 1, OT) modified independence  -EG     Time Frame (Bed Mobility Goal 1, OT) long term goal (LTG);30 days  -EG     Progress/Outcomes (Bed Mobility Goal 1, OT) goal met  -EG       Row Name 11/29/24 0536          Transfer Goal 1 (OT)    Activity/Assistive Device (Transfer Goal 1, OT) transfers, all  -EG     [place] Level/Cues Needed (Transfer Goal 1, OT) modified independence  -EG     Time Frame (Transfer Goal 1, OT) long term goal (LTG);30 days  -EG     Progress/Outcome (Transfer Goal 1, OT) goal met  -EG       Row Name 11/29/24 0536          Bathing Goal 1 (OT)    Activity/Device (Bathing Goal 1, OT) bathing skills, all  -EG     [place] Level/Cues Needed (Bathing Goal 1, OT) modified independence   -EG     Time Frame (Bathing Goal 1, OT) long term goal (LTG);30 days  -EG     Progress/Outcomes (Bathing Goal 1, OT) goal met  -EG       Row Name 11/29/24 0536          Dressing Goal 1 (OT)    Activity/Device (Dressing Goal 1, OT) dressing skills, all  -EG     [place]/Cues Needed (Dressing Goal 1, OT) modified independence  -EG     Time Frame (Dressing Goal 1, OT) long term goal (LTG);30 days  -EG     Progress/Outcome (Dressing Goal 1, OT) goal met  -EG       Row Name 11/29/24 05          Toileting Goal 1 (OT)    Activity/Device (Toileting Goal 1, OT) toileting skills, all  -EG     [place] Level/Cues Needed (Toileting Goal 1, OT) modified independence  -EG     Time Frame (Toileting Goal 1, OT) long term goal (LTG);30 days  -EG     Progress/Outcome (Toileting Goal 1, OT) goal met  -EG       Row Name 11/29/24 05          Grooming Goal 1 (OT)    Activity/Device (Grooming Goal 1, OT) grooming skills, all  -EG     [place] (Grooming Goal 1, OT) modified independence  -EG     Time Frame (Grooming Goal 1, OT) long term goal (LTG);30 days  -EG     Progress/Outcome (Grooming Goal 1, OT) goal met  -EG       Row Name 11/29/24 0536          Strength Goal 1 (OT)    Strength Goal 1 (OT) Patient will improve upper body strength to 4+/5 to support improved ADL function and mobility.  -EG     Time Frame (Strength Goal 1, OT) long term goal (LTG);30 days  -EG     Progress/Outcome (Strength Goal 1, OT) goal met  -EG       Row Name 11/29/24 05          Problem Specific Goal 1 (OT)    Problem Specific Goal 1 (OT) Patient will demonstrate good endurance to support ADLs/functional transfers.  -EG     Time Frame (Problem Specific Goal 1, OT) long term goal (LTG);30 days  -EG     Progress/Outcome (Problem Specific Goal 1, OT) goal met  -EG               User Key  (r) = Recorded By, (t) = Taken By, (c) = Cosigned By      Initials Name Provider Type    EG Sabra Escamilla OT Occupational Therapist                    Clinical Impression    No documentation.                  Outcome Measures       Row Name 11/28/24 2007          How much help from another person do you currently need...    Turning from your back to your side while in flat bed without using bedrails? 4  -FM     Moving from lying on back to sitting on the side of a flat bed without bedrails? 4  -FM     Moving to and from a bed to a chair (including a wheelchair)? 4  -FM     Standing up from a chair using your arms (e.g., wheelchair, bedside chair)? 4  -FM     Climbing 3-5 steps with a railing? 3  -FM     To walk in hospital room? 4  -FM     AM-PAC 6 Clicks Score (PT) 23  -FM     Highest Level of Mobility Goal 7 --> Walk 25 feet or more  -FM               User Key  (r) = Recorded By, (t) = Taken By, (c) = Cosigned By      Initials Name Provider Type    FM Dotty Bass RN Registered Nurse                  Section G  Mobility  Bed mobility - self performance: independent  Bed mobility support/assistance: No setup or physical help  Transfer - self performance: independent  Transfer support/assistance: No setup or physical help  Walking in room - self performance: independent  Walking in room support/assistance: No setup or physical help  Walking in corridors/hallway - self performance: independent  Walking in corridors/hallway support/assistance: No setup or physical help  Locomotion on unit - self performance: independent  Locomotion on unit support/assistance: No setup or physical help  Locomotion off unit - self performance: activity did not occur  Locomotion off unit support/assistance: Activity did not occur  Dressing - self performance: independent  Dressing support/assistance: No setup or physical help  Eating - self performance: independent  Eating support/assistance: No setup or physical help  Toileting - self performance: independent  Toileting support/assistance: No setup or physical help  Personal hygiene - self performance: independent  Personal  hygiene support/assistance: No setup or physical help  Bathing  Bathing - self performance: Independent  Balance  Balance during transitions & walking: Steady at all times  Moving from seated to standing position: Steady at all times  Walking: Steady at all times  Turning around while walking: Steady at all times  Moving on and off toilet: Steady at all times  Surface-to-surface transfer: Steady at all times  Mobility devices: None were used  Range of Motion  Upper Extremity: No impairment  Lower Extremity: No impairment  Section GG                 Self Care, Discharge Performance (RI5928)  Eating: Self-Care Discharge Performance (VY5742X2): independent (06)  Oral Hygiene: Self-Care Discharge Performance (JB2635F1): independent (06)  Toileting Hygiene: Self-Care Discharge Performance (MM5924R7): independent (06)  Shower/Bathe Self: Self-Care Discharge Performance (ZP7615H2): independent (06)  Upper Body Dressing: Self-Care Discharge Performance (CF1025L6): independent (06)  Lower Body Dressing: Self-Care Discharge Performance (TP9140F0): independent (06)  Putting On/Taking Off Footwear: Self-Care Discharge Performance (JV7517G4): independent (06)  Personal Hygiene: Self-Care Discharge Performance (PQ0576D9): independent (06)  Mobility, Discharge Performance (DE2126)  Toilet Transfer: Mobility Discharge Performance (OA7751R4): independent (06)  Tub/shower Transfer: Mobility Discharge Performance (VG9819HG1): independent (06)    Occupational Therapy Education       Title: PT OT SLP Therapies (Done)       Topic: Occupational Therapy (Done)       Point: ADL training (Done)       Description:   Instruct learner(s) on proper safety adaptation and remediation techniques during self care or transfers.   Instruct in proper use of assistive devices.                  Learning Progress Summary            Patient Acceptance, E, VU by SC at 11/26/2024 1529                      Point: Home exercise program (Done)        Description:   Instruct learner(s) on appropriate technique for monitoring, assisting and/or progressing therapeutic exercises/activities.                  Learning Progress Summary            Patient Acceptance, E, VU by SC at 11/26/2024 1529                      Point: Precautions (Done)       Description:   Instruct learner(s) on prescribed precautions during self-care and functional transfers.                  Learning Progress Summary            Patient Acceptance, E, VU by SC at 11/26/2024 1529                      Point: Body mechanics (Done)       Description:   Instruct learner(s) on proper positioning and spine alignment during self-care, functional mobility activities and/or exercises.                  Learning Progress Summary            Patient Acceptance, E, VU by SC at 11/26/2024 1529                                      User Key       Initials Effective Dates Name Provider Type Discipline    SC 02/05/24 -  Jeanette Lopez OT Occupational Therapist OT                  OT Recommendation and Plan  Therapy Frequency (OT): 5 times/wk  Plan of Care Review  Plan of Care Reviewed With: patient  Progress: improving  Outcome Evaluation: Patient is pleasant and cooperative; no reports of pain only soreness when gait belt is donned on R rib cage; Patient demonstrates ability to safely perform/particiapte in all ADL tasks this date Set-up/SUP assist; No RW used for mobility this date; OT services will continue POC; Wanting to discharge home friday morning with family if able.  Plan of Care Reviewed With: patient  Outcome Evaluation: Patient is pleasant and cooperative; no reports of pain only soreness when gait belt is donned on R rib cage; Patient demonstrates ability to safely perform/particiapte in all ADL tasks this date Set-up/SUP assist; No RW used for mobility this date; OT services will continue POC; Wanting to discharge home friday morning with family if able.     Time Calculation:                   Sabra  Francine, OT  11/29/2024

## 2024-11-29 NOTE — OUTREACH NOTE
Prep Survey      Flowsheet Row Responses   [facility] patient discharged from? [facility]   Is LACE score < 7 ? No   Eligibility [facility]   Date of Admission 11/25/24   Date of Discharge 11/29/24   Discharge Disposition Home-Health Care Sv   Discharge diagnosis Ischemic rest pain of lower extremity   Does the patient have one of the following disease processes/diagnoses(primary or secondary)? Other   Does the patient have Home health ordered? Yes   What is the Home health agency?  [facility]   Is there a DME ordered? No   Medication alerts for this patient see avs   Prep survey completed? Yes            Danielle ORDONEZ - Registered Nurse

## 2024-11-29 NOTE — PLAN OF CARE
Goal Outcome Evaluation:  Plan of Care Reviewed With: patient        Progress: improving  Outcome Evaluation: Patient is alert and oriented x4. Independent in her room per therapy. Gait is steady. Bilateral hip incisions are now BASHIR with staples intact. No drainage from sites noted. Given Immodium today for two loose stools. Patient stated has history of IBS and has to take that med periodically.   is to speak with patient tomorrow about discharge plan. Voices needs. Con't current POC.

## 2024-11-29 NOTE — DISCHARGE SUMMARY
[facility]  HOSPITALIST  DISCHARGE SUMMARY       Patient Name: Nilda Julien  : 1945  MRN: [id number]  Primary Care Physician: Ralph Marcus APRN    Date of Admission to SNF rehab: 2024  Date of Discharge from SNF rehab: 2024  SNF rehab-discharge Diagnoses   Hospital-acquired weakness after recent surgery  Recent revascularization procedure for ischemic rest pain secondary to peripheral arterial disease  Status post axillobifemoral bypass graft by Dr. Vásquez 24  COPD  Diastolic heart failure  Valvular heart disease  Ongoing tobacco use cigarettes  Peripheral arterial disease  Hyperlipidemia  Hypertension  GERD  Atelectasis, resolved  SNF rehab-Hospital Course   Hospital Course:  Nilda Julien is a 79 y.o. female past medical history significant for chronic and ongoing tobacco use with cigarettes, coronary artery disease status post CABG, diastolic congestive heart failure, COPD, peripheral arterial disease that underwent axillobifemoral bypass graft, bilateral femoral endarterectomy for ischemic rest pain of the lower extremities postoperatively patient was admitted to the ICU was stabilized postoperative course uneventful did have some shortness of air and decreasing oxygenation saturations postoperatively which was felt to be secondary to atelectasis patient remained hemodynamically and clinically stable was seen by PT and OT deemed a good candidate for inpatient rehab was transferred to the skilled nursing facility for ongoing therapy.     Admitted to SNF and worked with PT and OT on a daily basis.  She progressed very quickly and was back to baseline.  She had no setbacks.  She used her incentive spirometer regularly.  Her respiratory status remained stable and she was on room air with good sats.  She was doing very well and eager to return home at time of discharge.  She will be following up with vascular surgeon next week.  Also PCP.  She will keep follow-up appointments  with cardiology and pulmonology.    Discharge Follow Up / Recommendations (labs, diagnostics, meds, etc):   As above  Future Appointments   Date Time Provider Department Center   1/13/2025  2:30 PM Woodrow Samaniego [place] [facility] [place] [facility]   3/4/2025  1:45 PM Dannie Obando [place] [facility] [place] [facility]   4/23/2025  3:00 PM Ralph Marcus APRN [facility] [place] [facility]     Consultants     Consults       Date and Time Order Name Status Description    11/25/2024 11:40 PM Inpatient Internal Medicine Consult            On Day of Discharge   VS: Temp:  [97.7 °F (36.5 °C)] 97.7 °F (36.5 °C)  Heart Rate:  [60-82] 80  Resp:  [16-18] 18  BP: (130-158)/(74-85) 158/85  EXAM: Alert.  No distress.  Lungs clear without wheeze.  No murmur noted.  No edema.  Lower extremities warm to touch intact neurovascularly.  Calm mood.     Discharge Medications        Changes to Medications        Instructions Start Date   diphenoxylate-atropine 2.5-0.025 MG per tablet  Commonly known as: LOMOTIL  What changed: See the new instructions.   TAKE 2 TABLETS BY MOUTH DAILY AS NEEDED FOR DIARRHEA      fluticasone 50 MCG/ACT nasal spray  Commonly known as: FLONASE  What changed: See the new instructions.   SPRAY 2 SPRAYS IN EACH NOSTRIL ONCE DAILY      ondansetron 4 MG tablet  Commonly known as: ZOFRAN  What changed: See the new instructions.   TAKE 1 TABLET BY MOUTH EVERY 8 HOURS AS NEEDED FOR NAUSEA AND/OR VOMITING      triamcinolone 0.025 % ointment  Commonly known as: KENALOG  What changed: See the new instructions.   APPLY TO THE AFFECTED AREA(S) 2 TIMES A DAY AS DIRECTED             Continue These Medications        Instructions Start Date   albuterol sulfate  (90 Base) MCG/ACT inhaler  Commonly known as: Ventolin HFA   2 puffs, Inhalation, Every 4 Hours PRN      aspirin 81 MG EC tablet   81 mg, Daily      atorvastatin 80 MG tablet  Commonly known as: LIPITOR   80 mg, Oral, Every Night at Bedtime      carvedilol 6.25 MG tablet  Commonly known as: COREG    TAKE 1 TABLET BY MOUTH TWICE A DAY WITH A MEAL      cetirizine 10 MG tablet  Commonly known as: zyrTEC   10 mg, Oral, Daily      cilostazol 100 MG tablet  Commonly known as: PLETAL   100 mg, Oral, 2 Times Daily      omeprazole 20 MG capsule  Commonly known as: priLOSEC   20 mg, Daily      oxyCODONE-acetaminophen 5-325 MG per tablet  Commonly known as: PERCOCET   1 tablet, Oral, Every 6 Hours PRN      sertraline 50 MG tablet  Commonly known as: ZOLOFT   50 mg, Oral, Daily      traZODone 50 MG tablet  Commonly known as: DESYREL   50 mg, Oral, Nightly      vitamin D 1.25 MG (36717 UT) capsule capsule  Commonly known as: ERGOCALCIFEROL   50,000 Units, Oral, Weekly      vitamin E 400 UNIT capsule   400 Units, 2 Times Daily             Stop These Medications      Enoxaparin Sodium 30 MG/0.3ML solution prefilled syringe syringe  Commonly known as: LOVENOX            Procedures   None during rehab  Imaging   None during rehab  Discharge Details   Hospital Diet:     Diet Order   Procedures    Diet: Cardiac; Healthy Heart (2-3 Na+); Texture: Soft to Chew (NDD 3); Soft to Chew: Chopped Meat; Fluid Consistency: Thin (IDDSI 0)     CODE STATUS:    Code Status and Medical Interventions: CPR (Attempt to Resuscitate); Full Support   Ordered at: 11/25/24 7250     Code Status (Patient has no pulse and is not breathing):    CPR (Attempt to Resuscitate)     Medical Interventions (Patient has pulse or is breathing):    Full Support     Additional Instructions for the Follow-ups that You Need to Schedule       Discharge Follow-up with PCP   As directed       Currently Documented PCP:    Ralph Marcus APRN    PCP Phone Number:    [phone]     Follow Up Details: 1-2 weeks        Discharge Follow-up with Specified Provider: Vascular: Dr. Vásquez next week.  Post op follow up   As directed      To: Vascular: Dr. Vásquez next week.  Post op follow up              Discharge Disposition: Home or Self Care  Pertinent  Labs   LAB RESULTS:      Lab  11/28/24  0442 11/27/24  0530 11/25/24  1605   WBC 4.09 3.99 4.69   HEMOGLOBIN 9.2* 9.4* 9.0*   HEMATOCRIT 29.1* 29.4* 27.6*   PLATELETS 150 129* 128*   NEUTROS ABS 2.84 2.86 3.70   IMMATURE GRANS (ABS) 0.01 0.01 0.01   LYMPHS ABS 0.97 0.82 0.72   MONOS ABS 0.24 0.27 0.24   EOS ABS 0.01 0.01 0.00   MCV 87.9 87.0 86.8   PROCALCITONIN  --   --  0.07         Lab 11/28/24  0442 11/27/24  0530 11/25/24  1605   SODIUM 139 138 138   POTASSIUM 4.0 2.9* 3.3*   CHLORIDE 108* 105 106   CO2 23.9 22.2 21.4*   ANION GAP 7.1 10.8 10.6   BUN 11 11 14   CREATININE 0.60 0.57 0.70   EGFR 91.4 92.6 88.1   GLUCOSE 96 121* 97   CALCIUM 8.4* 8.0* 7.7*   MAGNESIUM 1.7  --   --    PHOSPHORUS  --  3.3  --          Lab 11/27/24  0530   ALBUMIN 3.0*         Lab 11/27/24  0530   PROBNP 1,664.0                 Brief Urine Lab Results  (Last result in the past 365 days)        Color   Clarity   Blood   Leuk Est   Nitrite   Protein   CREAT   Urine HCG        04/11/24 1457 Yellow   Cloudy   Negative   Trace   Negative   30 mg/dL (1+)                 Microbiology Results (last 10 days)       Procedure Component Value - Date/Time    COVID PRE-OP / PRE-PROCEDURE SCREENING ORDER (NO ISOLATION) - Swab, Nasal Cavity [[id number]]  (Normal) Collected: 11/28/24 0847    Lab Status: Final result Specimen: Swab from Nasal Cavity Updated: 11/28/24 1321    Narrative:      The following orders were created for panel order COVID PRE-OP / PRE-PROCEDURE SCREENING ORDER (NO ISOLATION) - Swab, Nasal Cavity.  Procedure                               Abnormality         Status                     ---------                               -----------         ------                     COVID-19,CEPHEID/TERESITA,CO...[[id number]]  Normal              Final result                 Please view results for these tests on the individual orders.    COVID-19,CEPHEID/TERESITA,COR/ELENITA/PAD/UMANG/LAG/NAIMA IN-HOUSE,NP SWAB IN TRANSPORT MEDIA 1 HR TAT, RT-PCR - Swab, Nasopharynx [[id number]]  (Normal)  Collected: 11/28/24 0847    Lab Status: Final result Specimen: Swab from Nasopharynx Updated: 11/28/24 1321     COVID19 Not Detected    Narrative:      Fact sheet for providers: https://www.fda.gov/media/532153/download     Fact sheet for patients: https://www.fda.gov/media/592268/download  Fact sheet for providers: https://www.fda.gov/media/705661/download     Fact sheet for patients: https://www.fda.gov/media/288163/download    COVID PRE-OP / PRE-PROCEDURE SCREENING ORDER (NO ISOLATION) - Swab, Nasopharynx [[id number]]  (Normal) Collected: 11/25/24 1158    Lab Status: Final result Specimen: Swab from Nasopharynx Updated: 11/25/24 1456    Narrative:      The following orders were created for panel order COVID PRE-OP / PRE-PROCEDURE SCREENING ORDER (NO ISOLATION) - Swab, Nasopharynx.  Procedure                               Abnormality         Status                     ---------                               -----------         ------                     COVID-19,CEPHEID/ETRESITA,CO...[[id number]]  Normal              Final result                 Please view results for these tests on the individual orders.    COVID-19,CEPHEID/TERESITA,COR/ELENITA/PAD/UMANG/LAG/NAIMA IN-HOUSE,NP SWAB IN TRANSPORT MEDIA 1 HR TAT, RT-PCR - Swab, Nasopharynx [[id number]]  (Normal) Collected: 11/25/24 1158    Lab Status: Final result Specimen: Swab from Nasopharynx Updated: 11/25/24 1456     COVID19 Not Detected    Narrative:      Fact sheet for providers: https://www.fda.gov/media/939135/download     Fact sheet for patients: https://www.fda.gov/media/041381/download  Fact sheet for providers: https://www.fda.gov/media/946120/download     Fact sheet for patients: https://www.fda.gov/media/264241/download          Labs Pending at Discharge:   Time spent on Discharge including face to face service: > 30 minutes  Electronically signed by MIGUELANGEL Esquivel, 11/29/24, 9:03 AM EST.

## 2024-11-30 ENCOUNTER — TRANSITIONAL CARE MANAGEMENT TELEPHONE ENCOUNTER (OUTPATIENT)
Dept: CALL CENTER | Facility: HOSPITAL | Age: 79
End: 2024-11-30
Payer: MEDICARE

## 2024-11-30 NOTE — OUTREACH NOTE
Call Center TCM Note      Flowsheet Row Responses   [facility] patient discharged from? [facility]   Does the patient have one of the following disease processes/diagnoses(primary or secondary)? Other   TCM attempt successful? Yes   Call start time 1318   Call end time 1321   Discharge diagnosis Ischemic rest pain of lower extremity   Is patient permission given to speak with other caregiver? No   Person spoke with today (if not patient) and relationship Patient   Meds reviewed with patient/caregiver? Yes   Is the patient having any side effects they believe may be caused by any medication additions or changes? No   Does the patient have all medications ordered at discharge? N/A  [no new rxs ordered at discharge.]   Is the patient taking all medications as directed (includes completed medication regime)? Yes   Comments Patient decflined scheduling a hospital f/u appt with PCP, JOSE Rose at time of call. patient states she wants to see specialist first and then will call and make an appt with PCP.   Does the patient have an appointment with their PCP within 7-14 days of discharge? No   Nursing Interventions Patient declined scheduling/rescheduling appointment at this time, Patient desires to follow up with specialty only   What is the Home health agency?  [facility]   Has home health visited the patient within 72 hours of discharge? Call prior to 72 hours   Psychosocial issues? No   Did the patient receive a copy of their discharge instructions? Yes   Nursing interventions Reviewed instructions with patient   What is the patient's perception of their health status since discharge? Improving   Is the patient/caregiver able to teach back signs and symptoms related to disease process for when to call PCP? Yes   Is the patient/caregiver able to teach back signs and symptoms related to disease process for when to call 911? Yes   Is the patient/caregiver able to teach back the  hierarchy of who to call/visit for symptoms/problems? PCP, Specialist, Home health nurse, Urgent Care, ED, 911 Yes   TCM call completed? Yes   Wrap up additional comments Patient decflined scheduling a hospital f/u appt with PCP, JOSE Rose at time of call. patient states she wants to see specialist first and then will call and make an appt with PCP.   Call end time 1321   Would this patient benefit from a Referral to [facility] Social Work? No   Is the patient interested in additional calls from an ambulatory ? No            Swati Ho RN    11/30/2024, 13:22 EST

## 2024-12-02 ENCOUNTER — TELEPHONE (OUTPATIENT)
Dept: FAMILY MEDICINE CLINIC | Facility: CLINIC | Age: 79
End: 2024-12-02
Payer: MEDICARE

## 2024-12-02 ENCOUNTER — PATIENT OUTREACH (OUTPATIENT)
Dept: CASE MANAGEMENT | Facility: OTHER | Age: 79
End: 2024-12-02
Payer: MEDICARE

## 2024-12-02 ENCOUNTER — TELEPHONE (OUTPATIENT)
Dept: VASCULAR SURGERY | Facility: HOSPITAL | Age: 79
End: 2024-12-02
Payer: MEDICARE

## 2024-12-02 NOTE — TELEPHONE ENCOUNTER
Requesting approval for Home PT with [facility], was discharged from the Hospital with [facility].

## 2024-12-02 NOTE — TELEPHONE ENCOUNTER
PT was already approved in another TE.   Normal vision: sees adequately in most situations; can see medication labels, newsprint

## 2024-12-02 NOTE — OUTREACH NOTE
AMBULATORY CASE MANAGEMENT NOTE    Names and Relationships of Patient/Support Persons: Contact: Nilda Julien; Relationship: Self -     Patient Outreach  Patient identified as potential CCM Program candidate from hospital discharge, she was hospitalized 11/19-11/25 for elective revascularization of BLE, she had an aortic occlusion causing pain. She was having SOA after her procedure, xray showed possible PNA, so she was discharged to Skilled Nursing unit on 11/25 and was discharged from there 11/29.  Spoke with patient about the program and she said she didn't need anything, asked if she was able to afford her bills, and medications, she stated she wasn't having any issues with that.   Patient doesn't have follow-up appointment with vascular, she will call and make this.  No other needs, discharging from [facility].   Education Documentation  No documentation found.      Zaynab KOO  Ambulatory Case Management  12/2/2024, 10:37 EST

## 2024-12-02 NOTE — TELEPHONE ENCOUNTER
Lesly with [facility] states they received referral for home health since patient was discharged from hospital on 11/29. Lesly requesting to know if PCP ok with [facility] seeing patient for home health services.

## 2024-12-02 NOTE — TELEPHONE ENCOUNTER
HOME HEALTH NURSE CALLED TO REPORT A CONCERN REGARDING STAPLES. INFORMED NURSE TEJAL WILL USUALLY STAY IN UNTIL WE SEE PATIENT IN THE CLINIC.

## 2024-12-06 ENCOUNTER — OUTSIDE FACILITY SERVICE (OUTPATIENT)
Dept: FAMILY MEDICINE CLINIC | Facility: CLINIC | Age: 79
End: 2024-12-06
Payer: MEDICARE

## 2024-12-09 ENCOUNTER — TELEPHONE (OUTPATIENT)
Dept: FAMILY MEDICINE CLINIC | Facility: CLINIC | Age: 79
End: 2024-12-09
Payer: MEDICARE

## 2024-12-09 NOTE — TELEPHONE ENCOUNTER
Spoke with Doreen, she discussed with pt about her eating habits and to drink her ensure shakes.  She isn't consistant with drinking them, she forgets.  She has an appointment on Wednesday with us.      Per Doreen, if you want to change weight parameters, we can send it in to the office, it is set at 86-76 lbs they are to call below or above that.

## 2024-12-11 ENCOUNTER — OFFICE VISIT (OUTPATIENT)
Dept: FAMILY MEDICINE CLINIC | Facility: CLINIC | Age: 79
End: 2024-12-11
Payer: MEDICARE

## 2024-12-11 VITALS
OXYGEN SATURATION: 97 % | TEMPERATURE: 97.7 F | HEART RATE: 79 BPM | WEIGHT: 76.6 LBS | HEIGHT: 58 IN | DIASTOLIC BLOOD PRESSURE: 60 MMHG | SYSTOLIC BLOOD PRESSURE: 136 MMHG | RESPIRATION RATE: 18 BRPM | BODY MASS INDEX: 16.08 KG/M2

## 2024-12-11 DIAGNOSIS — Z95.828 S/P AORTO-BIFEMORAL BYPASS SURGERY: Primary | ICD-10-CM

## 2024-12-11 DIAGNOSIS — I73.9 PAD (PERIPHERAL ARTERY DISEASE): ICD-10-CM

## 2024-12-11 PROCEDURE — 3078F DIAST BP <80 MM HG: CPT | Performed by: NURSE PRACTITIONER

## 2024-12-11 PROCEDURE — 1125F AMNT PAIN NOTED PAIN PRSNT: CPT | Performed by: NURSE PRACTITIONER

## 2024-12-11 PROCEDURE — 1111F DSCHRG MED/CURRENT MED MERGE: CPT | Performed by: NURSE PRACTITIONER

## 2024-12-11 PROCEDURE — 3075F SYST BP GE 130 - 139MM HG: CPT | Performed by: NURSE PRACTITIONER

## 2024-12-11 PROCEDURE — 99495 TRANSJ CARE MGMT MOD F2F 14D: CPT | Performed by: NURSE PRACTITIONER

## 2024-12-11 NOTE — PROGRESS NOTES
Transitional Care Follow Up Visit  Subjective     Nilda Julien is a 79 y.o. female who presents for a transitional care management visit.    Within 48 business hours after discharge our office contacted her via telephone to coordinate her care and needs.      I reviewed and discussed the details of that call along with the discharge summary, hospital problems, inpatient lab results, inpatient diagnostic studies, and consultation reports with Nilda.     Current outpatient and discharge medications have been reconciled for the patient.  Reviewed by: JOSE Rose          11/29/2024     5:15 PM   Date of TCM Phone Call   [facility]   Date of Admission 11/25/2024   Date of Discharge 11/29/2024   Discharge Disposition Home-Health Care [facility]     Risk for Readmission (LACE) Score: 10 (11/29/2024  6:00 AM)      History of Present Illness  Was in for CHF and has lost weight.  States today wants to cough. Does have PND.  Has staples in place from femoral endarterectomy.  Quit smoking th 12/19/24.       Course During Hospital Stay:  79 y.o. female past medical history significant for chronic and ongoing tobacco use with cigarettes, coronary artery disease status post CABG, diastolic congestive heart failure, COPD, peripheral arterial disease that underwent axillobifemoral bypass graft, bilateral femoral endarterectomy for ischemic rest pain of the lower extremities postoperatively patient was admitted to the ICU was stabilized postoperative course uneventful did have some shortness of air and decreasing oxygenation saturations postoperatively which was felt to be secondary to atelectasis patient remained hemodynamically and clinically stable was seen by PT and OT deemed a good candidate for inpatient rehab was transferred to the skilled nursing facility for ongoing therapy.      Admitted to SNF and worked with PT and OT on a daily basis.  She progressed very quickly and was back to baseline.  She had  "no setbacks.  She used her incentive spirometer regularly.  Her respiratory status remained stable and she was on room air with good sats.      The following portions of the patient's history were reviewed and updated as appropriate: allergies, current medications, past family history, past medical history, past social history, past surgical history, and problem list.    Review of Systems   Constitutional:  Negative for appetite change.   HENT:  Negative for postnasal drip.    Respiratory:  Negative for cough, chest tightness and shortness of breath.    Cardiovascular:  Negative for chest pain and leg swelling.   Genitourinary:  Negative for difficulty urinating.   Musculoskeletal:  Negative for back pain.   Neurological:  Negative for dizziness.   Psychiatric/Behavioral:  Negative for sleep disturbance.        Objective   /60 (BP Location: Right arm, Patient Position: Sitting, Cuff Size: Pediatric)   Pulse 79   Temp 97.7 °F (36.5 °C) (Temporal)   Resp 18   Ht 147.3 cm (57.99\")   Wt 34.7 kg (76 lb 9.6 oz)   SpO2 97%   BMI 16.01 kg/m²   Physical Exam  Vitals reviewed.   Constitutional:       Appearance: Normal appearance. She is well-developed.   HENT:      Head: Normocephalic and atraumatic.      Mouth/Throat:      Pharynx: No oropharyngeal exudate.   Eyes:      Conjunctiva/sclera: Conjunctivae normal.      Pupils: Pupils are equal, round, and reactive to light.   Cardiovascular:      Rate and Rhythm: Normal rate and regular rhythm.      Pulses:           Posterior tibial pulses are 1+ on the right side and 1+ on the left side.      Heart sounds: Normal heart sounds. No murmur heard.     No friction rub. No gallop.   Pulmonary:      Effort: Pulmonary effort is normal.      Breath sounds: Normal breath sounds. No wheezing or rhonchi.   Abdominal:          Comments: Ridge from femoral bypass that can be palpated.  Staples well approximated and no redness at site.   Skin:     General: Skin is warm and dry. "   Neurological:      Mental Status: She is alert and oriented to person, place, and time.   Psychiatric:         Mood and Affect: Mood and affect normal.         Behavior: Behavior normal.         Thought Content: Thought content normal.         Judgment: Judgment normal.         Assessment & Plan   Diagnoses and all orders for this visit:    1. S/P aorto-bifemoral bypass surgery (Primary)    2. PAD (peripheral artery disease)      Has labs in to recheck CBC  for anemia noted with surgery.       Encouraged to continue with the cessation of smoking.

## 2024-12-16 ENCOUNTER — OFFICE VISIT (OUTPATIENT)
Dept: VASCULAR SURGERY | Facility: HOSPITAL | Age: 79
End: 2024-12-16
Payer: MEDICARE

## 2024-12-16 VITALS
HEART RATE: 79 BPM | TEMPERATURE: 97.5 F | OXYGEN SATURATION: 96 % | DIASTOLIC BLOOD PRESSURE: 60 MMHG | SYSTOLIC BLOOD PRESSURE: 132 MMHG | RESPIRATION RATE: 18 BRPM

## 2024-12-16 DIAGNOSIS — M79.606 ISCHEMIC REST PAIN OF LOWER EXTREMITY: Primary | ICD-10-CM

## 2024-12-16 DIAGNOSIS — I99.8 ISCHEMIC REST PAIN OF LOWER EXTREMITY: Primary | ICD-10-CM

## 2024-12-16 DIAGNOSIS — R09.89 OTHER SPECIFIED SYMPTOMS AND SIGNS INVOLVING THE CIRCULATORY AND RESPIRATORY SYSTEMS: ICD-10-CM

## 2024-12-16 PROCEDURE — 1159F MED LIST DOCD IN RCRD: CPT | Performed by: SURGERY

## 2024-12-16 PROCEDURE — 3075F SYST BP GE 130 - 139MM HG: CPT | Performed by: SURGERY

## 2024-12-16 PROCEDURE — 99024 POSTOP FOLLOW-UP VISIT: CPT | Performed by: SURGERY

## 2024-12-16 PROCEDURE — G0463 HOSPITAL OUTPT CLINIC VISIT: HCPCS | Performed by: SURGERY

## 2024-12-16 PROCEDURE — 3078F DIAST BP <80 MM HG: CPT | Performed by: SURGERY

## 2024-12-16 PROCEDURE — 1160F RVW MEDS BY RX/DR IN RCRD: CPT | Performed by: SURGERY

## 2024-12-16 NOTE — PROGRESS NOTES
Psychiatric   Follow up Office    Patient Name: Nilda Julien  : 1945  MRN: [id number]  Primary Care Physician:  Ralph Marcus APRN      Subjective   Subjective     HPI:    Nilda Julien is a 79 y.o. female here for follow-up after a right axillobifemoral bypass graft 2024.  Doing well.  No complaints.  Walking well.  Pain is better.      Objective     Vitals:   Temp:  [97.5 °F (36.4 °C)] 97.5 °F (36.4 °C)  Heart Rate:  [79] 79  Resp:  [18] 18  BP: (132)/(60) 132/60    Physical Exam      General: Alert, no acute distress  Wounds: All healing well without any evidence of infections.  Staple lines intact.  Feet: Warm, well-perfused bilaterally.    Assessment & Plan   Assessment / Plan     Diagnoses and all orders for this visit:    1. Ischemic rest pain of lower extremity (Primary)  -     Duplex Upper Extremity Art / Grafts - Right CAR; Future  -     Duplex Lower Extremity Art / Grafts - Bilateral CAR; Future  -     Doppler Ankle Brachial Index Single Level CAR; Future    2. Other specified symptoms and signs involving the circulatory and respiratory systems  -     Duplex Upper Extremity Art / Grafts - Right CAR; Future  -     Duplex Lower Extremity Art / Grafts - Bilateral CAR; Future  -     Doppler Ankle Brachial Index Single Level CAR; Future       Assessment/Plan:   Satisfactory progress following right axillobifemoral bypass graft.  Staples out, Steri-Strips.  Follow-up with studies in 2 months.        Electronically signed by Selvin Vásquez MD, 24, 2:16 PM EST.  
[FreeTextEntry1] : 1 Month postop\par \par 54yo s/p Dilation  and curettage, hysteroscopy here for 1 month post-op visit. \par \par LMP:53 , denies any PMB \par OBHX:  x 3\par GYNHX: denies hx of ovarian cysts/fibroids or abnormal pap smears. \par \par PMHX: denies\par SX: total knee replacement and breast reduction 8 years ago\par \par MED: Denies\par ALL: NKDA\par \par SOCIAL: working as  in [employer], denies any toxic habits\par FAMHX: denies fmhx of cancer\par \par Health Maintenance\par Mammogram-2023- 6 month follow up needed- 2023 patient is aware\par Last pap- 2022- negative, HPV negative \par Last colonoscopy - 2021- repeat in 10 years
30-Sep-2020 18:50

## 2025-01-02 ENCOUNTER — OUTSIDE FACILITY SERVICE (OUTPATIENT)
Dept: FAMILY MEDICINE CLINIC | Facility: CLINIC | Age: 80
End: 2025-01-02
Payer: MEDICARE

## 2025-01-03 DIAGNOSIS — R19.7 DIARRHEA, UNSPECIFIED TYPE: ICD-10-CM

## 2025-01-03 RX ORDER — DIPHENOXYLATE HYDROCHLORIDE AND ATROPINE SULFATE 2.5; .025 MG/1; MG/1
TABLET ORAL
Qty: 60 TABLET | Refills: 1 | Status: SHIPPED | OUTPATIENT
Start: 2025-01-03

## 2025-01-03 NOTE — TELEPHONE ENCOUNTER
UPCOMING APPTS  With Family Medicine (JOSE Rose)  06/11/2025 at 1:15 PM  LAST OFFICE VISIT - THIS DEPT  12/11/2024 Ralph Marcus APRN

## 2025-02-13 DIAGNOSIS — J30.9 ALLERGIC RHINITIS, UNSPECIFIED SEASONALITY, UNSPECIFIED TRIGGER: ICD-10-CM

## 2025-02-13 RX ORDER — CARVEDILOL 6.25 MG/1
TABLET ORAL
Qty: 180 TABLET | Refills: 3 | Status: SHIPPED | OUTPATIENT
Start: 2025-02-13

## 2025-02-13 RX ORDER — FLUTICASONE PROPIONATE 50 MCG
SPRAY, SUSPENSION (ML) NASAL
Qty: 48 G | Refills: 0 | Status: SHIPPED | OUTPATIENT
Start: 2025-02-13

## 2025-02-13 RX ORDER — CETIRIZINE HYDROCHLORIDE 10 MG/1
10 TABLET ORAL DAILY
Qty: 90 TABLET | Refills: 1 | Status: SHIPPED | OUTPATIENT
Start: 2025-02-13

## 2025-02-25 ENCOUNTER — TELEPHONE (OUTPATIENT)
Age: 80
End: 2025-02-25
Payer: MEDICARE

## 2025-02-25 NOTE — TELEPHONE ENCOUNTER
Called patient to reschedule her testing and office visit. She stated that she is sick and will call us back to reschedule.

## 2025-03-23 DIAGNOSIS — R19.7 DIARRHEA, UNSPECIFIED TYPE: ICD-10-CM

## 2025-03-24 RX ORDER — DIPHENOXYLATE HYDROCHLORIDE AND ATROPINE SULFATE 2.5; .025 MG/1; MG/1
TABLET ORAL
Qty: 60 TABLET | Refills: 0 | Status: SHIPPED | OUTPATIENT
Start: 2025-03-24

## 2025-04-16 NOTE — PROGRESS NOTES
[facility]  Cardiology progress Note    Patient Name: Nilda Julien  : 1945    CHIEF COMPLAINT  CAD        Subjective   Subjective     HISTORY OF PRESENT ILLNESS    Nilda Julien is a 79 y.o. female with CAD status post CABG.  No chest pain    REVIEW OF SYSTEMS    Constitutional:    No fever, no weight loss  Skin:     No rash  Otolaryngeal:    No difficulty swallowing  Cardiovascular: See HPI.  Pulmonary:    No cough, no sputum production    Personal History     Social History:    reports that she quit smoking about 24 years ago. Her smoking use included cigarettes. She started smoking about 64 years ago. She has a 20 pack-year smoking history. She has been exposed to tobacco smoke. She has never used smokeless tobacco. She reports that she does not drink alcohol and does not use drugs.    Home Medications:  Current Outpatient Medications on File Prior to Visit   Medication Sig    albuterol sulfate HFA (Ventolin HFA) 108 (90 Base) MCG/ACT inhaler Inhale 2 puffs Every 4 (Four) Hours As Needed for Wheezing or Shortness of Air.    aspirin 81 MG EC tablet Take 1 tablet by mouth Daily. Last dose 22 per Dr. Obando instructed    carvedilol (COREG) 6.25 MG tablet TAKE 1 TABLET BY MOUTH TWICE A DAY WITH A MEAL    cetirizine (zyrTEC) 10 MG tablet TAKE 1 TABLET BY MOUTH DAILY    diphenoxylate-atropine (LOMOTIL) 2.5-0.025 MG per tablet TAKE 2 TABLETS BY MOUTH DAILY AS NEEDED FOR DIARRHEA    fluticasone (FLONASE) 50 MCG/ACT nasal spray SPRAY 2 SPRAYS IN EACH NOSTRIL ONCE DAILY    omeprazole (priLOSEC) 20 MG capsule Take 1 capsule by mouth Daily.    ondansetron (ZOFRAN) 4 MG tablet TAKE 1 TABLET BY MOUTH EVERY 8 HOURS AS NEEDED FOR NAUSEA AND/OR VOMITING (Patient taking differently: Take 1 tablet by mouth Every 8 (Eight) Hours As Needed.)    sertraline (ZOLOFT) 50 MG tablet TAKE 1 TABLET BY MOUTH DAILY    traZODone (DESYREL) 50 MG tablet Take 1 tablet by mouth Every Night.    vitamin D  (ERGOCALCIFEROL) 1.25 MG (15490 UT) capsule capsule TAKE 1 CAPSULE BY MOUTH ONCE WEEKLY    vitamin E 400 UNIT capsule Take 1 capsule by mouth 2 (Two) Times a Day.    atorvastatin (LIPITOR) 80 MG tablet TAKE 1 TABLET BY MOUTH EVERY NIGHT AT BEDTIME (Patient not taking: Reported on 4/22/2025)    cilostazol (PLETAL) 100 MG tablet TAKE 1 TABLET BY MOUTH TWICE A DAY (Patient not taking: Reported on 4/22/2025)    triamcinolone (KENALOG) 0.025 % ointment APPLY TO THE AFFECTED AREA(S) 2 TIMES A DAY AS DIRECTED (Patient not taking: Reported on 4/22/2025)     No current facility-administered medications on file prior to visit.       Past Medical History:   Diagnosis Date    AAA (abdominal aortic aneurysm)     INFRA RENAL, 3 CM LAST CT ABDOMEN    Allergic rhinitis     Seasonal allergies    Anxiety 04/22/2024    Aortic stenosis, mild 10/18/2021    Arthritis     CAD s/p CABG 10/18/2021    CHF (congestive heart failure)     Chronic heart failure with preserved ejection fraction (HFpEF) 12/30/2021    Claudication of both lower extremities     COPD (chronic obstructive pulmonary disease)     Diverticulitis     Diverticulosis 01/24    Elevated cholesterol     Essential hypertension 06/07/2021    Fatigue     GERD (gastroesophageal reflux disease)     Heart attack 10/18/2021    Hemorrhoids 2013    HL (hearing loss) 01/20    Hyperlipidemia LDL goal <70 06/07/2021    Irritable bowel syndrome with diarrhea     Memory loss     forgetfulness    Microhematuria 01/13/2019    Nephrolithiasis 01/13/2019    Smoker 12/30/2021       Allergies:  No Known Allergies    Objective    Objective       Vitals:   Heart Rate:  [79] 79  Resp:  [16] 16  BP: (129)/(65) 129/65  Body mass index is 17.02 kg/m².     PHYSICAL EXAM:    General Appearance:   well developed  well nourished  HENT:   oropharynx moist  lips not cyanotic  Neck:  thyroid not enlarged  supple  Respiratory:  no respiratory distress  normal breath sounds  no rales  Cardiovascular:  no  jugular venous distention  regular rhythm  apical impulse normal  S1 normal, S2 normal  no S3, no S4   no murmur  no rub, no thrill  carotid pulses normal; no bruit  pedal pulses normal  lower extremity edema: none    Skin:   warm, dry  Psychiatric:  judgement and insight appropriate  normal mood and affect        Result Review:  I have personally reviewed the available results from  [x]  Laboratory  [x]  EKG  [x]  Cardiology  [x]  Medications  [x]  Old records  []  Other:     Procedures  Lab Results   Component Value Date    CHOL 108 04/11/2024    CHOL 114 10/17/2023    CHOL 113 02/27/2023     Lab Results   Component Value Date    TRIG 158 (H) 04/11/2024    TRIG 148 10/17/2023    TRIG 85 02/27/2023     Lab Results   Component Value Date    HDL 33 (L) 04/11/2024    HDL 32 (L) 10/17/2023    HDL 39 (L) 02/27/2023     Lab Results   Component Value Date    LDL 48 04/11/2024    LDL 56 10/17/2023    LDL 57 02/27/2023     Lab Results   Component Value Date    VLDL 27 04/11/2024    VLDL 26 10/17/2023    VLDL 17 02/27/2023     Results for orders placed in visit on 06/20/22    Adult Transthoracic Echo Complete W/ Cont if Necessary Per Protocol    Interpretation Summary  Mild diffuse hypokinesis with adequate left ventricular systolic function .  Fibrocalcific mitral and aortic valves.  Mild MR and mild TR.  Mild AI.     Impression/Plan:  1.  Coronary artery s/p CABG stable: Continue aspirin 81 mg a day.  No chest pain.  2.  Essential hypertension controlled: Continue carvedilol 6.25 mg twice daily.  Blood pressure controlled at home.  3.  Mixed hyperlipidemia: Continue Lipitor 80 mg a day.  Lipid profile showed LDL of 53.  4.  Mild valvular heart disease/mild aortic regurgitation: Asymptomatic.              Dannie Obando MD   04/22/25   13:51 EDT

## 2025-04-22 ENCOUNTER — OFFICE VISIT (OUTPATIENT)
Dept: CARDIOLOGY | Facility: CLINIC | Age: 80
End: 2025-04-22
Payer: MEDICARE

## 2025-04-22 VITALS
DIASTOLIC BLOOD PRESSURE: 65 MMHG | SYSTOLIC BLOOD PRESSURE: 129 MMHG | WEIGHT: 81.4 LBS | BODY MASS INDEX: 17.02 KG/M2 | RESPIRATION RATE: 16 BRPM | HEART RATE: 79 BPM | OXYGEN SATURATION: 95 %

## 2025-04-22 DIAGNOSIS — Z95.1 HX OF CABG: ICD-10-CM

## 2025-04-22 DIAGNOSIS — E78.5 HYPERLIPIDEMIA LDL GOAL <70: ICD-10-CM

## 2025-04-22 DIAGNOSIS — I10 HYPERTENSION, ESSENTIAL: ICD-10-CM

## 2025-04-22 DIAGNOSIS — E78.2 HYPERLIPEMIA, MIXED: ICD-10-CM

## 2025-04-22 DIAGNOSIS — I25.10 CORONARY ARTERY DISEASE INVOLVING NATIVE CORONARY ARTERY OF NATIVE HEART WITHOUT ANGINA PECTORIS: Primary | ICD-10-CM

## 2025-04-22 PROCEDURE — 3078F DIAST BP <80 MM HG: CPT | Performed by: SPECIALIST

## 2025-04-22 PROCEDURE — 1160F RVW MEDS BY RX/DR IN RCRD: CPT | Performed by: SPECIALIST

## 2025-04-22 PROCEDURE — 99214 OFFICE O/P EST MOD 30 MIN: CPT | Performed by: SPECIALIST

## 2025-04-22 PROCEDURE — 1159F MED LIST DOCD IN RCRD: CPT | Performed by: SPECIALIST

## 2025-04-22 PROCEDURE — 3074F SYST BP LT 130 MM HG: CPT | Performed by: SPECIALIST

## 2025-04-22 RX ORDER — ATORVASTATIN CALCIUM 80 MG/1
80 TABLET, FILM COATED ORAL
Qty: 90 TABLET | Refills: 1 | Status: SHIPPED | OUTPATIENT
Start: 2025-04-22

## 2025-04-28 DIAGNOSIS — R19.7 DIARRHEA, UNSPECIFIED TYPE: ICD-10-CM

## 2025-04-28 RX ORDER — DIPHENOXYLATE HYDROCHLORIDE AND ATROPINE SULFATE 2.5; .025 MG/1; MG/1
TABLET ORAL
Qty: 60 TABLET | Refills: 0 | Status: SHIPPED | OUTPATIENT
Start: 2025-04-28

## 2025-04-30 ENCOUNTER — PATIENT MESSAGE (OUTPATIENT)
Dept: FAMILY MEDICINE CLINIC | Facility: CLINIC | Age: 80
End: 2025-04-30
Payer: MEDICARE

## 2025-05-01 ENCOUNTER — LAB (OUTPATIENT)
Dept: LAB | Facility: HOSPITAL | Age: 80
End: 2025-05-01
Payer: MEDICARE

## 2025-05-01 ENCOUNTER — HOSPITAL ENCOUNTER (OUTPATIENT)
Dept: GENERAL RADIOLOGY | Facility: HOSPITAL | Age: 80
Discharge: HOME | End: 2025-05-01
Payer: MEDICARE

## 2025-05-01 ENCOUNTER — OFFICE VISIT (OUTPATIENT)
Dept: FAMILY MEDICINE CLINIC | Facility: CLINIC | Age: 80
End: 2025-05-01
Payer: MEDICARE

## 2025-05-01 VITALS
HEIGHT: 58 IN | BODY MASS INDEX: 17.59 KG/M2 | DIASTOLIC BLOOD PRESSURE: 70 MMHG | SYSTOLIC BLOOD PRESSURE: 112 MMHG | OXYGEN SATURATION: 96 % | TEMPERATURE: 97.9 F | WEIGHT: 83.8 LBS | HEART RATE: 92 BPM

## 2025-05-01 DIAGNOSIS — R06.02 SHORTNESS OF BREATH: ICD-10-CM

## 2025-05-01 DIAGNOSIS — R60.9 EDEMA, UNSPECIFIED TYPE: ICD-10-CM

## 2025-05-01 DIAGNOSIS — E55.9 VITAMIN D DEFICIENCY: ICD-10-CM

## 2025-05-01 DIAGNOSIS — R06.02 SHORTNESS OF BREATH: Primary | ICD-10-CM

## 2025-05-01 DIAGNOSIS — E03.9 ACQUIRED HYPOTHYROIDISM: ICD-10-CM

## 2025-05-01 DIAGNOSIS — K58.0 IRRITABLE BOWEL SYNDROME WITH DIARRHEA: ICD-10-CM

## 2025-05-01 DIAGNOSIS — I10 ESSENTIAL HYPERTENSION: ICD-10-CM

## 2025-05-01 LAB
25(OH)D3 SERPL-MCNC: 45.9 NG/ML (ref 30–100)
ALBUMIN SERPL-MCNC: 4.2 G/DL (ref 3.5–5.2)
ALBUMIN/GLOB SERPL: 1.1 G/DL
ALP SERPL-CCNC: 175 U/L (ref 39–117)
ALT SERPL W P-5'-P-CCNC: 11 U/L (ref 1–33)
ANION GAP SERPL CALCULATED.3IONS-SCNC: 10.2 MMOL/L (ref 5–15)
AST SERPL-CCNC: 20 U/L (ref 1–32)
BILIRUB SERPL-MCNC: 0.6 MG/DL (ref 0–1.2)
BILIRUB UR QL STRIP: NEGATIVE
BUN SERPL-MCNC: 16 MG/DL (ref 8–23)
BUN/CREAT SERPL: 16.7 (ref 7–25)
CALCIUM SPEC-SCNC: 9.1 MG/DL (ref 8.6–10.5)
CHLORIDE SERPL-SCNC: 103 MMOL/L (ref 98–107)
CHOLEST SERPL-MCNC: 119 MG/DL (ref 0–200)
CLARITY UR: CLEAR
CO2 SERPL-SCNC: 22.8 MMOL/L (ref 22–29)
COLOR UR: YELLOW
CREAT SERPL-MCNC: 0.96 MG/DL (ref 0.57–1)
DEPRECATED RDW RBC AUTO: 51.3 FL (ref 37–54)
EGFRCR SERPLBLD CKD-EPI 2021: 60.3 ML/MIN/1.73
ERYTHROCYTE [DISTWIDTH] IN BLOOD BY AUTOMATED COUNT: 17.1 % (ref 12.3–15.4)
GLOBULIN UR ELPH-MCNC: 3.8 GM/DL
GLUCOSE SERPL-MCNC: 88 MG/DL (ref 65–99)
GLUCOSE UR STRIP-MCNC: NEGATIVE MG/DL
HCT VFR BLD AUTO: 38.4 % (ref 34–46.6)
HDLC SERPL QL: 3.4
HDLC SERPL-MCNC: 35 MG/DL (ref 40–60)
HGB BLD-MCNC: 11.8 G/DL (ref 12–15.9)
HGB UR QL STRIP.AUTO: NEGATIVE
HOLD SPECIMEN: NORMAL
KETONES UR QL STRIP: NEGATIVE
LDLC SERPL CALC-MCNC: 61 MG/DL (ref 0–100)
LEUKOCYTE ESTERASE UR QL STRIP.AUTO: NEGATIVE
MCH RBC QN AUTO: 25.4 PG (ref 26.6–33)
MCHC RBC AUTO-ENTMCNC: 30.7 G/DL (ref 31.5–35.7)
MCV RBC AUTO: 82.6 FL (ref 79–97)
NITRITE UR QL STRIP: NEGATIVE
NT-PROBNP SERPL-MCNC: 2288 PG/ML (ref 0–1800)
PH UR STRIP.AUTO: 5.5 [PH] (ref 5–8)
PHOSPHATE SERPL-MCNC: 4.5 MG/DL (ref 2.5–4.5)
PLATELET # BLD AUTO: 151 10*3/MM3 (ref 140–450)
PMV BLD AUTO: 11.3 FL (ref 6–12)
POTASSIUM SERPL-SCNC: 4.3 MMOL/L (ref 3.5–5.2)
PROT SERPL-MCNC: 8 G/DL (ref 6–8.5)
PROT UR QL STRIP: ABNORMAL
RBC # BLD AUTO: 4.65 10*6/MM3 (ref 3.77–5.28)
SODIUM SERPL-SCNC: 136 MMOL/L (ref 136–145)
SP GR UR STRIP: 1.02 (ref 1–1.03)
TRIGL SERPL-MCNC: 130 MG/DL (ref 0–150)
TSH SERPL DL<=0.05 MIU/L-ACNC: 3.98 UIU/ML (ref 0.27–4.2)
UROBILINOGEN UR QL STRIP: ABNORMAL
VLDLC SERPL-MCNC: 23 MG/DL (ref 5–40)
WBC NRBC COR # BLD AUTO: 4.69 10*3/MM3 (ref 3.4–10.8)

## 2025-05-01 PROCEDURE — 82306 VITAMIN D 25 HYDROXY: CPT

## 2025-05-01 PROCEDURE — 84443 ASSAY THYROID STIM HORMONE: CPT

## 2025-05-01 PROCEDURE — 85027 COMPLETE CBC AUTOMATED: CPT

## 2025-05-01 PROCEDURE — 80053 COMPREHEN METABOLIC PANEL: CPT

## 2025-05-01 PROCEDURE — 83880 ASSAY OF NATRIURETIC PEPTIDE: CPT

## 2025-05-01 PROCEDURE — 71046 X-RAY EXAM CHEST 2 VIEWS: CPT

## 2025-05-01 PROCEDURE — 80061 LIPID PANEL: CPT

## 2025-05-01 PROCEDURE — 81003 URINALYSIS AUTO W/O SCOPE: CPT

## 2025-05-01 PROCEDURE — 36415 COLL VENOUS BLD VENIPUNCTURE: CPT

## 2025-05-01 PROCEDURE — 84100 ASSAY OF PHOSPHORUS: CPT

## 2025-05-01 RX ORDER — FUROSEMIDE 20 MG/1
TABLET ORAL
Qty: 30 TABLET | Refills: 0 | Status: SHIPPED | OUTPATIENT
Start: 2025-05-01

## 2025-05-01 NOTE — PROGRESS NOTES
"Chief Complaint  Foot Swelling (Bilateral X1 week)    Subjective      Nilda Julien is a 79 y.o. female who presents to [facility]     History of Present Illness  The patient presents for evaluation of bilateral lower extremity edema.    She reports experiencing bilateral lower extremity edema, which began approximately 1 week ago. She has been dealing with persistent dyspnea for an extended period. A recent consultation with a cardiologist revealed no significant findings, and she did not exhibit any edema at that time. She does not perceive an increase in her shortness of breath beyond her usual baseline. She also reports frequent urination last night, initially every hour from 4:00 to 5:00, which subsequently increased to every 30 minutes.       She acknowledges a recent weight gain but reports no changes in her dietary habits.MEDICATIONS           Objective   Vital Signs:   Vitals:    05/01/25 1510   BP: 112/70   BP Location: Left arm   Patient Position: Sitting   Cuff Size: Adult   Pulse: 92   Temp: 97.9 °F (36.6 °C)   SpO2: 96%   Weight: 38 kg (83 lb 12.8 oz)   Height: 147.3 cm (57.99\")     Body mass index is 17.52 kg/m².    Wt Readings from Last 3 Encounters:   05/01/25 38 kg (83 lb 12.8 oz)   04/22/25 36.9 kg (81 lb 6.4 oz)   12/11/24 34.7 kg (76 lb 9.6 oz)     BP Readings from Last 3 Encounters:   05/01/25 112/70   04/22/25 129/65   12/16/24 132/60       Health Maintenance   Topic Date Due    RSV Vaccine - Adults (1 - 1-dose 75+ series) Never done    COVID-19 Vaccine (5 - 2024-25 season) 09/01/2024    DXA SCAN  09/13/2024    LIPID PANEL  04/11/2025    TDAP/TD VACCINES (3 - Tdap) 04/17/2025    ANNUAL WELLNESS VISIT  04/22/2025    ZOSTER VACCINE (1 of 2) 10/21/2025 (Originally 9/3/1995)    INFLUENZA VACCINE  07/01/2025    COLORECTAL CANCER SCREENING  02/22/2027    HEPATITIS C SCREENING  Completed    Pneumococcal Vaccine 50+  Completed    HEMOGLOBIN A1C  Discontinued    URINE " MICROALBUMIN-CREATININE RATIO (uACR)  Discontinued    LUNG CANCER SCREENING  Discontinued       No Images in the past 120 days found..     Physical Exam  Vitals and nursing note reviewed.   HENT:      Head: Normocephalic and atraumatic.      Mouth/Throat:      Mouth: Mucous membranes are moist.   Eyes:      Conjunctiva/sclera: Conjunctivae normal.   Cardiovascular:      Rate and Rhythm: Normal rate and regular rhythm.      Pulses: Normal pulses.      Heart sounds: Normal heart sounds.   Pulmonary:      Effort: Pulmonary effort is normal.      Breath sounds: Examination of the right-lower field reveals decreased breath sounds. Examination of the left-lower field reveals decreased breath sounds. Decreased breath sounds present.   Musculoskeletal:         General: Normal range of motion.      Cervical back: Normal range of motion.      Right lower le+ Pitting Edema present.      Left lower leg: 3+ Pitting Edema present.   Skin:     General: Skin is warm and dry.   Neurological:      Mental Status: She is alert and oriented to person, place, and time.   Psychiatric:         Mood and Affect: Mood normal.          Physical Exam  Lungs were auscultated.  Heart was examined.  Abdomen was examined.  Bilateral lower extremities were examined.    Vital Signs  Weight is 180 pounds.       Result Review :  The following data was reviewed by: JOSE Oden on 2025:         Procedures          ASSESSMENT/PLAN  Diagnoses and all orders for this visit:    1. Shortness of breath (Primary)  -     Comprehensive metabolic panel; Future  -     proBNP; Future  -     XR Chest PA & Lateral; Future    2. Edema, unspecified type  -     Comprehensive metabolic panel; Future  -     proBNP; Future  -     XR Chest PA & Lateral; Future    Other orders  -     furosemide (LASIX) 20 MG tablet; 1 tablet daily x 3-4 days then daily as needed for edema or weight gain of 2 pounds overnight or 5 pounds in one week.  Dispense: 30 tablet;  Refill: 0        Assessment & Plan  1. Bilateral lower extremity edema.  The etiology of the edema is likely multifactorial, with potential contributions from congestive heart failure and fluid retention. A comprehensive discussion regarding the management of congestive heart failure was conducted. She was advised to monitor for symptoms such as white frothy sputum production and increased dyspnea necessitating additional pillows during sleep, which could indicate exacerbation of congestive heart failure and fluid accumulation. The importance of daily weight monitoring was emphasized, with instructions to take Lasix if there is a weight gain of 2 to 3 pounds overnight or 5 pounds within a week. She was also advised to elevate her feet above heart level while resting on the couch to alleviate swelling. A low-salt diet was recommended, and the use of Mrs. Castaneda seasoning was suggested as a heart-healthy alternative. A prescription for Lasix 20 mg was provided, to be taken daily for 3 to 4 days, after which it can be used as needed based on her weight and swelling. A chest x-ray and blood work, including BNP levels, were ordered to rule out cardiac causes of fluid retention. She was instructed to follow up with Dr. Beaver next week to discuss the results of the lab work and chest x-ray.    2. Weight gain.  The weight gain is likely due to fluid retention associated with congestive heart failure. She was advised to monitor her weight daily and use Lasix as needed based on weight changes. A low-salt diet was recommended to help manage fluid retention.                    Nilda Julien  reports that she quit smoking about 24 years ago. Her smoking use included cigarettes. She started smoking about 64 years ago. She has a 20 pack-year smoking history. She has been exposed to tobacco smoke. She has never used smokeless tobacco.           FOLLOW UP  No follow-ups on file.  Patient was given instructions and counseling  regarding her condition or for health maintenance advice. Please see specific information pulled into the AVS if appropriate.       JOSE Oden  05/01/25  16:14 EDT    Patient or patient representative verbalized consent for the use of Ambient Listening during the visit with  JOSE Oden for chart documentation. 5/1/2025  16:14 EDT

## 2025-05-05 ENCOUNTER — TELEPHONE (OUTPATIENT)
Dept: CARDIOLOGY | Facility: CLINIC | Age: 80
End: 2025-05-05

## 2025-05-05 NOTE — TELEPHONE ENCOUNTER
Caller: Nilda Julien    Relationship to patient: Self    Best call back number: [phone]    Chief complaint: SWELLING    Type of visit: FOLLOW UP    Requested date: ASAP    Additional notes:WENT TO PCP ON 4-23-25 FOR THE SWELLING AND WAS PUT ON A WATER PILL AND TOLD TO F/U WITH CARDIOLOGY     HAS TO BE IN THE AFTERNOON

## 2025-05-29 NOTE — PROGRESS NOTES
[facility]  Cardiology progress Note    Patient Name: Nilda Julien  : 1945    CHIEF COMPLAINT  CAD        Subjective   Subjective     HISTORY OF PRESENT ILLNESS    Nilda Julien is a 79 y.o. female with history of CAD.  No chest pain.  Complains of pedal edema    REVIEW OF SYSTEMS    Constitutional:    No fever, no weight loss  Skin:     No rash  Otolaryngeal:    No difficulty swallowing  Cardiovascular: See HPI.  Pulmonary:    No cough, no sputum production    Personal History     Social History:    reports that she quit smoking about 24 years ago. Her smoking use included cigarettes. She started smoking about 64 years ago. She has a 20 pack-year smoking history. She has been exposed to tobacco smoke. She has never used smokeless tobacco. She reports that she does not drink alcohol and does not use drugs.    Home Medications:  Current Outpatient Medications on File Prior to Visit   Medication Sig    albuterol sulfate HFA (Ventolin HFA) 108 (90 Base) MCG/ACT inhaler Inhale 2 puffs Every 4 (Four) Hours As Needed for Wheezing or Shortness of Air.    aspirin 81 MG EC tablet Take 1 tablet by mouth Daily. Last dose 22 per Dr. Obando instructed    atorvastatin (LIPITOR) 80 MG tablet Take 1 tablet by mouth every night at bedtime.    carvedilol (COREG) 6.25 MG tablet TAKE 1 TABLET BY MOUTH TWICE A DAY WITH A MEAL    cetirizine (zyrTEC) 10 MG tablet TAKE 1 TABLET BY MOUTH DAILY    diphenoxylate-atropine (LOMOTIL) 2.5-0.025 MG per tablet TAKE 2 TABLETS BY MOUTH DAILY AS NEEDED FOR DIARRHEA    fluticasone (FLONASE) 50 MCG/ACT nasal spray SPRAY 2 SPRAYS IN EACH NOSTRIL ONCE DAILY    furosemide (LASIX) 20 MG tablet 1 tablet daily x 3-4 days then daily as needed for edema or weight gain of 2 pounds overnight or 5 pounds in one week.    omeprazole (priLOSEC) 20 MG capsule Take 1 capsule by mouth Daily.    ondansetron (ZOFRAN) 4 MG tablet TAKE 1 TABLET BY MOUTH EVERY 8 HOURS AS NEEDED FOR NAUSEA  AND/OR VOMITING (Patient taking differently: Take 1 tablet by mouth Every 8 (Eight) Hours As Needed.)    sertraline (ZOLOFT) 50 MG tablet TAKE 1 TABLET BY MOUTH DAILY    traZODone (DESYREL) 50 MG tablet Take 1 tablet by mouth Every Night.    vitamin D (ERGOCALCIFEROL) 1.25 MG (34608 UT) capsule capsule TAKE 1 CAPSULE BY MOUTH ONCE WEEKLY    vitamin E 400 UNIT capsule Take 1 capsule by mouth 2 (Two) Times a Day.     No current facility-administered medications on file prior to visit.       Past Medical History:   Diagnosis Date    AAA (abdominal aortic aneurysm)     INFRA RENAL, 3 CM LAST CT ABDOMEN    Allergic rhinitis     Seasonal allergies    Anxiety 04/22/2024    Aortic stenosis, mild 10/18/2021    Arthritis     CAD s/p CABG 10/18/2021    CHF (congestive heart failure)     Chronic heart failure with preserved ejection fraction (HFpEF) 12/30/2021    Claudication of both lower extremities     COPD (chronic obstructive pulmonary disease)     Diverticulitis     Diverticulosis 01/24    Elevated cholesterol     Essential hypertension 06/07/2021    Fatigue     GERD (gastroesophageal reflux disease)     Heart attack 10/18/2021    Hemorrhoids 2013    HL (hearing loss) 01/20    Hyperlipidemia LDL goal <70 06/07/2021    Irritable bowel syndrome with diarrhea     Memory loss     forgetfulness    Microhematuria 01/13/2019    Nephrolithiasis 01/13/2019    Rheumatoid arthritis     Smoker 12/30/2021       Allergies:  No Known Allergies    Objective    Objective       Vitals:   Heart Rate:  [78] 78  BP: (122)/(69) 122/69  Body mass index is 18.35 kg/m².     PHYSICAL EXAM:    General Appearance:   well developed  well nourished  HENT:   oropharynx moist  lips not cyanotic  Neck:  thyroid not enlarged  supple  Respiratory:  no respiratory distress  normal breath sounds  no rales  Cardiovascular:  no jugular venous distention  regular rhythm  apical impulse normal  S1 normal, S2 normal  no S3, no S4   no murmur  no rub, no  thrill  carotid pulses normal; no bruit  pedal pulses normal  lower extremity edema: none    Skin:   warm, dry  Psychiatric:  judgement and insight appropriate  normal mood and affect        Result Review:  I have personally reviewed the available results from  [x]  Laboratory  [x]  EKG  [x]  Cardiology  [x]  Medications  [x]  Old records  []  Other:     Procedures  Lab Results   Component Value Date    CHOL 119 05/01/2025    CHOL 108 04/11/2024    CHOL 114 10/17/2023     Lab Results   Component Value Date    TRIG 130 05/01/2025    TRIG 158 (H) 04/11/2024    TRIG 148 10/17/2023     Lab Results   Component Value Date    HDL 35 (L) 05/01/2025    HDL 33 (L) 04/11/2024    HDL 32 (L) 10/17/2023     Lab Results   Component Value Date    LDL 61 05/01/2025    LDL 48 04/11/2024    LDL 56 10/17/2023     Lab Results   Component Value Date    VLDL 23 05/01/2025    VLDL 27 04/11/2024    VLDL 26 10/17/2023     Results for orders placed in visit on 06/20/22    Adult Transthoracic Echo Complete W/ Cont if Necessary Per Protocol    Interpretation Summary  Mild diffuse hypokinesis with adequate left ventricular systolic function .  Fibrocalcific mitral and aortic valves.  Mild MR and mild TR.  Mild AI.     Impression/Plan:  1.  Coronary artery s/p CABG stable: Continue aspirin 81 mg a day.  No chest pain.  2.  Essential hypertension controlled: Continue carvedilol 6.25 mg twice daily.  Blood pressure controlled at home.  3.  Mixed hyperlipidemia: Continue Lipitor 80 mg a day.  Lipid profile showed LDL of 53.  4.  Mild valvular heart disease/mild aortic regurgitation: Asymptomatic.  5.  Chronic diastolic heart failure: Advised to take Lasix 20 mg every day.  Low-salt diet and fluid restriction.  Repeat echocardiogram           Dannie Obando MD   06/03/25   14:45 EDT

## 2025-06-03 ENCOUNTER — OFFICE VISIT (OUTPATIENT)
Dept: CARDIOLOGY | Facility: CLINIC | Age: 80
End: 2025-06-03
Payer: MEDICARE

## 2025-06-03 VITALS
HEART RATE: 78 BPM | WEIGHT: 84.8 LBS | SYSTOLIC BLOOD PRESSURE: 122 MMHG | DIASTOLIC BLOOD PRESSURE: 69 MMHG | BODY MASS INDEX: 18.29 KG/M2 | HEIGHT: 57 IN

## 2025-06-03 DIAGNOSIS — I10 HYPERTENSION, ESSENTIAL: Primary | ICD-10-CM

## 2025-06-03 DIAGNOSIS — E78.2 HYPERLIPEMIA, MIXED: ICD-10-CM

## 2025-06-03 DIAGNOSIS — Z95.1 HX OF CABG: ICD-10-CM

## 2025-06-03 DIAGNOSIS — I25.10 CORONARY ARTERY DISEASE INVOLVING NATIVE CORONARY ARTERY OF NATIVE HEART WITHOUT ANGINA PECTORIS: ICD-10-CM

## 2025-06-03 DIAGNOSIS — I50.32 DIASTOLIC CHF, CHRONIC: ICD-10-CM

## 2025-06-03 RX ORDER — FUROSEMIDE 20 MG/1
TABLET ORAL
Qty: 30 TABLET | Refills: 0 | Status: SHIPPED | OUTPATIENT
Start: 2025-06-03 | End: 2025-06-03 | Stop reason: SDUPTHER

## 2025-06-03 RX ORDER — FUROSEMIDE 20 MG/1
TABLET ORAL
Qty: 30 TABLET | Refills: 4 | Status: SHIPPED | OUTPATIENT
Start: 2025-06-03

## 2025-06-11 ENCOUNTER — OFFICE VISIT (OUTPATIENT)
Dept: FAMILY MEDICINE CLINIC | Facility: CLINIC | Age: 80
End: 2025-06-11
Payer: MEDICARE

## 2025-06-11 ENCOUNTER — RESULTS FOLLOW-UP (OUTPATIENT)
Dept: FAMILY MEDICINE CLINIC | Facility: CLINIC | Age: 80
End: 2025-06-11

## 2025-06-11 VITALS
BODY MASS INDEX: 19.35 KG/M2 | TEMPERATURE: 96.8 F | RESPIRATION RATE: 18 BRPM | HEIGHT: 55 IN | HEART RATE: 74 BPM | WEIGHT: 83.6 LBS | DIASTOLIC BLOOD PRESSURE: 76 MMHG | OXYGEN SATURATION: 100 % | SYSTOLIC BLOOD PRESSURE: 124 MMHG

## 2025-06-11 DIAGNOSIS — Z51.81 MEDICATION MONITORING ENCOUNTER: Primary | ICD-10-CM

## 2025-06-11 DIAGNOSIS — I10 ESSENTIAL HYPERTENSION: ICD-10-CM

## 2025-06-11 DIAGNOSIS — M81.0 AGE-RELATED OSTEOPOROSIS WITHOUT CURRENT PATHOLOGICAL FRACTURE: Primary | ICD-10-CM

## 2025-06-11 DIAGNOSIS — F41.9 ANXIETY: ICD-10-CM

## 2025-06-11 DIAGNOSIS — G47.00 INSOMNIA, UNSPECIFIED TYPE: ICD-10-CM

## 2025-06-11 DIAGNOSIS — G89.29 CHRONIC LOW BACK PAIN, UNSPECIFIED BACK PAIN LATERALITY, UNSPECIFIED WHETHER SCIATICA PRESENT: ICD-10-CM

## 2025-06-11 DIAGNOSIS — J30.9 ALLERGIC RHINITIS, UNSPECIFIED SEASONALITY, UNSPECIFIED TRIGGER: ICD-10-CM

## 2025-06-11 DIAGNOSIS — E55.9 VITAMIN D DEFICIENCY: ICD-10-CM

## 2025-06-11 DIAGNOSIS — M54.50 CHRONIC LOW BACK PAIN, UNSPECIFIED BACK PAIN LATERALITY, UNSPECIFIED WHETHER SCIATICA PRESENT: ICD-10-CM

## 2025-06-11 DIAGNOSIS — Z78.0 POSTMENOPAUSAL: ICD-10-CM

## 2025-06-11 DIAGNOSIS — E78.5 HYPERLIPIDEMIA LDL GOAL <70: ICD-10-CM

## 2025-06-11 RX ORDER — PREDNISONE 20 MG/1
20 TABLET ORAL DAILY
Qty: 5 TABLET | Refills: 0 | Status: SHIPPED | OUTPATIENT
Start: 2025-06-11

## 2025-06-11 RX ORDER — FLUTICASONE PROPIONATE 50 MCG
2 SPRAY, SUSPENSION (ML) NASAL DAILY PRN
Qty: 48 G | Refills: 1 | Status: SHIPPED | OUTPATIENT
Start: 2025-06-11

## 2025-06-11 RX ORDER — ERGOCALCIFEROL 1.25 MG/1
50000 CAPSULE, LIQUID FILLED ORAL WEEKLY
Qty: 13 CAPSULE | Refills: 0 | Status: SHIPPED | OUTPATIENT
Start: 2025-06-11

## 2025-06-11 RX ORDER — CETIRIZINE HYDROCHLORIDE 10 MG/1
10 TABLET ORAL DAILY
Qty: 90 TABLET | Refills: 1 | Status: SHIPPED | OUTPATIENT
Start: 2025-06-11

## 2025-06-11 RX ORDER — TRAZODONE HYDROCHLORIDE 50 MG/1
50 TABLET ORAL NIGHTLY
Qty: 30 TABLET | Refills: 1 | Status: CANCELLED | OUTPATIENT
Start: 2025-06-11

## 2025-06-11 NOTE — PROGRESS NOTES
Subjective   The ABCs of the Annual Wellness Visit  Medicare Wellness Visit      Nilda Julien is a 79 y.o. patient who presents for a Medicare Wellness Visit.    The following portions of the patient's history were reviewed and   updated as appropriate: allergies, current medications, past family history, past medical history, past social history, past surgical history, and problem list.    Compared to one year ago, the patient's physical   health is the same.  Compared to one year ago, the patient's mental   health is the same.    Recent Hospitalizations:  This patient has had a [facility] admission record on file within the last 365 days.  Current Medical Providers:  Patient Care Team:  Ralph Marcus APRN as PCP - General (Nurse Practitioner)  Dannie Obando MD as Consulting Physician (Cardiology)  Ann Scott APRN as Nurse Practitioner (Nurse Practitioner)  Woodrow Samaniego MD as Consulting Physician (Pulmonary Disease)  Selvin Vásquez MD as Consulting Physician (Vascular Surgery)  Aicha Cerrato DPM as Consulting Physician (Podiatry)    Outpatient Medications Prior to Visit   Medication Sig Dispense Refill    albuterol sulfate HFA (Ventolin HFA) 108 (90 Base) MCG/ACT inhaler Inhale 2 puffs Every 4 (Four) Hours As Needed for Wheezing or Shortness of Air. 1 g 3    aspirin 81 MG EC tablet Take 1 tablet by mouth Daily. Last dose 03/16/22 per Dr. Obando instructed      atorvastatin (LIPITOR) 80 MG tablet Take 1 tablet by mouth every night at bedtime. 90 tablet 1    carvedilol (COREG) 6.25 MG tablet TAKE 1 TABLET BY MOUTH TWICE A DAY WITH A MEAL 180 tablet 3    diphenoxylate-atropine (LOMOTIL) 2.5-0.025 MG per tablet TAKE 2 TABLETS BY MOUTH DAILY AS NEEDED FOR DIARRHEA 60 tablet 0    furosemide (LASIX) 20 MG tablet 1 tablet daily x 3-4 days then daily as needed for edema or weight gain of 2 pounds overnight or 5 pounds in one week. 30 tablet 4    omeprazole (priLOSEC) 20 MG  capsule Take 1 capsule by mouth Daily.      ondansetron (ZOFRAN) 4 MG tablet TAKE 1 TABLET BY MOUTH EVERY 8 HOURS AS NEEDED FOR NAUSEA AND/OR VOMITING (Patient taking differently: Take 1 tablet by mouth Every 8 (Eight) Hours As Needed.) 90 tablet 1    vitamin E 400 UNIT capsule Take 1 capsule by mouth 2 (Two) Times a Day.      cetirizine (zyrTEC) 10 MG tablet TAKE 1 TABLET BY MOUTH DAILY 90 tablet 1    fluticasone (FLONASE) 50 MCG/ACT nasal spray SPRAY 2 SPRAYS IN EACH NOSTRIL ONCE DAILY 48 g 0    sertraline (ZOLOFT) 50 MG tablet TAKE 1 TABLET BY MOUTH DAILY 90 tablet 1    traZODone (DESYREL) 50 MG tablet Take 1 tablet by mouth Every Night. 30 tablet 1    vitamin D (ERGOCALCIFEROL) 1.25 MG (06678 UT) capsule capsule TAKE 1 CAPSULE BY MOUTH ONCE WEEKLY 13 capsule 0     No facility-administered medications prior to visit.     No opioid medication identified on active medication list. I have reviewed chart for other potential  high risk medication/s and harmful drug interactions in the elderly.      Aspirin is on active medication list. Aspirin use is indicated based on review of current medical condition/s. Pros and cons of this therapy have been discussed today. Benefits of this medication outweigh potential harm.  Patient has been encouraged to continue taking this medication.  .      Patient Active Problem List   Diagnosis    Hyperlipidemia LDL goal <70    Essential hypertension    Gastroesophageal reflux disease without esophagitis    Acquired hypothyroidism    Allergic rhinitis    Aortic stenosis, mild    Arthritis    CAD s/p CABG    Diverticulitis    Fatigue    Hemorrhoids    Irritable bowel syndrome with diarrhea    Microhematuria    Nephrolithiasis    Smoker    Chronic heart failure with preserved ejection fraction (HFpEF)    Age-related osteoporosis without current pathological fracture    Encounter for screening for malignant neoplasm of colon    History of colon polyps    Anxiety    Rest pain of both lower  "extremities due to atherosclerosis    Ischemic rest pain of lower extremity    Severe protein-calorie malnutrition    Abnormal chest x-ray    Atelectasis     Advance Care Planning Advance Directive is on file.  ACP discussion was held with the patient during this visit. Patient has an advance directive in EMR which is still valid.             Objective   Vitals:    25 1319   BP: 124/76   BP Location: Right arm   Patient Position: Sitting   Cuff Size: Adult   Pulse: 74   Resp: 18   Temp: 96.8 °F (36 °C)   TempSrc: Temporal   SpO2: 100%   Weight: 37.9 kg (83 lb 9.6 oz)   Height: 139.7 cm (55\")   PainSc: 9    PainLoc: Back       Estimated body mass index is 19.43 kg/m² as calculated from the following:    Height as of this encounter: 139.7 cm (55\").    Weight as of this encounter: 37.9 kg (83 lb 9.6 oz).    BMI is within normal parameters. No other follow-up for BMI required.    Gait and Balance Evaluation:  Normal        Does the patient have evidence of cognitive impairment? No  Lab Results   Component Value Date    TRIG 130 2025    HDL 35 (L) 2025    LDL 61 2025    VLDL 23 2025                                                                                                Health  Risk Assessment    Smoking Status:  Social History     Tobacco Use   Smoking Status Former    Current packs/day: 0.00    Average packs/day: 0.8 packs/day for 80.0 years (60.0 ttl pk-yrs)    Types: Cigarettes    Start date: 1961    Quit date:     Years since quittin.4    Passive exposure: Current   Smokeless Tobacco Never   Tobacco Comments    Current every day smoker, 0.5 PPD, smoked for 31 or more years    INST TO NOT SMOKE 24 HOURS PRIOR TO ANESTHESIA PER PROTOCOL     Alcohol Consumption:  Social History     Substance and Sexual Activity   Alcohol Use Never       Fall Risk Screen  STEADI Fall Risk Assessment was completed, and patient is at MODERATE risk for falls. Assessment completed " on:2025    Depression Screening   Little interest or pleasure in doing things? Several days   Feeling down, depressed, or hopeless? Not at all   PHQ-2 Total Score 1      Health Habits and Functional and Cognitive Screenin/4/2025     7:47 PM   Functional & Cognitive Status   Do you have difficulty preparing food and eating? No   Do you have difficulty bathing yourself, getting dressed or grooming yourself? No   Do you have difficulty using the toilet? No   Do you have difficulty moving around from place to place? No   Do you have trouble with steps or getting out of a bed or a chair? No   Current Diet Other   Dental Exam Up to date   Eye Exam Up to date   Exercise (times per week) 0 times per week   Current Exercises Include No Regular Exercise   Do you need help using the phone?  No   Are you deaf or do you have serious difficulty hearing?  No   Do you need help to go to places out of walking distance? No   Do you need help shopping? No   Do you need help preparing meals?  No   Do you need help with housework?  No   Do you need help with laundry? No   Do you need help taking your medications? No   Do you need help managing money? No   Do you ever drive or ride in a car without wearing a seat belt? No   Have you felt unusual stress, anger or loneliness in the last month? No   Who do you live with? Alone   If you need help, do you have trouble finding someone available to you? No   Have you been bothered in the last four weeks by sexual problems? No   Do you have difficulty concentrating, remembering or making decisions? No           Age-appropriate Screening Schedule:  Refer to the list below for future screening recommendations based on patient's age, sex and/or medical conditions. Orders for these recommended tests are listed in the plan section. The patient has been provided with a written plan.    Health Maintenance List  Health Maintenance   Topic Date Due    DXA SCAN  2024    ZOSTER VACCINE  (1 of 2) 10/21/2025 (Originally 9/3/1995)    TDAP/TD VACCINES (3 - Tdap) 12/08/2025 (Originally 4/17/2025)    COVID-19 Vaccine (5 - 2024-25 season) 06/11/2026 (Originally 9/1/2024)    RSV Vaccine - Adults (1 - 1-dose 75+ series) 06/11/2026 (Originally 9/3/2020)    INFLUENZA VACCINE  07/01/2025    LIPID PANEL  05/01/2026    ANNUAL WELLNESS VISIT  06/11/2026    COLORECTAL CANCER SCREENING  02/22/2027    HEPATITIS C SCREENING  Completed    Pneumococcal Vaccine 50+  Completed    HEMOGLOBIN A1C  Discontinued    URINE MICROALBUMIN-CREATININE RATIO (uACR)  Discontinued    LUNG CANCER SCREENING  Discontinued                                                                                                                                                CMS Preventative Services Quick Reference  Risk Factors Identified During Encounter  None Identified    The above risks/problems have been discussed with the patient.  Pertinent information has been shared with the patient in the After Visit Summary.  An After Visit Summary and PPPS were made available to the patient.    Follow Up:   Next Medicare Wellness visit to be scheduled in 1 year.         Additional E&M Note during same encounter follows:  Patient has additional, significant, and separately identifiable condition(s)/problem(s) that require work above and beyond the Medicare Wellness Visit     Chief Complaint  Medicare Wellness-subsequent, Back Pain, Hypertension, Hyperlipidemia, Depression, and Peripheral Vascular Disease    Subjective   Back pain today states will be okay for awhile and then will have a flare.  Was trying to get floors clean and then maybe did too much.    Diarrhea:  Has been on lomotil states hasn't had any workup that she remembers for it but has had even before her back surgery.        Insomnia:  states is waking up at 3 am or 5 am and can't go back to sleep.  Has been going on a while.  Trazodone didn't help.    Back Pain  Associated symptoms: no  "chest pain, no fever and no headaches    Hypertension  Associated symptoms: no chest pain, no headaches and no shortness of breath    Hyperlipidemia  Pertinent negatives include no chest pain or shortness of breath.   Depression    Symptoms: no chest pain and no shortness of breath      Nighttime awakenings:     PMH Includes: depression          Review of Systems   Constitutional:  Negative for fever.   Respiratory:  Negative for cough, chest tightness and shortness of breath.    Cardiovascular:  Negative for chest pain.   Musculoskeletal:  Positive for back pain.              Objective   Vital Signs:  /76 (BP Location: Right arm, Patient Position: Sitting, Cuff Size: Adult)   Pulse 74   Temp 96.8 °F (36 °C) (Temporal)   Resp 18   Ht 139.7 cm (55\")   Wt 37.9 kg (83 lb 9.6 oz)   SpO2 100%   BMI 19.43 kg/m²   Physical Exam  Vitals reviewed.   Constitutional:       Appearance: Normal appearance. She is well-developed.   HENT:      Head: Normocephalic and atraumatic.      Mouth/Throat:      Pharynx: No oropharyngeal exudate.   Eyes:      Conjunctiva/sclera: Conjunctivae normal.      Pupils: Pupils are equal, round, and reactive to light.   Cardiovascular:      Rate and Rhythm: Normal rate and regular rhythm.      Heart sounds: Normal heart sounds. No murmur heard.     No friction rub. No gallop.   Pulmonary:      Effort: Pulmonary effort is normal.      Breath sounds: Normal breath sounds. No wheezing or rhonchi.   Skin:     General: Skin is warm and dry.   Neurological:      Mental Status: She is alert and oriented to person, place, and time.   Psychiatric:         Mood and Affect: Mood and affect normal.         Behavior: Behavior normal.         Thought Content: Thought content normal.         Judgment: Judgment normal.                    Assessment and Plan      Medication monitoring encounter    Orders:    POC Medline 12 Panel Urine Drug Screen    Insomnia, unspecified type    Orders:    amitriptyline " (ELAVIL) 25 MG tablet; Take 1 tablet by mouth Every Night.    Anxiety    Orders:    sertraline (ZOLOFT) 50 MG tablet; Take 1 tablet by mouth Daily.    Allergic rhinitis, unspecified seasonality, unspecified trigger    Orders:    cetirizine (zyrTEC) 10 MG tablet; Take 1 tablet by mouth Daily.    fluticasone (FLONASE) 50 MCG/ACT nasal spray; Administer 2 sprays into the nostril(s) as directed by provider Daily As Needed for Rhinitis.    Postmenopausal    Orders:    DEXA Bone Density Axial; Future    Vitamin D deficiency    Orders:    vitamin D (ERGOCALCIFEROL) 1.25 MG (65027 UT) capsule capsule; Take 1 capsule by mouth 1 (One) Time Per Week.    Vitamin D,25-Hydroxy; Future    Chronic low back pain, unspecified back pain laterality, unspecified whether sciatica present    Orders:    predniSONE (DELTASONE) 20 MG tablet; Take 1 tablet by mouth Daily.    Essential hypertension      Orders:    Lipid Panel With / Chol / HDL Ratio; Future    Comprehensive Metabolic Panel; Future    CBC (No Diff); Future    Urinalysis With Culture If Indicated -; Future    Hyperlipidemia LDL goal <70                    Follow Up   No follow-ups on file.  Patient was given instructions and counseling regarding her condition or for health maintenance advice. Please see specific information pulled into the AVS if appropriate.

## 2025-06-11 NOTE — ASSESSMENT & PLAN NOTE
Orders:    Lipid Panel With / Chol / HDL Ratio; Future    Comprehensive Metabolic Panel; Future    CBC (No Diff); Future    Urinalysis With Culture If Indicated -; Future

## 2025-06-11 NOTE — ASSESSMENT & PLAN NOTE
Orders:    cetirizine (zyrTEC) 10 MG tablet; Take 1 tablet by mouth Daily.    fluticasone (FLONASE) 50 MCG/ACT nasal spray; Administer 2 sprays into the nostril(s) as directed by provider Daily As Needed for Rhinitis.

## 2025-06-25 DIAGNOSIS — I70.219 ATHEROSCLEROSIS OF LOWER EXTREMITY WITH CLAUDICATION: ICD-10-CM

## 2025-06-25 RX ORDER — CILOSTAZOL 100 MG/1
100 TABLET ORAL 2 TIMES DAILY
Qty: 180 TABLET | Refills: 3 | Status: SHIPPED | OUTPATIENT
Start: 2025-06-25

## 2025-07-02 ENCOUNTER — APPOINTMENT (OUTPATIENT)
Dept: GENERAL RADIOLOGY | Facility: HOSPITAL | Age: 80
End: 2025-07-02
Payer: MEDICARE

## 2025-07-02 ENCOUNTER — HOSPITAL ENCOUNTER (EMERGENCY)
Facility: HOSPITAL | Age: 80
Discharge: HOME | End: 2025-07-02
Attending: EMERGENCY MEDICINE | Admitting: EMERGENCY MEDICINE
Payer: MEDICARE

## 2025-07-02 ENCOUNTER — TRANSCRIBE ORDERS (OUTPATIENT)
Age: 80
End: 2025-07-02
Payer: MEDICARE

## 2025-07-02 VITALS
SYSTOLIC BLOOD PRESSURE: 129 MMHG | WEIGHT: 83.55 LBS | DIASTOLIC BLOOD PRESSURE: 81 MMHG | BODY MASS INDEX: 19.34 KG/M2 | RESPIRATION RATE: 16 BRPM | TEMPERATURE: 98.1 F | OXYGEN SATURATION: 100 % | HEART RATE: 98 BPM | HEIGHT: 55 IN

## 2025-07-02 DIAGNOSIS — I73.9 CLAUDICATION: ICD-10-CM

## 2025-07-02 DIAGNOSIS — R19.7 DIARRHEA, UNSPECIFIED TYPE: ICD-10-CM

## 2025-07-02 DIAGNOSIS — M79.606 ISCHEMIC REST PAIN OF LOWER EXTREMITY: Primary | ICD-10-CM

## 2025-07-02 DIAGNOSIS — M19.049 OSTEOARTHRITIS OF HAND, UNSPECIFIED LATERALITY, UNSPECIFIED OSTEOARTHRITIS TYPE: Primary | ICD-10-CM

## 2025-07-02 DIAGNOSIS — I99.8 ISCHEMIC REST PAIN OF LOWER EXTREMITY: Primary | ICD-10-CM

## 2025-07-02 PROCEDURE — 73130 X-RAY EXAM OF HAND: CPT

## 2025-07-02 PROCEDURE — 25010000002 KETOROLAC TROMETHAMINE PER 15 MG

## 2025-07-02 PROCEDURE — 99283 EMERGENCY DEPT VISIT LOW MDM: CPT

## 2025-07-02 PROCEDURE — 25010000002 DEXAMETHASONE PER 1 MG

## 2025-07-02 PROCEDURE — 25010000002 ORPHENADRINE CITRATE PER 60 MG

## 2025-07-02 PROCEDURE — 96372 THER/PROPH/DIAG INJ SC/IM: CPT

## 2025-07-02 RX ORDER — FUROSEMIDE 20 MG/1
TABLET ORAL
Qty: 90 TABLET | Refills: 1 | Status: SHIPPED | OUTPATIENT
Start: 2025-07-02

## 2025-07-02 RX ORDER — DEXAMETHASONE SODIUM PHOSPHATE 4 MG/ML
4 INJECTION, SOLUTION INTRA-ARTICULAR; INTRALESIONAL; INTRAMUSCULAR; INTRAVENOUS; SOFT TISSUE ONCE
Status: COMPLETED | OUTPATIENT
Start: 2025-07-02 | End: 2025-07-02

## 2025-07-02 RX ORDER — ORPHENADRINE CITRATE 30 MG/ML
60 INJECTION INTRAMUSCULAR; INTRAVENOUS ONCE
Status: COMPLETED | OUTPATIENT
Start: 2025-07-02 | End: 2025-07-02

## 2025-07-02 RX ORDER — DIPHENOXYLATE HYDROCHLORIDE AND ATROPINE SULFATE 2.5; .025 MG/1; MG/1
2 TABLET ORAL 4 TIMES DAILY PRN
Qty: 60 TABLET | Refills: 0 | Status: SHIPPED | OUTPATIENT
Start: 2025-07-02

## 2025-07-02 RX ORDER — KETOROLAC TROMETHAMINE 15 MG/ML
15 INJECTION, SOLUTION INTRAMUSCULAR; INTRAVENOUS ONCE
Status: COMPLETED | OUTPATIENT
Start: 2025-07-02 | End: 2025-07-02

## 2025-07-02 RX ADMIN — DEXAMETHASONE SODIUM PHOSPHATE 4 MG: 4 INJECTION, SOLUTION INTRAMUSCULAR; INTRAVENOUS at 19:46

## 2025-07-02 RX ADMIN — KETOROLAC TROMETHAMINE 15 MG: 15 INJECTION, SOLUTION INTRAMUSCULAR; INTRAVENOUS at 19:46

## 2025-07-02 RX ADMIN — ORPHENADRINE CITRATE 60 MG: 60 INJECTION INTRAMUSCULAR; INTRAVENOUS at 19:47

## 2025-07-02 NOTE — ED PROVIDER NOTES
"SHARED VISIT ATTESTATION:    This visit was performed by myself and an APC.  I personally approved the management plan/medical decision making and take responsibility for the patient management.      SHARED VISIT NOTE:    Patient is 79 y.o. year old female that presents to the ED for evaluation of left hand pain and redness..         ED Course:    /81 (BP Location: Left arm, Patient Position: Sitting)   Pulse 98   Temp 98.1 °F (36.7 °C) (Oral)   Resp 16   Ht 139.7 cm (55\")   Wt 37.9 kg (83 lb 8.9 oz)   SpO2 100%   BMI 19.42 kg/m²       The following orders were placed and all results were independently analyzed by me:  Orders Placed This Encounter   Procedures    XR Hand 3+ View Left       Medications Given in the Emergency Department:  Medications   ketorolac (TORADOL) injection 15 mg (15 mg Intramuscular Given 7/2/25 1946)   orphenadrine (NORFLEX) injection 60 mg (60 mg Intramuscular Given 7/2/25 1947)   dexAMETHasone (DECADRON) injection 4 mg (4 mg Intramuscular Given 7/2/25 1946)        ED Course:    ED Course as of 07/02/25 2312 Wed Jul 02, 2025 1916 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 **             Imaging:    XR Hand 3+ View Left  Result Date: 7/2/2025  XR HAND 3+ VW LEFT Date of Exam: 7/2/2025 6:27 PM EDT Indication: Pain Comparison: None available. Findings: There is ulnar-sided soft tissue swelling. The bones are diffusely osteopenic. No fracture or destructive bone lesion is demonstrated. No definite erosions are visualized. There is arthropathy of the pisotriquetral joint with some degenerative debris and  widening of the joint space in the lateral view. Chondrocalcinosis of the triangular fibrocartilage is noted. 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 Cystic changes of the dorsal aspect of the lunate and triquetrum are " noted in the lateral view     Impression: 1. Ulnar side soft tissue swelling of the wrist 2. Pisotriquetral arthropathy with debris 3. 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                     Edinson Cheney DO  23:12 EDT  07/02/25         Edinson Cheney DO  07/02/25 1475

## 2025-07-02 NOTE — ED PROVIDER NOTES
Time: 7:38 PM EDT  Date of encounter:  7/2/2025  Independent Historian/Clinical History and Information was obtained by:   Patient    History is limited by: N/A    Chief Complaint: Left hand pain and redness      History of Present Illness:  Patient is a 79 y.o. year old female who presents to the emergency department for evaluation of left hand pain and redness.  Patient states she thinks it is her arthritis in the last time that she was seen for this she was given an injection that seemed to help.  Patient denies any injury.      Patient Care Team  Primary Care Provider: Ralph Marcus APRN    Past Medical History:     No Known Allergies  Past Medical History:   Diagnosis Date    AAA (abdominal aortic aneurysm)     INFRA RENAL, 3 CM LAST CT ABDOMEN    Allergic rhinitis     Seasonal allergies    Anxiety 04/22/2024    Aortic stenosis, mild 10/18/2021    Arthritis     CAD s/p CABG 10/18/2021    CHF (congestive heart failure)     Chronic heart failure with preserved ejection fraction (HFpEF) 12/30/2021    Claudication of both lower extremities     COPD (chronic obstructive pulmonary disease)     Diverticulitis     Diverticulosis 01/24    Elevated cholesterol     Essential hypertension 06/07/2021    Fatigue     GERD (gastroesophageal reflux disease)     Heart attack 10/18/2021    Hemorrhoids 2013    HL (hearing loss) 01/20    Hyperlipidemia LDL goal <70 06/07/2021    Irritable bowel syndrome with diarrhea     Memory loss     forgetfulness    Microhematuria 01/13/2019    Nephrolithiasis 01/13/2019    Rheumatoid arthritis     Smoker 12/30/2021     Past Surgical History:   Procedure Laterality Date    AXILLARY BIFEMORAL BYPASS Bilateral 11/19/2024    Procedure: AXILLO-BIFEMORAL BYPASS GRAFT;  Surgeon: Selvin Vásquez MD;  Location: [facility];  Service: Vascular;  Laterality: Bilateral;    CARDIAC SURGERY  04/19/2013    4 way bypass    COLONOSCOPY  05/23/2016, 2019    COLONOSCOPY N/A 2/22/2024    Procedure:  COLONOSCOPY WITH HOT SNARE POLYPECTOMIES;  Surgeon: Can Pearce MD;  Location: [facility] ENDOSCOPY;  Service: Gastroenterology;  Laterality: N/A;  COLON POLYPS, DIVERTICULOSIS    CORONARY ARTERY BYPASS GRAFT  2013    ENDOSCOPY  2019    HERNIA REPAIR      OTHER SURGICAL HISTORY      cardiac stents, 2 stents placed    VERTEBROPLASTY N/A 2022    Procedure: VERTEBROPLASTY, THORACIC 11;  Surgeon: Redd Cisneros MD;  Location: [facility] MAIN OR;  Service: Neurosurgery;  Laterality: N/A;     Family History   Problem Relation Age of Onset    Hypertension Mother     Heart attack Mother         MI    Skin cancer Mother     Stroke Father         MINI    Hypertension Father     COPD Father         Black lung  age 88    Skin cancer Father     Breast cancer Neg Hx     Ovarian cancer Neg Hx     Uterine cancer Neg Hx     Cervical cancer Neg Hx     Colon cancer Neg Hx     Stomach cancer Neg Hx     Malig Hyperthermia Neg Hx        Home Medications:  Prior to Admission medications    Medication Sig Start Date End Date Taking? Authorizing Provider   albuterol sulfate HFA (Ventolin HFA) 108 (90 Base) MCG/ACT inhaler Inhale 2 puffs Every 4 (Four) Hours As Needed for Wheezing or Shortness of Air. 24   Woodrow Samaniego MD   amitriptyline (ELAVIL) 25 MG tablet Take 1 tablet by mouth Every Night. 25   Ralph Marcus APRN   aspirin 81 MG EC tablet Take 1 tablet by mouth Daily. Last dose 22 per Dr. Obando instructed    Provider, MD Anny   atorvastatin (LIPITOR) 80 MG tablet Take 1 tablet by mouth every night at bedtime. 25   Dannie Obando MD   carvedilol (COREG) 6.25 MG tablet TAKE 1 TABLET BY MOUTH TWICE A DAY WITH A MEAL 25   Cindy Beltran APRN   cetirizine (zyrTEC) 10 MG tablet Take 1 tablet by mouth Daily. 25   Ralph Marcus APRN   cilostazol (PLETAL) 100 MG tablet TAKE 1 TABLET BY MOUTH 2 TIMES A DAY 25   Selvin Vásquez MD   diphenoxylate-atropine  (LOMOTIL) 2.5-0.025 MG per tablet Take 2 tablets by mouth 4 (Four) Times a Day As Needed for Diarrhea. 25   Ralph Marcus APRN   fluticasone (FLONASE) 50 MCG/ACT nasal spray Administer 2 sprays into the nostril(s) as directed by provider Daily As Needed for Rhinitis. 25   Ralph Marcus APRN   furosemide (LASIX) 20 MG tablet TAKE 1 TABLET BY MOUTH DAILY AS NEEDED FOR EDEMA OR WEIGHT GAIN OF 2 POUNDS OVERNIGHT OR 5 POUNDS IN 1 WEEK 25   Dannie Obando MD   omeprazole (priLOSEC) 20 MG capsule Take 1 capsule by mouth Daily.    ProviderAnny MD   ondansetron (ZOFRAN) 4 MG tablet TAKE 1 TABLET BY MOUTH EVERY 8 HOURS AS NEEDED FOR NAUSEA AND/OR VOMITING  Patient taking differently: Take 1 tablet by mouth Every 8 (Eight) Hours As Needed. 11/15/24   Ralph Marcus APRN   predniSONE (DELTASONE) 20 MG tablet Take 1 tablet by mouth Daily. 25   Ralph Marcus APRN   sertraline (ZOLOFT) 50 MG tablet Take 1 tablet by mouth Daily. 25   Ralph Marcus APRN   vitamin D (ERGOCALCIFEROL) 1.25 MG (45416 UT) capsule capsule Take 1 capsule by mouth 1 (One) Time Per Week. 25   Ralph Marcus APRN   vitamin E 400 UNIT capsule Take 1 capsule by mouth 2 (Two) Times a Day.    Provider, MD Anny   diphenoxylate-atropine (LOMOTIL) 2.5-0.025 MG per tablet TAKE 2 TABLETS BY MOUTH DAILY AS NEEDED FOR DIARRHEA 25  Ralph Marcus APRN   furosemide (LASIX) 20 MG tablet 1 tablet daily x 3-4 days then daily as needed for edema or weight gain of 2 pounds overnight or 5 pounds in one week. 6/3/25 7/2/25  Dannie Obando MD        Social History:   Social History     Tobacco Use    Smoking status: Former     Current packs/day: 0.00     Average packs/day: 0.8 packs/day for 80.0 years (60.0 ttl pk-yrs)     Types: Cigarettes     Start date: 1961     Quit date:      Years since quittin.5     Passive exposure: Current    Smokeless tobacco: Never    Tobacco comments:      "Current every day smoker, 0.5 PPD, smoked for 31 or more years     INST TO NOT SMOKE 24 HOURS PRIOR TO ANESTHESIA PER PROTOCOL   Vaping Use    Vaping status: Never Used   Substance Use Topics    Alcohol use: Never    Drug use: Never         Review of Systems:  Review of Systems   Constitutional:  Negative for chills and fever.   HENT:  Negative for congestion, rhinorrhea and sore throat.    Eyes:  Negative for pain and visual disturbance.   Respiratory:  Negative for apnea, cough, chest tightness and shortness of breath.    Cardiovascular:  Negative for chest pain and palpitations.   Gastrointestinal:  Negative for abdominal pain, diarrhea, nausea and vomiting.   Genitourinary:  Negative for difficulty urinating and dysuria.   Musculoskeletal:  Positive for joint swelling and myalgias.   Skin:  Negative for color change.   Neurological:  Negative for seizures and headaches.   Psychiatric/Behavioral: Negative.     All other systems reviewed and are negative.       Physical Exam:  /81 (BP Location: Left arm, Patient Position: Sitting)   Pulse 98   Temp 98.1 °F (36.7 °C) (Oral)   Resp 16   Ht 139.7 cm (55\")   Wt 37.9 kg (83 lb 8.9 oz)   SpO2 100%   BMI 19.42 kg/m²     Physical Exam  Vitals and nursing note reviewed.   Constitutional:       General: She is not in acute distress.     Appearance: Normal appearance. She is not toxic-appearing.   HENT:      Head: Normocephalic and atraumatic.      Jaw: There is normal jaw occlusion.   Eyes:      General: Lids are normal.      Extraocular Movements: Extraocular movements intact.      Conjunctiva/sclera: Conjunctivae normal.      Pupils: Pupils are equal, round, and reactive to light.   Cardiovascular:      Rate and Rhythm: Normal rate and regular rhythm.      Pulses: Normal pulses.      Heart sounds: Normal heart sounds.   Pulmonary:      Effort: Pulmonary effort is normal. No respiratory distress.      Breath sounds: Normal breath sounds. No wheezing or " rhonchi.   Abdominal:      General: Abdomen is flat.      Palpations: Abdomen is soft.      Tenderness: There is no abdominal tenderness. There is no guarding or rebound.   Musculoskeletal:         General: Swelling and tenderness present. No signs of injury. Normal range of motion.      Cervical back: Normal range of motion and neck supple.      Right lower leg: No edema.      Left lower leg: No edema.   Skin:     General: Skin is warm and dry.   Neurological:      Mental Status: She is alert and oriented to person, place, and time. Mental status is at baseline.   Psychiatric:         Mood and Affect: Mood normal.                    Medical Decision Making:      Comorbidities that affect care:    None    External Notes reviewed:    None      The following orders were placed and all results were independently analyzed by me:  Orders Placed This Encounter   Procedures    XR Hand 3+ View Left       Medications Given in the Emergency Department:  Medications   ketorolac (TORADOL) injection 15 mg (has no administration in time range)   orphenadrine (NORFLEX) injection 60 mg (has no administration in time range)   dexAMETHasone (DECADRON) injection 4 mg (has no administration in time range)        ED Course:    ED Course as of 07/02/25 1940 Wed Jul 02, 2025 1916 mild osteoarthritis of the first CMC joint [AA]      ED Course User Index  [AA] Amanda, Ava C, APRN       Labs:    Lab Results (last 24 hours)       ** No results found for the last 24 hours. **             Imaging:    XR Hand 3+ View Left  Result Date: 7/2/2025  XR HAND 3+ VW LEFT Date of Exam: 7/2/2025 6:27 PM EDT Indication: Pain Comparison: None available. Findings: There is ulnar-sided soft tissue swelling. The bones are diffusely osteopenic. No fracture or destructive bone lesion is demonstrated. No definite erosions are visualized. There is arthropathy of the pisotriquetral joint with some degenerative debris and  widening of the joint space in the  lateral view. Chondrocalcinosis of the triangular fibrocartilage is noted. 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 Cystic changes of the dorsal aspect of the lunate and triquetrum are noted in the lateral view     Impression: 1. Ulnar side soft tissue swelling of the wrist 2. Pisotriquetral arthropathy with debris 3. 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    PROCEDURES:    X-ray were performed in the emergency department and all X-ray impressions were independently interpreted by me.    No orders to display       Procedures    MDM  Number of Diagnoses or Management Options  Osteoarthritis of hand, unspecified laterality, unspecified osteoarthritis type: minor     Amount and/or Complexity of Data Reviewed  Tests in the radiology section of CPT®: reviewed and ordered    Risk of Complications, Morbidity, and/or Mortality  Presenting problems: minimal  Diagnostic procedures: minimal  Management options: minimal    Patient Progress  Patient progress: stable                       Patient Care Considerations:          Consultants/Shared Management Plan:    SHARED VISIT: I have discussed the case with my supervising physician, Dr. Cheney who states he agrees with current plan of care. The substantive portion of the medical decision was made by the attesting physician who made or approve the management plan and will take responsibility for the patient.  Clinical findings were discussed and ultimate disposition was made in consult with supervising physician.    Social Determinants of Health:    Patient is independent, reliable,  and has access to care.       Disposition and Care Coordination:    Discharged: The patient is suitable and stable for discharge with no need for consideration of admission.    I have explained the patient´s condition, diagnoses and treatment plan based on the information available to me at this time. I have answered questions and addressed any concerns. The patient has a good  understanding of the patient´s diagnosis, condition, and treatment plan as can be expected at this point. The vital signs have been stable. The patient´s condition is stable and appropriate for discharge from the emergency department.      The patient will pursue further outpatient evaluation with the primary care physician or other designated or consulting physician as outlined in the discharge instructions. They are agreeable to this plan of care and follow-up instructions have been explained in detail. The patient has received these instructions in written format and has expressed an understanding of the discharge instructions. The patient is aware that any significant change in condition or worsening of symptoms should prompt an immediate return to this or the closest emergency department or call to 911.    Final diagnoses:   Osteoarthritis of hand, unspecified laterality, unspecified osteoarthritis type        ED Disposition       ED Disposition   Discharge    Condition   Stable    Comment   --               This medical record created using voice recognition software.             Ava Patel, APRN  07/02/25 1940

## 2025-08-07 DIAGNOSIS — G47.00 INSOMNIA, UNSPECIFIED TYPE: ICD-10-CM

## 2025-08-08 ENCOUNTER — HOSPITAL ENCOUNTER (OUTPATIENT)
Dept: BONE DENSITY | Facility: HOSPITAL | Age: 80
Discharge: HOME | End: 2025-08-08
Payer: MEDICARE

## 2025-08-08 ENCOUNTER — HOSPITAL ENCOUNTER (OUTPATIENT)
Dept: RESPIRATORY THERAPY | Facility: HOSPITAL | Age: 80
Discharge: HOME | End: 2025-08-08
Payer: MEDICARE

## 2025-08-08 DIAGNOSIS — Z78.0 POSTMENOPAUSAL: ICD-10-CM

## 2025-08-08 DIAGNOSIS — Z72.0 TOBACCO ABUSE: ICD-10-CM

## 2025-08-08 DIAGNOSIS — J41.8 MIXED SIMPLE AND MUCOPURULENT CHRONIC BRONCHITIS: ICD-10-CM

## 2025-08-08 PROCEDURE — 94060 EVALUATION OF WHEEZING: CPT

## 2025-08-08 PROCEDURE — 94726 PLETHYSMOGRAPHY LUNG VOLUMES: CPT

## 2025-08-08 PROCEDURE — 94729 DIFFUSING CAPACITY: CPT

## 2025-08-08 PROCEDURE — 77080 DXA BONE DENSITY AXIAL: CPT

## 2025-08-08 RX ORDER — ALBUTEROL SULFATE 0.83 MG/ML
2.5 SOLUTION RESPIRATORY (INHALATION) ONCE
Status: COMPLETED | OUTPATIENT
Start: 2025-08-08 | End: 2025-08-08

## 2025-08-08 RX ADMIN — ALBUTEROL SULFATE 2.5 MG: 2.5 SOLUTION RESPIRATORY (INHALATION) at 14:49

## 2025-08-11 ENCOUNTER — HOSPITAL ENCOUNTER (OUTPATIENT)
Dept: CARDIOLOGY | Facility: HOSPITAL | Age: 80
Discharge: HOME | End: 2025-08-11
Payer: MEDICARE

## 2025-08-11 DIAGNOSIS — M79.606 ISCHEMIC REST PAIN OF LOWER EXTREMITY: ICD-10-CM

## 2025-08-11 DIAGNOSIS — I73.9 CLAUDICATION: ICD-10-CM

## 2025-08-11 DIAGNOSIS — I99.8 ISCHEMIC REST PAIN OF LOWER EXTREMITY: ICD-10-CM

## 2025-08-11 DIAGNOSIS — R09.89 OTHER SPECIFIED SYMPTOMS AND SIGNS INVOLVING THE CIRCULATORY AND RESPIRATORY SYSTEMS: ICD-10-CM

## 2025-08-11 LAB
BH CV GRAFT 1 - DISTAL ANASTAMOSIS PSV-RIGHT: 32.1 CM/S
BH CV GRAFT 1 - DISTAL GRAFT PSV-RIGHT: 42.8 CM/S
BH CV GRAFT 1 - INFLOW ARTERY PSV-RIGHT: 133.3 CM/S
BH CV GRAFT 1 - MID GRAFT PSV-RIGHT: 77.5 CM/S
BH CV GRAFT 1 - OUTFLOW ARTERY PSV-RIGHT: 45.9 CM/S
BH CV GRAFT 1 - PROX GRAFT PSV-RIGHT: 68.6 CM/S
BH CV GRAFT 1 - PROX MID GRAFT PSV-RIGHT: 61.9 CM/S
BH CV GRAFT 1 - PROXIMAL ANASTAMOSIS PSV-RIGHT: 175 CM/S
BH CV GRAFT 1- DISTAL ANASTAMOSIS EDV-RIGHT: 0 CM/S
BH CV GRAFT 1- DISTAL GRAFT EDV-RIGHT: 0 CM/S
BH CV GRAFT 1- INFLOW ARTERY EDV-RIGHT: 0 CM/S
BH CV GRAFT 1- MID GRAFT EDV-RIGHT: 0 CM/S
BH CV GRAFT 1- OUTFLOW ARTERY EDV-RIGHT: 6.9 CM/S
BH CV GRAFT 1- PROX GRAFT EDV-RIGHT: 0 CM/S
BH CV GRAFT 1- PROX MID GRAFT EDV-RIGHT: 0 CM/S
BH CV GRAFT 1- PROXIMAL ANASTAMOSIS EDV-RIGHT: 0 CM/S
BH CV GRAFT 2 - DISTAL ANASTAMOSIS PSV-RIGHT: 51.1 CM/S
BH CV GRAFT 2 - DISTAL GRAFT PSV-RIGHT: 43.5 CM/S
BH CV GRAFT 2 - OUTFLOW ARTERY PSV-RIGHT: 39.4 CM/S
BH CV GRAFT BRACHIAL PRESSURE LEFT: 130 MMHG
BH CV GRAFT BRACHIAL PRESSURE RIGHT: 121 MMHG
BH CV LEA LEFT ANT TIBIAL A DISTAL EDV: 0 CM/S
BH CV LEA LEFT ANT TIBIAL A DISTAL PSV: 17.3 CM/S
BH CV LEA LEFT ANT TIBIAL A MID EDV: 0 CM/S
BH CV LEA LEFT ANT TIBIAL A MID PSV: 23.3 CM/S
BH CV LEA LEFT ANT TIBIAL A PROX EDV: 0 CM/S
BH CV LEA LEFT ANT TIBIAL A PROX PSV: 41.1 CM/S
BH CV LEA LEFT CFA DISTAL EDV: 3.63 CM/S
BH CV LEA LEFT CFA DISTAL PSV: 48.05 CM/S
BH CV LEA LEFT DFA PROX EDV: 0 CM/S
BH CV LEA LEFT DFA PROX PSV: 138.91 CM/S
BH CV LEA LEFT DPA PRESSURE: 80 MMHG
BH CV LEA LEFT PERONEAL  DISTAL EDV: 0 CM/S
BH CV LEA LEFT PERONEAL  DISTAL PSV: 17.7 CM/S
BH CV LEA LEFT PERONEAL  MID EDV: 0 CM/S
BH CV LEA LEFT PERONEAL  MID PSV: 21.5 CM/S
BH CV LEA LEFT PERONEAL  PROX EDV: 0 CM/S
BH CV LEA LEFT PERONEAL  PROX PSV: 25 CM/S
BH CV LEA LEFT POPITEAL A  DISTAL EDV: 0 CM/S
BH CV LEA LEFT POPITEAL A  DISTAL PSV: 29 CM/S
BH CV LEA LEFT POPITEAL A  MID EDV: 0 CM/S
BH CV LEA LEFT POPITEAL A  MID PSV: 32.46 CM/S
BH CV LEA LEFT POPITEAL A  PROX EDV: 0 CM/S
BH CV LEA LEFT POPITEAL A  PROX PSV: 37.7 CM/S
BH CV LEA LEFT PTA DISTAL EDV: 0 CM/S
BH CV LEA LEFT PTA DISTAL PSV: 0 CM/S
BH CV LEA LEFT PTA MID EDV: 0 CM/S
BH CV LEA LEFT PTA MID PSV: 0 CM/S
BH CV LEA LEFT PTA PRESSURE: 85 MMHG
BH CV LEA LEFT PTA PROX EDV: 0 CM/S
BH CV LEA LEFT PTA PROX PSV: 27.1 CM/S
BH CV LEA LEFT SFA DISTAL EDV: 0 CM/S
BH CV LEA LEFT SFA DISTAL PSV: 72.3 CM/S
BH CV LEA LEFT SFA MID EDV: 0 CM/S
BH CV LEA LEFT SFA MID PSV: 0 CM/S
BH CV LEA LEFT SFA PROX EDV: 0 CM/S
BH CV LEA LEFT SFA PROX PSV: 0 CM/S
BH CV LEA LEFT TIBEOPERONEAL EDV: 0 CM/S
BH CV LEA LEFT TIBEOPERONEAL PSV: 20.79 CM/S
BH CV LEA RIGHT ANT TIBIAL A DISTAL EDV: 0 CM/S
BH CV LEA RIGHT ANT TIBIAL A DISTAL PSV: 9.3 CM/S
BH CV LEA RIGHT ANT TIBIAL A MID EDV: 0 CM/S
BH CV LEA RIGHT ANT TIBIAL A MID PSV: 0 CM/S
BH CV LEA RIGHT ANT TIBIAL A PROX EDV: 0 CM/S
BH CV LEA RIGHT ANT TIBIAL A PROX PSV: 20.1 CM/S
BH CV LEA RIGHT CFA DISTAL EDV: 0 CM/S
BH CV LEA RIGHT CFA DISTAL PSV: 0 CM/S
BH CV LEA RIGHT DFA PROX EDV: 0 CM/S
BH CV LEA RIGHT DFA PROX PSV: 86.17 CM/S
BH CV LEA RIGHT DPA PRESSURE: 92 MMHG
BH CV LEA RIGHT PERONEAL  DISTAL EDV: 0 CM/S
BH CV LEA RIGHT PERONEAL  DISTAL PSV: 40 CM/S
BH CV LEA RIGHT PERONEAL  MID EDV: 0 CM/S
BH CV LEA RIGHT PERONEAL  MID PSV: 23.8 CM/S
BH CV LEA RIGHT PERONEAL  PROX EDV: 0 CM/S
BH CV LEA RIGHT PERONEAL  PROX PSV: 18 CM/S
BH CV LEA RIGHT POPITEAL A  DISTAL EDV: 0 CM/S
BH CV LEA RIGHT POPITEAL A  DISTAL PSV: 24.4 CM/S
BH CV LEA RIGHT POPITEAL A  MID EDV: 0 CM/S
BH CV LEA RIGHT POPITEAL A  MID PSV: 46.97 CM/S
BH CV LEA RIGHT POPITEAL A  PROX EDV: 0 CM/S
BH CV LEA RIGHT POPITEAL A  PROX PSV: 16.6 CM/S
BH CV LEA RIGHT PTA DISTAL EDV: 0 CM/S
BH CV LEA RIGHT PTA DISTAL PSV: 16.7 CM/S
BH CV LEA RIGHT PTA MID EDV: 0 CM/S
BH CV LEA RIGHT PTA MID PSV: 20.9 CM/S
BH CV LEA RIGHT PTA PRESSURE: 94 MMHG
BH CV LEA RIGHT PTA PROX EDV: 0 CM/S
BH CV LEA RIGHT PTA PROX PSV: 11.4 CM/S
BH CV LEA RIGHT SFA DISTAL EDV: 0 CM/S
BH CV LEA RIGHT SFA DISTAL PSV: 3.8 CM/S
BH CV LEA RIGHT SFA MID EDV: 0 CM/S
BH CV LEA RIGHT SFA MID PSV: 0 CM/S
BH CV LEA RIGHT SFA PROX EDV: 0 CM/S
BH CV LEA RIGHT SFA PROX PSV: 0 CM/S
BH CV LEA RIGHT TIBEOPERONEAL EDV: 0 CM/S
BH CV LEA RIGHT TIBEOPERONEAL PSV: 21.87 CM/S
BH CV LOWER ARTERIAL LEFT ABI RATIO: 0.65
BH CV LOWER ARTERIAL RIGHT ABI RATIO: 0.72
BH CV UPPER DUPLEX RIGHT SUBCLAVIAN ART EDV: 0 CM/S
BH CV UPPER DUPLEX RIGHT SUBCLAVIAN ART PSV: 133.3 CM/S
BH CV VAS LEA RIGHT HIDDEN GRAFT ALERT 2: 44
BH CV VAS LEA RIGHT HIDDEN GRAFT ALERT: 11
LEFT GROIN CFA SYS: 0 CM/SEC
Lab: 0 CM/S
Lab: 41.5 CM/S
Lab: 6.9 CM/S
RIGHT GROIN CFA SYS: 0 CM/SEC

## 2025-08-11 PROCEDURE — 93925 LOWER EXTREMITY STUDY: CPT

## 2025-08-11 PROCEDURE — 93931 UPPER EXTREMITY STUDY: CPT

## 2025-08-11 PROCEDURE — 93931 UPPER EXTREMITY STUDY: CPT | Performed by: SURGERY

## 2025-08-11 PROCEDURE — 93925 LOWER EXTREMITY STUDY: CPT | Performed by: SURGERY

## 2025-08-18 DIAGNOSIS — R11.0 NAUSEA: ICD-10-CM

## 2025-08-18 DIAGNOSIS — L30.9 DERMATITIS: ICD-10-CM

## 2025-08-18 DIAGNOSIS — R19.7 DIARRHEA, UNSPECIFIED TYPE: ICD-10-CM

## 2025-08-18 RX ORDER — DIPHENOXYLATE HYDROCHLORIDE AND ATROPINE SULFATE 2.5; .025 MG/1; MG/1
TABLET ORAL
Qty: 60 TABLET | Refills: 0 | Status: SHIPPED | OUTPATIENT
Start: 2025-08-18

## 2025-08-18 RX ORDER — TRIAMCINOLONE ACETONIDE 0.25 MG/G
OINTMENT TOPICAL
Qty: 15 G | Refills: 1 | Status: SHIPPED | OUTPATIENT
Start: 2025-08-18

## 2025-08-18 RX ORDER — ONDANSETRON 4 MG/1
TABLET, FILM COATED ORAL
Qty: 90 TABLET | Refills: 1 | Status: SHIPPED | OUTPATIENT
Start: 2025-08-18

## (undated) DEVICE — SPNG GZ WOVN COTN 8PLY 4X4 NS LF NS

## (undated) DEVICE — Device

## (undated) DEVICE — GLV SURG BIOGEL LTX PF 7 1/2

## (undated) DEVICE — VAGINAL PREP TRAY: Brand: MEDLINE INDUSTRIES, INC.

## (undated) DEVICE — 3M™ IOBAN™ 2 ANTIMICROBIAL INCISE DRAPE 6650EZ: Brand: IOBAN™ 2

## (undated) DEVICE — TOWEL,OR,DSP,ST,BLUE,STD,4/PK,20PK/CS: Brand: MEDLINE

## (undated) DEVICE — MAJOR-LF: Brand: MEDLINE INDUSTRIES, INC.

## (undated) DEVICE — THE SINGLE USE ETRAP – POLYP TRAP IS USED FOR SUCTION RETRIEVAL OF ENDOSCOPICALLY REMOVED POLYPS.: Brand: ETRAP

## (undated) DEVICE — SUT PROLN 6/0 C1 D/A 30IN 8706H

## (undated) DEVICE — PAD GRND REM POLYHESIVE A/ DISP

## (undated) DEVICE — GLV SURG SENSICARE PI ORTHO SZ6.5 LF STRL

## (undated) DEVICE — MARKR SKIN W/RULR AND LBL

## (undated) DEVICE — SOLIDIFIER LIQLOC PLS 1500CC BT

## (undated) DEVICE — DRSNG SURESITE WNDW 2.38X2.75

## (undated) DEVICE — ANTIBACTERIAL UNDYED BRAIDED (POLYGLACTIN 910), SYNTHETIC ABSORBABLE SUTURE: Brand: COATED VICRYL

## (undated) DEVICE — ABDOMINAL VASCULAR-LF: Brand: MEDLINE INDUSTRIES, INC.

## (undated) DEVICE — BLANKT WARM PACU MISTRAL/AIR PREM REFL A/ 85.8X50IN

## (undated) DEVICE — GLV SURG SENSICARE PI ORTHO SZ7.5 LF STRL

## (undated) DEVICE — CONN JET HYDRA H20 AUXILIARY DISP

## (undated) DEVICE — BNDG ELAS CO-FLEX SLF ADHR 4IN5YD LF STRL

## (undated) DEVICE — STERILE POLYISOPRENE POWDER-FREE SURGICAL GLOVES WITH EMOLLIENT COATING: Brand: PROTEXIS

## (undated) DEVICE — ADHS SKIN SURG TISS VISC PREMIERPRO EXOFIN HI/VISC FAST/DRY

## (undated) DEVICE — 3M™ IOBAN™ 2 ANTIMICROBIAL INCISE DRAPE 6651EZ: Brand: IOBAN™ 2

## (undated) DEVICE — GAMMEX® NON-LATEX SIZE 7.5, STERILE NEOPRENE POWDER-FREE SURGICAL GLOVE: Brand: GAMMEX

## (undated) DEVICE — 1010 S-DRAPE TOWEL DRAPE 10/BX: Brand: STERI-DRAPE™

## (undated) DEVICE — TOTAL TRAY, 16FR 10ML SIL FOLEY, URN: Brand: MEDLINE

## (undated) DEVICE — SUT PROLN 5/0 C1 DA 24IN 8725H

## (undated) DEVICE — SUT VIC 3/0 SH 27IN J416H

## (undated) DEVICE — STERILE POLYISOPRENE POWDER-FREE SURGICAL GLOVES: Brand: PROTEXIS

## (undated) DEVICE — Device: Brand: DEFENDO AIR/WATER/SUCTION AND BIOPSY VALVE

## (undated) DEVICE — LINER SURG CANSTR SXN S/RIGD 1500CC

## (undated) DEVICE — SLV SCD KN/LEN ADJ EXPRSS BLENDED MD 1P/U

## (undated) DEVICE — LARGE (BLUE) FOR GRAFTS UP TO 10 MM, 20 1/2" / 52 CM (1/PKG): Brand: SCANLAN® VASCULAR TUNNELER SHEATHS AND BULLET TIPS

## (undated) DEVICE — SUT MNCRYL PLS ANTIB UD 4/0 PS2 18IN

## (undated) DEVICE — SOL IRRG H2O PL/BG 1000ML STRL

## (undated) DEVICE — SYR LL TP 10ML STRL

## (undated) DEVICE — SNAR POLYP CAPTIFLEX XS/OVL 11X2.4MM 240CM 1P/U

## (undated) DEVICE — NDL SPINAL ACCUTARG QUINCKE BK/BVL 22G 5.5IN

## (undated) DEVICE — C-ARM DRAPE  44 X 77 GUSSET FIT: Brand: OPTICS ONE

## (undated) DEVICE — CLAMP INSERT: Brand: STEALTH® CLAMP INSERT

## (undated) DEVICE — PREVENA DUO PEEL & PLACE SYSTEM KIT- 13 CM: Brand: PREVENA DUO™ PEEL & PLACE™

## (undated) DEVICE — PROXIMATE RH ROTATING HEAD SKIN STAPLERS (35 WIDE) CONTAINS 35 STAINLESS STEEL STAPLES: Brand: PROXIMATE

## (undated) DEVICE — SUT SILK 2/0 TIES 18IN A185H

## (undated) DEVICE — SUT SILK 3/0 TIES 18IN A184H

## (undated) DEVICE — STCKNT IMPERV 9X36IN STRL

## (undated) DEVICE — 3M™ STERI-STRIP™ REINFORCED ADHESIVE SKIN CLOSURES, R1547, 1/2 IN X 4 IN (12 MM X 100 MM), 6 STRIPS/ENVELOPE: Brand: 3M™ STERI-STRIP™

## (undated) DEVICE — PENCL SMOKE/EVAC MEGADYNE TELESCP 10FT

## (undated) DEVICE — INTENDED FOR TISSUE SEPARATION, AND OTHER PROCEDURES THAT REQUIRE A SHARP SURGICAL BLADE TO PUNCTURE OR CUT.: Brand: BARD-PARKER ® CARBON RIB-BACK BLADES

## (undated) DEVICE — NON-ADHERENT PADS PREPACK: Brand: TELFA

## (undated) DEVICE — DRP SURG UNIV BASIC BILAMINATE 70X85IN DISP STRL